# Patient Record
Sex: FEMALE | Race: WHITE | NOT HISPANIC OR LATINO | Employment: OTHER | URBAN - METROPOLITAN AREA
[De-identification: names, ages, dates, MRNs, and addresses within clinical notes are randomized per-mention and may not be internally consistent; named-entity substitution may affect disease eponyms.]

---

## 2017-01-13 ENCOUNTER — TRANSCRIBE ORDERS (OUTPATIENT)
Dept: LAB | Facility: CLINIC | Age: 62
End: 2017-01-13

## 2017-01-13 ENCOUNTER — APPOINTMENT (OUTPATIENT)
Dept: LAB | Facility: CLINIC | Age: 62
End: 2017-01-13
Payer: COMMERCIAL

## 2017-01-13 DIAGNOSIS — M06.9 RHEUMATOID ARTHRITIS, INVOLVING UNSPECIFIED SITE, UNSPECIFIED RHEUMATOID FACTOR PRESENCE: Primary | ICD-10-CM

## 2017-01-13 DIAGNOSIS — R73.09 OTHER ABNORMAL GLUCOSE: ICD-10-CM

## 2017-01-13 LAB
ALBUMIN SERPL BCP-MCNC: 3.6 G/DL (ref 3.5–5)
ALP SERPL-CCNC: 104 U/L (ref 46–116)
ALT SERPL W P-5'-P-CCNC: 76 U/L (ref 12–78)
ANION GAP SERPL CALCULATED.3IONS-SCNC: 7 MMOL/L (ref 4–13)
AST SERPL W P-5'-P-CCNC: 35 U/L (ref 5–45)
BILIRUB SERPL-MCNC: 0.54 MG/DL (ref 0.2–1)
BILIRUB UR QL STRIP: NEGATIVE
BUN SERPL-MCNC: 18 MG/DL (ref 5–25)
CALCIUM SERPL-MCNC: 9.2 MG/DL (ref 8.3–10.1)
CHLORIDE SERPL-SCNC: 102 MMOL/L (ref 100–108)
CLARITY UR: CLEAR
CO2 SERPL-SCNC: 27 MMOL/L (ref 21–32)
COLOR UR: YELLOW
CREAT SERPL-MCNC: 0.74 MG/DL (ref 0.6–1.3)
CRP SERPL QL: 10.6 MG/L
ERYTHROCYTE [DISTWIDTH] IN BLOOD BY AUTOMATED COUNT: 13.9 % (ref 11.6–15.1)
ERYTHROCYTE [SEDIMENTATION RATE] IN BLOOD: 49 MM/HOUR (ref 0–20)
GFR SERPL CREATININE-BSD FRML MDRD: >60 ML/MIN/1.73SQ M
GLUCOSE SERPL-MCNC: 207 MG/DL (ref 65–140)
GLUCOSE UR STRIP-MCNC: NEGATIVE MG/DL
HCT VFR BLD AUTO: 38.3 % (ref 34.8–46.1)
HGB BLD-MCNC: 12.8 G/DL (ref 11.5–15.4)
HGB UR QL STRIP.AUTO: NEGATIVE
KETONES UR STRIP-MCNC: NEGATIVE MG/DL
LEUKOCYTE ESTERASE UR QL STRIP: NEGATIVE
MCH RBC QN AUTO: 30.6 PG (ref 26.8–34.3)
MCHC RBC AUTO-ENTMCNC: 33.4 G/DL (ref 31.4–37.4)
MCV RBC AUTO: 92 FL (ref 82–98)
NITRITE UR QL STRIP: NEGATIVE
PH UR STRIP.AUTO: 6.5 [PH] (ref 4.5–8)
PLATELET # BLD AUTO: 281 THOUSANDS/UL (ref 149–390)
PMV BLD AUTO: 10.5 FL (ref 8.9–12.7)
POTASSIUM SERPL-SCNC: 4.1 MMOL/L (ref 3.5–5.3)
PROT SERPL-MCNC: 7.8 G/DL (ref 6.4–8.2)
PROT UR STRIP-MCNC: NEGATIVE MG/DL
RBC # BLD AUTO: 4.18 MILLION/UL (ref 3.81–5.12)
SODIUM SERPL-SCNC: 136 MMOL/L (ref 136–145)
SP GR UR STRIP.AUTO: 1.02 (ref 1–1.03)
UROBILINOGEN UR QL STRIP.AUTO: 0.2 E.U./DL
WBC # BLD AUTO: 7.29 THOUSAND/UL (ref 4.31–10.16)

## 2017-01-13 PROCEDURE — 85027 COMPLETE CBC AUTOMATED: CPT

## 2017-01-13 PROCEDURE — 80053 COMPREHEN METABOLIC PANEL: CPT

## 2017-01-13 PROCEDURE — 85652 RBC SED RATE AUTOMATED: CPT

## 2017-01-13 PROCEDURE — 86140 C-REACTIVE PROTEIN: CPT

## 2017-01-13 PROCEDURE — 81003 URINALYSIS AUTO W/O SCOPE: CPT

## 2017-01-13 PROCEDURE — 36415 COLL VENOUS BLD VENIPUNCTURE: CPT

## 2017-01-25 ENCOUNTER — ALLSCRIPTS OFFICE VISIT (OUTPATIENT)
Dept: OTHER | Facility: OTHER | Age: 62
End: 2017-01-25

## 2017-02-06 ENCOUNTER — ALLSCRIPTS OFFICE VISIT (OUTPATIENT)
Dept: OTHER | Facility: OTHER | Age: 62
End: 2017-02-06

## 2017-02-08 DIAGNOSIS — E11.9 TYPE 2 DIABETES MELLITUS WITHOUT COMPLICATIONS (HCC): ICD-10-CM

## 2017-02-16 ENCOUNTER — APPOINTMENT (OUTPATIENT)
Dept: LAB | Facility: CLINIC | Age: 62
End: 2017-02-16
Payer: COMMERCIAL

## 2017-02-16 DIAGNOSIS — E11.9 TYPE 2 DIABETES MELLITUS WITHOUT COMPLICATIONS (HCC): ICD-10-CM

## 2017-02-16 LAB
ALBUMIN SERPL BCP-MCNC: 3.8 G/DL (ref 3.5–5)
ALP SERPL-CCNC: 121 U/L (ref 46–116)
ALT SERPL W P-5'-P-CCNC: 77 U/L (ref 12–78)
ANION GAP SERPL CALCULATED.3IONS-SCNC: 9 MMOL/L (ref 4–13)
AST SERPL W P-5'-P-CCNC: 36 U/L (ref 5–45)
BILIRUB SERPL-MCNC: 0.69 MG/DL (ref 0.2–1)
BUN SERPL-MCNC: 23 MG/DL (ref 5–25)
CALCIUM SERPL-MCNC: 9.2 MG/DL (ref 8.3–10.1)
CHLORIDE SERPL-SCNC: 98 MMOL/L (ref 100–108)
CO2 SERPL-SCNC: 27 MMOL/L (ref 21–32)
CREAT SERPL-MCNC: 0.84 MG/DL (ref 0.6–1.3)
EST. AVERAGE GLUCOSE BLD GHB EST-MCNC: 194 MG/DL
GFR SERPL CREATININE-BSD FRML MDRD: >60 ML/MIN/1.73SQ M
GLUCOSE SERPL-MCNC: 297 MG/DL (ref 65–140)
HBA1C MFR BLD: 8.4 % (ref 4.2–6.3)
POTASSIUM SERPL-SCNC: 4.1 MMOL/L (ref 3.5–5.3)
PROT SERPL-MCNC: 8.2 G/DL (ref 6.4–8.2)
SODIUM SERPL-SCNC: 134 MMOL/L (ref 136–145)

## 2017-02-16 PROCEDURE — 80053 COMPREHEN METABOLIC PANEL: CPT

## 2017-02-16 PROCEDURE — 36415 COLL VENOUS BLD VENIPUNCTURE: CPT

## 2017-02-16 PROCEDURE — 83036 HEMOGLOBIN GLYCOSYLATED A1C: CPT

## 2017-02-28 ENCOUNTER — ALLSCRIPTS OFFICE VISIT (OUTPATIENT)
Dept: OTHER | Facility: OTHER | Age: 62
End: 2017-02-28

## 2017-03-05 ENCOUNTER — HOSPITAL ENCOUNTER (EMERGENCY)
Facility: HOSPITAL | Age: 62
Discharge: HOME | End: 2017-03-05
Attending: EMERGENCY MEDICINE
Payer: COMMERCIAL

## 2017-03-05 VITALS
DIASTOLIC BLOOD PRESSURE: 72 MMHG | OXYGEN SATURATION: 95 % | HEART RATE: 89 BPM | HEIGHT: 63 IN | SYSTOLIC BLOOD PRESSURE: 160 MMHG | RESPIRATION RATE: 18 BRPM | WEIGHT: 238.54 LBS | BODY MASS INDEX: 42.27 KG/M2

## 2017-03-05 DIAGNOSIS — R73.9 HYPERGLYCEMIA: Primary | ICD-10-CM

## 2017-03-05 LAB
ACETONE SERPL-MCNC: ABNORMAL MG/DL
ALBUMIN SERPL BCP-MCNC: 3.5 G/DL (ref 3.5–5)
ALP SERPL-CCNC: 131 U/L (ref 46–116)
ALT SERPL W P-5'-P-CCNC: 75 U/L (ref 12–78)
ANION GAP SERPL CALCULATED.3IONS-SCNC: 7 MMOL/L (ref 4–13)
AST SERPL W P-5'-P-CCNC: 37 U/L (ref 5–45)
BASOPHILS # BLD AUTO: 0.02 THOUSANDS/ΜL (ref 0–0.1)
BASOPHILS NFR BLD AUTO: 0 % (ref 0–1)
BILIRUB SERPL-MCNC: 0.3 MG/DL (ref 0.2–1)
BUN SERPL-MCNC: 20 MG/DL (ref 5–25)
CALCIUM SERPL-MCNC: 9.1 MG/DL (ref 8.3–10.1)
CHLORIDE SERPL-SCNC: 97 MMOL/L (ref 100–108)
CLARITY, POC: CLEAR
CO2 SERPL-SCNC: 28 MMOL/L (ref 21–32)
COLOR, POC: YELLOW
CREAT SERPL-MCNC: 0.76 MG/DL (ref 0.6–1.3)
EOSINOPHIL # BLD AUTO: 0.21 THOUSAND/ΜL (ref 0–0.61)
EOSINOPHIL NFR BLD AUTO: 3 % (ref 0–6)
ERYTHROCYTE [DISTWIDTH] IN BLOOD BY AUTOMATED COUNT: 12.7 % (ref 11.6–15.1)
EST. AVERAGE GLUCOSE BLD GHB EST-MCNC: 220 MG/DL
EXT BILIRUBIN, UA: NEGATIVE
EXT BLOOD URINE: NEGATIVE
EXT GLUCOSE, UA: NORMAL
EXT KETONES: NORMAL
EXT NITRITE, UA: NEGATIVE
EXT PH, UA: 6
EXT PROTEIN, UA: NEGATIVE
EXT SPECIFIC GRAVITY, UA: 1.01
EXT UROBILINOGEN: 0.2
GFR SERPL CREATININE-BSD FRML MDRD: >60 ML/MIN/1.73SQ M
GLUCOSE SERPL-MCNC: 295 MG/DL (ref 65–140)
GLUCOSE SERPL-MCNC: 342 MG/DL (ref 65–140)
HBA1C MFR BLD: 9.3 % (ref 4.2–6.3)
HCT VFR BLD AUTO: 39.3 % (ref 34.8–46.1)
HGB BLD-MCNC: 13.1 G/DL (ref 11.5–15.4)
LYMPHOCYTES # BLD AUTO: 1.8 THOUSANDS/ΜL (ref 0.6–4.47)
LYMPHOCYTES NFR BLD AUTO: 29 % (ref 14–44)
MCH RBC QN AUTO: 29.6 PG (ref 26.8–34.3)
MCHC RBC AUTO-ENTMCNC: 33.3 G/DL (ref 31.4–37.4)
MCV RBC AUTO: 89 FL (ref 82–98)
MONOCYTES # BLD AUTO: 0.91 THOUSAND/ΜL (ref 0.17–1.22)
MONOCYTES NFR BLD AUTO: 15 % (ref 4–12)
NEUTROPHILS # BLD AUTO: 3.27 THOUSANDS/ΜL (ref 1.85–7.62)
NEUTS SEG NFR BLD AUTO: 53 % (ref 43–75)
PLATELET # BLD AUTO: 261 THOUSANDS/UL (ref 149–390)
PMV BLD AUTO: 10 FL (ref 8.9–12.7)
POTASSIUM SERPL-SCNC: 4.2 MMOL/L (ref 3.5–5.3)
PROT SERPL-MCNC: 7.4 G/DL (ref 6.4–8.2)
RBC # BLD AUTO: 4.43 MILLION/UL (ref 3.81–5.12)
SODIUM SERPL-SCNC: 132 MMOL/L (ref 136–145)
T4 FREE SERPL-MCNC: 1.2 NG/DL (ref 0.76–1.46)
TSH SERPL DL<=0.05 MIU/L-ACNC: 3.8 UIU/ML (ref 0.36–3.74)
WBC # BLD AUTO: 6.21 THOUSAND/UL (ref 4.31–10.16)
WBC # BLD EST: NEGATIVE 10*3/UL

## 2017-03-05 PROCEDURE — 85025 COMPLETE CBC W/AUTO DIFF WBC: CPT | Performed by: PHYSICIAN ASSISTANT

## 2017-03-05 PROCEDURE — 84443 ASSAY THYROID STIM HORMONE: CPT | Performed by: PHYSICIAN ASSISTANT

## 2017-03-05 PROCEDURE — 82948 REAGENT STRIP/BLOOD GLUCOSE: CPT

## 2017-03-05 PROCEDURE — 82009 KETONE BODYS QUAL: CPT | Performed by: PHYSICIAN ASSISTANT

## 2017-03-05 PROCEDURE — 96360 HYDRATION IV INFUSION INIT: CPT

## 2017-03-05 PROCEDURE — 83036 HEMOGLOBIN GLYCOSYLATED A1C: CPT | Performed by: PHYSICIAN ASSISTANT

## 2017-03-05 PROCEDURE — 84480 ASSAY TRIIODOTHYRONINE (T3): CPT | Performed by: PHYSICIAN ASSISTANT

## 2017-03-05 PROCEDURE — 80053 COMPREHEN METABOLIC PANEL: CPT | Performed by: PHYSICIAN ASSISTANT

## 2017-03-05 PROCEDURE — 36415 COLL VENOUS BLD VENIPUNCTURE: CPT | Performed by: PHYSICIAN ASSISTANT

## 2017-03-05 PROCEDURE — 81002 URINALYSIS NONAUTO W/O SCOPE: CPT | Performed by: PHYSICIAN ASSISTANT

## 2017-03-05 PROCEDURE — 84439 ASSAY OF FREE THYROXINE: CPT | Performed by: PHYSICIAN ASSISTANT

## 2017-03-05 PROCEDURE — 99285 EMERGENCY DEPT VISIT HI MDM: CPT

## 2017-03-05 RX ORDER — CHOLECALCIFEROL (VITAMIN D3) 50 MCG
2000 TABLET ORAL DAILY
COMMUNITY

## 2017-03-05 RX ORDER — PHENOL 1.4 %
400 AEROSOL, SPRAY (ML) MUCOUS MEMBRANE DAILY
COMMUNITY

## 2017-03-05 RX ORDER — SACCHAROMYCES BOULARDII 250 MG
CAPSULE ORAL DAILY
COMMUNITY
End: 2018-06-05 | Stop reason: SDUPTHER

## 2017-03-05 RX ORDER — UREA 10 %
800 LOTION (ML) TOPICAL DAILY
COMMUNITY

## 2017-03-05 RX ORDER — GARLIC 180 MG
40 TABLET, DELAYED RELEASE (ENTERIC COATED) ORAL DAILY
COMMUNITY
End: 2021-02-12 | Stop reason: ALTCHOICE

## 2017-03-05 RX ORDER — VALSARTAN 160 MG/1
160 TABLET ORAL DAILY
COMMUNITY
End: 2018-02-08 | Stop reason: SDUPTHER

## 2017-03-05 RX ORDER — MULTIVITAMIN
1 TABLET ORAL DAILY
COMMUNITY

## 2017-03-05 RX ORDER — OMEPRAZOLE 20 MG/1
20 CAPSULE, DELAYED RELEASE ORAL DAILY
COMMUNITY

## 2017-03-05 RX ORDER — METFORMIN HYDROCHLORIDE 1000 MG/1
1000 TABLET, FILM COATED, EXTENDED RELEASE ORAL 2 TIMES DAILY WITH MEALS
COMMUNITY

## 2017-03-05 RX ORDER — OMEGA-3-ACID ETHYL ESTERS 1 G/1
1000 CAPSULE, LIQUID FILLED ORAL DAILY
COMMUNITY
End: 2022-07-18 | Stop reason: ALTCHOICE

## 2017-03-05 RX ADMIN — SODIUM CHLORIDE 1000 ML: 0.9 INJECTION, SOLUTION INTRAVENOUS at 20:31

## 2017-03-06 ENCOUNTER — ALLSCRIPTS OFFICE VISIT (OUTPATIENT)
Dept: OTHER | Facility: OTHER | Age: 62
End: 2017-03-06

## 2017-03-06 LAB — T3 SERPL-MCNC: 1.2 NG/ML (ref 0.6–1.8)

## 2017-03-09 ENCOUNTER — ALLSCRIPTS OFFICE VISIT (OUTPATIENT)
Dept: OTHER | Facility: OTHER | Age: 62
End: 2017-03-09

## 2017-03-12 LAB — GLUCOSE SERPL-MCNC: 282 MG/DL (ref 65–140)

## 2017-03-21 ENCOUNTER — HOSPITAL ENCOUNTER (OUTPATIENT)
Dept: RADIOLOGY | Age: 62
Discharge: HOME | End: 2017-03-21
Payer: COMMERCIAL

## 2017-03-21 ENCOUNTER — TRANSCRIBE ORDERS (OUTPATIENT)
Dept: ADMINISTRATIVE | Age: 62
End: 2017-03-21

## 2017-03-21 PROCEDURE — 74000 HB X-RAY EXAM OF ABDOMEN (SINGLE ANTEROPOSTERIOR VIEW): CPT

## 2017-04-01 ENCOUNTER — HOSPITAL ENCOUNTER (OUTPATIENT)
Dept: RADIOLOGY | Age: 62
Discharge: HOME | End: 2017-04-01
Payer: COMMERCIAL

## 2017-04-01 DIAGNOSIS — N20.0 CALCULUS OF KIDNEY: ICD-10-CM

## 2017-04-03 ENCOUNTER — ALLSCRIPTS OFFICE VISIT (OUTPATIENT)
Dept: OTHER | Facility: OTHER | Age: 62
End: 2017-04-03

## 2017-04-03 ENCOUNTER — LAB REQUISITION (OUTPATIENT)
Dept: LAB | Facility: HOSPITAL | Age: 62
End: 2017-04-03
Payer: COMMERCIAL

## 2017-04-03 DIAGNOSIS — R35.0 FREQUENCY OF MICTURITION: ICD-10-CM

## 2017-04-03 LAB
BILIRUB UR QL STRIP: NEGATIVE
CLARITY UR: NORMAL
COLOR UR: YELLOW
GLUCOSE (HISTORICAL): NEGATIVE
HGB UR QL STRIP.AUTO: NEGATIVE
KETONES UR STRIP-MCNC: NEGATIVE MG/DL
LEUKOCYTE ESTERASE UR QL STRIP: NEGATIVE
NITRITE UR QL STRIP: NEGATIVE
PH UR STRIP.AUTO: 7 [PH]
PROT UR STRIP-MCNC: 15 MG/DL
SP GR UR STRIP.AUTO: 1.01
UROBILINOGEN UR QL STRIP.AUTO: 0.2

## 2017-04-03 PROCEDURE — 87086 URINE CULTURE/COLONY COUNT: CPT | Performed by: INTERNAL MEDICINE

## 2017-04-04 LAB — BACTERIA UR CULT: NORMAL

## 2017-04-05 ENCOUNTER — GENERIC CONVERSION - ENCOUNTER (OUTPATIENT)
Dept: OTHER | Facility: OTHER | Age: 62
End: 2017-04-05

## 2017-04-10 ENCOUNTER — GENERIC CONVERSION - ENCOUNTER (OUTPATIENT)
Dept: OTHER | Facility: OTHER | Age: 62
End: 2017-04-10

## 2017-04-12 ENCOUNTER — GENERIC CONVERSION - ENCOUNTER (OUTPATIENT)
Dept: OTHER | Facility: OTHER | Age: 62
End: 2017-04-12

## 2017-04-13 ENCOUNTER — APPOINTMENT (OUTPATIENT)
Dept: LAB | Facility: MEDICAL CENTER | Age: 62
End: 2017-04-13
Payer: COMMERCIAL

## 2017-04-13 ENCOUNTER — TRANSCRIBE ORDERS (OUTPATIENT)
Dept: ADMINISTRATIVE | Facility: HOSPITAL | Age: 62
End: 2017-04-13

## 2017-04-13 DIAGNOSIS — E11.9 TYPE 2 DIABETES MELLITUS WITHOUT COMPLICATION, UNSPECIFIED LONG TERM INSULIN USE STATUS: ICD-10-CM

## 2017-04-13 DIAGNOSIS — R94.6 NONSPECIFIC ABNORMAL RESULTS OF THYROID FUNCTION STUDY: ICD-10-CM

## 2017-04-13 DIAGNOSIS — E11.9 TYPE 2 DIABETES MELLITUS WITHOUT COMPLICATION, UNSPECIFIED LONG TERM INSULIN USE STATUS: Primary | ICD-10-CM

## 2017-04-13 DIAGNOSIS — R79.89 OTHER ABNORMAL BLOOD CHEMISTRY: ICD-10-CM

## 2017-04-13 LAB
ALBUMIN SERPL BCP-MCNC: 3.5 G/DL (ref 3.5–5)
ALP SERPL-CCNC: 113 U/L (ref 46–116)
ALT SERPL W P-5'-P-CCNC: 62 U/L (ref 12–78)
AST SERPL W P-5'-P-CCNC: 30 U/L (ref 5–45)
BILIRUB DIRECT SERPL-MCNC: 0.13 MG/DL (ref 0–0.2)
BILIRUB SERPL-MCNC: 0.45 MG/DL (ref 0.2–1)
CHOLEST SERPL-MCNC: 245 MG/DL (ref 50–200)
GLUCOSE P FAST SERPL-MCNC: 186 MG/DL (ref 65–99)
HDLC SERPL-MCNC: 82 MG/DL (ref 40–60)
LDLC SERPL CALC-MCNC: 134 MG/DL (ref 0–100)
PROT SERPL-MCNC: 7.6 G/DL (ref 6.4–8.2)
TRIGL SERPL-MCNC: 143 MG/DL
TSH SERPL DL<=0.05 MIU/L-ACNC: 3.03 UIU/ML (ref 0.36–3.74)

## 2017-04-13 PROCEDURE — 80076 HEPATIC FUNCTION PANEL: CPT

## 2017-04-13 PROCEDURE — 84443 ASSAY THYROID STIM HORMONE: CPT

## 2017-04-13 PROCEDURE — 84681 ASSAY OF C-PEPTIDE: CPT

## 2017-04-13 PROCEDURE — 83519 RIA NONANTIBODY: CPT

## 2017-04-13 PROCEDURE — 80061 LIPID PANEL: CPT

## 2017-04-13 PROCEDURE — 82947 ASSAY GLUCOSE BLOOD QUANT: CPT

## 2017-04-13 PROCEDURE — 36415 COLL VENOUS BLD VENIPUNCTURE: CPT

## 2017-04-14 LAB — C PEPTIDE SERPL-MCNC: 2.3 NG/ML (ref 1.1–4.4)

## 2017-04-17 LAB — GAD65 AB SER-ACNC: <5 U/ML (ref 0–5)

## 2017-05-02 ENCOUNTER — ALLSCRIPTS OFFICE VISIT (OUTPATIENT)
Dept: OTHER | Facility: OTHER | Age: 62
End: 2017-05-02

## 2017-05-16 ENCOUNTER — GENERIC CONVERSION - ENCOUNTER (OUTPATIENT)
Dept: OTHER | Facility: OTHER | Age: 62
End: 2017-05-16

## 2017-05-24 ENCOUNTER — ALLSCRIPTS OFFICE VISIT (OUTPATIENT)
Dept: OTHER | Facility: OTHER | Age: 62
End: 2017-05-24

## 2017-06-05 ENCOUNTER — GENERIC CONVERSION - ENCOUNTER (OUTPATIENT)
Dept: OTHER | Facility: OTHER | Age: 62
End: 2017-06-05

## 2017-06-14 ENCOUNTER — ALLSCRIPTS OFFICE VISIT (OUTPATIENT)
Dept: OTHER | Facility: OTHER | Age: 62
End: 2017-06-14

## 2017-07-18 ENCOUNTER — ALLSCRIPTS OFFICE VISIT (OUTPATIENT)
Dept: OTHER | Facility: OTHER | Age: 62
End: 2017-07-18

## 2017-07-19 ENCOUNTER — GENERIC CONVERSION - ENCOUNTER (OUTPATIENT)
Dept: OTHER | Facility: OTHER | Age: 62
End: 2017-07-19

## 2017-07-26 ENCOUNTER — GENERIC CONVERSION - ENCOUNTER (OUTPATIENT)
Dept: OTHER | Facility: OTHER | Age: 62
End: 2017-07-26

## 2017-07-27 ENCOUNTER — GENERIC CONVERSION - ENCOUNTER (OUTPATIENT)
Dept: OTHER | Facility: OTHER | Age: 62
End: 2017-07-27

## 2017-07-27 ENCOUNTER — LAB REQUISITION (OUTPATIENT)
Dept: LAB | Facility: HOSPITAL | Age: 62
End: 2017-07-27
Payer: COMMERCIAL

## 2017-07-27 DIAGNOSIS — Z11.51 ENCOUNTER FOR SCREENING FOR HUMAN PAPILLOMAVIRUS (HPV): ICD-10-CM

## 2017-07-27 DIAGNOSIS — Z01.419 ENCOUNTER FOR GYNECOLOGICAL EXAMINATION WITHOUT ABNORMAL FINDING: ICD-10-CM

## 2017-07-27 PROCEDURE — G0145 SCR C/V CYTO,THINLAYER,RESCR: HCPCS | Performed by: OBSTETRICS & GYNECOLOGY

## 2017-07-27 PROCEDURE — 87624 HPV HI-RISK TYP POOLED RSLT: CPT | Performed by: OBSTETRICS & GYNECOLOGY

## 2017-07-31 ENCOUNTER — APPOINTMENT (OUTPATIENT)
Dept: LAB | Facility: MEDICAL CENTER | Age: 62
End: 2017-07-31
Payer: COMMERCIAL

## 2017-07-31 ENCOUNTER — TRANSCRIBE ORDERS (OUTPATIENT)
Dept: ADMINISTRATIVE | Facility: HOSPITAL | Age: 62
End: 2017-07-31

## 2017-07-31 DIAGNOSIS — M06.9 RHEUMATOID ARTHRITIS, INVOLVING UNSPECIFIED SITE, UNSPECIFIED RHEUMATOID FACTOR PRESENCE: Primary | ICD-10-CM

## 2017-07-31 DIAGNOSIS — E11.8 TYPE 2 DIABETES MELLITUS WITH COMPLICATION, UNSPECIFIED LONG TERM INSULIN USE STATUS: Primary | ICD-10-CM

## 2017-07-31 LAB
ALBUMIN SERPL BCP-MCNC: 3.4 G/DL (ref 3.5–5)
ALP SERPL-CCNC: 98 U/L (ref 46–116)
ALT SERPL W P-5'-P-CCNC: 46 U/L (ref 12–78)
ANION GAP SERPL CALCULATED.3IONS-SCNC: 6 MMOL/L (ref 4–13)
AST SERPL W P-5'-P-CCNC: 24 U/L (ref 5–45)
BACTERIA UR QL AUTO: ABNORMAL /HPF
BILIRUB DIRECT SERPL-MCNC: 0.15 MG/DL (ref 0–0.2)
BILIRUB SERPL-MCNC: 0.62 MG/DL (ref 0.2–1)
BILIRUB UR QL STRIP: NEGATIVE
BUN SERPL-MCNC: 24 MG/DL (ref 5–25)
CALCIUM SERPL-MCNC: 9.3 MG/DL (ref 8.3–10.1)
CHLORIDE SERPL-SCNC: 102 MMOL/L (ref 100–108)
CHOLEST SERPL-MCNC: 261 MG/DL (ref 50–200)
CLARITY UR: CLEAR
CO2 SERPL-SCNC: 28 MMOL/L (ref 21–32)
COLOR UR: YELLOW
CREAT SERPL-MCNC: 0.83 MG/DL (ref 0.6–1.3)
CREAT UR-MCNC: 103 MG/DL
CRP SERPL QL: 11 MG/L
ERYTHROCYTE [DISTWIDTH] IN BLOOD BY AUTOMATED COUNT: 13.2 % (ref 11.6–15.1)
ERYTHROCYTE [SEDIMENTATION RATE] IN BLOOD: 55 MM/HOUR (ref 0–20)
EST. AVERAGE GLUCOSE BLD GHB EST-MCNC: 171 MG/DL
GFR SERPL CREATININE-BSD FRML MDRD: 76 ML/MIN/1.73SQ M
GLUCOSE P FAST SERPL-MCNC: 159 MG/DL (ref 65–99)
GLUCOSE UR STRIP-MCNC: NEGATIVE MG/DL
HBA1C MFR BLD: 7.6 % (ref 4.2–6.3)
HCT VFR BLD AUTO: 37.8 % (ref 34.8–46.1)
HDLC SERPL-MCNC: 73 MG/DL (ref 40–60)
HGB BLD-MCNC: 12.7 G/DL (ref 11.5–15.4)
HGB UR QL STRIP.AUTO: NEGATIVE
HYALINE CASTS #/AREA URNS LPF: ABNORMAL /LPF
KETONES UR STRIP-MCNC: NEGATIVE MG/DL
LDLC SERPL CALC-MCNC: 163 MG/DL (ref 0–100)
LEUKOCYTE ESTERASE UR QL STRIP: ABNORMAL
MCH RBC QN AUTO: 29.7 PG (ref 26.8–34.3)
MCHC RBC AUTO-ENTMCNC: 33.6 G/DL (ref 31.4–37.4)
MCV RBC AUTO: 89 FL (ref 82–98)
MICROALBUMIN UR-MCNC: 13.8 MG/L (ref 0–20)
MICROALBUMIN/CREAT 24H UR: 13 MG/G CREATININE (ref 0–30)
NITRITE UR QL STRIP: NEGATIVE
NON-SQ EPI CELLS URNS QL MICRO: ABNORMAL /HPF
PH UR STRIP.AUTO: 6.5 [PH] (ref 4.5–8)
PLATELET # BLD AUTO: 279 THOUSANDS/UL (ref 149–390)
PMV BLD AUTO: 10.6 FL (ref 8.9–12.7)
POTASSIUM SERPL-SCNC: 4.2 MMOL/L (ref 3.5–5.3)
PROT SERPL-MCNC: 7.5 G/DL (ref 6.4–8.2)
PROT UR STRIP-MCNC: NEGATIVE MG/DL
RBC # BLD AUTO: 4.27 MILLION/UL (ref 3.81–5.12)
RBC #/AREA URNS AUTO: ABNORMAL /HPF
SODIUM SERPL-SCNC: 136 MMOL/L (ref 136–145)
SP GR UR STRIP.AUTO: 1.02 (ref 1–1.03)
TRIGL SERPL-MCNC: 125 MG/DL
TSH SERPL DL<=0.05 MIU/L-ACNC: 2.66 UIU/ML (ref 0.36–3.74)
UROBILINOGEN UR QL STRIP.AUTO: 0.2 E.U./DL
WBC # BLD AUTO: 7.88 THOUSAND/UL (ref 4.31–10.16)
WBC #/AREA URNS AUTO: ABNORMAL /HPF

## 2017-07-31 PROCEDURE — 82248 BILIRUBIN DIRECT: CPT | Performed by: INTERNAL MEDICINE

## 2017-07-31 PROCEDURE — 85027 COMPLETE CBC AUTOMATED: CPT | Performed by: INTERNAL MEDICINE

## 2017-07-31 PROCEDURE — 85652 RBC SED RATE AUTOMATED: CPT | Performed by: INTERNAL MEDICINE

## 2017-07-31 PROCEDURE — 80053 COMPREHEN METABOLIC PANEL: CPT | Performed by: INTERNAL MEDICINE

## 2017-07-31 PROCEDURE — 36415 COLL VENOUS BLD VENIPUNCTURE: CPT | Performed by: INTERNAL MEDICINE

## 2017-07-31 PROCEDURE — 82570 ASSAY OF URINE CREATININE: CPT | Performed by: INTERNAL MEDICINE

## 2017-07-31 PROCEDURE — 81001 URINALYSIS AUTO W/SCOPE: CPT | Performed by: INTERNAL MEDICINE

## 2017-07-31 PROCEDURE — 80061 LIPID PANEL: CPT | Performed by: INTERNAL MEDICINE

## 2017-07-31 PROCEDURE — 86140 C-REACTIVE PROTEIN: CPT | Performed by: INTERNAL MEDICINE

## 2017-07-31 PROCEDURE — 82043 UR ALBUMIN QUANTITATIVE: CPT | Performed by: INTERNAL MEDICINE

## 2017-07-31 PROCEDURE — 83036 HEMOGLOBIN GLYCOSYLATED A1C: CPT | Performed by: INTERNAL MEDICINE

## 2017-07-31 PROCEDURE — 84443 ASSAY THYROID STIM HORMONE: CPT | Performed by: INTERNAL MEDICINE

## 2017-08-01 ENCOUNTER — GENERIC CONVERSION - ENCOUNTER (OUTPATIENT)
Dept: OTHER | Facility: OTHER | Age: 62
End: 2017-08-01

## 2017-08-01 ENCOUNTER — HOSPITAL ENCOUNTER (OUTPATIENT)
Dept: RADIOLOGY | Age: 62
Discharge: HOME | End: 2017-08-01
Payer: COMMERCIAL

## 2017-08-01 DIAGNOSIS — Z12.31 VISIT FOR SCREENING MAMMOGRAM: ICD-10-CM

## 2017-08-01 PROCEDURE — G0202 SCR MAMMO BI INCL CAD: HCPCS

## 2017-08-02 LAB — HPV RRNA GENITAL QL NAA+PROBE: NORMAL

## 2017-08-03 LAB
LAB AP GYN PRIMARY INTERPRETATION: NORMAL
Lab: NORMAL

## 2017-08-09 ENCOUNTER — GENERIC CONVERSION - ENCOUNTER (OUTPATIENT)
Dept: OTHER | Facility: OTHER | Age: 62
End: 2017-08-09

## 2017-08-11 ENCOUNTER — GENERIC CONVERSION - ENCOUNTER (OUTPATIENT)
Dept: OTHER | Facility: OTHER | Age: 62
End: 2017-08-11

## 2017-09-14 ENCOUNTER — ALLSCRIPTS OFFICE VISIT (OUTPATIENT)
Dept: OTHER | Facility: OTHER | Age: 62
End: 2017-09-14

## 2017-09-19 ENCOUNTER — GENERIC CONVERSION - ENCOUNTER (OUTPATIENT)
Dept: OTHER | Facility: OTHER | Age: 62
End: 2017-09-19

## 2017-09-21 ENCOUNTER — TRANSCRIBE ORDERS (OUTPATIENT)
Dept: ADMINISTRATIVE | Facility: HOSPITAL | Age: 62
End: 2017-09-21

## 2017-09-21 DIAGNOSIS — R10.31 GROIN PAIN, RIGHT: ICD-10-CM

## 2017-09-21 DIAGNOSIS — R10.31 RIGHT LOWER QUADRANT ABDOMINAL PAIN: Primary | ICD-10-CM

## 2017-09-25 DIAGNOSIS — R10.31 RIGHT LOWER QUADRANT PAIN: ICD-10-CM

## 2017-09-27 ENCOUNTER — HOSPITAL ENCOUNTER (OUTPATIENT)
Dept: RADIOLOGY | Age: 62
Discharge: HOME | End: 2017-09-27
Payer: COMMERCIAL

## 2017-09-27 DIAGNOSIS — R10.31 RIGHT LOWER QUADRANT PAIN: ICD-10-CM

## 2017-09-27 PROCEDURE — 76705 ECHO EXAM OF ABDOMEN: CPT

## 2017-10-02 ENCOUNTER — GENERIC CONVERSION - ENCOUNTER (OUTPATIENT)
Dept: OTHER | Facility: OTHER | Age: 62
End: 2017-10-02

## 2017-10-30 ENCOUNTER — GENERIC CONVERSION - ENCOUNTER (OUTPATIENT)
Dept: OTHER | Facility: OTHER | Age: 62
End: 2017-10-30

## 2017-11-10 ENCOUNTER — APPOINTMENT (OUTPATIENT)
Dept: LAB | Facility: MEDICAL CENTER | Age: 62
End: 2017-11-10
Payer: COMMERCIAL

## 2017-11-10 ENCOUNTER — TRANSCRIBE ORDERS (OUTPATIENT)
Dept: ADMINISTRATIVE | Facility: HOSPITAL | Age: 62
End: 2017-11-10

## 2017-11-10 DIAGNOSIS — M06.9 RHEUMATOID ARTHRITIS, INVOLVING UNSPECIFIED SITE, UNSPECIFIED RHEUMATOID FACTOR PRESENCE: Primary | ICD-10-CM

## 2017-11-10 DIAGNOSIS — IMO0001 UNCONTROLLED TYPE 2 DIABETES MELLITUS WITHOUT COMPLICATION, WITH LONG-TERM CURRENT USE OF INSULIN: Primary | ICD-10-CM

## 2017-11-10 LAB
25(OH)D3 SERPL-MCNC: 42.5 NG/ML (ref 30–100)
ALBUMIN SERPL BCP-MCNC: 3.5 G/DL (ref 3.5–5)
ALP SERPL-CCNC: 103 U/L (ref 46–116)
ALT SERPL W P-5'-P-CCNC: 49 U/L (ref 12–78)
ANION GAP SERPL CALCULATED.3IONS-SCNC: 9 MMOL/L (ref 4–13)
AST SERPL W P-5'-P-CCNC: 25 U/L (ref 5–45)
BILIRUB SERPL-MCNC: 0.48 MG/DL (ref 0.2–1)
BUN SERPL-MCNC: 22 MG/DL (ref 5–25)
CALCIUM SERPL-MCNC: 9.8 MG/DL (ref 8.3–10.1)
CHLORIDE SERPL-SCNC: 100 MMOL/L (ref 100–108)
CK SERPL-CCNC: 55 U/L (ref 26–192)
CO2 SERPL-SCNC: 26 MMOL/L (ref 21–32)
CREAT SERPL-MCNC: 0.74 MG/DL (ref 0.6–1.3)
CRP SERPL QL: 11.1 MG/L
ERYTHROCYTE [DISTWIDTH] IN BLOOD BY AUTOMATED COUNT: 13.3 % (ref 11.6–15.1)
ERYTHROCYTE [SEDIMENTATION RATE] IN BLOOD: 66 MM/HOUR (ref 0–20)
EST. AVERAGE GLUCOSE BLD GHB EST-MCNC: 186 MG/DL
GFR SERPL CREATININE-BSD FRML MDRD: 87 ML/MIN/1.73SQ M
GLUCOSE P FAST SERPL-MCNC: 179 MG/DL (ref 65–99)
HBA1C MFR BLD: 8.1 % (ref 4.2–6.3)
HCT VFR BLD AUTO: 38 % (ref 34.8–46.1)
HGB BLD-MCNC: 12.8 G/DL (ref 11.5–15.4)
MCH RBC QN AUTO: 29.6 PG (ref 26.8–34.3)
MCHC RBC AUTO-ENTMCNC: 33.7 G/DL (ref 31.4–37.4)
MCV RBC AUTO: 88 FL (ref 82–98)
PLATELET # BLD AUTO: 290 THOUSANDS/UL (ref 149–390)
PMV BLD AUTO: 10.3 FL (ref 8.9–12.7)
POTASSIUM SERPL-SCNC: 4 MMOL/L (ref 3.5–5.3)
PROT SERPL-MCNC: 8.2 G/DL (ref 6.4–8.2)
RBC # BLD AUTO: 4.33 MILLION/UL (ref 3.81–5.12)
SODIUM SERPL-SCNC: 135 MMOL/L (ref 136–145)
WBC # BLD AUTO: 9.25 THOUSAND/UL (ref 4.31–10.16)

## 2017-11-10 PROCEDURE — 80053 COMPREHEN METABOLIC PANEL: CPT | Performed by: INTERNAL MEDICINE

## 2017-11-10 PROCEDURE — 82306 VITAMIN D 25 HYDROXY: CPT | Performed by: INTERNAL MEDICINE

## 2017-11-10 PROCEDURE — 82550 ASSAY OF CK (CPK): CPT | Performed by: INTERNAL MEDICINE

## 2017-11-10 PROCEDURE — 86140 C-REACTIVE PROTEIN: CPT | Performed by: INTERNAL MEDICINE

## 2017-11-10 PROCEDURE — 36415 COLL VENOUS BLD VENIPUNCTURE: CPT | Performed by: INTERNAL MEDICINE

## 2017-11-10 PROCEDURE — 86617 LYME DISEASE ANTIBODY: CPT | Performed by: INTERNAL MEDICINE

## 2017-11-10 PROCEDURE — 83036 HEMOGLOBIN GLYCOSYLATED A1C: CPT | Performed by: INTERNAL MEDICINE

## 2017-11-10 PROCEDURE — 85652 RBC SED RATE AUTOMATED: CPT | Performed by: INTERNAL MEDICINE

## 2017-11-10 PROCEDURE — 85027 COMPLETE CBC AUTOMATED: CPT | Performed by: INTERNAL MEDICINE

## 2017-11-12 LAB
B BURGDOR IGG PATRN SER IB-IMP: NEGATIVE
B BURGDOR IGM PATRN SER IB-IMP: NEGATIVE
B BURGDOR18KD IGG SER QL IB: NORMAL
B BURGDOR23KD IGG SER QL IB: NORMAL
B BURGDOR23KD IGM SER QL IB: NORMAL
B BURGDOR28KD IGG SER QL IB: NORMAL
B BURGDOR30KD IGG SER QL IB: NORMAL
B BURGDOR39KD IGG SER QL IB: NORMAL
B BURGDOR39KD IGM SER QL IB: NORMAL
B BURGDOR41KD IGG SER QL IB: NORMAL
B BURGDOR41KD IGM SER QL IB: NORMAL
B BURGDOR45KD IGG SER QL IB: NORMAL
B BURGDOR58KD IGG SER QL IB: NORMAL
B BURGDOR66KD IGG SER QL IB: NORMAL
B BURGDOR93KD IGG SER QL IB: NORMAL

## 2017-11-16 ENCOUNTER — GENERIC CONVERSION - ENCOUNTER (OUTPATIENT)
Dept: OTHER | Facility: OTHER | Age: 62
End: 2017-11-16

## 2017-12-21 ENCOUNTER — GENERIC CONVERSION - ENCOUNTER (OUTPATIENT)
Dept: OTHER | Facility: OTHER | Age: 62
End: 2017-12-21

## 2017-12-26 ENCOUNTER — GENERIC CONVERSION - ENCOUNTER (OUTPATIENT)
Dept: FAMILY MEDICINE CLINIC | Facility: CLINIC | Age: 62
End: 2017-12-26

## 2018-01-09 ENCOUNTER — GENERIC CONVERSION - ENCOUNTER (OUTPATIENT)
Dept: FAMILY MEDICINE CLINIC | Facility: CLINIC | Age: 63
End: 2018-01-09

## 2018-01-11 NOTE — PROGRESS NOTES
Plan    1  DSMT/MNT Time Record; Status:Complete;   Done: 63LLD1625 03:59PM   2  *1 - [facility] Co-Management  *  Status:   Active  Requested for: 73LBT7083  Care Summary provided  : Yes    Discussion/Summary    PATIENT EDUCATION RECORD   Indication for Services: hypertension, type 2 Diabetes Mellitus, hyperlipidemia and obesity  She is ready to learn  She has no barriers to learning  Healthy Eating:   Discussed importance of meal timing/consistency: Method: Instruction and Handout  Response: Verbalizes Understanding   Discussed nutrient types ( Cho/Fat/Protein): Method: Instruction and Handout  Response: Verbalizes Understanding   Discussed portion sizes: Method: Instruction, Handout and Demonstration  Response: Verbalizes Understanding   Discussed Eating Out: Method: Instruction  Response: Verbalizes Understanding   Discussed food label reading: Method: Instruction and Handout  Response: Verbalizes Understanding  Provided food diary and instructions on use: Method: Instruction, Handout and DemonstrationResponse: Verbalizes Understanding   Provided meal planning: Method: Instruction, Handout and Demonstration  Response: Verbalizes Understanding Her current weight is 239 4  Her keal needs are 1422  Her CHO's per meal are 107 g/day 30% carb 2,2,3  He/She was provided a meal plan for: fixed carbohydrates and weight loss  Discussed weight management/weight loss: Method: Instruction  Response: Verbalizes Understanding   Her current BMI 44  Discussed basic carbohydrate counting: Method: Instruction, Handout and Demonstration  Response: Verbalizes Understanding   Healthy Coping Class:   Identified lifestyle behaviors that need to change: Method: Instruction  Response: Verbalizes Understanding   Discussed motivation to change: Method: Instruction  Response: Verbalizes Understanding   Identified goals for behavior change: Method: Instruction  Response: Verbalizes Understanding   Discussed strategies for change: Method: Instruction  Response: Verbalizes Understanding   She participated in goal setting  Does not want class  Will be available for follow up as needed She was given the following educational materials: portion book, Personal Meal Plan 1422 calories, Planning Healthy Meals and Calorie and Carbohydrate Tracking Books/Websites/Phone Apps   Chief Complaint  Patient is here today for Medical Nutrition therapy for T2DM      History of Present Illness  Patient is a 57 y/o female with T2DM, HTN, HLP< Neuropathy, Morbid Obesity  A1c 9 3, BMI 42  Patient had lap band surgery in 2009 and has lost 75 lbs  Recently had to stop exercising due to health issues  Plans on returning to eliptical or recumbent bike  Referred to physical therapist for strength training exercises  Recent severe hypoglycemic episodes  Dr Robbie Doyle stopped Buddie Darnell and Prandin until BG >140  Patients random BG today was 254  Advised patient to contact Dr  to restart Buddie Darnell possibly at a lower dose and titrate up if necessary every few days  Also to discuss restarting Prandin when basal insulin is adjusted   Food history shows a medium carb intake  Patient admits to eating frequently throughout the day ("grazing")  She was educated on carb counting/meal planning and given a 1422 calorie low carb (30%) meal plan  She was encouraged to eat carbs only at meal when she takes the Prandin and to eat non carb snacks in between meals to avoid hypoglycemia  This is her initial assessment    Present at session: patient    She has no special learning needs  Her caloric needs are 1422  Recent weight change: +5    Patient  shops for food  Patient  cooks the food     Exercise routine:  None due to injury   She eats breakfast at  AM Egg sandwich, 1/2 banana OR Organic peanut butter dots cereal, milk   She snacks at AM 1/2 banana   She eats lunch at  [address] [place] [address] with [employer],   She snacks at PM Protein bar   She eats dinner at  PM Meat, salad or vegetable, 1/2 c  rice or potato   She snacks at PM Saltine crackers with peanut butter, Sf jello or pudding, hummus with vegetables     NUTRITION DIAGNOSES   Overweight Obesity   Overweight obesity related to: Excess energy intake and Physical inactivity  As evidenced by: BMI more than normative standard for age and sex (obesity-grade III 40+)  Medical Nutrition Therapy Intervention: Carbohydrate counting, Meal planning, Individualized meal plan, Strategies to monitor portion control, Label reading, Meal timing and Behavior modification strategies  Her comprehension was good   Her motivation was fair   Her compliance was fair   Goals:  1  Follow meal plan/count carbs  2  Increase exercise as able      Active Problems    1  Ankylosing spondylitis (720 0) (M45 9)   2  GERD without esophagitis (530 81) (K21 9)   3  Hypertension (401 9) (I10)   4  Nephrolithiasis (592 0) (N20 0)   5  Osteoarthritis (715 90) (M19 90)   6  Rheumatoid arthritis involving multiple sites, unspecified rheumatoid factor presence   (714 0) (M06 9)   7  Situational anxiety (300 09) (F41 8)   8  Subclinical hypothyroidism (244 8) (E03 9)   9  Thoracic myofascial strain, sequela (905 7) (S29 019S)   10  Type 2 diabetes mellitus (250 00) (E11 9)   11  Urinary frequency (788 41) (R35 0)    Past Medical History    1  History of Acute Cystitis (595 0)   2  History of Ankylosing spondylitis (720 0) (M45 9)   3  History of Diabetes Mellitus (250 00)   4  History of Diabetes mellitus screening (V77 1) (Z13 1)   5  History of abdominal pain (V13 89) (Z87 898)   6  History of acute sinusitis (V12 69) (Z87 09)   7  History of backache (V13 59) (Z87 39)   8  History of hematuria (V13 09) (Z87 448)   9  History of hypertension (V12 59) (Z86 79)   10  History of porphyria (V12 29) (Z86 39)   11  History of Urinary tract infection (599 0) (N39 0)   12   History of Visit for pre-operative examination (V72 84) (O96 802)    Surgical History    1  History of Appendectomy   2  History of Back Surgery   3  History of Cholecystectomy   4  History of Diagnostic Cystoscopy    Family History  Mother    1  Family history of Diabetes Mellitus (V18 0)   2  Family history of Gout (V18 19)   3  Family history of Heart Disease (V17 49)  Father    4  Family history of Diabetes Mellitus (V18 0)   5  Family history of Heart Disease (V17 49)  Brother    6  Family history of Nephrolithiasis    Social History    · Never A Smoker    Current Meds   1  Adult Aspirin EC Low Strength 81 MG Oral Tablet Delayed Release; Take 1 tablet daily   Recorded   2  ALPRAZolam 0 25 MG Oral Tablet; Therapy: 81NEE0785 to (Evaluate:78Eoq6117) Recorded   3  BD Pen Needle Short U/F 31G X 8 MM Miscellaneous; USE AS DIRECTED; Therapy: 80KFC0137 to (Evaluate:05Oct2017)  Requested for: 03IOE2672; Last   Rx:09Mar2017 Ordered   4  Calcium + D TABS Recorded   5  Ciprofloxacin HCl - 500 MG Oral Tablet; TAKE 1 TABLET EVERY 12 HOURS DAILY; Therapy: 14ATF2295 to (Evaluate:08Apr2017)  Requested for: 03Apr2017; Last   Rx:03Apr2017 Ordered   6  [employer] Fish Oil CAPS Recorded   7  [employer] Vitamin D3 1000 UNIT CAPS; Take as directed Recorded   8  Daily Value Multivitamin Oral Tablet Recorded   9  Folic Acid 311 MCG Oral Tablet; Take 1 tablet daily as directed Recorded   10  Levemir FlexTouch 100 UNIT/ML Subcutaneous Solution Pen-injector; INJECT 18 UNITS    IN THE AM AND 32 UNITS AT BEDTIME  Requested for: 18NYL3577;    Last Rx:27Mar2017 Ordered   11  MetFORMIN HCl ER (OSM) 1000 MG Oral Tablet Extended Release 24 Hour; TAKE 1    TABLET ONCE DAILY WITH THE BREAKFAST; Therapy: 11FOU9707 to (IYKNBJGX:60ZPO2074); Last Rx:25Jan2017 Ordered   12  Piroxicam 20 MG Oral Capsule; TAKE 1 CAPSULE Daily Recorded   13  PriLOSEC 20 MG CPDR Recorded   14  Terconazole 80 MG Vaginal Suppository; INSERT 1 SUPPOSITORY INTRAVAGINALLY AT    BEDTIME NIGHTLY;     Therapy: 71QXD4957 to (Evaluate:36Yzp8574)  Requested for: 03Apr2017; Last    Rx:03Apr2017 Ordered   15  Valsartan 160 MG Oral Tablet; take 1 tablet every day; Therapy: 80MJR9670 to (Evaluate:10Mar2018)  Requested for: 05KVY1201; Last    Rx:15Mar2017 Ordered   16  Xeljanz XR 11 MG Oral Tablet Extended Release 24 Hour; Take 1 tablet daily; Therapy: (Recorded:25Jan2017) to Recorded    Allergies    1  Penicillins   2  Sulfa Drugs   3  Barbiturates   4   Morphine Sulfate ER TB12    Vitals  Signs   Recorded: 51TBK6705 04:00PM   Weight: 239 lb 4 oz  BMI Calculated: 43 76  BSA Calculated: 2 06    Results/Data  DSMT/MNT Time Record 04WPB6241 03:59PM Honorio Bal     Test Name Result Flag Reference   Date of Service 6/14/17     Start - Stop Time 11:15-12:30     Total MInutes 75     Group Or Individual Instruction I-MNT       Signatures   Electronically signed by : Jm Mccabe RD; Jun 14 2017  4:23PM EST                       (Author)    Electronically signed by : LAURA Garcia ; Jun 20 2017  2:04PM EST

## 2018-01-11 NOTE — PROGRESS NOTES
Plan    1  DSMT/MNT Time Record; Status:Complete;   Done: 80VDC4994 10:20AM    Discussion/Summary    PATIENT EDUCATION RECORD   Indication for Services: hypertension, type 2 Diabetes Mellitus, Neuropathy, hyperlipidemia and obesity  She has no barriers to learning  Diabetes Disease Process:   She understands the pathophysiology of diabetes: Method: Instruction  Response: Verbalizes Understanding   Discussed patient's type of diabetes: Method: Instruction  Response: Verbalizes Understanding   Discussed diagnosis criteria: Method: Instruction  Response: Verbalizes Understanding   Discussed treatment goals: Method: Instruction  Response: Verbalizes Understanding   Discussed benefits of control: Method: Instruction  Response: Verbalizes Understanding   Discussed treatment options: Method: Instruction  Response: Verbalizes Understanding   Healthy Eating:   Discussed general nutrition topics: Method: Instruction and Handout  Response: Verbalizes Understanding   Discussed food label reading: Method: Instruction and Demonstration  Response: Verbalizes Understanding   Being Active:   Stated the benefits of exercise: Method: Instruction  Response: Verbalizes Understanding   Discussed proper exercise program: Method: Instruction  Response: Verbalizes Understanding   Her blood glucose targets are: Pre-meal target <110, Post-meal target <140 and HS target   Monitoring:   Discussed target blood glucose ranges: Method: Instruction and Handout  Response: Verbalizes Understanding  Discussed target hemoglobin A1c: Method: Instruction  Response: Verbalizes Understanding  Discussed testing times: Method: Instruction and Handout  Response: Verbalizes Understanding  Discussed meter : Method: Instruction and Demonstration  Response: Verbalizes Understanding  Discussed options for record keeping: Method: Instruction and Handout  Response: Verbalizes Understanding     Discussed reporting of readings to M D : Method: Instruction  Response: Verbalizes Understanding  She is currently using a Reli On meter  Taking Medication:   Stated name,dose, and timing of oral meds  Method: Instruction  Response: [facility]  Stated side effects/precautions with diabetes meds  Method: Instruction  Response: [facility]  Discussed medication safety  Method: Instruction  Response: [facility]  She is taking  biguanides and Meglitinide  Discussed site selection and rotation of injections  Method: Instruction  Response: [facility]  She is taking   She is taking insulin Tresiba 50 U hs  Discussed onset, peak, and duration of insulin  Method: Instruction  Response: [facility]  Discussed side effects and precautions of insulin  Method: Instruction  Response: [facility] 20lines  Discussed basal-bolus concept  Method: Instruction  Response: [facility]  She was given Fast 15 List   Problem Solving: She is on medications that cause hypoglycemia, She is able to state the symptoms, prevention, and treatment of hypoglycemia   Hypoglycemia: Stated definition and causes: Method: Instruction and Handout  Response: Verbalizes Understanding   Described signs and symptoms: Method: Instruction and Handout  Response: Verbalizes Understanding   Discussed prevention: Method: Instruction and Handout  Response: Verbalizes Instruction   Discussed treatment: Method: Instruction and Handout  Response: [employer] when to notify physician and when to call EMS: Method: Instruction and Handout  Response: Verbalizes Instruction   Hyperglycemia: Stated definition and causes: Method: Instruction and Handout  Response: Verbalizes Understanding   Described signs and symptoms: Method: Instruction and Handout  Response: Verbalizes Instruction   Discussed prevention of illness: Method: Instruction and Handout  Response: Verbalizes Understanding   Reducing Risk:   Discussed long term complications- prevention, assessment, and monitoring  Method: Instruction  Response: [facility]  Healthy Coping Class:   Identified lifestyle behaviors that need to change: Method: Instruction  Response: Verbalizes Understanding   Discussed motivation to change: Method: Instruction  Response: Verbalizes Understanding   Identified goals for behavior change: Method: Instruction  Response: Verbalizes Understanding   Discussed strategies for change: Method: Instruction  Response: Verbalizes Understanding   Education Plan/Path:  She needs an individual consultation  Recommended patient see: RD   She was given the following educational materials: Know Your Blood Glucose Numbers, The 15/15 Rule for Treating Low Blood Sugar, Blood Glucose Tracker, Diabetes Guidelines and Hyperglycemia/Hypoglycemia   Chief Complaint  Patient is here today for initial assessment for diabetes self management eduation for T2DM      History of Present Illness  Patient is a 59 y/o female with T2DM, neuropathy, HTN, HLP, Morbid Obesity  A1c 9 3, BMI 42  The patient has had lap band surgery and lost 75 lbs so far  She is exercising on treadmill 4 x week for 30-40 minutes  She is unable to do resistance training due to spinal condition  She is testing her BG fasting, before meals and bed  Readings are variable but mostly above target  Some readings in 200's-300's  Occasional 80-90 when she feels symptomatic for hypoglycemia  We discussed using paired BG testing in order to find patterns of hyperglycemia to facilitate treatment changes  The action of her various medications were reviewed as well as additional OAD's or bolus insulin that may be necessary to control PP BG  She reports eating a very low carb diet with some dietary indiscretions  Patient stated that she was well controlled until recently when her blood sugar started to increase significantly   We discussed the progression of diabetes and importance of control to reduce complications  She was educated on identifying total carbs on a food label and was given an estimate of 30 grams carb per meal until her MNT appointment  Active Problems    1  Ankylosing spondylitis (720 0) (M45 9)   2  GERD without esophagitis (530 81) (K21 9)   3  Hypertension (401 9) (I10)   4  Nephrolithiasis (592 0) (N20 0)   5  Osteoarthritis (715 90) (M19 90)   6  Rheumatoid arthritis involving multiple sites, unspecified rheumatoid factor presence   (714 0) (M06 9)   7  Situational anxiety (300 09) (F41 8)   8  Subclinical hypothyroidism (244 8) (E03 9)   9  Thoracic myofascial strain, sequela (905 7) (S29 019S)   10  Type 2 diabetes mellitus (250 00) (E11 9)   11  Urinary frequency (788 41) (R35 0)    Past Medical History    1  History of Acute Cystitis (595 0)   2  History of Ankylosing spondylitis (720 0) (M45 9)   3  History of Diabetes Mellitus (250 00)   4  History of Diabetes mellitus screening (V77 1) (Z13 1)   5  History of abdominal pain (V13 89) (Z87 898)   6  History of acute sinusitis (V12 69) (Z87 09)   7  History of backache (V13 59) (Z87 39)   8  History of hematuria (V13 09) (Z87 448)   9  History of hypertension (V12 59) (Z86 79)   10  History of porphyria (V12 29) (Z86 39)   11  History of Urinary tract infection (599 0) (N39 0)   12  History of Visit for pre-operative examination (V72 84) (U66 079)    Surgical History    1  History of Appendectomy   2  History of Back Surgery   3  History of Cholecystectomy   4  History of Diagnostic Cystoscopy    Family History  Mother    1  Family history of Diabetes Mellitus (V18 0)   2  Family history of Gout (V18 19)   3  Family history of Heart Disease (V17 49)  Father    4  Family history of Diabetes Mellitus (V18 0)   5  Family history of Heart Disease (V17 49)  Brother    6  Family history of Nephrolithiasis    Social History    · Never A Smoker    Current Meds   1  Adult Aspirin EC Low Strength 81 MG Oral Tablet Delayed Release;  Take 1 tablet daily   Recorded   2  ALPRAZolam 0 25 MG Oral Tablet; Therapy: 37SYW3529 to (Evaluate:41Pbb5029) Recorded   3  BD Pen Needle Short U/F 31G X 8 MM Miscellaneous; USE AS DIRECTED; Therapy: 56TYL6332 to (Evaluate:2017)  Requested for: 86PAF1666; Last   Rx:2017 Ordered   4  Calcium + D TABS Recorded   5  Ciprofloxacin HCl - 500 MG Oral Tablet; TAKE 1 TABLET EVERY 12 HOURS DAILY; Therapy: 54TSB4208 to (Evaluate:2017)  Requested for: 2017; Last   Rx:2017 Ordered   6  CVS Fish Oil CAPS Recorded   7  CVS Vitamin D3 1000 UNIT CAPS; Take as directed Recorded   8  Daily Value Multivitamin Oral Tablet Recorded   9  Folic Acid 874 MCG Oral Tablet; Take 1 tablet daily as directed Recorded   10  Levemir FlexTouch 100 UNIT/ML Subcutaneous Solution Pen-injector; INJECT 18 UNITS    IN THE AM AND 32 UNITS AT BEDTIME  Requested for: 96TUK3607;    Last Rx:2017 Ordered   11  MetFORMIN HCl ER (OSM) 1000 MG Oral Tablet Extended Release 24 Hour; TAKE 1    TABLET ONCE DAILY WITH THE BREAKFAST; Therapy: 66XCS8651 [facility]); Last Rx:2017 Ordered   12  Piroxicam 20 MG Oral Capsule; TAKE 1 CAPSULE Daily Recorded   13  PriLOSEC 20 MG CPDR Recorded   14  Terconazole 80 MG Vaginal Suppository; INSERT 1 SUPPOSITORY INTRAVAGINALLY AT    BEDTIME NIGHTLY; Therapy: 97HBR7045 to (Evaluate:57Xwf0781)  Requested for: 2017; Last    Rx:2017 Ordered   15  Valsartan 160 MG Oral Tablet; take 1 tablet every day; Therapy: 15QYM1320 to (Evaluate:2018)  Requested for: 23SSW5471; Last    Rx:2017 Ordered   16  Xeljanz XR 11 MG Oral Tablet Extended Release 24 Hour; Take 1 tablet daily; Therapy: (Recorded:2017) to Recorded    Allergies    1  Penicillins   2  Sulfa Drugs   3  Barbiturates   4   Morphine Sulfate ER TB12    Results/Data  DSMT/MNT Time Record 55ZMN4245 10:20AM Jojo Leone     Test Name Result Flag Reference   Date of Service 17     Start - Stop Time 8:30-9:30     Total MInutes 60     Group Or Individual Instruction I-DSMT       End of Encounter Meds    1  Valsartan 160 MG Oral Tablet; take 1 tablet every day; Therapy: 27DFQ7591 to (Evaluate:10Mar2018)  Requested for: 11TET9476; Last   Rx:15Mar2017 Ordered    2  BD Pen Needle Short U/F 31G X 8 MM Miscellaneous; USE AS DIRECTED; Therapy: 96GRT1666 to (Evaluate:05Oct2017)  Requested for: 90FDA3275; Last   Rx:09Mar2017 Ordered   3  Levemir FlexTouch 100 UNIT/ML Subcutaneous Solution Pen-injector; INJECT 18 UNITS   IN THE AM AND 32 UNITS AT BEDTIME  Requested for: 34NCL3252;   Last Rx:27Mar2017 Ordered   4  MetFORMIN HCl ER (OSM) 1000 MG Oral Tablet Extended Release 24 Hour; TAKE 1   TABLET ONCE DAILY WITH THE BREAKFAST; Therapy: 95CHG9629 to (JUYBABJL:61OQM3041); Last Rx:25Jan2017 Ordered    5  Ciprofloxacin HCl - 500 MG Oral Tablet; TAKE 1 TABLET EVERY 12 HOURS DAILY; Therapy: 48COM7612 to (Evaluate:08Apr2017)  Requested for: 03Apr2017; Last   Rx:03Apr2017 Ordered   6  Terconazole 80 MG Vaginal Suppository; INSERT 1 SUPPOSITORY INTRAVAGINALLY AT   BEDTIME NIGHTLY; Therapy: 42DDT4414 to (Evaluate:01Pqt2695)  Requested for: 03Apr2017; Last   Rx:03Apr2017 Ordered    7  Adult Aspirin EC Low Strength 81 MG Oral Tablet Delayed Release; Take 1 tablet daily   Recorded   8  ALPRAZolam 0 25 MG Oral Tablet; Therapy: 75EFB3395 to (Evaluate:35Vrk1347) Recorded   9  Calcium + D TABS Recorded   10  CVS Fish Oil CAPS Recorded   11  CVS Vitamin D3 1000 UNIT CAPS; Take as directed Recorded   12  Daily Value Multivitamin Oral Tablet Recorded   13  Folic Acid 721 MCG Oral Tablet; Take 1 tablet daily as directed Recorded   14  Piroxicam 20 MG Oral Capsule; TAKE 1 CAPSULE Daily Recorded   15  PriLOSEC 20 MG CPDR (Omeprazole) Recorded   16  Xeljanz XR 11 MG Oral Tablet Extended Release 24 Hour; Take 1 tablet daily;     Therapy: (Recorded:25Jan2017) to Recorded    Future Appointments    Date/Time Provider Specialty Site 06/14/2017 11:00 AM Micheline Rascon RD Diabetes Educator [address]     Signatures   Electronically signed by : Taya Guzman RD; May 24 2017 10:38AM EST                       (Author)    Electronically signed by : LAURA Aviles ; May 26 2017 12:43PM EST

## 2018-01-12 VITALS
HEIGHT: 62 IN | RESPIRATION RATE: 18 BRPM | HEART RATE: 84 BPM | WEIGHT: 233 LBS | BODY MASS INDEX: 42.88 KG/M2 | DIASTOLIC BLOOD PRESSURE: 78 MMHG | TEMPERATURE: 97.4 F | SYSTOLIC BLOOD PRESSURE: 122 MMHG

## 2018-01-12 VITALS — BODY MASS INDEX: 42.38 KG/M2 | WEIGHT: 239.25 LBS

## 2018-01-12 NOTE — RESULT NOTES
Verified Results  * CT ABDOMEN PELVIS W CONTRAST [id number] 11:08AM Marj Muir Order Number: [id number]     Test Name Result Flag Reference   CT ABDOMEN PELVIS W CONTRAST (Report)     CT ABDOMEN AND PELVIS WITH IV CONTRAST     INDICATION: 59-year-old woman with history of kidney stones now complaining of left flank pain  COMPARISON: CT from April 13, 2015  TECHNIQUE: CT examination of the abdomen and pelvis was performed after the administration of intravenous contrast  This examination, like all CT scans performed in the [facility], was performed utilizing techniques to minimize    radiation dose exposure, including the use of iterative reconstruction and automated exposure control  Axial, sagittal, and coronal reformatted images were submitted for interpretation  100 cc of intravenous Omnipaque 350 was administered for this    examination  Enteric contrast was not given  Delayed enhanced images were obtained  FINDINGS:     ABDOMEN     LOWER CHEST: No significant abnormalities identified in the lower chest      LIVER/BILIARY TREE: Unremarkable  GALLBLADDER: Postcholecystectomy  SPLEEN: Unremarkable  PANCREAS: Unremarkable  ADRENAL GLANDS: Stable 1 8 cm right adrenal adenoma  Normal left adrenal gland  KIDNEYS/URETERS: Single 2 mm calculus in the lower pole of each kidney  No other renal stone  No renal mass or collection  Perinephric spaces normal    Mild fullness of both pyelocalyceal systems with normal caliber ureters, consistent with mild lateral UPJ stenoses  No ureteral calculus  STOMACH AND BOWEL: Post gastric banding  Stomach otherwise normal in appearance  Small intestine normal  Several diverticula in the descending colon and sigmoid without evidence of diverticulitis  Colon otherwise normal in appearance  APPENDIX: No findings to suggest appendicitis  ABDOMINOPELVIC CAVITY: No lymphadenopathy or mass  No ascites   No extraluminal gas  VESSELS: Unremarkable for patient's age  PELVIS     REPRODUCTIVE ORGANS: Uterus normal in appearance  No adnexal masses  URINARY BLADDER: Unremarkable  ABDOMINAL WALL/INGUINAL REGIONS: Unremarkable  OSSEOUS STRUCTURES: Prior lumbar fusion  No recent fracture or destructive lesion  IMPRESSION:     1  Single 2 mm calculus in the lower pole of each kidney  2  No ureteral calculus  3  Probable mild bilateral UPJ stenoses  4  No evidence of acute abnormality in the abdomen or pelvis  Workstation performed: [id number]     Signed by:   Yennifer Liang MD   7/15/16     (1) CALCULI, RENAL 12Luy4515 08:18PM Theodore Gomez    Order Number: [id number]     Test Name Result Flag Reference   COLOR Austyn Volo     SIZE 5x4x3 mm     STONE WEIGHT 56 3 mg     COMPOSITION Comment     Percentage (Represents the % composition)   CA OXALATE,DIHYDRATE 25 %     CA OXALATE,MONOHYDR  70 %     CALCIUM PHOSPHATE 05 %     NIDUS No Nidus visualized     SURFACE CRYSTALS Comment:     Calcium oxalate dihydrate   PLEASE NOTE Comment     Calculi report with photograph will follow via computer, mail or   delivery  COMMENT-STONE3 Comment     Physician questions regarding Calculi Analysis contact [facility] at:  [phone]  PHOTO Comment     Photograph will follow under separate cover      Performed at:  [address]  [id number]  : Ninfa Wells MD, Phone:  [phone]     (1) URINE CULTURE [id number] 12:55PM Theodore Shubhamnina     Test Name Result Flag Reference   CLINICAL REPORT (Report)     Test:        Urine culture  Specimen Type:   Urine  Specimen Date:   7/11/2016 12:55 PM  Result Date:    7/12/2016 11:43 AM  Result Status:   Final result  Resulting Lab:   [place] [address]            Tel: [phone]      CULTURE ------------------                                   No Growth <1000 cfu/mL     (1) LIPID PANEL, FASTING [id number] 09:35AM Thaddeus Payne    Order Number: [id number]  TW Order Number: [id number] Order Number: [id number] Order Number: [id number] Order Number: [id number]     Test Name Result Flag Reference   CHOLESTEROL 220 mg/dL H    HDL,DIRECT 58 mg/dL  40-60   Specimen collection should occur prior to Metamizole administration due to the potential for falsely depressed results  LDL CHOLESTEROL CALCULATED 131 mg/dL H 0-100   Triglyceride:         Normal              <150 mg/dl       Borderline High    150-199 mg/dl       High               200-499 mg/dl       Very High          >499 mg/dl  Cholesterol:         Desirable        <200 mg/dl      Borderline High  200-239 mg/dl      High             >239 mg/dl  HDL Cholesterol:        High    >59 mg/dL      Low     <41 mg/dL  LDL CALCULATED:    This screening LDL is a calculated result  It does not have the accuracy of the Direct Measured LDL in the monitoring of patients with hyperlipidemia and/or statin therapy  Direct Measure LDL (PFO665) must be ordered separately in these patients  TRIGLYCERIDES 157 mg/dL H <=150   Specimen collection should occur prior to N-Acetylcysteine or Metamizole administration due to the potential for falsely depressed results       (1) GLUCOSE,  FASTING [id number] 09:35AM Thaddeus Payne    Order Number: [id number]  TW Order Number: [id number] Order Number: [id number] Order Number: [id number] Order Number: [id number]     Test Name Result Flag Reference   GLUCOSE FASTING 192 mg/dL H 65-99     (1) BUN 37Mpr8718 09:35AM Thdadeus Payne    Order Number: [id number]  TW Order Number: [id number] Order Number: [id number] Order Number: [id number] PABLO Number: [id number]     Test Name Result Flag Reference   BLOOD UREA NITROGEN 16 mg/dL  5-25     (1) CREATININE 51TVH9195 09:35AM Zari Cam    Order Number: [id number]  TW Order Number: [id number] Order Number: [id number] Order Number: [id number] Order Number: [id number]     Test Name Result Flag Reference   CREATININE 0 76 mg/dL  0 60-1 30   Standardized to IDMS reference method   eGFR Non-African American      >60 0 ml/min/1 73sq m   National Kidney Disease Education Program recommendations are as follows:  GFR calculation is accurate only with a steady state creatinine  Chronic Kidney disease less than 60 ml/min/1 73 sq  meters  Kidney failure less than 15 ml/min/1 73 sq  meters  Discussion/Summary   CALCIUM OXALATE STONE NOTED;  BLOOD SUGAR IS TOO HIGH- FOLLOW UP FOR ADJUSMENT PLEASE

## 2018-01-13 VITALS
SYSTOLIC BLOOD PRESSURE: 132 MMHG | HEART RATE: 100 BPM | TEMPERATURE: 97.8 F | WEIGHT: 234.6 LBS | DIASTOLIC BLOOD PRESSURE: 86 MMHG | BODY MASS INDEX: 41.56 KG/M2 | RESPIRATION RATE: 18 BRPM

## 2018-01-13 VITALS
BODY MASS INDEX: 43.24 KG/M2 | HEART RATE: 88 BPM | HEIGHT: 62 IN | TEMPERATURE: 97.2 F | WEIGHT: 235 LBS | RESPIRATION RATE: 16 BRPM | DIASTOLIC BLOOD PRESSURE: 82 MMHG | SYSTOLIC BLOOD PRESSURE: 142 MMHG

## 2018-01-13 VITALS
SYSTOLIC BLOOD PRESSURE: 122 MMHG | TEMPERATURE: 97.4 F | HEART RATE: 88 BPM | HEIGHT: 62 IN | DIASTOLIC BLOOD PRESSURE: 82 MMHG | WEIGHT: 236 LBS | BODY MASS INDEX: 43.43 KG/M2 | RESPIRATION RATE: 18 BRPM

## 2018-01-13 VITALS
WEIGHT: 236 LBS | SYSTOLIC BLOOD PRESSURE: 124 MMHG | RESPIRATION RATE: 16 BRPM | HEIGHT: 62 IN | BODY MASS INDEX: 43.43 KG/M2 | TEMPERATURE: 96.8 F | HEART RATE: 80 BPM | DIASTOLIC BLOOD PRESSURE: 82 MMHG

## 2018-01-13 VITALS
TEMPERATURE: 96.4 F | HEIGHT: 62 IN | BODY MASS INDEX: 42.65 KG/M2 | DIASTOLIC BLOOD PRESSURE: 80 MMHG | HEART RATE: 80 BPM | RESPIRATION RATE: 16 BRPM | WEIGHT: 231.8 LBS | SYSTOLIC BLOOD PRESSURE: 122 MMHG

## 2018-01-14 VITALS
HEIGHT: 62 IN | HEART RATE: 84 BPM | SYSTOLIC BLOOD PRESSURE: 140 MMHG | RESPIRATION RATE: 18 BRPM | WEIGHT: 237 LBS | BODY MASS INDEX: 43.61 KG/M2 | TEMPERATURE: 98.1 F | DIASTOLIC BLOOD PRESSURE: 80 MMHG

## 2018-01-14 NOTE — RESULT NOTES
Verified Results  (1) HEMOGLOBIN A1C 77Wpf0068 08:41AM Ashli Jeffers Order Number: [id number]     Test Name Result Flag Reference   HEMOGLOBIN A1C 7 5 % H 4 2-6 3   EST  AVG  GLUCOSE 169 mg/dl         Discussion/Summary   please follow up to adjust diabetic medication- HgA1c 7 5 - we can do better!

## 2018-01-15 VITALS
TEMPERATURE: 96.8 F | RESPIRATION RATE: 18 BRPM | HEIGHT: 62 IN | DIASTOLIC BLOOD PRESSURE: 74 MMHG | SYSTOLIC BLOOD PRESSURE: 118 MMHG | HEART RATE: 82 BPM | WEIGHT: 234 LBS | BODY MASS INDEX: 43.06 KG/M2

## 2018-01-15 NOTE — RESULT NOTES
Verified Results  [address] [place] [address] [id number] 08:32AM Bell Body Order Number: [id number]    - Patient Instructions: To schedule this appointment, please contact Central Scheduling at [phone]  Test Name Result Flag Reference   US ABDOMEN LIMITED (Report)     RIGHT LOWER QUADRANT ULTRASOUND     INDICATION: R10 31: Right lower quadrant pain  History taken directly from the electronic ordering system  COMPARISON: None  TECHNIQUE:  Real-time ultrasound of the right lower quadrant and right groin was performed with a linear transducer with both volumetric sweeps and still imaging techniques  FINDINGS: No discrete lesion visualized  Bladder appears unremarkable  Bilateral ureteral jets demonstrated  No hernia visualized  Right kidney does not demonstrate hydronephrosis  IMPRESSION:     No acute findings            Workstation performed: [id number]     Signed by:   Lyn Leavitt MD   9/30/17

## 2018-01-16 NOTE — RESULT NOTES
Verified Results  (1) URINE CULTURE 03Apr2017 06:47PM [place]     Test Name Result Flag Reference   CLINICAL REPORT (Report)     Test:        Urine culture  Specimen Type:   Urine  Specimen Date:   4/3/2017 6:47 PM  Result Date:    4/4/2017 5:06 PM  Result Status:   Final result  Resulting Lab:    [address] [place] [address]            Tel: [phone]      CULTURE                                       ------------------                                   30,000-39,000 cfu/ml Mixed Contaminants X4

## 2018-01-17 ENCOUNTER — ALLSCRIPTS OFFICE VISIT (OUTPATIENT)
Dept: OTHER | Facility: OTHER | Age: 63
End: 2018-01-17

## 2018-01-18 NOTE — MISCELLANEOUS
Provider Comments  Pt called in the AM to cancel appointment on 5/2/17          Signatures   Electronically signed by : Orlando Zhao, ; May  2 2017  2:36PM EST                       (Author)

## 2018-01-18 NOTE — PROGRESS NOTES
Assessment   1  Pre-op evaluation (V72 84) (Z01 818)   2  Type 2 diabetes mellitus (250 00) (E11 9)   3  GERD without esophagitis (530 81) (K21 9)   4  Hypertension (401 9) (I10)   5  Ankylosing spondylitis (720 0) (M45 9)   6  Rheumatoid arthritis involving multiple sites, unspecified rheumatoid factor presence     (714 0) (M06 9)    Discussion/Summary   Surgical Clearance: She is at a LOW risk from a cardiovascular standpoint at this time without any additional cardiac testing  Reevaluation needed, if she should present with symptoms prior to surgery/procedure  Surgical clearance faxed to Dr Aric Powers   PATIENT IS MEDICALLY STABLE FOR T9-L1 PEDICLE SCREW FIXATION AND FUSION; L1-2 INTERBODY FUSION, CAGE, BONE MORPHOGENIC PROTEIN, CHECK L2-SAC FUSION T10 LAMI   SINUS TACH STABLE; CREAT STABLE; ORDERS PER ENDOCRINE UP POST OP STABLE  Chief Complaint   PATIENT HERE FOR PRE OP EVALUATION FOR T9-L1 PEDICLE SCREW FIXATION AND FUSION; L1-2 INTER BODY FUSION, CAGE, CHECK L2-SAC FUSION T10 LAMI DR Aric Powers AT [place] 1/22/18      History of Present Illness   Pre-Op Visit (Brief): The patient is being seen for a preoperative visit-- and-- [email or web address]  Surgical Risk Assessment:      Prior Anesthesia: She had prior anesthesia,-- no prior adverse reaction to edidural anesthesia,-- no prior adverse reaction to spinal anesthesia-- and-- no prior adverse reaction to general anesthesia  Pertinent Past Medical History: DM2;  Exercise Capacity: unable to walk four blocks without symptoms,-- unable to walk two flights of stairs without symptoms-- and-- CANNOT AMBULATE DUE TO DISC DISEASE  Lifestyle Factors: denies alcohol use and denies tobacco use  Symptoms: no easy bleeding,-- no easy bruising,-- no frequent nosebleeds,-- no chest pain,-- no cough,-- no dyspnea,-- no edema,-- no palpitations-- and-- no wheezing  Pertinent Family History: no pertinent family history        Living Situation: home is secure and supportive and no post-op concerns with her living situation  HPI: PT HERE FOR PRE OP EVAL FOR SURGERY -1/22/18 DR ROCHA LUMBAR FUSION; THORACIC FUSION HAS HX OF PREVIOUS SURGERY; Review of Systems        Constitutional: PT IN OBVIOUS PAIN, but-- as noted in HPI,-- no fever,-- not feeling poorly,-- no chills-- and-- not feeling tired  Eyes: No complaints of eye pain, no red eyes, no eyesight problems, no discharge, no dry eyes, no itching of eyes,-- no eye pain,-- no eyesight problems-- and-- no purulent discharge from the eyes  ENT: as noted in HPI,-- no earache,-- no nosebleeds,-- no sore throat,-- no hearing loss,-- no nasal discharge-- and-- no hoarseness  Cardiovascular: chest pain-- and-- fast heart rate, but-- No complaints of slow heart rate, no fast heart rate, no chest pain, no palpitations, no leg claudication, no lower extremity edema,-- as noted in HPI,-- the heart rate was not slow,-- no intermittent leg claudication,-- no palpitations-- and-- no lower extremity edema  Respiratory: No complaints of shortness of breath, no wheezing, no cough, no SOB on exertion, no orthopnea, no PND,-- as noted in HPI,-- no shortness of breath,-- no cough,-- no wheezing-- and-- no shortness of breath during exertion  Gastrointestinal: No complaints of abdominal pain, no constipation, no nausea or vomiting, no diarrhea, no bloody stools,-- no abdominal pain,-- no nausea,-- no constipation-- and-- no diarrhea  Genitourinary: No complaints of dysuria, no incontinence, no pelvic pain, no dysmenorrhea, no vaginal discharge or bleeding-- and-- no pelvic pain  Musculoskeletal: THORACIC BACK PAIN; ANTALGIC GAIT;, but-- as noted in HPI,-- no joint swelling,-- no limb pain,-- no joint stiffness-- and-- no limb swelling--       The patient presents with complaints of gradual onset of moderate mid back arthralgias, described as aching, radiating to the back        Integumentary: NO RASH, but-- No complaints of skin rash or lesions, no itching, no skin wounds, no breast pain or lump,-- no rashes,-- no itching,-- no skin lesions-- and-- no skin wound  Neurological: No complaints of headache, no confusion, no convulsions, no numbness, no dizziness or fainting, no tingling, no limb weakness, no difficulty walking,-- no headache,-- no numbness,-- no tingling,-- no confusion-- and-- no dizziness  Psychiatric: no anxiety,-- no personality change,-- no sleep disturbances,-- no depression-- and-- no emotional problems  Endocrine: No complaints of proptosis, no hot flashes, no muscle weakness, no deepening of the voice, no feelings of weakness,-- no proptosis-- and-- no hot flashes  Hematologic/Lymphatic: No complaints of swollen glands, no swollen glands in the neck, does not bleed easily, does not bruise easily,-- no swollen glands,-- no tendency for easy bleeding,-- no tendency for easy bruising-- and-- no swollen glands in the neck  ROS reviewed  Active Problems   1  Abdominal pain, RLQ (right lower quadrant) (789 03) (R10 31)   2  Acute sinusitis, recurrence not specified, unspecified location (461 9) (J01 90)   3  Ankylosing spondylitis (720 0) (M45 9)   4  GERD without esophagitis (530 81) (K21 9)   5  Hypertension (401 9) (I10)   6  Need for influenza vaccination (V04 81) (Z23)   7  Nephrolithiasis (592 0) (N20 0)   8  Osteoarthritis (715 90) (M19 90)   9  Pre-op evaluation (V72 84) (Z01 818)   10  Rheumatoid arthritis involving multiple sites, unspecified rheumatoid factor presence      (714 0) (M06 9)   11  Right groin pain (789 09) (R10 31)   12  Situational anxiety (300 09) (F41 8)   13  Subclinical hypothyroidism (244 8) (E03 9)   14  Thoracic myofascial strain, sequela (905 7) (S29 019S)   15  Type 2 diabetes mellitus (250 00) (E11 9)   16   Urinary frequency (788 41) (R35 0)    Past Medical History    · History of Acute Cystitis (595 0)   · History of Ankylosing spondylitis (720 0) (M45 9)   · History of Diabetes Mellitus (250 00)   · History of Diabetes mellitus screening (V77 1) (Z13 1)   · History of abdominal pain (V13 89) (X13 476)   · History of acute sinusitis (V12 69) (Z87 09)   · History of backache (V13 59) (Z87 39)   · History of hematuria (V13 09) (Z87 448)   · History of hypertension (V12 59) (Z86 79)   · History of porphyria (V12 29) (Z86 39)   · History of Urinary tract infection (599 0) (N39 0)   · History of Visit for pre-operative examination (V72 84) (K64 946)     The active problems and past medical history were reviewed and updated today  Surgical History    · History of Appendectomy   · History of Back Surgery   · History of Cholecystectomy   · History of Diagnostic Cystoscopy     The surgical history was reviewed and updated today  Family History   Mother    · Family history of Diabetes Mellitus (V18 0)   · Family history of Gout (V18 19)   · Family history of Heart Disease (V17 49)  Father    · Family history of Diabetes Mellitus (V18 0)   · Family history of Heart Disease (V17 49)  Brother    · Family history of Nephrolithiasis     The family history was reviewed and updated today  Social History    · Never A Smoker  The social history was reviewed and updated today  The social history was reviewed and is unchanged  Current Meds    1  Adult Aspirin EC Low Strength 81 MG Oral Tablet Delayed Release; Take 1 tablet daily     Recorded   2  ALPRAZolam 0 25 MG Oral Tablet; Therapy: 08NXH6680 to (Evaluate:92Kva6570) Recorded   3  Amoxicillin 500 MG Oral Tablet; 4 tabs prior to dental procedure- pt not allergic to amoxil; Therapy: 06Vpy2821 to (Evaluate:30Oct2017)  Requested for: 27Oct2017; Last     Rx:27Oct2017 Ordered   4  BD Pen Needle Short U/F 31G X 8 MM Miscellaneous; USE AS DIRECTED; Therapy: 39NEF2315 to (Evaluate:05Oct2017)  Requested for: 08MJA7983; Last     Rx:09Mar2017 Ordered   5   Calcium + D TABS Recorded   6  CVS Fish Oil CAPS Recorded   7  CVS Vitamin D3 1000 UNIT CAPS; Take as directed Recorded   8  Daily Value Multivitamin Oral Tablet Recorded   9  Folic Acid 884 MCG Oral Tablet; Take 1 tablet daily as directed Recorded   10  Januvia 100 MG Oral Tablet; TAKE 1 TABLET DAILY; Therapy: (Recorded:66Vyv7516) to Recorded   11  MetFORMIN HCl ER (OSM) 1000 MG Oral Tablet Extended Release 24 Hour; Take 1      tablet twice daily; Therapy: 11VMW0948 to [phone])  Requested for: 31Oct2017; Last      Rx:31Oct2017 Ordered   12  Mometasone Furoate 0 1 % External Cream; APPLY SPARINGLY TO AFFECTED AREAS      TWICE DAILY  (AM AND PM); Therapy: 37UND2228 to (Jose Waggoner)  Requested for: 75ZXA8867; Last      Rx:72Ign1082 Ordered   13  Multivitamins TABS; Therapy: (Recorded:17Jan2018) to Recorded   14  Piroxicam 20 MG Oral Capsule; TAKE 1 CAPSULE Daily Recorded   15  PriLOSEC 20 MG CPDR Recorded   16  Probiotic CAPS; Therapy: (Recorded:17Jan2018) to Recorded   17  Mimi Crass FlexTouch 100 UNIT/ML Subcutaneous Solution Pen-injector; INJECT 38 UNIT      Bedtime; Therapy: (Recorded:14Sep2017) to Recorded   18  Valsartan 160 MG Oral Tablet; take 1 tablet every day; Therapy: 91VHP6795 to (Evaluate:10Mar2018)  Requested for: 92DCS1055; Last      Rx:15Mar2017 Ordered   19  Victoza 18 MG/3ML SOLN; INJECT 1 2 UNIT Daily; Therapy: (Recorded:17Jan2018) to Recorded   20  Xeljanz XR 11 MG Oral Tablet Extended Release 24 Hour; Take 1 tablet daily; Therapy: (Recorded:25Jan2017) to Recorded     The medication list was reviewed and updated today  Allergies   1  Penicillins   2  Sulfa Drugs   3  Barbiturates   4   Morphine Sulfate ER TB12    Vitals    Recorded: 07DIH1009 09:27AM   Temperature 97 5 F   Heart Rate 76   Respiration 18   Systolic 802   Diastolic 76   Height 5 ft 2 in   Weight 234 lb    BMI Calculated 42 8   BSA Calculated 2 04     Physical Exam        Constitutional General appearance: No acute distress, well appearing and well nourished  Head and Face      Head and face: Normal        Palpation of the face and sinuses: No sinus tenderness  Eyes      Conjunctiva and lids: No swelling, erythema or discharge  Pupils and irises: Equal, round, reactive to light  Ears, Nose, Mouth, and Throat      External inspection of ears and nose: Normal        Otoscopic examination: Tympanic membranes translucent with normal light reflex  Canals patent without erythema  Hearing: Normal        Nasal mucosa, septum, and turbinates: Normal without edema or erythema  -- SOME CROWDING POST OROPHARYNX  Lips, teeth, and gums: Normal, good dentition  Oropharynx: Abnormal   Oral mucosa was normal  The palate examination showed no abnormalities  The tongue was normal       Neck      Neck: Supple, symmetric, trachea midline, no masses  Thyroid: Normal, no thyromegaly  Pulmonary      Respiratory effort: No increased work of breathing or signs of respiratory distress  Respiratory rate: normal  Respiratory Findings: no dry cough,-- no barky cough-- and-- no audible wheezing  Percussion of chest: Normal        Palpation of chest: Normal        Auscultation of lungs: Clear to auscultation  Cardiovascular      Palpation of heart: Normal PMI, no thrills  The PMI was palpated at the 5th LICS in the midclavicular line  The apical impulse was normal  no precordial heave was noted  Auscultation of heart: Normal rate and rhythm, normal S1 and S2, no murmurs  The heart rate was normal  The rhythm was regular  Heart sounds: normal S1,-- normal S2,-- no gallop heard,-- no S3-- and-- no S4  Carotid pulses: 2+ bilaterally  Abdominal aorta: Normal        Pedal pulses: 2+ bilaterally  Peripheral vascular exam: Normal        Examination of extremities for edema and/or varicosities: Normal   no edema        Abdomen      Abdomen: Non-tender, no masses  The abdomen was obese  Bowel sounds were normal  The abdomen was soft and nontender  no masses palpated  The abdomen was normal to percussion  no CVA tenderness-- HEALED CICATRIX ABOVE UMBILICUS  Liver and spleen: No hepatomegaly or splenomegaly  Lymphatic      Palpation of lymph nodes in neck: No lymphadenopathy  Musculoskeletal      Gait and station: Normal        Digits and nails: Normal without clubbing or cyanosis  Joints, bones, and muscles: Normal        Range of motion: Abnormal  -- DECREASED CERVICAL ROM ; DECREASD T SPINE ROTATION; SPASM RIGHT TRAPEZIUS MUSCLE  Skin      Skin and subcutaneous tissue: Normal without rashes or lesions  Neurologic      Cranial nerves: Cranial nerves II-XII intact  Reflexes: 2+ and symmetric  Sensation: No sensory loss  Psychiatric      Orientation to person, place, and time: Normal        Mood and affect: Normal        End of Encounter Meds   1  Valsartan 160 MG Oral Tablet; take 1 tablet every day; Therapy: 66YVJ8452 to (Evaluate:10Mar2018)  Requested for: 88GGK3612; Last     Rx:15Mar2017 Ordered  2  Mometasone Furoate 0 1 % External Cream; APPLY SPARINGLY TO AFFECTED AREAS     TWICE DAILY  (AM AND PM); Therapy: 48ILJ8167 to (Geni Miramontes)  Requested for: 58RJX4124; Last     Rx:80Viv5541 Ordered  3  Multivitamins TABS; Therapy: (Recorded:17Jan2018) to Recorded   4  Probiotic CAPS; Therapy: (Recorded:17Jan2018) to Recorded   5  Victoza 18 MG/3ML SOLN; INJECT 1 2 UNIT Daily; Therapy: (Recorded:17Jan2018) to Recorded  6  Amoxicillin 500 MG Oral Tablet; 4 tabs prior to dental procedure- pt not allergic to amoxil; Therapy: 35Qrw3458 to (Evaluate:30Oct2017)  Requested for: 27Oct2017; Last     Rx:27Oct2017 Ordered  7  BD Pen Needle Short U/F 31G X 8 MM Miscellaneous; USE AS DIRECTED;      Therapy: 44NDJ3314 to [phone])  Requested for: 70BVP1934; Last     Rx:09Mar2017 Ordered   8  MetFORMIN HCl ER (OSM) 1000 MG Oral Tablet Extended Release 24 Hour; Take 1     tablet twice daily; Therapy: 74PVA0423 to ([phone])  Requested for: 31Oct2017; Last     Rx:31Oct2017 Ordered  9  Adult Aspirin EC Low Strength 81 MG Oral Tablet Delayed Release; Take 1 tablet daily     Recorded   10  ALPRAZolam 0 25 MG Oral Tablet; Therapy: 17AKQ1747 to (Evaluate:79Lwm1970) Recorded   11  Calcium + D TABS Recorded   12  CVS Fish Oil CAPS Recorded   13  CVS Vitamin D3 1000 UNIT CAPS; Take as directed Recorded   14  Daily Value Multivitamin Oral Tablet Recorded   15  Folic Acid 281 MCG Oral Tablet; Take 1 tablet daily as directed Recorded   16  Januvia 100 MG Oral Tablet; TAKE 1 TABLET DAILY; Therapy: (Recorded:14Sep2017) to Recorded   17  Piroxicam 20 MG Oral Capsule; TAKE 1 CAPSULE Daily Recorded   18  PriLOSEC 20 MG CPDR (Omeprazole) Recorded   19  Daisha Shravan FlexTouch 100 UNIT/ML Subcutaneous Solution Pen-injector; INJECT 38 UNIT      Bedtime; Therapy: (Recorded:14Sep2017) to Recorded   20  Xeljanz XR 11 MG Oral Tablet Extended Release 24 Hour; Take 1 tablet daily; Therapy: ([phone]) to Recorded    Signatures    Electronically signed by :  Jose Elias Mclaughlin DO; Jan 17 2018 10:28AM EST                       (Author)

## 2018-01-22 VITALS
TEMPERATURE: 97.5 F | WEIGHT: 234 LBS | HEART RATE: 76 BPM | BODY MASS INDEX: 43.06 KG/M2 | SYSTOLIC BLOOD PRESSURE: 128 MMHG | HEIGHT: 62 IN | RESPIRATION RATE: 18 BRPM | DIASTOLIC BLOOD PRESSURE: 76 MMHG

## 2018-01-23 NOTE — CONSULTS
Chief Complaint  PATIENT HERE FOR PRE OP EVALUATION FOR T9-L1 PEDICLE SCREW FIXATION AND FUSION; L1-2 INTER BODY FUSION, CAGE, CHECK L2-SAC FUSION T10 [facility] DR Karsten Srivastava AT [facility] 1/22/18      History of Present Illness  Pre-Op Visit (Brief): The patient is being seen for a preoperative visit and [employer]  Surgical Risk Assessment:   Prior Anesthesia: She had prior anesthesia, no prior adverse reaction to edidural anesthesia, no prior adverse reaction to spinal anesthesia and no prior adverse reaction to general anesthesia  Pertinent Past Medical History: DM2;  Exercise Capacity: unable to walk four blocks without symptoms, unable to walk two flights of stairs without symptoms and CANNOT AMBULATE DUE TO DISC DISEASE  Lifestyle Factors: denies alcohol use and denies tobacco use  Symptoms: no easy bleeding, no easy bruising, no frequent nosebleeds, no chest pain, no cough, no dyspnea, no edema, no palpitations and no wheezing  Pertinent Family History: no pertinent family history  Living Situation: home is secure and supportive and no post-op concerns with her living situation  HPI: PT HERE FOR PRE OP EVAL FOR SURGERY -1/22/18 DR ROCHA LUMBAR FUSION; THORACIC FUSION  SHE HAS HX OF PREVIOUS SURGERY; Review of Systems    Constitutional: PT IN OBVIOUS PAIN, but as noted in HPI, no fever, not feeling poorly, no chills and not feeling tired  Eyes: No complaints of eye pain, no red eyes, no eyesight problems, no discharge, no dry eyes, no itching of eyes, no eye pain, no eyesight problems and no purulent discharge from the eyes  ENT: as noted in HPI, no earache, no nosebleeds, no sore throat, no hearing loss, no nasal discharge and no hoarseness     Cardiovascular: chest pain and fast heart rate, but No complaints of slow heart rate, no fast heart rate, no chest pain, no palpitations, no leg claudication, no lower extremity edema, as noted in HPI, the heart rate was not slow, no intermittent leg claudication, no palpitations and no lower extremity edema  Respiratory: No complaints of shortness of breath, no wheezing, no cough, no SOB on exertion, no orthopnea, no PND, as noted in HPI, no shortness of breath, no cough, no wheezing and no shortness of breath during exertion  Gastrointestinal: No complaints of abdominal pain, no constipation, no nausea or vomiting, no diarrhea, no bloody stools, no abdominal pain, no nausea, no constipation and no diarrhea  Genitourinary: No complaints of dysuria, no incontinence, no pelvic pain, no dysmenorrhea, no vaginal discharge or bleeding and no pelvic pain  Musculoskeletal: THORACIC BACK PAIN; ANTALGIC GAIT;, but as noted in HPI, no joint swelling, no limb pain, no joint stiffness and no limb swelling    The patient presents with complaints of gradual onset of moderate mid back arthralgias, described as aching, radiating to the back  Integumentary: NO RASH, but No complaints of skin rash or lesions, no itching, no skin wounds, no breast pain or lump, no rashes, no itching, no skin lesions and no skin wound  Neurological: No complaints of headache, no confusion, no convulsions, no numbness, no dizziness or fainting, no tingling, no limb weakness, no difficulty walking, no headache, no numbness, no tingling, no confusion and no dizziness  Psychiatric: no anxiety, no personality change, no sleep disturbances, no depression and no emotional problems  Endocrine: No complaints of proptosis, no hot flashes, no muscle weakness, no deepening of the voice, no feelings of weakness, no proptosis and no hot flashes  Hematologic/Lymphatic: No complaints of swollen glands, no swollen glands in the neck, does not bleed easily, does not bruise easily, no swollen glands, no tendency for easy bleeding, no tendency for easy bruising and no swollen glands in the neck  ROS reviewed  Active Problems    1   Abdominal pain, RLQ (right lower quadrant) (789 03) (R10 31)   2  Acute sinusitis, recurrence not specified, unspecified location (461 9) (J01 90)   3  Ankylosing spondylitis (720 0) (M45 9)   4  GERD without esophagitis (530 81) (K21 9)   5  Hypertension (401 9) (I10)   6  Need for influenza vaccination (V04 81) (Z23)   7  Nephrolithiasis (592 0) (N20 0)   8  Osteoarthritis (715 90) (M19 90)   9  Pre-op evaluation (V72 84) (Z01 818)   10  Rheumatoid arthritis involving multiple sites, unspecified rheumatoid factor presence    (714 0) (M06 9)   11  Right groin pain (789 09) (R10 31)   12  Situational anxiety (300 09) (F41 8)   13  Subclinical hypothyroidism (244 8) (E03 9)   14  Thoracic myofascial strain, sequela (905 7) (S29 019S)   15  Type 2 diabetes mellitus (250 00) (E11 9)   16  Urinary frequency (788 41) (R35 0)    Past Medical History    · History of Acute Cystitis (595 0)   · History of Ankylosing spondylitis (720 0) (M45 9)   · History of Diabetes Mellitus (250 00)   · History of Diabetes mellitus screening (V77 1) (Z13 1)   · History of abdominal pain (V13 89) (Q18 113)   · History of acute sinusitis (V12 69) (Z87 09)   · History of backache (V13 59) (Z87 39)   · History of hematuria (V13 09) (Z87 448)   · History of hypertension (V12 59) (Z86 79)   · History of porphyria (V12 29) (Z86 39)   · History of Urinary tract infection (599 0) (N39 0)   · History of Visit for pre-operative examination (V72 84) (X78 410)    The active problems and past medical history were reviewed and updated today  Surgical History    · History of Appendectomy   · History of Back Surgery   · History of Cholecystectomy   · History of Diagnostic Cystoscopy    The surgical history was reviewed and updated today         Family History    · Family history of Diabetes Mellitus (V18 0)   · Family history of Gout (V18 19)   · Family history of Heart Disease (V17 49)    · Family history of Diabetes Mellitus (V18 0)   · Family history of Heart Disease (V17 49)    · Family history of Nephrolithiasis    The family history was reviewed and updated today  Social History    · Never A Smoker  The social history was reviewed and updated today  The social history was reviewed and is unchanged  Current Meds   1  Adult Aspirin EC Low Strength 81 MG Oral Tablet Delayed Release; Take 1 tablet daily   Recorded   2  ALPRAZolam 0 25 MG Oral Tablet; Therapy: 12TWX5902 to (Evaluate:23Gsr2152) Recorded   3  Amoxicillin 500 MG Oral Tablet; 4 tabs prior to dental procedure- pt not allergic to amoxil; Therapy: 27Efi6830 to (Evaluate:30Oct2017)  Requested for: 27Oct2017; Last   Rx:27Oct2017 Ordered   4  BD Pen Needle Short U/F 31G X 8 MM Miscellaneous; USE AS DIRECTED; Therapy: 46GEV1724 to (Evaluate:05Oct2017)  Requested for: 58YRJ0890; Last   Rx:09Mar2017 Ordered   5  Calcium + D TABS Recorded   6  [employer]S Fish Oil CAPS Recorded   7  CVS Vitamin D3 1000 UNIT CAPS; Take as directed Recorded   8  Daily Value Multivitamin Oral Tablet Recorded   9  Folic Acid 721 MCG Oral Tablet; Take 1 tablet daily as directed Recorded   10  Januvia 100 MG Oral Tablet; TAKE 1 TABLET DAILY; Therapy: (Recorded:14Sep2017) to Recorded   11  MetFORMIN HCl ER (OSM) 1000 MG Oral Tablet Extended Release 24 Hour; Take 1    tablet twice daily; Therapy: 22IDL3982 to (([phone])  Requested for: 31Oct2017; Last    Rx:31Oct2017 Ordered   12  Mometasone Furoate 0 1 % External Cream; APPLY SPARINGLY TO AFFECTED AREAS    TWICE DAILY  (AM AND PM); Therapy: 70LNH7102 to ()  Requested for: 78QSB9899; Last    Rx:59Euf7574 Ordered   13  Multivitamins TABS; Therapy: (Recorded:17Jan2018) to Recorded   14  Piroxicam 20 MG Oral Capsule; TAKE 1 CAPSULE Daily Recorded   15  PriLOSEC 20 MG CPDR Recorded   16  Probiotic CAPS; Therapy: (Recorded:17Jan2018) to Recorded   17  Nora Collie FlexTouch 100 UNIT/ML Subcutaneous Solution Pen-injector; INJECT 38 UNIT    Bedtime;     Therapy: (Recorded:14Sep2017) to Recorded   18  Valsartan 160 MG Oral Tablet; take 1 tablet every day; Therapy: 92RLE3340 to (Evaluate:10Mar2018)  Requested for: 27IIF4202; Last    Rx:15Mar2017 Ordered   19  Victoza 18 MG/3ML SOLN; INJECT 1 2 UNIT Daily; Therapy: (Recorded:17Jan2018) to Recorded   20  Xeljanz XR 11 MG Oral Tablet Extended Release 24 Hour; Take 1 tablet daily; Therapy: (Recorded:25Jan2017) to Recorded    The medication list was reviewed and updated today  Allergies    1  Penicillins   2  Sulfa Drugs   3  Barbiturates   4  Morphine Sulfate ER TB12    Vitals  Signs    Temperature: 97 5 F  Heart Rate: 76  Respiration: 18  Systolic: 567  Diastolic: 76  Height: 5 ft 2 in  Weight: 234 lb   BMI Calculated: 42 8  BSA Calculated: 2 04    Physical Exam    Constitutional   General appearance: No acute distress, well appearing and well nourished  Head and Face   Head and face: Normal     Palpation of the face and sinuses: No sinus tenderness  Eyes   Conjunctiva and lids: No swelling, erythema or discharge  Pupils and irises: Equal, round, reactive to light  Ears, Nose, Mouth, and Throat   External inspection of ears and nose: Normal     Otoscopic examination: Tympanic membranes translucent with normal light reflex  Canals patent without erythema  Hearing: Normal     Nasal mucosa, septum, and turbinates: Normal without edema or erythema  SOME CROWDING POST OROPHARYNX  Lips, teeth, and gums: Normal, good dentition  Oropharynx: Abnormal   Oral mucosa was normal  The palate examination showed no abnormalities  The tongue was normal    Neck   Neck: Supple, symmetric, trachea midline, no masses  Thyroid: Normal, no thyromegaly  Pulmonary   Respiratory effort: No increased work of breathing or signs of respiratory distress  Respiratory rate: normal  Respiratory Findings: no dry cough, no barky cough and no audible wheezing     Percussion of chest: Normal     Palpation of chest: Normal     Auscultation of lungs: Clear to auscultation  Cardiovascular   Palpation of heart: Normal PMI, no thrills  The PMI was palpated at the 5th LICS in the midclavicular line  The apical impulse was normal  no precordial heave was noted  Auscultation of heart: Normal rate and rhythm, normal S1 and S2, no murmurs  The heart rate was normal  The rhythm was regular  Heart sounds: normal S1, normal S2, no gallop heard, no S3 and no S4  Carotid pulses: 2+ bilaterally  Abdominal aorta: Normal     Pedal pulses: 2+ bilaterally  Peripheral vascular exam: Normal     Examination of extremities for edema and/or varicosities: Normal   no edema  Abdomen   Abdomen: Non-tender, no masses  The abdomen was obese  Bowel sounds were normal  The abdomen was soft and nontender  no masses palpated  The abdomen was normal to percussion  no CVA tenderness HEALED CICATRIX ABOVE UMBILICUS  Liver and spleen: No hepatomegaly or splenomegaly  Lymphatic   Palpation of lymph nodes in neck: No lymphadenopathy  Musculoskeletal   Gait and station: Normal     Digits and nails: Normal without clubbing or cyanosis  Joints, bones, and muscles: Normal     Range of motion: Abnormal   DECREASED CERVICAL ROM ; DECREASD T SPINE ROTATION; SPASM RIGHT TRAPEZIUS MUSCLE  Skin   Skin and subcutaneous tissue: Normal without rashes or lesions  Neurologic   Cranial nerves: Cranial nerves II-XII intact  Reflexes: 2+ and symmetric  Sensation: No sensory loss  Psychiatric   Orientation to person, place, and time: Normal     Mood and affect: Normal        Assessment    1  Pre-op evaluation (V72 84) (Z01 818)   2  Type 2 diabetes mellitus (250 00) (E11 9)   3  GERD without esophagitis (530 81) (K21 9)   4  Hypertension (401 9) (I10)   5  Ankylosing spondylitis (720 0) (M45 9)   6   Rheumatoid arthritis involving multiple sites, unspecified rheumatoid factor presence   (714 0) (M06 9)    Discussion/Summary  Surgical Clearance: She is at a LOW risk from a cardiovascular standpoint at this time without any additional cardiac testing  Reevaluation needed, if she should present with symptoms prior to surgery/procedure  Surgical clearance faxed to Dr Ace Lemons   PATIENT IS MEDICALLY STABLE FOR T9-L1 PEDICLE SCREW FIXATION AND FUSION; L1-2 INTERBODY FUSION, CAGE, BONE MORPHOGENIC PROTEIN, CHECK L2-SAC FUSION T10 [facility]    EKG- SINUS TACH  LABS STABLE; CREAT STABLE;  METFORMIN/INSULIN ORDERS PER ENDOCRINE  FOLLOW UP POST OP  BP STABLE  End of Encounter Meds    1  Valsartan 160 MG Oral Tablet; take 1 tablet every day; Therapy: 22GGL9585 to (Evaluate:10Mar2018)  Requested for: 72YFI8354; Last   Rx:15Mar2017 Ordered    2  Mometasone Furoate 0 1 % External Cream; APPLY SPARINGLY TO AFFECTED AREAS   TWICE DAILY  (AM AND PM); Therapy: 12HKS4054 to (Jer Perez)  Requested for: 21DCY1413; Last   Rx:27Sgx6020 Ordered    3  Multivitamins TABS; Therapy: (Recorded:17Jan2018) to Recorded   4  Probiotic CAPS; Therapy: (Recorded:17Jan2018) to Recorded   5  Victoza 18 MG/3ML SOLN; INJECT 1 2 UNIT Daily; Therapy: (Recorded:17Jan2018) to Recorded    6  Amoxicillin 500 MG Oral Tablet; 4 tabs prior to dental procedure- pt not allergic to amoxil; Therapy: 13Nhv9593 to (Evaluate:30Oct2017)  Requested for: 27Oct2017; Last   Rx:27Oct2017 Ordered    7  BD Pen Needle Short U/F 31G X 8 MM Miscellaneous; USE AS DIRECTED; Therapy: 79NTL9816 to (Evaluate:05Oct2017)  Requested for: 22HCK4839; Last   Rx:09Mar2017 Ordered   8  MetFORMIN HCl ER (OSM) 1000 MG Oral Tablet Extended Release 24 Hour; Take 1   tablet twice daily; Therapy: 88PRS8858 to ([phone] [id number])  Requested for: 31Oct2017; Last   Rx:31Oct2017 Ordered    9  Adult Aspirin EC Low Strength 81 MG Oral Tablet Delayed Release; Take 1 tablet daily   Recorded   10  ALPRAZolam 0 25 MG Oral Tablet; Therapy: 25WCM8353 to (Evaluate:29Dbt3210) Recorded   11  Calcium + D TABS Recorded   12   CVS Fish Oil CAPS Recorded 13  CVS Vitamin D3 1000 UNIT CAPS; Take as directed Recorded   14  Daily Value Multivitamin Oral Tablet Recorded   15  Folic Acid 532 MCG Oral Tablet; Take 1 tablet daily as directed Recorded   16  Januvia 100 MG Oral Tablet; TAKE 1 TABLET DAILY; Therapy: (Recorded:14Sep2017) to Recorded   17  Piroxicam 20 MG Oral Capsule; TAKE 1 CAPSULE Daily Recorded   18  PriLOSEC 20 MG CPDR (Omeprazole) Recorded   19  Buddie Darnell FlexTouch 100 UNIT/ML Subcutaneous Solution Pen-injector; INJECT 38 UNIT    Bedtime; Therapy: (Recorded:14Sep2017) to Recorded   20  Xeljanz XR 11 MG Oral Tablet Extended Release 24 Hour; Take 1 tablet daily; Therapy: ([phone]) to Recorded    Signatures   Electronically signed by :  Jose Elias Mclaughlin DO; Jan 17 2018 10:28AM EST                       (Author)

## 2018-02-06 ENCOUNTER — TRANSITIONAL CARE MANAGEMENT (OUTPATIENT)
Dept: FAMILY MEDICINE CLINIC | Facility: CLINIC | Age: 63
End: 2018-02-06

## 2018-02-06 DIAGNOSIS — I10 ESSENTIAL HYPERTENSION: Primary | ICD-10-CM

## 2018-02-08 ENCOUNTER — OFFICE VISIT (OUTPATIENT)
Dept: FAMILY MEDICINE CLINIC | Facility: CLINIC | Age: 63
End: 2018-02-08
Payer: COMMERCIAL

## 2018-02-08 VITALS
HEART RATE: 72 BPM | RESPIRATION RATE: 18 BRPM | DIASTOLIC BLOOD PRESSURE: 86 MMHG | TEMPERATURE: 97.4 F | SYSTOLIC BLOOD PRESSURE: 124 MMHG

## 2018-02-08 DIAGNOSIS — M96.1 LUMBAR POSTLAMINECTOMY SYNDROME: ICD-10-CM

## 2018-02-08 DIAGNOSIS — M48.062 SPINAL STENOSIS OF LUMBAR REGION WITH NEUROGENIC CLAUDICATION: ICD-10-CM

## 2018-02-08 DIAGNOSIS — E11.9 TYPE 2 DIABETES MELLITUS WITHOUT COMPLICATION, WITHOUT LONG-TERM CURRENT USE OF INSULIN (HCC): ICD-10-CM

## 2018-02-08 DIAGNOSIS — M45.7 ANKYLOSING SPONDYLITIS OF LUMBOSACRAL REGION (HCC): ICD-10-CM

## 2018-02-08 DIAGNOSIS — I10 ESSENTIAL HYPERTENSION: ICD-10-CM

## 2018-02-08 DIAGNOSIS — IMO0001 TRANSITION OF CARE PERFORMED WITH SHARING OF CLINICAL SUMMARY: Primary | ICD-10-CM

## 2018-02-08 PROCEDURE — 99496 TRANSJ CARE MGMT HIGH F2F 7D: CPT | Performed by: INTERNAL MEDICINE

## 2018-02-08 RX ORDER — LIRAGLUTIDE 6 MG/ML
INJECTION SUBCUTANEOUS
Refills: 0 | COMMUNITY
Start: 2018-02-01 | End: 2022-02-15

## 2018-02-08 RX ORDER — FERROUS SULFATE 325(65) MG
325 TABLET ORAL
COMMUNITY
Start: 2018-01-31 | End: 2018-04-09 | Stop reason: SDUPTHER

## 2018-02-08 RX ORDER — PEN NEEDLE, DIABETIC 31 GX5/16"
NEEDLE, DISPOSABLE MISCELLANEOUS
Refills: 0 | COMMUNITY
Start: 2018-02-01

## 2018-02-08 RX ORDER — INSULIN DEGLUDEC 200 U/ML
38 INJECTION, SOLUTION SUBCUTANEOUS
Refills: 0 | COMMUNITY
Start: 2018-02-07 | End: 2018-08-06 | Stop reason: ALTCHOICE

## 2018-02-08 RX ORDER — OXYCODONE HYDROCHLORIDE 5 MG/1
TABLET ORAL
Refills: 0 | COMMUNITY
Start: 2018-01-31 | End: 2018-06-05 | Stop reason: ALTCHOICE

## 2018-02-08 RX ORDER — ALPRAZOLAM 0.25 MG/1
TABLET ORAL AS NEEDED
COMMUNITY
Start: 2016-02-22

## 2018-02-08 RX ORDER — VALSARTAN 160 MG/1
160 TABLET ORAL DAILY
Qty: 90 TABLET | Refills: 3 | Status: SHIPPED | OUTPATIENT
Start: 2018-02-08 | End: 2018-07-24 | Stop reason: SINTOL

## 2018-02-08 RX ORDER — BLACK COHOSH ROOT 200 MG
250 CAPSULE ORAL
Refills: 0 | COMMUNITY
Start: 2018-01-30 | End: 2018-04-09 | Stop reason: SDUPTHER

## 2018-02-08 RX ORDER — METHOCARBAMOL 750 MG/1
TABLET, FILM COATED ORAL EVERY 6 HOURS PRN
COMMUNITY
Start: 2018-02-07 | End: 2020-04-22

## 2018-02-08 RX ORDER — AMOXICILLIN 500 MG/1
TABLET, FILM COATED ORAL
COMMUNITY
Start: 2017-08-23 | End: 2018-06-05 | Stop reason: ALTCHOICE

## 2018-02-08 NOTE — PROGRESS NOTES
Assessment/Plan: Nunu Dutton is a 61 y o  female with:   Problem List Items Addressed This Visit     None        Subjective:   Nunu Dutton is a 61 y o  female who presents today with a chief complaint of Transition of Care Management    Pt is two weeks s/p spinal surgery; She is walkign with a walker; Her sutures were removed; She was in rehab for a week; Review of Systems   Constitutional: Positive for activity change (walking better; using walker)  Negative for appetite change, chills, diaphoresis, fatigue, fever and unexpected weight change  HENT: Negative  Eyes: Negative  Respiratory: Negative  Cardiovascular: Negative  Gastrointestinal: Negative  Abdominal pain: right leg still weak  Anal bleeding: back pain from surgery; improving slowly  Endocrine: Negative  Musculoskeletal: Positive for arthralgias, back pain and gait problem  Skin: Negative  Allergic/Immunologic: Negative  Neurological: Negative for facial asymmetry, light-headedness and headaches  Hematological: Negative  Psychiatric/Behavioral: Negative  Objective: There were no vitals taken for this visit  Physical Exam   Constitutional: She is oriented to person, place, and time  She appears well-developed and well-nourished  HENT:   Head: Normocephalic and atraumatic  Right Ear: External ear normal    Left Ear: External ear normal    Nose: Nose normal    Mouth/Throat: Oropharynx is clear and moist    Eyes: Conjunctivae and EOM are normal  Pupils are equal, round, and reactive to light  Neck: Normal range of motion  Neck supple  No tracheal deviation present  No thyromegaly present  Cardiovascular: Normal rate, regular rhythm, normal heart sounds and intact distal pulses  Pulmonary/Chest: Effort normal and breath sounds normal  No respiratory distress  She has no wheezes  Abdominal: Soft  Bowel sounds are normal  She exhibits no distension  There is no tenderness     Musculoskeletal: Improved rom and sensation b/l lower ext   Neurological: She is alert and oriented to person, place, and time  No cranial nerve deficit  Coordination normal    Weakness right quadricep    Skin: Skin is warm and dry  Healing cicatrix mid thoracic to lumbar area- healing well; No exudate    Psychiatric: She has a normal mood and affect  Her behavior is normal  Judgment and thought content normal    Nursing note and vitals reviewed  No results found for this visit on 02/06/18      Histories Reviewed:  Patient's Medications   New Prescriptions    No medications on file   Previous Medications    ALPRAZOLAM (XANAX) 0 25 MG TABLET    as needed    AMOXICILLIN (AMOXIL) 500 MG TABLET    Take by mouth    ASPIRIN 81 MG TABLET    Take 81 mg by mouth daily    B-D UF III MINI PEN NEEDLES 31G X 5 MM MISC    use 2 daily as directed    BLACK COHOSH 40 MG CAPS    Take 40 mg by mouth daily    CALCIUM CARBONATE (OS-RILEY) 600 MG TABLET    Take 400 mg by mouth daily    CHOLECALCIFEROL (VITAMIN D) 2000 UNITS TABLET    Take 2,000 Units by mouth daily    FERROUS SULFATE 325 (65 FE) MG TABLET    Take 325 mg by mouth    FOLIC ACID (FOLVITE) 360 MCG TABLET    Take 800 mg by mouth daily    INSULIN DETEMIR (LEVEMIR) 100 UNITS/ML SUBCUTANEOUS INJECTION    Inject 10 Units under the skin daily at bedtime    INSULIN PEN NEEDLE (B-D ULTRAFINE III SHORT PEN) 31G X 8 MM MISC    by Does not apply route    METFORMIN (GLUMETZA) 1000 MG (MOD) 24 HR TABLET    Take 1,000 mg by mouth daily with breakfast    METHOCARBAMOL (ROBAXIN) 750 MG TABLET        MULTIPLE VITAMIN (MULTIVITAMIN) TABLET    Take 1 tablet by mouth daily    OMEGA-3-ACID ETHYL ESTERS (LOVAZA) 1 G CAPSULE    Take 1,000 mg by mouth daily    OMEPRAZOLE (PRILOSEC) 20 MG DELAYED RELEASE CAPSULE    Take 20 mg by mouth daily    OXYCODONE (ROXICODONE) 5 MG IMMEDIATE RELEASE TABLET    TAKE 1 TO 2 TABLETS BY MOUTH  2 TIMES A DAY AS NEEDED FOR SEVERE PAIN ( PAIN SCORE 7 -10)    PIROXICAM (FELDENE) 20 MG CAPSULE    Take 20 mg by mouth daily    PROBIOTIC PRODUCT (PROBIOTIC-10 PO)    Take by mouth    RA VITAMIN C 250 MG TABLET    Take 250 mg by mouth 2 (two) times a day before meals    SACCHAROMYCES BOULARDII (FLORASTOR) 250 MG CAPSULE    Take by mouth daily    TOFACITINIB CITRATE (XELJANZ XR PO)    Take 11 mg by mouth daily    TRESIBA FLEXTOUCH INJECTION  UNITS/ML        VALSARTAN (DIOVAN) 160 MG TABLET    Take 160 mg by mouth daily    VICTOZA 18 MG/3ML SOPN    inject 1 2 milligram subcutaneously daily IF TOLERATED   Modified Medications    No medications on file   Discontinued Medications    No medications on file     Allergies   Allergen Reactions    Barbiturates     Morphine     Penicillins Hives    Sulfa Antibiotics Hives     Past Medical History:   Diagnosis Date    [facility]     Hypertension      Social History     Social History    Marital status: /Civil Union     Spouse name: N/A    Number of children: N/A    Years of education: N/A     Occupational History    Not on file  Social History Main Topics    Smoking status: Never Smoker    Smokeless tobacco: Never Used    Alcohol use Yes      Comment: very rarely    Drug use: No    Sexual activity: Not on file     Other Topics Concern    Not on file     Social History Narrative    No narrative on file     Future Appointments  Date Time Provider Wilmar Hanley   8/7/2018 9:20 AM [facility]     There are no Patient Instructions on file for this visit  Follow up with surgeon;   Follow up with endocrine  Labs in 3-4 months  Continue with exercise at home  Follow up  Refill valsartan  Follow blood pressure  contine with iron tabs and vitamin c    lablast[poct:3

## 2018-02-09 NOTE — PROGRESS NOTES
Assessment/Plan:        Problem List Items Addressed This Visit     Ankylosing spondylitis ([facility] [address] )    Diabetes mellitus ([facility] [address] )    Relevant Medications    TRESIBA FLEXTOUCH injection pen 200 units/mL    VICTOZA 18 MG/3ML SOPN    Hypertension    Lumbar postlaminectomy syndrome    Lumbar stenosis    Transition of care performed with sharing of clinical summary - Primary           Subjective:     Patient ID: Huy Gibson is a 61 y o  female  Patient here for booker; She had lumbar spinal fusion and laminectomy; She did well in rehab and is walking with minimal assistance from a walker;  Surgical course and d/c  Course were reviewed in records and with patient and her ; She is progressing well and getting pt at home at present; she is wearing a brace; her bp has been stable at home; She has minimal incisional pain;  Her blood sugars have been monitoroed and she has no hypoglycemia; Review of Systems   HENT: Negative  Eyes: Negative  Respiratory: Negative  Gastrointestinal: Negative  Endocrine: Negative  Genitourinary: Negative  Musculoskeletal: Positive for arthralgias, back pain, gait problem (improved gait overall) and neck stiffness  Skin: Positive for wound (healing cicatrix lumbar area- no discharge; no redness; )  Allergic/Immunologic: Negative  Neurological: Positive for weakness (improved weakness b/l lower ext; )  Negative for tremors, seizures, syncope, speech difficulty, light-headedness and numbness  Hematological: Bruises/bleeds easily (currently with some bruising from meds in [facility])  Psychiatric/Behavioral: Negative  Objective:     Physical Exam   Constitutional: She is oriented to person, place, and time  She appears well-developed and well-nourished  HENT:   Head: Normocephalic and atraumatic     Right Ear: External ear normal    Left Ear: External ear normal    Nose: Nose normal    Mouth/Throat: Oropharynx is clear and moist    Eyes: Conjunctivae and EOM are normal  Pupils are equal, round, and reactive to light  Left eye exhibits no discharge  Neck: Normal range of motion  Neck supple  No JVD present  No tracheal deviation present  No thyromegaly present  Some limitation of c spine to right;   Some spasm right trapezius    Cardiovascular: Normal rate, regular rhythm and intact distal pulses  No murmur heard  Pulmonary/Chest: Effort normal and breath sounds normal  No respiratory distress  She has no wheezes  She has no rales  Abdominal: Soft  Bowel sounds are normal  She exhibits no distension  There is no tenderness  There is no rebound  Musculoskeletal: She exhibits no edema or tenderness  Lymphadenopathy:     She has no cervical adenopathy  Neurological: She is alert and oriented to person, place, and time  She has normal reflexes  No cranial nerve deficit  Coordination normal    Skin: Skin is warm and dry  She is not diaphoretic  Psychiatric: She has a normal mood and affect  Her behavior is normal  Judgment and thought content normal    Nursing note and vitals reviewed  Vitals:    02/08/18 1329   BP: 124/86   Pulse: 72   Resp: 18   Temp: (!) 97 4 °F (36 3 °C)   TempSrc: Tympanic       Transitional Care Management Review:  Samina Quintero is a 61 y o  female here for TCM follow up       During the TCM phone call patient stated:    Date and time hospital follow up call was made:  2/6/2018  1:20 PM  Patient was hopsitalized at:  [facility]  Date of admission:  1/22/18  Date of discharge:  1/24/18  Diagnosis:  Back Surgery  Were the patients medicaitons reviewed and updated:  No  Current symptoms:  Back pain - left side, Back pain - right side  Back pain, left side, severity:  Moderate  Back pain, left side, onset:  Other (comment)  Back pain, right side, severity:  Moderate  Back pain, right side, onset:  Other (comment)  Post hospital issues:  Reduced activity  Should patient be enrolled in antico monitoring?:  No  Scheduled for follow up?:  Yes  Did you obtain your prescribed medications:  Yes  Do you need help managing your perscriptions or medications:  No  Is transportation to your appointments needed:  Yes  Specify why:   will transport  I have advised the patient to call PCP with any new or worsening symptoms (please type in name along with any credentials):  Jhon Valdez MA  Living Arrangements:  Spouse or Significiant other  Support System:  Spouse  The type of support provided:  Emotional, Physical  Do you have social support:  Yes, as much as I need  Are you recieving outpatient services:  No  Are you recieving home care services:  Yes  Types of home care services:  Home PT, Nurse visit  Are you using any community resources:  No  Current waiver service:  No  Have you fallen in the last 12 months:  No  Interperter language line required?:  No  Counseling:  Patient             Leopold Calix, DO

## 2018-02-11 PROBLEM — I10 HYPERTENSION: Status: ACTIVE | Noted: 2018-02-11

## 2018-02-11 PROBLEM — E66.01 MORBID OBESITY WITH BMI OF 40.0-44.9, ADULT (HCC): Status: ACTIVE | Noted: 2018-01-22

## 2018-02-11 PROBLEM — M51.35 DDD (DEGENERATIVE DISC DISEASE), THORACOLUMBAR: Status: ACTIVE | Noted: 2017-12-28

## 2018-02-11 PROBLEM — IMO0001 TRANSITION OF CARE PERFORMED WITH SHARING OF CLINICAL SUMMARY: Status: ACTIVE | Noted: 2018-02-11

## 2018-02-11 PROBLEM — M06.9 RHEUMATOID ARTHRITIS INVOLVING MULTIPLE SITES (HCC): Status: ACTIVE | Noted: 2017-02-28

## 2018-02-11 PROBLEM — M45.9 ANKYLOSING SPONDYLITIS (HCC): Status: ACTIVE | Noted: 2017-02-28

## 2018-02-11 PROBLEM — K21.9 GASTROESOPHAGEAL REFLUX DISEASE WITHOUT ESOPHAGITIS: Status: ACTIVE | Noted: 2018-01-22

## 2018-02-11 PROBLEM — Z78.9 TRANSITION OF CARE PERFORMED WITH SHARING OF CLINICAL SUMMARY: Status: ACTIVE | Noted: 2018-02-11

## 2018-02-11 PROBLEM — M48.061 LUMBAR STENOSIS: Status: ACTIVE | Noted: 2018-01-22

## 2018-02-11 NOTE — PATIENT INSTRUCTIONS
Follow up with surgeon  Monitor wound  Follow blood pressure and blood glucose  Follow up with endocrine

## 2018-02-13 ENCOUNTER — TELEPHONE (OUTPATIENT)
Dept: FAMILY MEDICINE CLINIC | Facility: CLINIC | Age: 63
End: 2018-02-13

## 2018-02-14 DIAGNOSIS — E11.9 TYPE 2 DIABETES MELLITUS WITHOUT COMPLICATION, WITH LONG-TERM CURRENT USE OF INSULIN (HCC): Primary | ICD-10-CM

## 2018-02-14 DIAGNOSIS — Z79.4 TYPE 2 DIABETES MELLITUS WITHOUT COMPLICATION, WITH LONG-TERM CURRENT USE OF INSULIN (HCC): Primary | ICD-10-CM

## 2018-02-24 ENCOUNTER — TRANSCRIBE ORDERS (OUTPATIENT)
Dept: ADMINISTRATIVE | Facility: HOSPITAL | Age: 63
End: 2018-02-24

## 2018-02-24 ENCOUNTER — APPOINTMENT (OUTPATIENT)
Dept: LAB | Facility: MEDICAL CENTER | Age: 63
End: 2018-02-24
Payer: COMMERCIAL

## 2018-02-24 DIAGNOSIS — R94.6 NONSPECIFIC ABNORMAL RESULTS OF THYROID FUNCTION STUDY: Primary | ICD-10-CM

## 2018-02-24 DIAGNOSIS — E11.9 TYPE 2 DIABETES MELLITUS WITHOUT COMPLICATION, WITH LONG-TERM CURRENT USE OF INSULIN (HCC): ICD-10-CM

## 2018-02-24 DIAGNOSIS — Z79.4 TYPE 2 DIABETES MELLITUS WITHOUT COMPLICATION, WITH LONG-TERM CURRENT USE OF INSULIN (HCC): ICD-10-CM

## 2018-02-24 LAB
ALBUMIN SERPL BCP-MCNC: 3.6 G/DL (ref 3.5–5)
ALP SERPL-CCNC: 111 U/L (ref 46–116)
ALT SERPL W P-5'-P-CCNC: 45 U/L (ref 12–78)
AST SERPL W P-5'-P-CCNC: 29 U/L (ref 5–45)
BASOPHILS # BLD AUTO: 0.03 THOUSANDS/ΜL (ref 0–0.1)
BASOPHILS NFR BLD AUTO: 0 % (ref 0–1)
BILIRUB DIRECT SERPL-MCNC: 0.15 MG/DL (ref 0–0.2)
BILIRUB SERPL-MCNC: 0.46 MG/DL (ref 0.2–1)
CREAT SERPL-MCNC: 0.62 MG/DL (ref 0.6–1.3)
EOSINOPHIL # BLD AUTO: 0.19 THOUSAND/ΜL (ref 0–0.61)
EOSINOPHIL NFR BLD AUTO: 3 % (ref 0–6)
ERYTHROCYTE [DISTWIDTH] IN BLOOD BY AUTOMATED COUNT: 14.4 % (ref 11.6–15.1)
EST. AVERAGE GLUCOSE BLD GHB EST-MCNC: 148 MG/DL
GFR SERPL CREATININE-BSD FRML MDRD: 96 ML/MIN/1.73SQ M
HBA1C MFR BLD: 6.8 % (ref 4.2–6.3)
HCT VFR BLD AUTO: 35.4 % (ref 34.8–46.1)
HGB BLD-MCNC: 11 G/DL (ref 11.5–15.4)
IRON SERPL-MCNC: 44 UG/DL (ref 50–170)
LYMPHOCYTES # BLD AUTO: 1.17 THOUSANDS/ΜL (ref 0.6–4.47)
LYMPHOCYTES NFR BLD AUTO: 17 % (ref 14–44)
MCH RBC QN AUTO: 28 PG (ref 26.8–34.3)
MCHC RBC AUTO-ENTMCNC: 31.1 G/DL (ref 31.4–37.4)
MCV RBC AUTO: 90 FL (ref 82–98)
MONOCYTES # BLD AUTO: 0.72 THOUSAND/ΜL (ref 0.17–1.22)
MONOCYTES NFR BLD AUTO: 10 % (ref 4–12)
NEUTROPHILS # BLD AUTO: 4.95 THOUSANDS/ΜL (ref 1.85–7.62)
NEUTS SEG NFR BLD AUTO: 70 % (ref 43–75)
NRBC BLD AUTO-RTO: 0 /100 WBCS
PLATELET # BLD AUTO: 334 THOUSANDS/UL (ref 149–390)
PMV BLD AUTO: 9.8 FL (ref 8.9–12.7)
PROT SERPL-MCNC: 8.2 G/DL (ref 6.4–8.2)
RBC # BLD AUTO: 3.93 MILLION/UL (ref 3.81–5.12)
TSH SERPL DL<=0.05 MIU/L-ACNC: 3.37 UIU/ML (ref 0.36–3.74)
WBC # BLD AUTO: 7.08 THOUSAND/UL (ref 4.31–10.16)

## 2018-02-24 PROCEDURE — 36415 COLL VENOUS BLD VENIPUNCTURE: CPT | Performed by: INTERNAL MEDICINE

## 2018-02-24 PROCEDURE — 83036 HEMOGLOBIN GLYCOSYLATED A1C: CPT | Performed by: INTERNAL MEDICINE

## 2018-02-24 PROCEDURE — 84443 ASSAY THYROID STIM HORMONE: CPT | Performed by: INTERNAL MEDICINE

## 2018-02-24 PROCEDURE — 85025 COMPLETE CBC W/AUTO DIFF WBC: CPT

## 2018-02-24 PROCEDURE — 83540 ASSAY OF IRON: CPT

## 2018-02-24 PROCEDURE — 80076 HEPATIC FUNCTION PANEL: CPT | Performed by: INTERNAL MEDICINE

## 2018-02-24 PROCEDURE — 82565 ASSAY OF CREATININE: CPT | Performed by: INTERNAL MEDICINE

## 2018-02-28 ENCOUNTER — TELEPHONE (OUTPATIENT)
Dept: FAMILY MEDICINE CLINIC | Facility: CLINIC | Age: 63
End: 2018-02-28

## 2018-02-28 NOTE — TELEPHONE ENCOUNTER
PT called back I gave her message  She wants to know what she should do about her iron and her blood count

## 2018-02-28 NOTE — TELEPHONE ENCOUNTER
Call pt- labs are all very goo from a diabetes standpoing- hga1c is 6 8- fantastic! Her blood count is down( it is 11 -had been 12 8) ; Her iron is 44 - should be 50 or more   Overall labs are good but blood count being down can make her tired-  As for diabetes she is doing great tho!!!!

## 2018-03-01 ENCOUNTER — TELEPHONE (OUTPATIENT)
Dept: FAMILY MEDICINE CLINIC | Facility: CLINIC | Age: 63
End: 2018-03-01

## 2018-03-01 DIAGNOSIS — D50.0 IRON DEFICIENCY ANEMIA DUE TO CHRONIC BLOOD LOSS: Primary | ICD-10-CM

## 2018-03-01 NOTE — TELEPHONE ENCOUNTER
Call pt- we can repeat the blood count in 6-8 weeks - final result is in and it is very close to normal-  I will put in order for blood count and iron to make sure it is coming up

## 2018-04-03 ENCOUNTER — TELEPHONE (OUTPATIENT)
Dept: FAMILY MEDICINE CLINIC | Facility: CLINIC | Age: 63
End: 2018-04-03

## 2018-04-03 NOTE — TELEPHONE ENCOUNTER
Patient was admitted [address] 3/29/18 for mild attack and had a heart catheterization  D/C 3/31/18  Needs to set up TCM with Dr Quita Oppenheim

## 2018-04-04 ENCOUNTER — APPOINTMENT (OUTPATIENT)
Dept: LAB | Facility: MEDICAL CENTER | Age: 63
End: 2018-04-04
Payer: COMMERCIAL

## 2018-04-04 ENCOUNTER — TRANSCRIBE ORDERS (OUTPATIENT)
Dept: ADMINISTRATIVE | Facility: HOSPITAL | Age: 63
End: 2018-04-04

## 2018-04-04 DIAGNOSIS — E11.8 TYPE 2 DIABETES MELLITUS WITH COMPLICATION, WITHOUT LONG-TERM CURRENT USE OF INSULIN (HCC): ICD-10-CM

## 2018-04-04 DIAGNOSIS — D50.0 IRON DEFICIENCY ANEMIA DUE TO CHRONIC BLOOD LOSS: ICD-10-CM

## 2018-04-04 DIAGNOSIS — R07.9 CHEST PAIN, UNSPECIFIED TYPE: Primary | ICD-10-CM

## 2018-04-04 LAB
BASOPHILS # BLD AUTO: 0.03 THOUSANDS/ΜL (ref 0–0.1)
BASOPHILS NFR BLD AUTO: 0 % (ref 0–1)
CHOLEST SERPL-MCNC: 107 MG/DL (ref 50–200)
EOSINOPHIL # BLD AUTO: 0.14 THOUSAND/ΜL (ref 0–0.61)
EOSINOPHIL NFR BLD AUTO: 2 % (ref 0–6)
ERYTHROCYTE [DISTWIDTH] IN BLOOD BY AUTOMATED COUNT: 14 % (ref 11.6–15.1)
HCT VFR BLD AUTO: 36.2 % (ref 34.8–46.1)
HDLC SERPL-MCNC: 46 MG/DL (ref 40–60)
HGB BLD-MCNC: 11.9 G/DL (ref 11.5–15.4)
IRON SERPL-MCNC: 38 UG/DL (ref 50–170)
LDLC SERPL CALC-MCNC: 41 MG/DL (ref 0–100)
LYMPHOCYTES # BLD AUTO: 1.05 THOUSANDS/ΜL (ref 0.6–4.47)
LYMPHOCYTES NFR BLD AUTO: 14 % (ref 14–44)
MCH RBC QN AUTO: 28.1 PG (ref 26.8–34.3)
MCHC RBC AUTO-ENTMCNC: 32.9 G/DL (ref 31.4–37.4)
MCV RBC AUTO: 85 FL (ref 82–98)
MONOCYTES # BLD AUTO: 0.72 THOUSAND/ΜL (ref 0.17–1.22)
MONOCYTES NFR BLD AUTO: 9 % (ref 4–12)
NEUTROPHILS # BLD AUTO: 5.74 THOUSANDS/ΜL (ref 1.85–7.62)
NEUTS SEG NFR BLD AUTO: 75 % (ref 43–75)
NRBC BLD AUTO-RTO: 0 /100 WBCS
PLATELET # BLD AUTO: 318 THOUSANDS/UL (ref 149–390)
PMV BLD AUTO: 10.1 FL (ref 8.9–12.7)
RBC # BLD AUTO: 4.24 MILLION/UL (ref 3.81–5.12)
TRIGL SERPL-MCNC: 98 MG/DL
WBC # BLD AUTO: 7.7 THOUSAND/UL (ref 4.31–10.16)

## 2018-04-04 PROCEDURE — 80061 LIPID PANEL: CPT | Performed by: INTERNAL MEDICINE

## 2018-04-04 PROCEDURE — 36415 COLL VENOUS BLD VENIPUNCTURE: CPT

## 2018-04-04 PROCEDURE — 83540 ASSAY OF IRON: CPT

## 2018-04-04 PROCEDURE — 85025 COMPLETE CBC W/AUTO DIFF WBC: CPT

## 2018-04-09 ENCOUNTER — OFFICE VISIT (OUTPATIENT)
Dept: FAMILY MEDICINE CLINIC | Facility: CLINIC | Age: 63
End: 2018-04-09
Payer: COMMERCIAL

## 2018-04-09 VITALS
DIASTOLIC BLOOD PRESSURE: 72 MMHG | TEMPERATURE: 97.7 F | BODY MASS INDEX: 41.92 KG/M2 | HEART RATE: 64 BPM | HEIGHT: 62 IN | RESPIRATION RATE: 18 BRPM | WEIGHT: 227.8 LBS | SYSTOLIC BLOOD PRESSURE: 126 MMHG

## 2018-04-09 DIAGNOSIS — K21.9 GASTROESOPHAGEAL REFLUX DISEASE WITHOUT ESOPHAGITIS: ICD-10-CM

## 2018-04-09 DIAGNOSIS — IMO0001 TRANSITION OF CARE PERFORMED WITH SHARING OF CLINICAL SUMMARY: Primary | ICD-10-CM

## 2018-04-09 DIAGNOSIS — D50.9 IRON DEFICIENCY ANEMIA, UNSPECIFIED IRON DEFICIENCY ANEMIA TYPE: ICD-10-CM

## 2018-04-09 DIAGNOSIS — I10 ESSENTIAL HYPERTENSION: ICD-10-CM

## 2018-04-09 DIAGNOSIS — I21.21: ICD-10-CM

## 2018-04-09 DIAGNOSIS — E11.9 TYPE 2 DIABETES MELLITUS WITHOUT COMPLICATION, WITHOUT LONG-TERM CURRENT USE OF INSULIN (HCC): ICD-10-CM

## 2018-04-09 PROBLEM — I21.4 NSTEMI (NON-ST ELEVATED MYOCARDIAL INFARCTION) (HCC): Status: ACTIVE | Noted: 2018-04-09

## 2018-04-09 PROCEDURE — 99496 TRANSJ CARE MGMT HIGH F2F 7D: CPT | Performed by: INTERNAL MEDICINE

## 2018-04-09 RX ORDER — FERROUS SULFATE 325(65) MG
TABLET ORAL
Qty: 60 TABLET | Refills: 1 | Status: SHIPPED | OUTPATIENT
Start: 2018-04-09 | End: 2018-06-05 | Stop reason: ALTCHOICE

## 2018-04-09 RX ORDER — BLACK COHOSH ROOT 200 MG
CAPSULE ORAL
Qty: 60 TABLET | Refills: 1 | Status: SHIPPED | OUTPATIENT
Start: 2018-04-09 | End: 2018-06-05 | Stop reason: ALTCHOICE

## 2018-04-09 RX ORDER — METOPROLOL SUCCINATE 25 MG/1
25 TABLET, EXTENDED RELEASE ORAL 2 TIMES DAILY
COMMUNITY
End: 2018-08-06 | Stop reason: ALTCHOICE

## 2018-04-09 RX ORDER — ATORVASTATIN CALCIUM 80 MG/1
80 TABLET, FILM COATED ORAL
COMMUNITY

## 2018-04-09 NOTE — ASSESSMENT & PLAN NOTE
Pt on brillenta s/p stent left circ  Starting cardiac rehab- appt 4/20  appt with dr Claudell Polo upcoming  Check labs in 1 month

## 2018-04-09 NOTE — PROGRESS NOTES
ASSESSMENT and PLAN:  Rahel Worley is a 61 y o  female with:   Problem List Items Addressed This Visit     Diabetes mellitus ([address] )    Gastroesophageal reflux disease without esophagitis    Hypertension    Relevant Medications    metoprolol succinate (TOPROL-XL) 25 mg 24 hr tablet    Transition of care performed with sharing of clinical summary - Primary    Iron deficiency anemia    Relevant Medications    ferrous sulfate 325 (65 Fe) mg tablet    RA VITAMIN C 250 MG tablet    Other Relevant Orders    CBC and differential    Iron    Comprehensive metabolic panel    Acute myocardial infarction involving left circumflex coronary artery (HCC)     Pt on brillenta s/p stent left circ  Starting cardiac rehab- appt 4/20  appt with dr Talita Bee upcoming  Check labs in 1 month          Relevant Medications    ticagrelor (BRILINTA) 90 MG    metoprolol succinate (TOPROL-XL) 25 mg 24 hr tablet          SUBJECTIVE:  Rahel Worley is a 61 y o  female who presents today with a chief complaint of Transition of Care Management    Patient was in physical therapy- had trouble with pain in her neck; Went home- her back hurt - got up next day and it was worse; Had worse pain- into jaw and neck - they called 911 - went to  [facility]; ekg was ok; She had loose bm after leaving the hospital; has hx of iron def anemia      S/P cardiac cath and PCI/YOVANI to LCX OM2 90-95% lesion    Had Right radial cath     Coronary artery disease involving native coronary artery of native heart found  Left cxx    NSTEMI, initial episode of care [facility])              Review of Systems   Constitutional: Positive for activity change (not using right arm since cath)  HENT: Negative  Eyes: Negative  Respiratory: Negative  Cardiovascular: Negative  Gastrointestinal: Positive for diarrhea  Endocrine: Negative  Genitourinary: Negative  Musculoskeletal: Positive for arthralgias and back pain  Allergic/Immunologic: Negative      Neurological: Negative  Hematological: Negative  Psychiatric/Behavioral: Negative  I have reviewed the patient's PMH, Social History, Medication List and Allergies  OBJECTIVE:  /72 (BP Location: Left arm, Patient Position: Sitting, Cuff Size: Large)   Pulse 64   Temp 97 7 °F (36 5 °C) (Tympanic)   Resp 18   Ht 5' 2" (1 575 m)   Wt 103 kg (227 lb 12 8 oz)   BMI 41 67 kg/m²   Physical Exam   Constitutional: She is oriented to person, place, and time  She appears well-developed and well-nourished  HENT:   Head: Normocephalic and atraumatic  Right Ear: External ear normal    Left Ear: External ear normal    Nose: Nose normal    Mouth/Throat: Oropharynx is clear and moist    Eyes: Conjunctivae and EOM are normal  Pupils are equal, round, and reactive to light  Neck: Normal range of motion  Neck supple  No JVD present  No tracheal deviation present  No thyromegaly present  Cardiovascular: Normal rate, regular rhythm, normal heart sounds and intact distal pulses  Exam reveals no friction rub  No murmur heard  Pulmonary/Chest: Effort normal and breath sounds normal  No stridor  No respiratory distress  She has no wheezes  She has no rales  Abdominal: Soft  Bowel sounds are normal    Musculoskeletal: Normal range of motion  She exhibits tenderness (neck and back pain- unchanged)  Lymphadenopathy:     She has no cervical adenopathy  Neurological: She is alert and oriented to person, place, and time  She has normal reflexes  Skin: Skin is warm and dry  Psychiatric: She has a normal mood and affect  Her behavior is normal  Judgment and thought content normal    Nursing note and vitals reviewed

## 2018-05-15 ENCOUNTER — APPOINTMENT (OUTPATIENT)
Dept: LAB | Facility: MEDICAL CENTER | Age: 63
End: 2018-05-15
Payer: COMMERCIAL

## 2018-05-15 ENCOUNTER — TRANSCRIBE ORDERS (OUTPATIENT)
Dept: ADMINISTRATIVE | Facility: HOSPITAL | Age: 63
End: 2018-05-15

## 2018-05-15 DIAGNOSIS — IMO0001 UNCONTROLLED DIABETES MELLITUS TYPE 2 WITHOUT COMPLICATIONS, UNSPECIFIED WHETHER LONG TERM INSULIN USE: ICD-10-CM

## 2018-05-15 DIAGNOSIS — IMO0001 UNCONTROLLED DIABETES MELLITUS TYPE 2 WITHOUT COMPLICATIONS, UNSPECIFIED WHETHER LONG TERM INSULIN USE: Primary | ICD-10-CM

## 2018-05-15 DIAGNOSIS — M06.9 RHEUMATOID ARTHRITIS, INVOLVING UNSPECIFIED SITE, UNSPECIFIED RHEUMATOID FACTOR PRESENCE: Primary | ICD-10-CM

## 2018-05-15 DIAGNOSIS — M06.9 RHEUMATOID ARTHRITIS, INVOLVING UNSPECIFIED SITE, UNSPECIFIED RHEUMATOID FACTOR PRESENCE: ICD-10-CM

## 2018-05-15 DIAGNOSIS — D50.9 IRON DEFICIENCY ANEMIA, UNSPECIFIED IRON DEFICIENCY ANEMIA TYPE: ICD-10-CM

## 2018-05-15 LAB
ALBUMIN SERPL BCP-MCNC: 3.7 G/DL (ref 3.5–5)
ALP SERPL-CCNC: 109 U/L (ref 46–116)
ALT SERPL W P-5'-P-CCNC: 35 U/L (ref 12–78)
ANION GAP SERPL CALCULATED.3IONS-SCNC: 7 MMOL/L (ref 4–13)
AST SERPL W P-5'-P-CCNC: 23 U/L (ref 5–45)
BACTERIA UR QL AUTO: NORMAL /HPF
BASOPHILS # BLD AUTO: 0.02 THOUSANDS/ΜL (ref 0–0.1)
BASOPHILS NFR BLD AUTO: 0 % (ref 0–1)
BILIRUB DIRECT SERPL-MCNC: 0.15 MG/DL (ref 0–0.2)
BILIRUB SERPL-MCNC: 0.53 MG/DL (ref 0.2–1)
BILIRUB UR QL STRIP: NEGATIVE
BUN SERPL-MCNC: 15 MG/DL (ref 5–25)
CALCIUM SERPL-MCNC: 9.6 MG/DL (ref 8.3–10.1)
CHLORIDE SERPL-SCNC: 102 MMOL/L (ref 100–108)
CLARITY UR: CLEAR
CO2 SERPL-SCNC: 29 MMOL/L (ref 21–32)
COLOR UR: YELLOW
CREAT SERPL-MCNC: 0.75 MG/DL (ref 0.6–1.3)
CRP SERPL QL: 9.2 MG/L
EOSINOPHIL # BLD AUTO: 0.14 THOUSAND/ΜL (ref 0–0.61)
EOSINOPHIL NFR BLD AUTO: 2 % (ref 0–6)
ERYTHROCYTE [DISTWIDTH] IN BLOOD BY AUTOMATED COUNT: 14.1 % (ref 11.6–15.1)
ERYTHROCYTE [SEDIMENTATION RATE] IN BLOOD: 69 MM/HOUR (ref 0–20)
GFR SERPL CREATININE-BSD FRML MDRD: 85 ML/MIN/1.73SQ M
GLUCOSE P FAST SERPL-MCNC: 105 MG/DL (ref 65–99)
GLUCOSE UR STRIP-MCNC: NEGATIVE MG/DL
HCT VFR BLD AUTO: 37.9 % (ref 34.8–46.1)
HGB BLD-MCNC: 12 G/DL (ref 11.5–15.4)
HGB UR QL STRIP.AUTO: NEGATIVE
HYALINE CASTS #/AREA URNS LPF: NORMAL /LPF
IMM GRANULOCYTES # BLD AUTO: 0.02 THOUSAND/UL (ref 0–0.2)
IMM GRANULOCYTES NFR BLD AUTO: 0 % (ref 0–2)
IRON SERPL-MCNC: 54 UG/DL (ref 50–170)
KETONES UR STRIP-MCNC: NEGATIVE MG/DL
LEUKOCYTE ESTERASE UR QL STRIP: NEGATIVE
LYMPHOCYTES # BLD AUTO: 1.18 THOUSANDS/ΜL (ref 0.6–4.47)
LYMPHOCYTES NFR BLD AUTO: 15 % (ref 14–44)
MCH RBC QN AUTO: 27.6 PG (ref 26.8–34.3)
MCHC RBC AUTO-ENTMCNC: 31.7 G/DL (ref 31.4–37.4)
MCV RBC AUTO: 87 FL (ref 82–98)
MONOCYTES # BLD AUTO: 0.88 THOUSAND/ΜL (ref 0.17–1.22)
MONOCYTES NFR BLD AUTO: 11 % (ref 4–12)
NEUTROPHILS # BLD AUTO: 5.86 THOUSANDS/ΜL (ref 1.85–7.62)
NEUTS SEG NFR BLD AUTO: 72 % (ref 43–75)
NITRITE UR QL STRIP: NEGATIVE
NON-SQ EPI CELLS URNS QL MICRO: NORMAL /HPF
NRBC BLD AUTO-RTO: 0 /100 WBCS
PH UR STRIP.AUTO: 7.5 [PH] (ref 4.5–8)
PLATELET # BLD AUTO: 301 THOUSANDS/UL (ref 149–390)
PMV BLD AUTO: 9.8 FL (ref 8.9–12.7)
POTASSIUM SERPL-SCNC: 4.1 MMOL/L (ref 3.5–5.3)
PROT SERPL-MCNC: 8 G/DL (ref 6.4–8.2)
PROT UR STRIP-MCNC: ABNORMAL MG/DL
RBC # BLD AUTO: 4.35 MILLION/UL (ref 3.81–5.12)
RBC #/AREA URNS AUTO: NORMAL /HPF
SODIUM SERPL-SCNC: 138 MMOL/L (ref 136–145)
SP GR UR STRIP.AUTO: 1.01 (ref 1–1.03)
UROBILINOGEN UR QL STRIP.AUTO: 0.2 E.U./DL
WBC # BLD AUTO: 8.1 THOUSAND/UL (ref 4.31–10.16)
WBC #/AREA URNS AUTO: NORMAL /HPF

## 2018-05-15 PROCEDURE — 86140 C-REACTIVE PROTEIN: CPT

## 2018-05-15 PROCEDURE — 36415 COLL VENOUS BLD VENIPUNCTURE: CPT

## 2018-05-15 PROCEDURE — 85652 RBC SED RATE AUTOMATED: CPT

## 2018-05-15 PROCEDURE — 82248 BILIRUBIN DIRECT: CPT

## 2018-05-15 PROCEDURE — 85025 COMPLETE CBC W/AUTO DIFF WBC: CPT

## 2018-05-15 PROCEDURE — 80053 COMPREHEN METABOLIC PANEL: CPT

## 2018-05-15 PROCEDURE — 81001 URINALYSIS AUTO W/SCOPE: CPT | Performed by: PHYSICIAN ASSISTANT

## 2018-05-15 PROCEDURE — 83540 ASSAY OF IRON: CPT

## 2018-06-04 RX ORDER — CLOPIDOGREL BISULFATE 75 MG/1
75 TABLET ORAL
COMMUNITY
Start: 2018-04-17 | End: 2019-04-15

## 2018-06-05 ENCOUNTER — OFFICE VISIT (OUTPATIENT)
Dept: FAMILY MEDICINE CLINIC | Facility: CLINIC | Age: 63
End: 2018-06-05
Payer: COMMERCIAL

## 2018-06-05 VITALS
HEART RATE: 88 BPM | WEIGHT: 228 LBS | DIASTOLIC BLOOD PRESSURE: 78 MMHG | TEMPERATURE: 98.5 F | RESPIRATION RATE: 18 BRPM | BODY MASS INDEX: 41.7 KG/M2 | SYSTOLIC BLOOD PRESSURE: 134 MMHG

## 2018-06-05 DIAGNOSIS — R10.32 LLQ PAIN: Primary | ICD-10-CM

## 2018-06-05 DIAGNOSIS — R30.0 DIFFICULT OR PAINFUL URINATION: ICD-10-CM

## 2018-06-05 LAB
SL AMB  POCT GLUCOSE, UA: 1
SL AMB LEUKOCYTE ESTERASE,UA: NEGATIVE
SL AMB POCT BILIRUBIN,UA: NEGATIVE
SL AMB POCT BLOOD,UA: NEGATIVE
SL AMB POCT CLARITY,UA: CLEAR
SL AMB POCT COLOR,UA: YELLOW
SL AMB POCT KETONES,UA: ABNORMAL
SL AMB POCT NITRITE,UA: NEGATIVE
SL AMB POCT PH,UA: 6
SL AMB POCT SPECIFIC GRAVITY,UA: 1.01
SL AMB POCT URINE PROTEIN: NEGATIVE
SL AMB POCT UROBILINOGEN: 0.2

## 2018-06-05 PROCEDURE — 81002 URINALYSIS NONAUTO W/O SCOPE: CPT | Performed by: INTERNAL MEDICINE

## 2018-06-05 PROCEDURE — 99213 OFFICE O/P EST LOW 20 MIN: CPT | Performed by: INTERNAL MEDICINE

## 2018-06-05 RX ORDER — CIPROFLOXACIN 500 MG/1
500 TABLET, FILM COATED ORAL EVERY 12 HOURS SCHEDULED
Qty: 14 TABLET | Refills: 0 | Status: SHIPPED | OUTPATIENT
Start: 2018-06-05 | End: 2018-06-12

## 2018-06-05 RX ORDER — METRONIDAZOLE 500 MG/1
500 TABLET ORAL EVERY 8 HOURS SCHEDULED
Qty: 21 TABLET | Refills: 0 | Status: SHIPPED | OUTPATIENT
Start: 2018-06-05 | End: 2018-06-12

## 2018-06-05 NOTE — PROGRESS NOTES
ASSESSMENT and PLAN:  Yonas Nye is a 61 y o  female with:   Problem List Items Addressed This Visit     LLQ pain - Primary     Start oral abx  Call if no better  Relevant Medications    ciprofloxacin (CIPRO) 500 mg tablet    metroNIDAZOLE (FLAGYL) 500 mg tablet      Other Visit Diagnoses     Difficult or painful urination        Relevant Orders    POCT urine dip (Completed)          SUBJECTIVE:  Yonas Nye is a 61 y o  female who presents today with a chief complaint of Painful Urination    Patient has burning with urination for the last few days- has ;pelvic pain that radiates to left lower quadrant and into her back; It woke her up this am   No fever, no chills      Review of Systems   Constitutional: Negative  HENT: Negative  Eyes: Negative  Respiratory: Negative  Cardiovascular: Negative  Gastrointestinal: Negative  Endocrine: Negative  Genitourinary: Positive for dysuria, flank pain, frequency and pelvic pain  Negative for genital sores, hematuria, menstrual problem and urgency  Skin: Negative  Allergic/Immunologic: Negative  Neurological: Negative  Hematological: Negative  Psychiatric/Behavioral: Negative  I have reviewed the patient's PMH, Social History, Medication List and Allergies  OBJECTIVE:  /78 (BP Location: Left arm, Patient Position: Sitting, Cuff Size: Large)   Pulse 88   Temp 98 5 °F (36 9 °C) (Tympanic)   Resp 18   Wt 103 kg (228 lb)   BMI 41 70 kg/m²   Physical Exam   Constitutional: She is oriented to person, place, and time  She appears well-developed and well-nourished  HENT:   Head: Normocephalic and atraumatic  Right Ear: External ear normal    Mouth/Throat: Oropharynx is clear and moist    Eyes: Conjunctivae and EOM are normal  Pupils are equal, round, and reactive to light  Neck: Normal range of motion  Neck supple  No JVD present  No tracheal deviation present     Cardiovascular: Normal rate, regular rhythm, normal heart sounds and intact distal pulses  Pulmonary/Chest: Effort normal and breath sounds normal    Abdominal: Soft  She exhibits no shifting dullness, no distension and no mass  There is no hepatosplenomegaly  There is tenderness (pain llq, no rebound, no guarding  normal bs ) in the left lower quadrant  There is no rigidity, no rebound, no guarding, no CVA tenderness, no tenderness at McBurney's point and negative Buchanan's sign  No hernia  Hernia confirmed negative in the ventral area  Musculoskeletal: Normal range of motion  She exhibits tenderness (low back pain and stiffness)  Neurological: She is alert and oriented to person, place, and time  She has normal reflexes  No cranial nerve deficit  Coordination normal    Skin: Skin is warm and dry  Psychiatric: She has a normal mood and affect  Her behavior is normal  Thought content normal    Nursing note and vitals reviewed

## 2018-07-16 ENCOUNTER — TELEPHONE (OUTPATIENT)
Dept: FAMILY MEDICINE CLINIC | Facility: CLINIC | Age: 63
End: 2018-07-16

## 2018-07-16 NOTE — TELEPHONE ENCOUNTER
Patient needs to premedicate for a dental appointment on 7/25/18  Can you call in a antibiotic for her?      [address] [place] [address]

## 2018-07-17 ENCOUNTER — TELEPHONE (OUTPATIENT)
Dept: FAMILY MEDICINE CLINIC | Facility: CLINIC | Age: 63
End: 2018-07-17

## 2018-07-17 DIAGNOSIS — Z29.8 NEED FOR SBE (SUBACUTE BACTERIAL ENDOCARDITIS) PROPHYLAXIS: Primary | ICD-10-CM

## 2018-07-17 RX ORDER — CLINDAMYCIN HYDROCHLORIDE 300 MG/1
CAPSULE ORAL
Qty: 2 CAPSULE | Refills: 2 | Status: SHIPPED | OUTPATIENT
Start: 2018-07-17 | End: 2018-07-18

## 2018-07-17 NOTE — TELEPHONE ENCOUNTER
Pt is listed as being listed pcn allergic-  So she cant take pcn- so let  Me know if this is entered in error

## 2018-07-17 NOTE — TELEPHONE ENCOUNTER
Spoke with patient  She states she is allergic to penicillin (it gives her hives) BUT she has taken amoxil in the past   She was concerned that the medication you gave her wouldn't be enough coverage for her  I explained that penicillin and amoxil are the same and clindamycin is another antibiotic we give to patients allergic to penicillin  I explained to her it is safe and effective for her to take prior to her dental work  She expressed understanding and will take the medication she was prescribed

## 2018-07-17 NOTE — TELEPHONE ENCOUNTER
Pt is having dental work done and had requested an abx to take before she goes  She usually gets Amoxicillin 4 - 500mg tablets 1 hour before dental procedure  What she got this time is clindamycin   Pt is just making sure this will work the same way

## 2018-07-24 ENCOUNTER — TELEPHONE (OUTPATIENT)
Dept: FAMILY MEDICINE CLINIC | Facility: CLINIC | Age: 63
End: 2018-07-24

## 2018-07-24 DIAGNOSIS — I10 ESSENTIAL HYPERTENSION: Primary | ICD-10-CM

## 2018-07-24 RX ORDER — OLMESARTAN MEDOXOMIL 20 MG/1
20 TABLET ORAL DAILY
Qty: 30 TABLET | Refills: 3 | Status: SHIPPED | OUTPATIENT
Start: 2018-07-24 | End: 2018-08-06 | Stop reason: SDUPTHER

## 2018-07-24 NOTE — TELEPHONE ENCOUNTER
Patient called  Her Valsartan 160 mg once daily has been recalled  She would like a different med to be called to [facility]  30 day supply in case it doesn't work for her

## 2018-07-29 ENCOUNTER — OFFICE VISIT (OUTPATIENT)
Dept: URGENT CARE | Facility: MEDICAL CENTER | Age: 63
End: 2018-07-29
Payer: COMMERCIAL

## 2018-07-29 VITALS
SYSTOLIC BLOOD PRESSURE: 138 MMHG | HEART RATE: 84 BPM | OXYGEN SATURATION: 97 % | DIASTOLIC BLOOD PRESSURE: 85 MMHG | BODY MASS INDEX: 41.7 KG/M2 | WEIGHT: 228 LBS | TEMPERATURE: 98.4 F | RESPIRATION RATE: 16 BRPM

## 2018-07-29 DIAGNOSIS — J01.00 ACUTE NON-RECURRENT MAXILLARY SINUSITIS: Primary | ICD-10-CM

## 2018-07-29 PROCEDURE — 99213 OFFICE O/P EST LOW 20 MIN: CPT | Performed by: PHYSICIAN ASSISTANT

## 2018-07-29 RX ORDER — TOFACITINIB 11 MG/1
TABLET, FILM COATED, EXTENDED RELEASE ORAL
COMMUNITY
Start: 2018-07-11 | End: 2018-08-06 | Stop reason: ALTCHOICE

## 2018-07-29 RX ORDER — FLUTICASONE PROPIONATE 50 MCG
1 SPRAY, SUSPENSION (ML) NASAL DAILY
Qty: 1 BOTTLE | Refills: 1 | Status: SHIPPED | OUTPATIENT
Start: 2018-07-29 | End: 2018-09-13 | Stop reason: SDUPTHER

## 2018-07-29 RX ORDER — CLINDAMYCIN HYDROCHLORIDE 300 MG/1
CAPSULE ORAL
Refills: 0 | COMMUNITY
Start: 2018-07-27 | End: 2018-09-13 | Stop reason: ALTCHOICE

## 2018-07-29 RX ORDER — DOXYCYCLINE 100 MG/1
100 TABLET ORAL 2 TIMES DAILY
Qty: 14 TABLET | Refills: 0 | Status: SHIPPED | OUTPATIENT
Start: 2018-07-29 | End: 2018-08-06 | Stop reason: ALTCHOICE

## 2018-07-29 NOTE — PROGRESS NOTES
[facility]        NAME: Luisa Gray is a 61 y o  female  : 1955    MRN: [id number]  DATE: 2018  TIME: 10:42 AM    Assessment and Plan   Acute non-recurrent maxillary sinusitis [J01 00]  1  Acute non-recurrent maxillary sinusitis  fluticasone (FLONASE) 50 mcg/act nasal spray    doxycycline (ADOXA) 100 MG tablet         Patient Instructions     Take doxycycline twice daily for 7 days  Take with food  Use flonase daily  Continue saline rinses and tylenol  Follow up with PCP in 3-5 days  Proceed to  ER if symptoms worsen  Chief Complaint     Chief Complaint   Patient presents with    Earache     pt states left ear pain, left sided facial congestion, fatigue; onset one week  History of Present Illness       This is a 61year old female presenting for left sided facial pain x 1 week  She reports sinus congestion on the left side, left sided facial pressure and pain, and left sided ear pain and clogged ear sensation, PND with mild cough, and sore throat  She denies any symptoms on the right side, fever, nausea, vomiting, ear discharge  She has been using saline nasal rinses for her symptoms without relief  Review of Systems   Review of Systems   Constitutional: Negative for fever  HENT: Positive for congestion, ear pain, postnasal drip, sinus pain, sinus pressure and sore throat  Negative for facial swelling  Respiratory: Positive for cough  Negative for shortness of breath  Gastrointestinal: Negative for nausea and vomiting  Musculoskeletal: Negative for myalgias and neck pain  Skin: Negative for rash  Neurological: Positive for headaches  Negative for dizziness and light-headedness           Current Medications       Current Outpatient Prescriptions:     ALPRAZolam (XANAX) 0 25 mg tablet, as needed, Disp: , Rfl:     aspirin 81 MG tablet, Take 81 mg by mouth daily, Disp: , Rfl:     atorvastatin (LIPITOR) 80 mg tablet, Take 80 mg by mouth Medrol Dose Pack scheduling ONLY, Disp: , Rfl:     B-D UF III MINI PEN NEEDLES 31G X 5 MM MISC, use 2 daily as directed, Disp: , Rfl: 0    Black Cohosh 40 MG CAPS, Take 40 mg by mouth daily, Disp: , Rfl:     calcium carbonate (OS-RILEY) 600 MG tablet, Take 400 mg by mouth daily, Disp: , Rfl:     Cholecalciferol (VITAMIN D) 2000 units tablet, Take 2,000 Units by mouth daily, Disp: , Rfl:     clindamycin (CLEOCIN) 300 MG capsule, , Disp: , Rfl: 0    clopidogrel (PLAVIX) 75 mg tablet, Take 75 mg by mouth, Disp: , Rfl:     folic acid (FOLVITE) 149 MCG tablet, Take 800 mg by mouth daily, Disp: , Rfl:     insulin degludec (TRESIBA FLEXTOUCH) 200 units/mL CONCENTRATED U-200 injection pen, Inject 38 Units under the skin, Disp: , Rfl:     Insulin Pen Needle (B-D ULTRAFINE III SHORT PEN) 31G X 8 MM MISC, by Does not apply route, Disp: , Rfl:     metFORMIN (GLUMETZA) 1000 MG (MOD) 24 hr tablet, Take 1,000 mg by mouth 2 (two) times a day  , Disp: , Rfl:     methocarbamol (ROBAXIN) 750 mg tablet, every 6 (six) hours as needed  , Disp: , Rfl:     metoprolol succinate (TOPROL-XL) 25 mg 24 hr tablet, Take 25 mg by mouth 2 (two) times a day, Disp: , Rfl:     metoprolol tartrate (LOPRESSOR) 25 mg tablet, , Disp: , Rfl:     Multiple Vitamin (MULTIVITAMIN) tablet, Take 1 tablet by mouth daily, Disp: , Rfl:     olmesartan (BENICAR) 20 mg tablet, Take 1 tablet (20 mg total) by mouth daily, Disp: 30 tablet, Rfl: 3    omega-3-acid ethyl esters (LOVAZA) 1 g capsule, Take 1,000 mg by mouth daily, Disp: , Rfl:     omeprazole (PriLOSEC) 20 mg delayed release capsule, Take 20 mg by mouth daily, Disp: , Rfl:     Probiotic Product (PROBIOTIC-10 PO), Take by mouth, Disp: , Rfl:     Tofacitinib Citrate (XELJANZ XR PO), Take 11 mg by mouth daily, Disp: , Rfl:     TRESIBA FLEXTOUCH injection pen 200 units/mL, 38 Units daily at bedtime  , Disp: , Rfl: 0    VICTOZA 18 MG/3ML SOPN, inject 1 2 milligram subcutaneously daily IF TOLERATED, Disp: , Rfl: 0    XELJANZ XR 11 MG TB24, , Disp: , Rfl:     doxycycline (ADOXA) 100 MG tablet, Take 1 tablet (100 mg total) by mouth 2 (two) times a day for 7 days, Disp: 14 tablet, Rfl: 0    fluticasone (FLONASE) 50 mcg/act nasal spray, 1 spray into each nostril daily, Disp: 1 Bottle, Rfl: 1    piroxicam (FELDENE) 20 mg capsule, Take 20 mg by mouth daily, Disp: , Rfl:     Current Allergies     Allergies as of 07/29/2018 - Reviewed 07/29/2018   Allergen Reaction Noted    Barbiturates Other (See Comments) 03/05/2017    Morphine GI Intolerance 03/05/2017    Penicillins Hives 10/19/2012    Sulfasalazine Hives 10/19/2012    Sulfa antibiotics Hives and Rash 10/19/2012            The following portions of the patient's history were reviewed and updated as appropriate: allergies, current medications, past family history, past medical history, past social history, past surgical history and problem list      Past Medical History:   Diagnosis Date    [facility]ylosing spondyl[facility] ([facility] [address] )     Diabetes mellitus ([facility] [address] )     Hematuria     last assessed 9/7/13    Hypertension        Past Surgical History:   Procedure Laterality Date    APPENDECTOMY      BACK SURGERY      CERVICAL SPINE SURGERY      CHOLECYSTECTOMY      CYSTOSCOPY  05/06/2015    Diagnostic, last assessed 5/6/15    HYSTERECTOMY      LITHOTRIPSY         Family History   Problem Relation Age of Onset    Diabetes Mother     Gout Mother     Heart disease Mother     Diabetes Father     Heart disease Father     Nephrolithiasis Brother          Medications have been verified  Objective   /85   Pulse 84   Temp 98 4 °F (36 9 °C)   Resp 16   Wt 103 kg (228 lb)   SpO2 97%   Breastfeeding? No   BMI 41 70 kg/m²        Physical Exam     Physical Exam   Constitutional: She appears well-developed and well-nourished  No distress  HENT:   Head: Normocephalic and atraumatic     Right Ear: Tympanic membrane, external ear and ear canal normal    Left Ear: Tympanic membrane, external ear and ear canal normal    Nose: Mucosal edema (left side) and rhinorrhea present  Right sinus exhibits no maxillary sinus tenderness and no frontal sinus tenderness  Left sinus exhibits maxillary sinus tenderness  Left sinus exhibits no frontal sinus tenderness  Mouth/Throat: Uvula is midline and mucous membranes are normal  Posterior oropharyngeal erythema present  No oropharyngeal exudate or posterior oropharyngeal edema  Eyes: Conjunctivae are normal  Pupils are equal, round, and reactive to light  Neck: Normal range of motion  Neck supple  Cardiovascular: Normal rate, regular rhythm and normal heart sounds  Pulmonary/Chest: Effort normal and breath sounds normal  No respiratory distress  She has no decreased breath sounds  She has no wheezes  She has no rhonchi  She has no rales  Lymphadenopathy:     She has no cervical adenopathy  Neurological: She is alert  Skin: Skin is warm and dry  She is not diaphoretic  Nursing note and vitals reviewed

## 2018-07-29 NOTE — PATIENT INSTRUCTIONS
Take doxycycline twice daily for 7 days  Take with food  Use flonase daily  Continue saline rinses and tylenol  Follow up with your PCP for persistent symptoms  Go to the ER for any distress

## 2018-08-03 ENCOUNTER — TRANSCRIBE ORDERS (OUTPATIENT)
Dept: ADMINISTRATIVE | Facility: HOSPITAL | Age: 63
End: 2018-08-03

## 2018-08-03 ENCOUNTER — APPOINTMENT (OUTPATIENT)
Dept: LAB | Facility: MEDICAL CENTER | Age: 63
End: 2018-08-03
Payer: COMMERCIAL

## 2018-08-03 DIAGNOSIS — IMO0001 UNCONTROLLED DIABETES MELLITUS TYPE 2 WITHOUT COMPLICATIONS, UNSPECIFIED WHETHER LONG TERM INSULIN USE: Primary | ICD-10-CM

## 2018-08-03 DIAGNOSIS — IMO0001 UNCONTROLLED DIABETES MELLITUS TYPE 2 WITHOUT COMPLICATIONS, UNSPECIFIED WHETHER LONG TERM INSULIN USE: ICD-10-CM

## 2018-08-03 LAB
CREAT UR-MCNC: 26.6 MG/DL
EST. AVERAGE GLUCOSE BLD GHB EST-MCNC: 146 MG/DL
HBA1C MFR BLD: 6.7 % (ref 4.2–6.3)
MICROALBUMIN UR-MCNC: 7.4 MG/L (ref 0–20)
MICROALBUMIN/CREAT 24H UR: 28 MG/G CREATININE (ref 0–30)
TSH SERPL DL<=0.05 MIU/L-ACNC: 2.41 UIU/ML (ref 0.36–3.74)

## 2018-08-03 PROCEDURE — 83036 HEMOGLOBIN GLYCOSYLATED A1C: CPT | Performed by: INTERNAL MEDICINE

## 2018-08-03 PROCEDURE — 82043 UR ALBUMIN QUANTITATIVE: CPT | Performed by: INTERNAL MEDICINE

## 2018-08-03 PROCEDURE — 3061F NEG MICROALBUMINURIA REV: CPT | Performed by: INTERNAL MEDICINE

## 2018-08-03 PROCEDURE — 82570 ASSAY OF URINE CREATININE: CPT | Performed by: INTERNAL MEDICINE

## 2018-08-03 PROCEDURE — 84443 ASSAY THYROID STIM HORMONE: CPT

## 2018-08-03 PROCEDURE — 36415 COLL VENOUS BLD VENIPUNCTURE: CPT

## 2018-08-06 ENCOUNTER — OFFICE VISIT (OUTPATIENT)
Dept: FAMILY MEDICINE CLINIC | Facility: CLINIC | Age: 63
End: 2018-08-06
Payer: COMMERCIAL

## 2018-08-06 VITALS
WEIGHT: 228 LBS | TEMPERATURE: 98.1 F | SYSTOLIC BLOOD PRESSURE: 126 MMHG | RESPIRATION RATE: 116 BRPM | HEART RATE: 82 BPM | BODY MASS INDEX: 41.7 KG/M2 | DIASTOLIC BLOOD PRESSURE: 78 MMHG

## 2018-08-06 DIAGNOSIS — I10 ESSENTIAL HYPERTENSION: ICD-10-CM

## 2018-08-06 DIAGNOSIS — J01.01 ACUTE RECURRENT MAXILLARY SINUSITIS: Primary | ICD-10-CM

## 2018-08-06 PROCEDURE — 3078F DIAST BP <80 MM HG: CPT | Performed by: FAMILY MEDICINE

## 2018-08-06 PROCEDURE — 99214 OFFICE O/P EST MOD 30 MIN: CPT | Performed by: FAMILY MEDICINE

## 2018-08-06 PROCEDURE — 3074F SYST BP LT 130 MM HG: CPT | Performed by: FAMILY MEDICINE

## 2018-08-06 RX ORDER — AMOXICILLIN AND CLAVULANATE POTASSIUM 875; 125 MG/1; MG/1
1 TABLET, FILM COATED ORAL EVERY 12 HOURS SCHEDULED
Qty: 14 TABLET | Refills: 0 | Status: SHIPPED | OUTPATIENT
Start: 2018-08-06 | End: 2018-08-13

## 2018-08-06 NOTE — PROGRESS NOTES
FAMILY MEDICINE PROGRESS NOTE  Marielos Garcia 61 y o  female   DATE: August 6, 2018     ASSESSMENT and PLAN:  Marielos Garcia is a 61 y o  female with:     1  Acute recurrent maxillary sinusitis  >2 weeks of left sided frontal and maxillary sinusitis with mild improvement on Doxycycline that she was given at Urgent Care  Has been doing OTC care as well  Continue allergic rhinitis treatment with Flonase and intranasal saline for middle ear effusions, add Claritin  Treat with Augmentin, if persists, check sinus CT    -Drink at least 10 glasses of water per day  -Use intranasal saline  -Honey has been shown to help with coughs  -You can use Tylenol as needed for fevers  -Practice good hygiene and cover your mouth if you cough  -Call us back if you have new or worsening fevers and other symptoms  - amoxicillin-clavulanate (AUGMENTIN) 875-125 mg per tablet; Take 1 tablet by mouth every 12 (twelve) hours for 7 days  Dispense: 14 tablet; Refill: 0    2  Essential hypertension  Valsartan recalled, doing well on Olmesartan  Will call in for 1 year Rx for new antihypertensive  SUBJECTIVE:  Marielos Garcia is a 61 y o  female who presents today with a chief complaint of Dizziness; Nasal Congestion; and Sinus Problem  Pt has been having sinus issues for >2 weeks and worsening dizziness and feels like her whole left side is blocked, occ feels liquid moving around  Cough has improved, has postnasal drip and nasal congestion  Went to Urgent Care on 7/29 and given Doxcycline 100mg BID x 1 week, has also been doing Flonase and intranasal saline  Also just passed a kidney stone yesterday so was sick and nauseated yesterday  Dizziness   This is a new problem  The current episode started 1 to 4 weeks ago  The problem occurs constantly  The problem has been unchanged  Associated symptoms include congestion and a sore throat   Pertinent negatives include no abdominal pain, chest pain, chills, coughing, fatigue, fever, nausea or vomiting  She has tried acetaminophen and rest for the symptoms  The treatment provided mild relief  Sinus Problem   Associated symptoms include congestion and a sore throat  Pertinent negatives include no chills, coughing, ear pain, shortness of breath, sinus pressure or sneezing  Review of Systems   Constitutional: Negative for activity change, chills, fatigue and fever  HENT: Positive for congestion and sore throat  Negative for ear pain, rhinorrhea, sinus pain, sinus pressure and sneezing  Eyes: Negative for discharge  Respiratory: Negative for cough and shortness of breath  Cardiovascular: Negative for chest pain and palpitations  Gastrointestinal: Negative for abdominal pain, diarrhea, nausea and vomiting  Neurological: Positive for dizziness  I have reviewed the patient's PMH, Social History, Medication List and Allergies as appropriate  OBJECTIVE:  /78   Pulse 82   Temp 98 1 °F (36 7 °C)   Resp (!) 116   Wt 103 kg (228 lb)   BMI 41 70 kg/m²    Physical Exam   Constitutional: She appears well-developed and well-nourished  No distress  HENT:   Head: Normocephalic and atraumatic  Right Ear: External ear normal  Tympanic membrane is not injected, not perforated and not erythematous  No middle ear effusion  Left Ear: External ear normal  Tympanic membrane is not injected, not perforated and not erythematous  A middle ear effusion is present  Nose: Right sinus exhibits no maxillary sinus tenderness and no frontal sinus tenderness  Left sinus exhibits maxillary sinus tenderness and frontal sinus tenderness  Mouth/Throat: Uvula is midline, oropharynx is clear and moist and mucous membranes are normal  No oropharyngeal exudate, posterior oropharyngeal edema, posterior oropharyngeal erythema or tonsillar abscesses  Eyes: Conjunctivae are normal  Right eye exhibits no discharge  Left eye exhibits no discharge  Neck: Normal range of motion  Neck supple  Cardiovascular: Normal rate and normal heart sounds  Pulmonary/Chest: Effort normal and breath sounds normal  No respiratory distress  She has no wheezes  She has no rales  Lymphadenopathy:     She has no cervical adenopathy  Skin: She is not diaphoretic  Vitals reviewed  Gregor Murray MD    Note: Portions of the record may have been created with voice recognition software  Occasional wrong word or "sound a like" substitutions may have occurred due to the inherent limitations of voice recognition software  Read the chart carefully and recognize, using context, where substitutions have occurred

## 2018-08-06 NOTE — TELEPHONE ENCOUNTER
Patient requesting refill on the Olmesartan 20mg, 90 day supply  To [facility]  Last DOS- 08/06/2018

## 2018-08-07 PROCEDURE — 4010F ACE/ARB THERAPY RXD/TAKEN: CPT | Performed by: INTERNAL MEDICINE

## 2018-08-07 RX ORDER — OLMESARTAN MEDOXOMIL 20 MG/1
20 TABLET ORAL DAILY
Qty: 90 TABLET | Refills: 3 | Status: SHIPPED | OUTPATIENT
Start: 2018-08-07 | End: 2019-03-25 | Stop reason: ALTCHOICE

## 2018-08-08 ENCOUNTER — TELEPHONE (OUTPATIENT)
Dept: FAMILY MEDICINE CLINIC | Facility: CLINIC | Age: 63
End: 2018-08-08

## 2018-08-08 DIAGNOSIS — J06.9 ACUTE URI: Primary | ICD-10-CM

## 2018-08-08 RX ORDER — MOXIFLOXACIN HYDROCHLORIDE 400 MG/1
400 TABLET ORAL DAILY
Qty: 7 TABLET | Refills: 0 | Status: SHIPPED | OUTPATIENT
Start: 2018-08-08 | End: 2018-08-15

## 2018-08-08 NOTE — TELEPHONE ENCOUNTER
Pt called she stated the prescription for amoxicillin-clav is giving her diarrhea  She is asking if there is another alternative for her to take  Please advise  Patient would like a call back

## 2018-08-24 LAB
LEFT EYE DIABETIC RETINOPATHY: NORMAL
RIGHT EYE DIABETIC RETINOPATHY: NORMAL

## 2018-08-27 ENCOUNTER — OFFICE VISIT (OUTPATIENT)
Dept: FAMILY MEDICINE CLINIC | Facility: CLINIC | Age: 63
End: 2018-08-27
Payer: COMMERCIAL

## 2018-08-27 VITALS
WEIGHT: 228.2 LBS | HEIGHT: 62 IN | SYSTOLIC BLOOD PRESSURE: 112 MMHG | BODY MASS INDEX: 41.99 KG/M2 | HEART RATE: 96 BPM | DIASTOLIC BLOOD PRESSURE: 76 MMHG | RESPIRATION RATE: 16 BRPM | TEMPERATURE: 99 F

## 2018-08-27 DIAGNOSIS — M50.20 CERVICAL HERNIATED DISC: Primary | ICD-10-CM

## 2018-08-27 DIAGNOSIS — E11.9 TYPE 2 DIABETES MELLITUS WITHOUT COMPLICATION, WITHOUT LONG-TERM CURRENT USE OF INSULIN (HCC): ICD-10-CM

## 2018-08-27 DIAGNOSIS — Z01.818 PRE-OP EXAMINATION: ICD-10-CM

## 2018-08-27 PROCEDURE — 99243 OFF/OP CNSLTJ NEW/EST LOW 30: CPT | Performed by: INTERNAL MEDICINE

## 2018-08-27 NOTE — ASSESSMENT & PLAN NOTE
Lab Results   Component Value Date    HGBA1C 6 7 (H) 08/03/2018       No results for input(s): POCGLU in the last 72 hours      Blood Sugar Average: Last 72 hrs:

## 2018-08-27 NOTE — PROGRESS NOTES
ASSESSMENT and PLAN:  Luisa Gray is a 61 y o  female with:   Problem List Items Addressed This Visit     Cervical herniated disc - Primary     pateint is medically stable for anesthesia  Diabetes mellitus ([address] )     Lab Results   Component Value Date    HGBA1C 6 7 (H) 08/03/2018       No results for input(s): POCGLU in the last 72 hours  Blood Sugar Average: Last 72 hrs:           Pre-op examination     Patient is medically stable for anesthesia   Follow up after mri               SUBJECTIVE:  Luisa Gray is a 61 y o  female who presents today with a chief complaint of Procedure ([place])    Patient here for pre op eval- she needs anesthesia for mri of c spine after worsening of pain  Her bs is well controlled and se sees endocrine  Her blood pressure is stable  While camping a few weeks ago, she struck the back of her head on her camper- since then she has more pain, pain with lifting her arms  Review of Systems   Constitutional: Negative  HENT: Negative  Eyes: Negative  Respiratory: Negative  Cardiovascular: Negative  Gastrointestinal: Negative  Endocrine: Negative  Genitourinary: Negative  Musculoskeletal: Positive for arthralgias (neck pain, pain with lifting arms  ), neck pain and neck stiffness (neck pain on right side  )  Skin: Negative  Allergic/Immunologic: Negative  Neurological: Negative  Hematological: Negative  Psychiatric/Behavioral: Negative  I have reviewed the patient's PMH, Social History, Medication List and Allergies  OBJECTIVE:  /76 (BP Location: Left arm, Patient Position: Sitting, Cuff Size: Large)   Pulse 96   Temp 99 °F (37 2 °C) (Tympanic)   Resp 16   Ht 5' 2" (1 575 m)   Wt 104 kg (228 lb 3 2 oz)   BMI 41 74 kg/m²   Physical Exam   Constitutional: She is oriented to person, place, and time  She appears well-developed and well-nourished  HENT:   Head: Normocephalic and atraumatic     Right Ear: External ear normal    Left Ear: External ear normal    Nose: Nose normal    Mouth/Throat: Oropharyngeal exudate (post nasal drip  noted on left ) present  Eyes: Conjunctivae and EOM are normal  Pupils are equal, round, and reactive to light  Left eye exhibits no discharge  Neck: Neck supple  No JVD present  No tracheal deviation present  No thyromegaly present  Limited rom of c spine   Cardiovascular: Normal rate, regular rhythm, normal heart sounds and intact distal pulses  No murmur heard  Pulmonary/Chest: Effort normal and breath sounds normal  No respiratory distress  She has no wheezes  She has no rales  Abdominal: Soft  Bowel sounds are normal  She exhibits no distension  There is no tenderness  Musculoskeletal: She exhibits tenderness (pain right side of neck; pain with active rotation to right;  )  Neurological: She is alert and oriented to person, place, and time  Skin: Skin is warm and dry  She is not diaphoretic  Psychiatric: She has a normal mood and affect  Her behavior is normal  Judgment and thought content normal    Nursing note and vitals reviewed

## 2018-09-13 ENCOUNTER — OFFICE VISIT (OUTPATIENT)
Dept: FAMILY MEDICINE CLINIC | Facility: CLINIC | Age: 63
End: 2018-09-13
Payer: COMMERCIAL

## 2018-09-13 VITALS
TEMPERATURE: 97.7 F | DIASTOLIC BLOOD PRESSURE: 74 MMHG | HEART RATE: 88 BPM | RESPIRATION RATE: 16 BRPM | SYSTOLIC BLOOD PRESSURE: 122 MMHG

## 2018-09-13 DIAGNOSIS — J01.00 ACUTE NON-RECURRENT MAXILLARY SINUSITIS: ICD-10-CM

## 2018-09-13 DIAGNOSIS — R42 DIZZINESS: ICD-10-CM

## 2018-09-13 DIAGNOSIS — J01.00 SUBACUTE MAXILLARY SINUSITIS: Primary | ICD-10-CM

## 2018-09-13 DIAGNOSIS — R42 VERTIGO: ICD-10-CM

## 2018-09-13 DIAGNOSIS — R09.81 SINUS CONGESTION: ICD-10-CM

## 2018-09-13 PROCEDURE — 99213 OFFICE O/P EST LOW 20 MIN: CPT | Performed by: INTERNAL MEDICINE

## 2018-09-13 PROCEDURE — 1036F TOBACCO NON-USER: CPT | Performed by: INTERNAL MEDICINE

## 2018-09-13 RX ORDER — AZITHROMYCIN 250 MG/1
TABLET, FILM COATED ORAL
Qty: 6 TABLET | Refills: 0 | Status: SHIPPED | OUTPATIENT
Start: 2018-09-13 | End: 2018-09-17

## 2018-09-13 RX ORDER — FLUTICASONE PROPIONATE 50 MCG
1 SPRAY, SUSPENSION (ML) NASAL DAILY
Qty: 1 BOTTLE | Refills: 4 | Status: SHIPPED | OUTPATIENT
Start: 2018-09-13 | End: 2019-03-25 | Stop reason: ALTCHOICE

## 2018-09-13 RX ORDER — PREDNISONE 10 MG/1
TABLET ORAL
Qty: 15 TABLET | Refills: 0 | Status: SHIPPED | OUTPATIENT
Start: 2018-09-13 | End: 2019-02-07

## 2018-09-13 NOTE — ASSESSMENT & PLAN NOTE
Add pred  Add nasal steroid- continue  Add abx  Add pt  Call if no better in 2 weeks are refer to ent for scope

## 2018-09-13 NOTE — ASSESSMENT & PLAN NOTE
Add abx  Add pred  contiue nasal steroid  If no better refer to ent for scope   follw blood sugars on prednisone

## 2018-09-13 NOTE — PROGRESS NOTES
ASSESSMENT and PLAN:  Esther Chin is a 61 y o  female with:   Problem List Items Addressed This Visit     Dizziness     Add pred  Add nasal steroid- continue  Add abx  Add pt  Call if no better in 2 weeks are refer to ent for scope  Sinus congestion     Add abx  Add pred  contiue nasal steroid  If no better refer to ent for scope   follw blood sugars on prednisone             Other Visit Diagnoses     Subacute maxillary sinusitis    -  Primary    Relevant Medications    predniSONE 10 mg tablet    azithromycin (ZITHROMAX) 250 mg tablet    Acute non-recurrent maxillary sinusitis        Relevant Medications    fluticasone (FLONASE) 50 mcg/act nasal spray    Vertigo        Relevant Orders    Ambulatory referral to Physical Therapy          SUBJECTIVE:  Esther Chin is a 61 y o  female who presents today with a chief complaint of Follow-up (MRI results) and Sinus Problem    Patient here for follow up  She  Continues to have dizziness and clogged ears and feeling off balance  Even since she had sinus infection her ear feels blocked on left, left side feels heavy; if she lays down she gets dizzy when she gets up- she has to rest for a minute  She ahs left facial pressure ; Review of Systems   Constitutional: Positive for fatigue  HENT: Positive for congestion, postnasal drip, rhinorrhea, sinus pain and sinus pressure  Negative for sore throat, tinnitus, trouble swallowing and voice change  Eyes: Negative  Respiratory: Negative  Cardiovascular: Negative  Gastrointestinal: Negative  Endocrine: Negative  Genitourinary: Negative  Musculoskeletal: Negative  Skin: Negative  Allergic/Immunologic: Negative  Neurological: Positive for dizziness and light-headedness  Negative for tremors, syncope, facial asymmetry, weakness, numbness and headaches  Hematological: Negative  Psychiatric/Behavioral: Negative          I have reviewed the patient's PMH, Social History, Medication List and Allergies  OBJECTIVE:  /74   Pulse 88   Temp 97 7 °F (36 5 °C)   Resp 16   Physical Exam   Constitutional: She is oriented to person, place, and time  She appears well-developed and well-nourished  HENT:   Head: Normocephalic and atraumatic  Right Ear: Hearing, tympanic membrane and external ear normal    Left Ear: Hearing and external ear normal  Tympanic membrane mobility is abnormal    Nose: Mucosal edema and rhinorrhea present  Mouth/Throat: Oropharyngeal exudate (p9ost nasal drip noted;  pressure left maxillary sinus; some pain left tmj;  lmited rom of c spine) present  Effusion left tm ; right is clear    Eyes: Conjunctivae and EOM are normal  Pupils are equal, round, and reactive to light  Right eye exhibits no discharge  Neck: Normal range of motion  Neck supple  No JVD present  No tracheal deviation present  No thyromegaly present  Cardiovascular: Normal rate, regular rhythm, normal heart sounds and intact distal pulses  No murmur heard  Pulmonary/Chest: Effort normal and breath sounds normal  No respiratory distress  She has no wheezes  Musculoskeletal: Normal range of motion  She exhibits no edema or tenderness  Lymphadenopathy:     She has no cervical adenopathy  Neurological: She is alert and oriented to person, place, and time  She has normal reflexes  No cranial nerve deficit  Coordination normal    Nursing note and vitals reviewed

## 2018-10-15 ENCOUNTER — IMMUNIZATION (OUTPATIENT)
Dept: FAMILY MEDICINE CLINIC | Facility: CLINIC | Age: 63
End: 2018-10-15
Payer: COMMERCIAL

## 2018-10-15 DIAGNOSIS — Z23 NEED FOR INFLUENZA VACCINATION: Primary | ICD-10-CM

## 2018-10-15 PROCEDURE — 90686 IIV4 VACC NO PRSV 0.5 ML IM: CPT

## 2018-10-15 PROCEDURE — 90471 IMMUNIZATION ADMIN: CPT

## 2018-10-16 ENCOUNTER — APPOINTMENT (OUTPATIENT)
Dept: LAB | Facility: MEDICAL CENTER | Age: 63
End: 2018-10-16
Payer: COMMERCIAL

## 2018-10-16 ENCOUNTER — TRANSCRIBE ORDERS (OUTPATIENT)
Dept: ADMINISTRATIVE | Facility: HOSPITAL | Age: 63
End: 2018-10-16

## 2018-10-16 DIAGNOSIS — Z79.899 HISTORY OF LONG-TERM TREATMENT WITH HIGH-RISK MEDICATION: ICD-10-CM

## 2018-10-16 DIAGNOSIS — R58 HEMORRHAGE OF BLOOD VESSEL: Primary | ICD-10-CM

## 2018-10-16 LAB
BASOPHILS # BLD AUTO: 0.03 THOUSANDS/ΜL (ref 0–0.1)
BASOPHILS NFR BLD AUTO: 0 % (ref 0–1)
EOSINOPHIL # BLD AUTO: 0.13 THOUSAND/ΜL (ref 0–0.61)
EOSINOPHIL NFR BLD AUTO: 2 % (ref 0–6)
ERYTHROCYTE [DISTWIDTH] IN BLOOD BY AUTOMATED COUNT: 13.2 % (ref 11.6–15.1)
HCT VFR BLD AUTO: 37.7 % (ref 34.8–46.1)
HGB BLD-MCNC: 12.2 G/DL (ref 11.5–15.4)
IMM GRANULOCYTES # BLD AUTO: 0.02 THOUSAND/UL (ref 0–0.2)
IMM GRANULOCYTES NFR BLD AUTO: 0 % (ref 0–2)
INR PPP: 0.89 (ref 0.86–1.17)
LYMPHOCYTES # BLD AUTO: 1 THOUSANDS/ΜL (ref 0.6–4.47)
LYMPHOCYTES NFR BLD AUTO: 13 % (ref 14–44)
MCH RBC QN AUTO: 29.6 PG (ref 26.8–34.3)
MCHC RBC AUTO-ENTMCNC: 32.4 G/DL (ref 31.4–37.4)
MCV RBC AUTO: 92 FL (ref 82–98)
MONOCYTES # BLD AUTO: 0.64 THOUSAND/ΜL (ref 0.17–1.22)
MONOCYTES NFR BLD AUTO: 8 % (ref 4–12)
NEUTROPHILS # BLD AUTO: 5.85 THOUSANDS/ΜL (ref 1.85–7.62)
NEUTS SEG NFR BLD AUTO: 77 % (ref 43–75)
NRBC BLD AUTO-RTO: 0 /100 WBCS
PLATELET # BLD AUTO: 299 THOUSANDS/UL (ref 149–390)
PMV BLD AUTO: 10.3 FL (ref 8.9–12.7)
PROTHROMBIN TIME: 12.1 SECONDS (ref 11.8–14.2)
RBC # BLD AUTO: 4.12 MILLION/UL (ref 3.81–5.12)
WBC # BLD AUTO: 7.67 THOUSAND/UL (ref 4.31–10.16)

## 2018-10-16 PROCEDURE — 85025 COMPLETE CBC W/AUTO DIFF WBC: CPT | Performed by: INTERNAL MEDICINE

## 2018-10-16 PROCEDURE — 85610 PROTHROMBIN TIME: CPT | Performed by: INTERNAL MEDICINE

## 2018-10-16 PROCEDURE — 36415 COLL VENOUS BLD VENIPUNCTURE: CPT | Performed by: INTERNAL MEDICINE

## 2018-11-08 ENCOUNTER — APPOINTMENT (OUTPATIENT)
Dept: LAB | Facility: MEDICAL CENTER | Age: 63
End: 2018-11-08
Payer: COMMERCIAL

## 2018-11-08 DIAGNOSIS — M06.9 RHEUMATOID ARTHRITIS, INVOLVING UNSPECIFIED SITE, UNSPECIFIED RHEUMATOID FACTOR PRESENCE: ICD-10-CM

## 2018-11-08 LAB
ALBUMIN SERPL BCP-MCNC: 3.7 G/DL (ref 3.5–5)
ALP SERPL-CCNC: 103 U/L (ref 46–116)
ALT SERPL W P-5'-P-CCNC: 34 U/L (ref 12–78)
ANION GAP SERPL CALCULATED.3IONS-SCNC: 4 MMOL/L (ref 4–13)
AST SERPL W P-5'-P-CCNC: 16 U/L (ref 5–45)
BASOPHILS # BLD AUTO: 0.03 THOUSANDS/ΜL (ref 0–0.1)
BASOPHILS NFR BLD AUTO: 0 % (ref 0–1)
BILIRUB SERPL-MCNC: 0.41 MG/DL (ref 0.2–1)
BUN SERPL-MCNC: 21 MG/DL (ref 5–25)
CALCIUM SERPL-MCNC: 9.6 MG/DL (ref 8.3–10.1)
CHLORIDE SERPL-SCNC: 100 MMOL/L (ref 100–108)
CO2 SERPL-SCNC: 28 MMOL/L (ref 21–32)
CREAT SERPL-MCNC: 0.73 MG/DL (ref 0.6–1.3)
CRP SERPL QL: 5.4 MG/L
EOSINOPHIL # BLD AUTO: 0.15 THOUSAND/ΜL (ref 0–0.61)
EOSINOPHIL NFR BLD AUTO: 2 % (ref 0–6)
ERYTHROCYTE [DISTWIDTH] IN BLOOD BY AUTOMATED COUNT: 13.2 % (ref 11.6–15.1)
ERYTHROCYTE [SEDIMENTATION RATE] IN BLOOD: 62 MM/HOUR (ref 0–20)
GFR SERPL CREATININE-BSD FRML MDRD: 88 ML/MIN/1.73SQ M
GLUCOSE P FAST SERPL-MCNC: 122 MG/DL (ref 65–99)
HCT VFR BLD AUTO: 37.7 % (ref 34.8–46.1)
HGB BLD-MCNC: 12 G/DL (ref 11.5–15.4)
IMM GRANULOCYTES # BLD AUTO: 0.03 THOUSAND/UL (ref 0–0.2)
IMM GRANULOCYTES NFR BLD AUTO: 0 % (ref 0–2)
LYMPHOCYTES # BLD AUTO: 1.86 THOUSANDS/ΜL (ref 0.6–4.47)
LYMPHOCYTES NFR BLD AUTO: 21 % (ref 14–44)
MCH RBC QN AUTO: 28.8 PG (ref 26.8–34.3)
MCHC RBC AUTO-ENTMCNC: 31.8 G/DL (ref 31.4–37.4)
MCV RBC AUTO: 91 FL (ref 82–98)
MONOCYTES # BLD AUTO: 0.86 THOUSAND/ΜL (ref 0.17–1.22)
MONOCYTES NFR BLD AUTO: 10 % (ref 4–12)
NEUTROPHILS # BLD AUTO: 5.77 THOUSANDS/ΜL (ref 1.85–7.62)
NEUTS SEG NFR BLD AUTO: 67 % (ref 43–75)
NRBC BLD AUTO-RTO: 0 /100 WBCS
PLATELET # BLD AUTO: 321 THOUSANDS/UL (ref 149–390)
PMV BLD AUTO: 10.2 FL (ref 8.9–12.7)
POTASSIUM SERPL-SCNC: 3.8 MMOL/L (ref 3.5–5.3)
PROT SERPL-MCNC: 8 G/DL (ref 6.4–8.2)
RBC # BLD AUTO: 4.16 MILLION/UL (ref 3.81–5.12)
SODIUM SERPL-SCNC: 132 MMOL/L (ref 136–145)
WBC # BLD AUTO: 8.7 THOUSAND/UL (ref 4.31–10.16)

## 2018-11-08 PROCEDURE — 80053 COMPREHEN METABOLIC PANEL: CPT

## 2018-11-08 PROCEDURE — 86140 C-REACTIVE PROTEIN: CPT

## 2018-11-08 PROCEDURE — 85652 RBC SED RATE AUTOMATED: CPT

## 2018-11-08 PROCEDURE — 36415 COLL VENOUS BLD VENIPUNCTURE: CPT

## 2018-11-08 PROCEDURE — 85025 COMPLETE CBC W/AUTO DIFF WBC: CPT

## 2018-12-03 ENCOUNTER — EVALUATION (OUTPATIENT)
Dept: PHYSICAL THERAPY | Age: 63
End: 2018-12-03
Payer: COMMERCIAL

## 2018-12-03 ENCOUNTER — APPOINTMENT (OUTPATIENT)
Dept: PHYSICAL THERAPY | Age: 63
End: 2018-12-03
Payer: COMMERCIAL

## 2018-12-03 DIAGNOSIS — M54.5 CHRONIC BILATERAL LOW BACK PAIN, WITH SCIATICA PRESENCE UNSPECIFIED: Primary | ICD-10-CM

## 2018-12-03 DIAGNOSIS — G89.29 CHRONIC BILATERAL LOW BACK PAIN, WITH SCIATICA PRESENCE UNSPECIFIED: Primary | ICD-10-CM

## 2018-12-03 PROCEDURE — 97163 PT EVAL HIGH COMPLEX 45 MIN: CPT | Performed by: PHYSICAL THERAPIST

## 2018-12-03 PROCEDURE — G8990 OTHER PT/OT CURRENT STATUS: HCPCS | Performed by: PHYSICAL THERAPIST

## 2018-12-03 PROCEDURE — G8991 OTHER PT/OT GOAL STATUS: HCPCS | Performed by: PHYSICAL THERAPIST

## 2018-12-03 PROCEDURE — 97110 THERAPEUTIC EXERCISES: CPT | Performed by: PHYSICAL THERAPIST

## 2018-12-03 NOTE — PROGRESS NOTES
PT Evaluation     Today's date: 12/3/2018  Patient name: Cheryle Groom  : 1955  MRN: [id number]  Referring provider: Mary Ellen Wilson MD  Dx:   Encounter Diagnosis     ICD-10-CM    1  Chronic bilateral low back pain, with sciatica presence unspecified M54 5     G89 29                   Assessment  Assessment details: 61year old female patient reports to PT for aquatherapy with chronic low back pain and history of 11 cervical and lumbar surgeries combined with most recent being in January that was from her thoracic region to her tailbone, per patient report  Patient presents with generalized decreased strength on her L LE compared to her R, which correlates with patient's inability to complete functional tasks  Patient educated that if weakness progresses, that she should go see her MD   Patient will benefit from skilled PT services to address current impairments and functional limitations to help patient return to her PLOF  Impairments: abnormal or restricted ROM, abnormal movement, activity intolerance, impaired physical strength and pain with function    Symptom irritability: moderateUnderstanding of Dx/Px/POC: good   Prognosis: fair  Prognosis details: Chronicity, past surgical history     Goals  STG  1  Patient will be independent with completion of HEP throughout therapy  2  Patient will have at worst 8/10 pain so patient can sleep with less discomfort in 2 weeks  LTG  1  Patient will stand for at least 10 minutes without increase in symptoms so patient can wait in line at the store in 6 weeks  2  Patient will increase strength by at least 1/2 grade in L LE so patient can navigate steps with less difficulty in 6 weeks      Plan  Patient would benefit from: skilled physical therapy  Planned therapy interventions: abdominal trunk stabilization, aquatic therapy, joint mobilization, manual therapy, neuromuscular re-education, patient education, strengthening, stretching, therapeutic activities, therapeutic exercise, home exercise program, functional ROM exercises, graded exercise and graded activity  Frequency: 2x week  Duration in weeks: 4  Treatment plan discussed with: patient        Subjective Evaluation    History of Present Illness  Mechanism of injury: Patient reports for aquatherapy due to chronic low back pain  Patient has had a total of 11 surgeries combined on her cervical and lumbar region, with the most recent one being in January when patient had her thoracic spine fused to her tailbone, per patient report  Patient also had myocardial infarction in March  Patient notes she has had some weakness in her L LE since a couple months after the surgery, but it isn't progressively weakening  Patient denies bowel/bladder issues, numbness/tingling  Patient has trouble sleeping, standing, and lifting due to her symptoms  Patient was told to not lift more than 3 lbs    Pain  Current pain ratin  At best pain ratin  At worst pain rating: 10  Relieving factors: medications, ice and heat  Aggravating factors: standing and stair climbing    Treatments  Previous treatment: physical therapy  Current treatment: physical therapy  Patient Goals  Patient goals for therapy: decreased pain, return to sport/leisure activities and increased strength          Objective     Neurological Testing     Sensation     Lumbar   Left   Intact: light touch    Right   Intact: light touch    Reflexes   Left   Patellar (L4): trace (1+)  Achilles (S1): trace (1+)  Clonus sign: negative    Right   Patellar (L4): trace (1+)  Achilles (S1): trace (1+)  Clonus sign: negative    Strength/Myotome Testing     Left Hip   Planes of Motion   Flexion: 3-    Right Hip   Planes of Motion   Flexion: 4    Left Knee   Flexion: 3+  Extension: 3+    Right Knee   Flexion: 4  Extension: 4    Left Ankle/Foot   Dorsiflexion: 3+    Right Ankle/Foot   Dorsiflexion: 4      Flowsheet Rows      Most Recent Value   PT/OT G-Codes   Current Score  47 Projected Score  55   FOTO information reviewed  Yes   Assessment Type  Evaluation   G code set  Other PT/OT Primary   Other PT Primary Current Status ()  CK   Other PT Primary Goal Status ()  CK          Precautions: total of 11 cervical/lumbar surgeries, DM, Myocardial infarction in March, 3 lb lifting restriction (per patient report)    Daily Treatment Diary     Manual                                                                                   Exercise Diary              Seated R QL stretch             Step ups             Supine ab marching             DKTC                                                    aquatherapy             Warm up/cool down             Seated R QL stretch             Seated piriformis stretch B             Seated hamstring stretch             LAQ             Side steps             Step ups or mini squats             Standing marches             Hip 3 way             B UE 3 way                                            Modalities

## 2018-12-03 NOTE — LETTER
2018    Jennifer Larsen MD  [address]  [address] [facility]  [address] [place] [address]    Patient: Huy Gibson   YOB: 1955   Date of Visit: 12/3/2018     Encounter Diagnosis     ICD-10-CM    1  Chronic bilateral low back pain, with sciatica presence unspecified M54 5     G89 29        Dear Dr Sky Shane:    Please review the attached Plan of Care from [facility] recent visit  Please verify that you agree therapy should continue by signing the attached document and sending it back to our office  If you have any questions or concerns, please don't hesitate to call  Sincerely,    Colen Bence, PT      Referring Provider:      I certify that I have read the below Plan of Care and certify the need for these services furnished under this plan of treatment while under my care  Jennifer Larsen MD  [address]  [address] [facility]  [place] [address] [address]: [phone]          PT Evaluation     Today's date: 12/3/2018  Patient name: Huy Gibson  : 1955  MRN: [id number]  Referring provider: Lincoln Cabrera MD  Dx:   Encounter Diagnosis     ICD-10-CM    1  Chronic bilateral low back pain, with sciatica presence unspecified M54 5     G89 29                   Assessment  Assessment details: 61year old female patient reports to PT for aquatherapy with chronic low back pain and history of 11 cervical and lumbar surgeries combined with most recent being in January that was from her thoracic region to her tailbone, per patient report  Patient presents with generalized decreased strength on her L LE compared to her R, which correlates with patient's inability to complete functional tasks  Patient educated that if weakness progresses, that she should go see her MD   Patient will benefit from skilled PT services to address current impairments and functional limitations to help patient return to her PLOF     Impairments: abnormal or restricted ROM, abnormal movement, activity intolerance, impaired physical strength and pain with function    Symptom irritability: moderateUnderstanding of Dx/Px/POC: good   Prognosis: fair  Prognosis details: Chronicity, past surgical history     Goals  STG  1  Patient will be independent with completion of HEP throughout therapy  2  Patient will have at worst 8/10 pain so patient can sleep with less discomfort in 2 weeks  LTG  1  Patient will stand for at least 10 minutes without increase in symptoms so patient can wait in line at the store in 6 weeks  2  Patient will increase strength by at least 1/2 grade in L LE so patient can navigate steps with less difficulty in 6 weeks  Plan  Patient would benefit from: skilled physical therapy  Planned therapy interventions: abdominal trunk stabilization, aquatic therapy, joint mobilization, manual therapy, neuromuscular re-education, patient education, strengthening, stretching, therapeutic activities, therapeutic exercise, home exercise program, functional ROM exercises, graded exercise and graded activity  Frequency: 2x week  Duration in weeks: 4  Treatment plan discussed with: patient        Subjective Evaluation    History of Present Illness  Mechanism of injury: Patient reports for aquatherapy due to chronic low back pain  Patient has had a total of 11 surgeries combined on her cervical and lumbar region, with the most recent one being in January when patient had her thoracic spine fused to her tailbone, per patient report  Patient also had myocardial infarction in March  Patient notes she has had some weakness in her L LE since a couple months after the surgery, but it isn't progressively weakening  Patient denies bowel/bladder issues, numbness/tingling  Patient has trouble sleeping, standing, and lifting due to her symptoms  Patient was told to not lift more than 3 lbs    Pain  Current pain ratin  At best pain ratin  At worst pain rating: 10  Relieving factors: medications, ice and heat  Aggravating factors: standing and stair climbing    Treatments  Previous treatment: physical therapy  Current treatment: physical therapy  Patient Goals  Patient goals for therapy: decreased pain, return to sport/leisure activities and increased strength          Objective     Neurological Testing     Sensation     Lumbar   Left   Intact: light touch    Right   Intact: light touch    Reflexes   Left   Patellar (L4): trace (1+)  Achilles (S1): trace (1+)  Clonus sign: negative    Right   Patellar (L4): trace (1+)  Achilles (S1): trace (1+)  Clonus sign: negative    Strength/Myotome Testing     Left Hip   Planes of Motion   Flexion: 3-    Right Hip   Planes of Motion   Flexion: 4    Left Knee   Flexion: 3+  Extension: 3+    Right Knee   Flexion: 4  Extension: 4    Left Ankle/Foot   Dorsiflexion: 3+    Right Ankle/Foot   Dorsiflexion: 4      Flowsheet Rows      Most Recent Value   PT/OT G-Codes   Current Score  47   Projected Score  55   FOTO information reviewed  Yes   Assessment Type  Evaluation   G code set  Other PT/OT Primary   Other PT Primary Current Status ()  CK   Other PT Primary Goal Status ()  CK          Precautions: total of 11 cervical/lumbar surgeries, DM, Myocardial infarction in March, 3 lb lifting restriction (per patient report)    Daily Treatment Diary     Manual                                                                                   Exercise Diary              Seated R QL stretch             Step ups             Supine ab marching             DKTC                                                    aquatherapy             Warm up/cool down             Seated R QL stretch             Seated piriformis stretch B             Seated hamstring stretch             LAQ             Side steps             Step ups or mini squats             Standing marches             Hip 3 way             B UE 3 way                                            Modalities

## 2018-12-05 ENCOUNTER — OFFICE VISIT (OUTPATIENT)
Dept: PHYSICAL THERAPY | Age: 63
End: 2018-12-05
Payer: COMMERCIAL

## 2018-12-05 DIAGNOSIS — G89.29 CHRONIC BILATERAL LOW BACK PAIN, WITH SCIATICA PRESENCE UNSPECIFIED: Primary | ICD-10-CM

## 2018-12-05 DIAGNOSIS — M54.5 CHRONIC BILATERAL LOW BACK PAIN, WITH SCIATICA PRESENCE UNSPECIFIED: Primary | ICD-10-CM

## 2018-12-05 PROCEDURE — 97113 AQUATIC THERAPY/EXERCISES: CPT | Performed by: SPECIALIST/TECHNOLOGIST

## 2018-12-05 NOTE — PROGRESS NOTES
Daily Note     Today's date: 2018  Patient name: Blas Proctor  : 1955  MRN: [id number]  Referring provider: Judie Zacarias MD  Dx:   Encounter Diagnosis     ICD-10-CM    1  Chronic bilateral low back pain, with sciatica presence unspecified M54 5     G89 29                   Subjective: Pt reports back pain with steps entering pool  Pt also reports overall L sided weakness that has remianed unchanged since initial evaluation  Objective: See treatment diary below    Precautions: total of 11 cervical/lumbar surgeries, DM, Myocardial infarction in March, 3 lb lifting restriction (per patient report)    Daily Treatment Diary     Manual                                                                                   Exercise Diary              Seated R QL stretch             Step ups             Supine ab marching             DKTC                                                    aquatherapy             Warm up/cool down 10x shallow end            Seated R QL stretch 3x :20            Seated piriformis stretch B 3x :20            Seated hamstring stretch 3x :20            LAQ 20x            Side steps 5x             Step ups or mini squats 20x mini squat            Standing marches 20x            Hip 3 way 20x            B UE 3 way  20x                                          Modalities                                                       Assessment: Started aquatic exercise plan this visit  Pt able to tolerate all exercises well with no increase in pain and demonstrates increased mobility and exercise tolerance in pool  Pt notes L sided LE weakness with standing hip abduction  Tolerated treatment well  Pt reports mild muscular fatigue post-treatment  Patient demonstrated fatigue post treatment, exhibited good technique with therapeutic exercises and would benefit from continued PT in aquatic environment  Plan: Continue per plan of care  Progress treatment as tolerated

## 2018-12-10 ENCOUNTER — APPOINTMENT (OUTPATIENT)
Dept: PHYSICAL THERAPY | Age: 63
End: 2018-12-10
Payer: COMMERCIAL

## 2018-12-11 ENCOUNTER — APPOINTMENT (OUTPATIENT)
Dept: LAB | Facility: MEDICAL CENTER | Age: 63
End: 2018-12-11
Payer: COMMERCIAL

## 2018-12-11 ENCOUNTER — TRANSCRIBE ORDERS (OUTPATIENT)
Dept: ADMINISTRATIVE | Facility: HOSPITAL | Age: 63
End: 2018-12-11

## 2018-12-11 DIAGNOSIS — E11.9 TYPE 2 DIABETES MELLITUS WITHOUT COMPLICATION, UNSPECIFIED WHETHER LONG TERM INSULIN USE (HCC): Primary | ICD-10-CM

## 2018-12-11 LAB
EST. AVERAGE GLUCOSE BLD GHB EST-MCNC: 177 MG/DL
HBA1C MFR BLD: 7.8 % (ref 4.2–6.3)

## 2018-12-11 PROCEDURE — 83036 HEMOGLOBIN GLYCOSYLATED A1C: CPT | Performed by: INTERNAL MEDICINE

## 2018-12-11 PROCEDURE — 36415 COLL VENOUS BLD VENIPUNCTURE: CPT | Performed by: INTERNAL MEDICINE

## 2018-12-12 ENCOUNTER — APPOINTMENT (OUTPATIENT)
Dept: PHYSICAL THERAPY | Age: 63
End: 2018-12-12
Payer: COMMERCIAL

## 2018-12-19 ENCOUNTER — APPOINTMENT (OUTPATIENT)
Dept: PHYSICAL THERAPY | Age: 63
End: 2018-12-19
Payer: COMMERCIAL

## 2018-12-21 ENCOUNTER — APPOINTMENT (OUTPATIENT)
Dept: PHYSICAL THERAPY | Age: 63
End: 2018-12-21
Payer: COMMERCIAL

## 2018-12-26 ENCOUNTER — APPOINTMENT (OUTPATIENT)
Dept: PHYSICAL THERAPY | Age: 63
End: 2018-12-26
Payer: COMMERCIAL

## 2018-12-28 ENCOUNTER — APPOINTMENT (OUTPATIENT)
Dept: PHYSICAL THERAPY | Age: 63
End: 2018-12-28
Payer: COMMERCIAL

## 2019-02-07 ENCOUNTER — OFFICE VISIT (OUTPATIENT)
Dept: FAMILY MEDICINE CLINIC | Facility: CLINIC | Age: 64
End: 2019-02-07
Payer: COMMERCIAL

## 2019-02-07 VITALS
TEMPERATURE: 97.6 F | BODY MASS INDEX: 43.43 KG/M2 | WEIGHT: 236 LBS | DIASTOLIC BLOOD PRESSURE: 76 MMHG | HEART RATE: 100 BPM | HEIGHT: 62 IN | RESPIRATION RATE: 17 BRPM | SYSTOLIC BLOOD PRESSURE: 122 MMHG

## 2019-02-07 DIAGNOSIS — R39.9 UTI SYMPTOMS: Primary | ICD-10-CM

## 2019-02-07 PROBLEM — Z01.818 PRE-OP EXAMINATION: Status: RESOLVED | Noted: 2018-08-27 | Resolved: 2019-02-07

## 2019-02-07 PROBLEM — IMO0001 TRANSITION OF CARE PERFORMED WITH SHARING OF CLINICAL SUMMARY: Status: RESOLVED | Noted: 2018-02-11 | Resolved: 2019-02-07

## 2019-02-07 PROBLEM — R10.32 LLQ PAIN: Status: RESOLVED | Noted: 2018-06-05 | Resolved: 2019-02-07

## 2019-02-07 PROBLEM — Z78.9 TRANSITION OF CARE PERFORMED WITH SHARING OF CLINICAL SUMMARY: Status: RESOLVED | Noted: 2018-02-11 | Resolved: 2019-02-07

## 2019-02-07 PROBLEM — R09.81 SINUS CONGESTION: Status: RESOLVED | Noted: 2018-09-13 | Resolved: 2019-02-07

## 2019-02-07 PROBLEM — R42 DIZZINESS: Status: RESOLVED | Noted: 2018-09-13 | Resolved: 2019-02-07

## 2019-02-07 LAB
SL AMB  POCT GLUCOSE, UA: NEGATIVE
SL AMB LEUKOCYTE ESTERASE,UA: NEGATIVE
SL AMB POCT BILIRUBIN,UA: NEGATIVE
SL AMB POCT BLOOD,UA: NEGATIVE
SL AMB POCT CLARITY,UA: ABNORMAL
SL AMB POCT COLOR,UA: YELLOW
SL AMB POCT KETONES,UA: NEGATIVE
SL AMB POCT NITRITE,UA: NEGATIVE
SL AMB POCT PH,UA: 5
SL AMB POCT SPECIFIC GRAVITY,UA: 1.03
SL AMB POCT URINE PROTEIN: ABNORMAL
SL AMB POCT UROBILINOGEN: 0.2

## 2019-02-07 PROCEDURE — 99213 OFFICE O/P EST LOW 20 MIN: CPT | Performed by: FAMILY MEDICINE

## 2019-02-07 PROCEDURE — 3008F BODY MASS INDEX DOCD: CPT | Performed by: FAMILY MEDICINE

## 2019-02-07 PROCEDURE — 81002 URINALYSIS NONAUTO W/O SCOPE: CPT | Performed by: FAMILY MEDICINE

## 2019-02-07 PROCEDURE — 87086 URINE CULTURE/COLONY COUNT: CPT | Performed by: FAMILY MEDICINE

## 2019-02-07 NOTE — PROGRESS NOTES
FAMILY MEDICINE PROGRESS NOTE  Roderick Valdivia 59 y o  female   DATE: February 7, 2019     ASSESSMENT and PLAN:  Roderick Valdivia is a 59 y o  female with:     1  UTI symptoms  1 week of right flank pain radiating into groin/vagina  Pt unable to lay on exam table for speculum exam or thorough abdominal exam due to chronic low back pain  Urine dip in the office today was negative for blood, leuks, nitrites  Will send for Urine Cx to rule out UTI  If negative, consider empiric treatment for vaginitis versus pelvic US given abdominal bloating versus MSK type pain  She does have a h/o nephrolithiasis, but given lack of hematuria and classic symptoms, lower likelihood  Patient agreeable with the plan and expressed understanding  I discucssed signs and symptoms for which to RTC, go to ER or seek urgent medical care  SUBJECTIVE:  Roderick Valdivia is a 59 y o  female who presents today with a chief complaint of Back Pain (Since last sunday); Groin Pain; and Possible UTI  Pt started 1 week ago with right flank pain that radiates into her groin/vagina  The pain comes and goes, can go days without the pain, doesn't feel like her kidney infection  She feels like a burning in her vagina, worse at night time  Sometimes will feel pressure in her belly after she eats  She has had 11 back surgeries, but doesn't feel like her back pain  Denies dysuria, hematuria (history of nephrolithiasis <1 year ago)  Denies fevers, chills, nausea/vomiting, constipation/diarrhea  She did cranberry juice for a few days without relief  Review of Systems   Constitutional: Negative for chills, fatigue and fever  Endocrine: Negative for polyuria  Genitourinary: Positive for frequency, pelvic pain, vaginal discharge and vaginal pain  Negative for decreased urine volume, difficulty urinating, dysuria, hematuria, urgency and vaginal bleeding  Musculoskeletal: Positive for arthralgias and back pain       I have reviewed the patient's PMH, Social History, Medication List and Allergies as appropriate  OBJECTIVE:  /76 (BP Location: Left arm, Patient Position: Sitting, Cuff Size: Large)   Pulse 100   Temp 97 6 °F (36 4 °C)   Resp 17   Ht 5' 2" (1 575 m)   Wt 107 kg (236 lb)   Breastfeeding? No   BMI 43 16 kg/m²    Physical Exam   Constitutional: She appears well-developed and well-nourished  No distress  Cardiovascular: Normal rate, regular rhythm and normal heart sounds  Pulmonary/Chest: Effort normal and breath sounds normal  No respiratory distress  She has no wheezes  She has no rales  Abdominal: Soft  Bowel sounds are normal  She exhibits no distension  There is no tenderness  There is no rigidity, no rebound, no guarding and no CVA tenderness  No suprapubic tenderness   Skin: She is not diaphoretic  Vitals reviewed  Josy Farmer MD    Note: Portions of the record may have been created with voice recognition software  Occasional wrong word or "sound a like" substitutions may have occurred due to the inherent limitations of voice recognition software  Read the chart carefully and recognize, using context, where substitutions have occurred

## 2019-02-08 ENCOUNTER — TELEPHONE (OUTPATIENT)
Dept: FAMILY MEDICINE CLINIC | Facility: CLINIC | Age: 64
End: 2019-02-08

## 2019-02-08 DIAGNOSIS — N76.0 ACUTE VAGINITIS: Primary | ICD-10-CM

## 2019-02-08 LAB — BACTERIA UR CULT: NORMAL

## 2019-02-09 NOTE — TELEPHONE ENCOUNTER
Please call Becky Anton and let her know that her labs showed she does not have a UTI, if she is still having vaginal symptoms, I e  Discharge, I'll send in medications for vaginitis  Thank you!     Office Visit on 02/07/2019   Component Date Value Ref Range Status    LEUKOCYTE ESTERASE,UA 02/07/2019 negative   Final    NITRITE,UA 02/07/2019 negative   Final    SL AMB POCT UROBILINOGEN 02/07/2019 0 2   Final    POCT URINE PROTEIN 02/07/2019 15+-   Final     PH,UA 02/07/2019 5 0   Final    BLOOD,UA 02/07/2019 negative   Final    SPECIFIC GRAVITY,UA 02/07/2019 1 030   Final    KETONES,UA 02/07/2019 negative   Final    BILIRUBIN,UA 02/07/2019 negative   Final    GLUCOSE, UA 02/07/2019 negative   Final     COLOR,UA 02/07/2019 yellow   Final    CLARITY,UA 02/07/2019 cloudy   Final    Urine Culture 02/07/2019 10,000-19,000 cfu/ml    Final    Mixed Contaminants X3

## 2019-02-11 RX ORDER — FLUCONAZOLE 150 MG/1
150 TABLET ORAL ONCE
Qty: 1 TABLET | Refills: 0 | Status: SHIPPED | OUTPATIENT
Start: 2019-02-11 | End: 2019-02-11

## 2019-02-11 NOTE — TELEPHONE ENCOUNTER
I am treating her for a vaginal infection, if her symptoms don't improve, she should give me a call back

## 2019-02-11 NOTE — TELEPHONE ENCOUNTER
Called pt and advised, she is still having burning and pain   Can you send in RX to [address] [place] [address] please

## 2019-02-14 ENCOUNTER — TELEPHONE (OUTPATIENT)
Dept: FAMILY MEDICINE CLINIC | Facility: CLINIC | Age: 64
End: 2019-02-14

## 2019-02-14 DIAGNOSIS — R10.9 ABDOMINAL PAIN, UNSPECIFIED ABDOMINAL LOCATION: Primary | ICD-10-CM

## 2019-02-14 NOTE — TELEPHONE ENCOUNTER
So we have a couple of options  1  She can come in for an appt for a speculum exam, she didn't want to do it before because she couldn't lay on the table long enough  The other option would be to see a Gyn and they maybe able to examine her better  2  I can treat her empirically for all other vaginal infections, but that really isnt the best way to do it  3  CT of the abd/pelvis to rule out out a non-gyn cause of her pain      If there is any concern for Gonorrhea/Chlamydia, we can check a urine test without doing the pelvic exam, but I'm not sure if that is really the cause

## 2019-02-14 NOTE — TELEPHONE ENCOUNTER
Patient called  She saw you on 02/07/19  Originally she was treated for UTI, but culture was negative  She then was treated for possible vaginal infection   Patient states she is still having abdominal pain and vaginal burning, please advise

## 2019-02-19 ENCOUNTER — TRANSCRIBE ORDERS (OUTPATIENT)
Dept: ADMINISTRATIVE | Facility: HOSPITAL | Age: 64
End: 2019-02-19

## 2019-02-19 ENCOUNTER — APPOINTMENT (OUTPATIENT)
Dept: LAB | Facility: MEDICAL CENTER | Age: 64
End: 2019-02-19
Payer: COMMERCIAL

## 2019-02-19 DIAGNOSIS — M06.9 RHEUMATOID ARTHRITIS, INVOLVING UNSPECIFIED SITE, UNSPECIFIED RHEUMATOID FACTOR PRESENCE: Primary | ICD-10-CM

## 2019-02-19 DIAGNOSIS — M06.9 RHEUMATOID ARTHRITIS, INVOLVING UNSPECIFIED SITE, UNSPECIFIED RHEUMATOID FACTOR PRESENCE: ICD-10-CM

## 2019-02-19 LAB
ALBUMIN SERPL BCP-MCNC: 3.9 G/DL (ref 3.5–5)
ALP SERPL-CCNC: 103 U/L (ref 46–116)
ALT SERPL W P-5'-P-CCNC: 47 U/L (ref 12–78)
ANION GAP SERPL CALCULATED.3IONS-SCNC: 10 MMOL/L (ref 4–13)
AST SERPL W P-5'-P-CCNC: 29 U/L (ref 5–45)
BACTERIA UR QL AUTO: ABNORMAL /HPF
BASOPHILS # BLD AUTO: 0.03 THOUSANDS/ΜL (ref 0–0.1)
BASOPHILS NFR BLD AUTO: 0 % (ref 0–1)
BILIRUB SERPL-MCNC: 0.55 MG/DL (ref 0.2–1)
BILIRUB UR QL STRIP: NEGATIVE
BUN SERPL-MCNC: 19 MG/DL (ref 5–25)
CALCIUM SERPL-MCNC: 9.4 MG/DL (ref 8.3–10.1)
CHLORIDE SERPL-SCNC: 102 MMOL/L (ref 100–108)
CLARITY UR: CLEAR
CO2 SERPL-SCNC: 25 MMOL/L (ref 21–32)
COLOR UR: YELLOW
CREAT SERPL-MCNC: 0.78 MG/DL (ref 0.6–1.3)
CRP SERPL QL: 3.7 MG/L
EOSINOPHIL # BLD AUTO: 0.29 THOUSAND/ΜL (ref 0–0.61)
EOSINOPHIL NFR BLD AUTO: 4 % (ref 0–6)
ERYTHROCYTE [DISTWIDTH] IN BLOOD BY AUTOMATED COUNT: 13.2 % (ref 11.6–15.1)
ERYTHROCYTE [SEDIMENTATION RATE] IN BLOOD: 54 MM/HOUR (ref 0–20)
GFR SERPL CREATININE-BSD FRML MDRD: 81 ML/MIN/1.73SQ M
GLUCOSE P FAST SERPL-MCNC: 131 MG/DL (ref 65–99)
GLUCOSE UR STRIP-MCNC: NEGATIVE MG/DL
HCT VFR BLD AUTO: 39.7 % (ref 34.8–46.1)
HGB BLD-MCNC: 12.4 G/DL (ref 11.5–15.4)
HGB UR QL STRIP.AUTO: ABNORMAL
HYALINE CASTS #/AREA URNS LPF: ABNORMAL /LPF
IMM GRANULOCYTES # BLD AUTO: 0.03 THOUSAND/UL (ref 0–0.2)
IMM GRANULOCYTES NFR BLD AUTO: 0 % (ref 0–2)
KETONES UR STRIP-MCNC: NEGATIVE MG/DL
LEUKOCYTE ESTERASE UR QL STRIP: NEGATIVE
LYMPHOCYTES # BLD AUTO: 1.22 THOUSANDS/ΜL (ref 0.6–4.47)
LYMPHOCYTES NFR BLD AUTO: 15 % (ref 14–44)
MCH RBC QN AUTO: 29 PG (ref 26.8–34.3)
MCHC RBC AUTO-ENTMCNC: 31.2 G/DL (ref 31.4–37.4)
MCV RBC AUTO: 93 FL (ref 82–98)
MONOCYTES # BLD AUTO: 0.73 THOUSAND/ΜL (ref 0.17–1.22)
MONOCYTES NFR BLD AUTO: 9 % (ref 4–12)
NEUTROPHILS # BLD AUTO: 5.65 THOUSANDS/ΜL (ref 1.85–7.62)
NEUTS SEG NFR BLD AUTO: 72 % (ref 43–75)
NITRITE UR QL STRIP: NEGATIVE
NON-SQ EPI CELLS URNS QL MICRO: ABNORMAL /HPF
NRBC BLD AUTO-RTO: 0 /100 WBCS
PH UR STRIP.AUTO: 7 [PH] (ref 4.5–8)
PLATELET # BLD AUTO: 320 THOUSANDS/UL (ref 149–390)
PMV BLD AUTO: 9.9 FL (ref 8.9–12.7)
POTASSIUM SERPL-SCNC: 4.1 MMOL/L (ref 3.5–5.3)
PROT SERPL-MCNC: 7.9 G/DL (ref 6.4–8.2)
PROT UR STRIP-MCNC: NEGATIVE MG/DL
RBC # BLD AUTO: 4.28 MILLION/UL (ref 3.81–5.12)
RBC #/AREA URNS AUTO: ABNORMAL /HPF
SODIUM SERPL-SCNC: 137 MMOL/L (ref 136–145)
SP GR UR STRIP.AUTO: 1.02 (ref 1–1.03)
UROBILINOGEN UR QL STRIP.AUTO: 0.2 E.U./DL
WBC # BLD AUTO: 7.95 THOUSAND/UL (ref 4.31–10.16)
WBC #/AREA URNS AUTO: ABNORMAL /HPF

## 2019-02-19 PROCEDURE — 85025 COMPLETE CBC W/AUTO DIFF WBC: CPT

## 2019-02-19 PROCEDURE — 80053 COMPREHEN METABOLIC PANEL: CPT

## 2019-02-19 PROCEDURE — 36415 COLL VENOUS BLD VENIPUNCTURE: CPT

## 2019-02-19 PROCEDURE — 81001 URINALYSIS AUTO W/SCOPE: CPT | Performed by: INTERNAL MEDICINE

## 2019-02-19 PROCEDURE — 86140 C-REACTIVE PROTEIN: CPT

## 2019-02-19 PROCEDURE — 85652 RBC SED RATE AUTOMATED: CPT

## 2019-02-22 ENCOUNTER — HOSPITAL ENCOUNTER (OUTPATIENT)
Dept: RADIOLOGY | Age: 64
Discharge: HOME | End: 2019-02-22
Payer: COMMERCIAL

## 2019-02-22 DIAGNOSIS — R10.9 ABDOMINAL PAIN, UNSPECIFIED ABDOMINAL LOCATION: ICD-10-CM

## 2019-02-22 PROCEDURE — 74176 CT ABD & PELVIS W/O CONTRAST: CPT

## 2019-02-26 ENCOUNTER — TELEPHONE (OUTPATIENT)
Dept: FAMILY MEDICINE CLINIC | Facility: CLINIC | Age: 64
End: 2019-02-26

## 2019-02-26 DIAGNOSIS — R93.5 ABNORMAL CT OF THE ABDOMEN: Primary | ICD-10-CM

## 2019-02-26 NOTE — TELEPHONE ENCOUNTER
She definitely needs a GI referral, I ordered in, I'd like her to get that done soon    If her pain doesn't improve, I would also have her see Gyn, does she have one she is seeing already, if not, I'll put in a referral

## 2019-02-26 NOTE — TELEPHONE ENCOUNTER
Patient returned call and I gave message  She stated she hasn't had a colonoscopy in over 10 years  She also stated she has had a partial hysterectomy  Would patient need a referral for GI? Since is been a while  Please advise  Patient would like a return call

## 2019-02-26 NOTE — TELEPHONE ENCOUNTER
Please call Samina Quintero and let her know that I reviewed her CT abd and it showed a few things:  1  In the right side of her abdomen she does have some pooling of fluid which could be related to stool but could be due to something underlying, when was her last colonoscopy? I don't see that she's had one per Epic  She also may have some inflammation of the bowel wall  I would like her to see GI, does she have one already? 2  Also there was some fullness in her pelvis, did she have a hysterectomy in the past? She may need Gyn f/u as well  Thank you! Procedure: Ct Abdomen Pelvis Wo Contrast    Result Date: 2/26/2019  Narrative: CT ABDOMEN AND PELVIS WITHOUT IV CONTRAST INDICATION:   R10 9: Unspecified abdominal pain  COMPARISON:  July 15, 2016 TECHNIQUE:  CT examination of the abdomen and pelvis was performed without intravenous contrast   Axial, sagittal, and coronal 2D reformatted images were created from the source data and submitted for interpretation  Radiation dose length product (DLP) for this visit:  1281 mGy-cm   This examination, like all CT scans performed in the [facility], was performed utilizing techniques to minimize radiation dose exposure, including the use of iterative reconstruction and automated exposure control  Enteric contrast was administered  FINDINGS: Study is limited secondary to artifact from patient's prior spinal surgical hardware  ABDOMEN LOWER CHEST:  Atelectatic changes are noted at the lung bases  LIVER/BILIARY TREE:  Hepatic steatosis  GALLBLADDER:  Gallbladder is surgically absent  SPLEEN:  Unremarkable  PANCREAS:  Unremarkable  ADRENAL GLANDS:  Stable 1 8 cm right adrenal gland adenoma  Normal left adrenal gland  KIDNEYS/URETERS:  No evidence of hydronephrosis  2 mm calcification in the left renal pelvis is noted  STOMACH AND BOWEL:  Status post gastric banding  There is contrast pooling peripherally within the ascending colon (series 2 image 46)  Thickening versus underdistention of the rectal wall is noted  Scattered colonic diverticula without evidence of acute diverticulitis  APPENDIX:  There are expected postoperative changes of appendectomy  ABDOMINOPELVIC CAVITY:  No ascites or free intraperitoneal air  No lymphadenopathy  VESSELS:  Unremarkable for patient's age  PELVIS REPRODUCTIVE ORGANS:  Patient is status post hysterectomy  Fullness within the vaginal cuff is noted URINARY BLADDER:  Unremarkable  ABDOMINAL WALL/INGUINAL REGIONS:  Unremarkable  OSSEOUS STRUCTURES:  Status post thoracolumbar spine surgery with hardware and diffuse osteopenia noted  Impression: Contrast pooling peripherally within the ascending colon which may be due to fecal material however underlying lesion cannot be entirely excluded  Recommend correlation with colonoscopy Thickening versus underdistention of the rectal wall  Clinical correlation for colitis is recommended  Follow-up with gastroenterology is recommended  Fullness within the vaginal cuff  Recommend correlation with physical examination  The study was marked in EPIC for significant notification   Workstation performed: [id number]

## 2019-02-27 NOTE — TELEPHONE ENCOUNTER
Patient called back, she wants to discuss her CT in person   She scheduled an appt w/ you this Friday to discuss results

## 2019-03-01 ENCOUNTER — OFFICE VISIT (OUTPATIENT)
Dept: FAMILY MEDICINE CLINIC | Facility: CLINIC | Age: 64
End: 2019-03-01
Payer: COMMERCIAL

## 2019-03-01 VITALS — DIASTOLIC BLOOD PRESSURE: 68 MMHG | HEART RATE: 70 BPM | TEMPERATURE: 97.7 F | SYSTOLIC BLOOD PRESSURE: 122 MMHG

## 2019-03-01 DIAGNOSIS — R93.5 ABNORMAL CT OF THE ABDOMEN: Primary | ICD-10-CM

## 2019-03-01 PROBLEM — E78.5 HYPERLIPIDEMIA: Status: ACTIVE | Noted: 2018-07-02

## 2019-03-01 PROBLEM — M48.061 LUMBAR STENOSIS: Status: RESOLVED | Noted: 2018-01-22 | Resolved: 2019-03-01

## 2019-03-01 PROBLEM — I25.10 CORONARY ARTERY DISEASE INVOLVING NATIVE CORONARY ARTERY OF NATIVE HEART: Status: ACTIVE | Noted: 2018-03-29

## 2019-03-01 PROBLEM — I21.4 NSTEMI (NON-ST ELEVATED MYOCARDIAL INFARCTION) (HCC): Status: RESOLVED | Noted: 2018-04-09 | Resolved: 2019-03-01

## 2019-03-01 PROBLEM — M35.00 SJOGREN'S DISEASE (HCC): Status: ACTIVE | Noted: 2019-03-01

## 2019-03-01 PROCEDURE — 1036F TOBACCO NON-USER: CPT | Performed by: FAMILY MEDICINE

## 2019-03-01 PROCEDURE — 99214 OFFICE O/P EST MOD 30 MIN: CPT | Performed by: FAMILY MEDICINE

## 2019-03-01 NOTE — PROGRESS NOTES
FAMILY MEDICINE PROGRESS NOTE  Frandy Jefferson 59 y o  female   DATE: March 1, 2019     ASSESSMENT and PLAN:  Frandy Jefferson is a 59 y o  female with:     1  Abnormal CT of the abdomen  Reviewed results of recent CT abd in depth with patient and her   Discussed the findings in the ascending colon could represent stool or could represent a colonic lesion given her last colonoscopy was 1/2009  Also discussed thickening of the rectum  She was advised to f/u with Dr Janis Biggs (GI) who did her previous colonoscopy ASAP for further management  Also discussed vaginal cuff fullness noted on CT  Pt did not have a hysterectomy, but did have b/l salpingoophorectomy in 2005, will have her f/u with her Gyn (Dr Jeff Corey) for further evaluation if indicated  Patient agreeable with the plan and expressed understanding  I discucssed signs and symptoms for which to RTC, go to ER or seek urgent medical care  SUBJECTIVE:  Frandy Jefferson is a 59 y o  female who presents today with a chief complaint of Results (Review of CT scan results)  Pt is here to discuss her CT scan results  She was initially seen for right sided groin pain/vaginal pain, that pain is present though improving  Lajean Cassette She was treated for a presumed UTI  She cannot lay on the table for a speculum exam so was treated empirically for vaginitis with Diflucan  Her last colonoscopy was 10 years ago  Also, s/p b/l salpingo-oophorectomy in 2005 for ovarian cysts, but didn't take her Uterus  Review of Systems   Constitutional: Negative for chills and fever  Gastrointestinal: Positive for abdominal pain  Negative for constipation, diarrhea and nausea  Genitourinary: Positive for vaginal pain  Negative for difficulty urinating and vaginal discharge  I have reviewed the patient's PMH, Social History, Medication List and Allergies as appropriate        OBJECTIVE:  /68 (BP Location: Left arm, Patient Position: Sitting, Cuff Size: Large) Pulse 70   Temp 97 7 °F (36 5 °C)   Breastfeeding? No      I have spent 30 minutes with Patient and  today in which greater than 50% of this time was spent in counseling/coordination of care regarding CT results, Risks and benefits of tx options, Intructions for management, Patient and family education, Importance of treatment compliance and Impressions  Rual Fierro MD    Note: Portions of the record may have been created with voice recognition software  Occasional wrong word or "sound a like" substitutions may have occurred due to the inherent limitations of voice recognition software  Read the chart carefully and recognize, using context, where substitutions have occurred

## 2019-03-04 ENCOUNTER — TELEPHONE (OUTPATIENT)
Dept: OBGYN CLINIC | Facility: CLINIC | Age: 64
End: 2019-03-04

## 2019-03-04 NOTE — TELEPHONE ENCOUNTER
Pt called  She had a CT scan on 2/22/19 and her doctor told her to call her gyn because of what was found  Please review and advise

## 2019-03-25 ENCOUNTER — OFFICE VISIT (OUTPATIENT)
Dept: FAMILY MEDICINE CLINIC | Facility: CLINIC | Age: 64
End: 2019-03-25
Payer: COMMERCIAL

## 2019-03-25 VITALS
TEMPERATURE: 98.1 F | BODY MASS INDEX: 43.43 KG/M2 | DIASTOLIC BLOOD PRESSURE: 64 MMHG | HEIGHT: 62 IN | HEART RATE: 108 BPM | OXYGEN SATURATION: 95 % | SYSTOLIC BLOOD PRESSURE: 128 MMHG | WEIGHT: 236 LBS | RESPIRATION RATE: 16 BRPM

## 2019-03-25 DIAGNOSIS — R31.9 HEMATURIA, UNSPECIFIED TYPE: Primary | ICD-10-CM

## 2019-03-25 LAB
SL AMB  POCT GLUCOSE, UA: NEGATIVE
SL AMB LEUKOCYTE ESTERASE,UA: NEGATIVE
SL AMB POCT BILIRUBIN,UA: ABNORMAL
SL AMB POCT BLOOD,UA: ABNORMAL
SL AMB POCT CLARITY,UA: ABNORMAL
SL AMB POCT COLOR,UA: ABNORMAL
SL AMB POCT KETONES,UA: NEGATIVE
SL AMB POCT NITRITE,UA: NEGATIVE
SL AMB POCT PH,UA: 5
SL AMB POCT SPECIFIC GRAVITY,UA: 1.03
SL AMB POCT URINE PROTEIN: ABNORMAL
SL AMB POCT UROBILINOGEN: ABNORMAL

## 2019-03-25 PROCEDURE — 81002 URINALYSIS NONAUTO W/O SCOPE: CPT | Performed by: FAMILY MEDICINE

## 2019-03-25 PROCEDURE — 99214 OFFICE O/P EST MOD 30 MIN: CPT | Performed by: FAMILY MEDICINE

## 2019-03-25 NOTE — PATIENT INSTRUCTIONS
Hematuria   WHAT YOU NEED TO KNOW:   Hematuria is blood in your urine  Your urine may be bright red to dark brown  DISCHARGE INSTRUCTIONS:   Return to the emergency department if:   · You have blood in your urine after a new injury, such as a fall  · You are urinating very small amounts or not at all  · You feel like you cannot empty your bladder  · You have severe back or side pain that does not go away with treatment  Contact your healthcare provider if:   · You have a fever that gets worse or does not go away with treatment  · You cannot keep liquids or medicines down  · Your urine gets darker, even after you drink extra liquids  · You have questions or concerns about your condition, treatment, or care  Drink liquids as directed: You may need to drink extra liquids to help flush the blood from your body through your urine  Water is the best liquid to drink  Ask how much liquid to drink each day and which liquids are best for you  Follow up with your healthcare provider as directed:  Write down your questions so you remember to ask them during your visits  © 2017 [facility] Information is for End User's use only and may not be sold, redistributed or otherwise used for commercial purposes  All illustrations and images included in CareNotes® are the copyrighted property of A D A M , Inc  or [employer]ien [employer]  The above information is an  only  It is not intended as medical advice for individual conditions or treatments  Talk to your doctor, nurse or pharmacist before following any medical regimen to see if it is safe and effective for you

## 2019-03-25 NOTE — PROGRESS NOTES
FAMILY MEDICINE PROGRESS NOTE  Obinna Montgomery 59 y o  female   DATE: March 25, 2019     ASSESSMENT and PLAN:  Obinna Montgomery is a 59 y o  female with:     1  Hematuria, unspecified type  2 days of gross hematuria with recent worsening  Urine dip positive for blood, negative for leuks and nitrites  Pt given urine sample to give sample at lab since not enough quantity to send for micro  Does have a history of nephrolithiasis and on 2/22 showed nonobstructing left 2mm kidney stone, which is likely the cause of her symptoms  Her b/l flank pain is MSK>b/l nephrolithiasis  Encouraged fluids, PRN pain meds, CT renal protocol if symptoms dont improve  - POCT urine dip  - CT renal protocol; Future  - UA w Reflex to Microscopic w Reflex to Culture -Lab Collect    Patient agreeable with the plan and expressed understanding  I discucssed signs and symptoms for which to RTC, go to ER or seek urgent medical care  SUBJECTIVE:  Obinna Montgomery is a 59 y o  female who presents today with a chief complaint of Blood in Urine  Started 2 days ago with pink in her urine with a "metallic smell " Denies dysuria, dribbling, hesitancy, fevers, chills  Today she said the urine was darker  Does have bilateral flank pain  Pt has been doing a lot of lifting and bending her  since his recent surgery  She has been drinking a lot of fluids  She doesn't think it's kidney stones which she has had in the past  2/22/19- 2 mm calcification in the left renal pelvis is noted  She is scheduled for a colonoscopy    Blood in Urine   This is a new problem  The current episode started in the past 7 days  The problem has been gradually worsening since onset  She describes the hematuria as gross hematuria  The hematuria occurs during the initial portion of her urinary stream  She reports no clotting in her urine stream  She is experiencing no pain  She describes her urine color as light pink   Irritative symptoms do not include frequency, nocturia or urgency  Obstructive symptoms do not include dribbling, incomplete emptying, an intermittent stream, a slower stream, straining or a weak stream  Associated symptoms include flank pain  Pertinent negatives include no abdominal pain, dysuria, fever, hesitancy, inability to urinate, nausea or vomiting  Her past medical history is significant for kidney stones  There is no history of  trauma or recent infection  Review of Systems   Constitutional: Negative for fever  Gastrointestinal: Negative for abdominal pain, nausea and vomiting  Genitourinary: Positive for flank pain and hematuria  Negative for dysuria, frequency, hesitancy, incomplete emptying, nocturia and urgency  I have reviewed the patient's PMH, Social History, Medication List and Allergies as appropriate  OBJECTIVE:  /64   Pulse (!) 108   Temp 98 1 °F (36 7 °C)   Resp 16   Ht 5' 2" (1 575 m)   Wt 107 kg (236 lb)   SpO2 95%   BMI 43 16 kg/m²    Physical Exam   Constitutional: She appears well-developed and well-nourished  No distress  Cardiovascular: Normal rate, regular rhythm and normal heart sounds  Pulmonary/Chest: Effort normal and breath sounds normal  No respiratory distress  She has no wheezes  She has no rales  Abdominal: Soft  Bowel sounds are normal  She exhibits no distension  There is no tenderness  There is CVA tenderness  There is no rigidity, no rebound and no guarding  No suprapubic tenderness   Skin: She is not diaphoretic  Vitals reviewed  Manasa Jacques MD    Note: Portions of the record have been created with voice recognition software  Occasional wrong word or "sound a like" substitutions may have occurred due to the inherent limitations of voice recognition software  Read the chart carefully and recognize, using context, where substitutions have occurred

## 2019-03-27 ENCOUNTER — TELEPHONE (OUTPATIENT)
Dept: FAMILY MEDICINE CLINIC | Facility: CLINIC | Age: 64
End: 2019-03-27

## 2019-03-27 NOTE — TELEPHONE ENCOUNTER
Patient called stating she still had blood in her urine  She stated she will be dropping off a sample to the lab tomorrow morning   Just an [place]

## 2019-03-28 ENCOUNTER — TELEPHONE (OUTPATIENT)
Dept: FAMILY MEDICINE CLINIC | Facility: CLINIC | Age: 64
End: 2019-03-28

## 2019-03-28 ENCOUNTER — TRANSCRIBE ORDERS (OUTPATIENT)
Dept: ADMINISTRATIVE | Age: 64
End: 2019-03-28

## 2019-03-28 ENCOUNTER — APPOINTMENT (OUTPATIENT)
Dept: LAB | Age: 64
End: 2019-03-28
Payer: COMMERCIAL

## 2019-03-28 LAB
BACTERIA UR QL AUTO: ABNORMAL /HPF
BILIRUB UR QL STRIP: NEGATIVE
CLARITY UR: ABNORMAL
COLOR UR: YELLOW
GLUCOSE UR STRIP-MCNC: NEGATIVE MG/DL
HGB UR QL STRIP.AUTO: ABNORMAL
HYALINE CASTS #/AREA URNS LPF: ABNORMAL /LPF
KETONES UR STRIP-MCNC: NEGATIVE MG/DL
LEUKOCYTE ESTERASE UR QL STRIP: NEGATIVE
NITRITE UR QL STRIP: NEGATIVE
NON-SQ EPI CELLS URNS QL MICRO: ABNORMAL /HPF
PH UR STRIP.AUTO: 6.5 [PH]
PROT UR STRIP-MCNC: ABNORMAL MG/DL
RBC #/AREA URNS AUTO: ABNORMAL /HPF
SP GR UR STRIP.AUTO: 1.02 (ref 1–1.03)
UROBILINOGEN UR QL STRIP.AUTO: 0.2 E.U./DL
WBC #/AREA URNS AUTO: ABNORMAL /HPF

## 2019-03-28 PROCEDURE — 81001 URINALYSIS AUTO W/SCOPE: CPT | Performed by: FAMILY MEDICINE

## 2019-03-28 NOTE — TELEPHONE ENCOUNTER
Please call Michoacano Wisdom and let her know that her urine test did show that she has a large amount of blood, if she is still having the back pain, I would recommend that she get the CT done to check for kidney stones, if not I would recommend Urology evaluation  Thank you!     Office Visit on 03/25/2019   Component Date Value Ref Range Status    LEUKOCYTE ESTERASE,UA 03/25/2019 negative   Final    NITRITE,UA 03/25/2019 negative   Final    SL AMB POCT UROBILINOGEN 03/25/2019 0 2(3 5)   Final    POCT URINE PROTEIN 03/25/2019 100(1 0)++   Final     PH,UA 03/25/2019 5 0   Final    BLOOD,UA 03/25/2019 +++   Final    SPECIFIC GRAVITY,UA 03/25/2019 1 030   Final    KETONES,UA 03/25/2019 negative   Final    BILIRUBIN,UA 03/25/2019 1(17)+   Final    GLUCOSE, UA 03/25/2019 negative   Final     COLOR,UA 03/25/2019 DENNIS   Final    CLARITY,UA 03/25/2019 CLOUDY   Final    Color, UA 03/28/2019 Yellow   Final    Clarity, UA 03/28/2019 Cloudy   Final    Specific [place], UA 03/28/2019 1 023  1 003 - 1 030 Final    pH, UA 03/28/2019 6 5  4 5, 5 0, 5 5, 6 0, 6 5, 7 0, 7 5, 8 0 Final    Leukocytes, UA 03/28/2019 Negative  Negative Final    Nitrite, UA 03/28/2019 Negative  Negative Final    Protein, UA 03/28/2019 30 (1+)* Negative mg/dl Final    Glucose, UA 03/28/2019 Negative  Negative mg/dl Final    Ketones, UA 03/28/2019 Negative  Negative mg/dl Final    Urobilinogen, UA 03/28/2019 0 2  0 2, 1 0 E U /dl E U /dl Final    Bilirubin, UA 03/28/2019 Negative  Negative Final    Blood, UA 03/28/2019 Large* Negative Final    RBC, UA 03/28/2019 Innumerable* None Seen, 0-5 /hpf Final    WBC, UA 03/28/2019 2-4* None Seen, 0-5, 5-55, 5-65 /hpf Final    Epithelial Cells 03/28/2019 None Seen  None Seen, Occasional /hpf Final    Bacteria, UA 03/28/2019 None Seen  None Seen, Occasional /hpf Final    Hyaline Casts, UA 03/28/2019 None Seen  None Seen /lpf Final

## 2019-03-28 NOTE — TELEPHONE ENCOUNTER
Pt calling to inform she has scheduled a renal protocol ct scan for 04/04/19 in [place] and needs a prior [facility]

## 2019-03-28 NOTE — TELEPHONE ENCOUNTER
SPOKE WITH PATIENT, GAVE MESSAGE  SHE IS NOT HAVING BACK PAIN  SHE SAID THE URINE IS WHAT IS CONCERNING HER  WHEN SHE WIPES THERE IS BLOOD ON THE PAPER  SHE STATES SHE HAD KIDNEY STONES IN THE PAST AND "THIS IS NOT KIDNEY STONES"  SHE SAW DR Gayatri Carvajal AT UROLOGY IN THE PAST, I INSTRUCTED HER TO CALL HIM AND MAKE APPOINTMENT, HER CT SCAN IS NOT SCHEDULED UNTIL NEXT WEEK

## 2019-03-29 ENCOUNTER — TELEPHONE (OUTPATIENT)
Dept: UROLOGY | Facility: CLINIC | Age: 64
End: 2019-03-29

## 2019-03-29 NOTE — TELEPHONE ENCOUNTER
Patient of [facility], Dr Ana Maria Marie  Has not been seen since 2016  Being worked up for hematuria by PCP  Scheduled for CT scan on 4/4/19  Would like to follow up in office after scan  Scheduled patient to see AP on 4/11/19

## 2019-04-04 ENCOUNTER — HOSPITAL ENCOUNTER (OUTPATIENT)
Dept: RADIOLOGY | Facility: MEDICAL CENTER | Age: 64
Discharge: HOME | End: 2019-04-04
Payer: COMMERCIAL

## 2019-04-04 DIAGNOSIS — R31.9 HEMATURIA, UNSPECIFIED TYPE: ICD-10-CM

## 2019-04-04 PROCEDURE — 74178 CT ABD&PLV WO CNTR FLWD CNTR: CPT

## 2019-04-04 RX ADMIN — IOHEXOL 100 ML: 350 INJECTION, SOLUTION INTRAVENOUS at 14:27

## 2019-04-08 ENCOUNTER — TELEPHONE (OUTPATIENT)
Dept: FAMILY MEDICINE CLINIC | Facility: CLINIC | Age: 64
End: 2019-04-08

## 2019-04-09 ENCOUNTER — TELEPHONE (OUTPATIENT)
Dept: FAMILY MEDICINE CLINIC | Facility: CLINIC | Age: 64
End: 2019-04-09

## 2019-04-09 PROBLEM — E27.8 ADRENAL MASS, RIGHT (HCC): Status: ACTIVE | Noted: 2019-04-09

## 2019-04-11 ENCOUNTER — OFFICE VISIT (OUTPATIENT)
Dept: UROLOGY | Facility: AMBULATORY SURGERY CENTER | Age: 64
End: 2019-04-11
Payer: COMMERCIAL

## 2019-04-11 VITALS
HEART RATE: 88 BPM | DIASTOLIC BLOOD PRESSURE: 72 MMHG | HEIGHT: 62 IN | SYSTOLIC BLOOD PRESSURE: 122 MMHG | WEIGHT: 237 LBS | BODY MASS INDEX: 43.61 KG/M2

## 2019-04-11 DIAGNOSIS — N20.1 URETERAL CALCULUS: Primary | ICD-10-CM

## 2019-04-11 DIAGNOSIS — E27.8 RIGHT ADRENAL MASS (HCC): ICD-10-CM

## 2019-04-11 DIAGNOSIS — R31.0 GROSS HEMATURIA: ICD-10-CM

## 2019-04-11 PROCEDURE — 99214 OFFICE O/P EST MOD 30 MIN: CPT | Performed by: NURSE PRACTITIONER

## 2019-04-11 RX ORDER — TAMSULOSIN HYDROCHLORIDE 0.4 MG/1
0.4 CAPSULE ORAL
Qty: 30 CAPSULE | Refills: 0 | Status: ON HOLD | OUTPATIENT
Start: 2019-04-11 | End: 2019-04-18 | Stop reason: SDUPTHER

## 2019-04-11 RX ORDER — OLMESARTAN MEDOXOMIL 20 MG/1
20 TABLET ORAL DAILY
COMMUNITY
End: 2019-06-17 | Stop reason: SDUPTHER

## 2019-04-11 RX ORDER — CEFAZOLIN SODIUM 1 G/50ML
1000 SOLUTION INTRAVENOUS ONCE
Status: CANCELLED | OUTPATIENT
Start: 2019-04-11 | End: 2019-04-11

## 2019-04-12 ENCOUNTER — TELEPHONE (OUTPATIENT)
Dept: UROLOGY | Facility: AMBULATORY SURGERY CENTER | Age: 64
End: 2019-04-12

## 2019-04-15 ENCOUNTER — CLINICAL SUPPORT (OUTPATIENT)
Dept: URGENT CARE | Facility: MEDICAL CENTER | Age: 64
End: 2019-04-15
Payer: COMMERCIAL

## 2019-04-15 DIAGNOSIS — N20.1 URETERAL CALCULUS: ICD-10-CM

## 2019-04-15 PROBLEM — R31.0 GROSS HEMATURIA: Status: ACTIVE | Noted: 2019-04-15

## 2019-04-15 LAB
ATRIAL RATE: 93 BPM
P AXIS: 49 DEGREES
PR INTERVAL: 156 MS
QRS AXIS: -3 DEGREES
QRSD INTERVAL: 82 MS
QT INTERVAL: 352 MS
QTC INTERVAL: 437 MS
T WAVE AXIS: 27 DEGREES
VENTRICULAR RATE: 93 BPM

## 2019-04-15 PROCEDURE — 93005 ELECTROCARDIOGRAM TRACING: CPT

## 2019-04-15 PROCEDURE — 93010 ELECTROCARDIOGRAM REPORT: CPT | Performed by: INTERNAL MEDICINE

## 2019-04-17 ENCOUNTER — ANESTHESIA EVENT (OUTPATIENT)
Dept: PERIOP | Facility: HOSPITAL | Age: 64
End: 2019-04-17
Payer: COMMERCIAL

## 2019-04-17 PROCEDURE — NC001 PR NO CHARGE: Performed by: UROLOGY

## 2019-04-18 ENCOUNTER — HOSPITAL ENCOUNTER (OUTPATIENT)
Dept: RADIOLOGY | Facility: HOSPITAL | Age: 64
Setting detail: OUTPATIENT SURGERY
Discharge: HOME | End: 2019-04-18
Payer: COMMERCIAL

## 2019-04-18 ENCOUNTER — HOSPITAL ENCOUNTER (OUTPATIENT)
Facility: HOSPITAL | Age: 64
Setting detail: OUTPATIENT SURGERY
Discharge: HOME | End: 2019-04-18
Attending: UROLOGY | Admitting: UROLOGY
Payer: COMMERCIAL

## 2019-04-18 ENCOUNTER — ANESTHESIA (OUTPATIENT)
Dept: PERIOP | Facility: HOSPITAL | Age: 64
End: 2019-04-18
Payer: COMMERCIAL

## 2019-04-18 VITALS
OXYGEN SATURATION: 97 % | HEIGHT: 62 IN | DIASTOLIC BLOOD PRESSURE: 77 MMHG | BODY MASS INDEX: 43.61 KG/M2 | TEMPERATURE: 97.9 F | WEIGHT: 237 LBS | RESPIRATION RATE: 16 BRPM | HEART RATE: 83 BPM | SYSTOLIC BLOOD PRESSURE: 154 MMHG

## 2019-04-18 DIAGNOSIS — R31.0 GROSS HEMATURIA: ICD-10-CM

## 2019-04-18 DIAGNOSIS — N20.1 URETERAL CALCULUS: ICD-10-CM

## 2019-04-18 LAB
GLUCOSE SERPL-MCNC: 137 MG/DL (ref 65–140)
GLUCOSE SERPL-MCNC: 90 MG/DL (ref 65–140)

## 2019-04-18 PROCEDURE — 52356 CYSTO/URETERO W/LITHOTRIPSY: CPT | Performed by: UROLOGY

## 2019-04-18 PROCEDURE — NC001 PR NO CHARGE: Performed by: UROLOGY

## 2019-04-18 PROCEDURE — C2617 STENT, NON-COR, TEM W/O DEL: HCPCS | Performed by: UROLOGY

## 2019-04-18 PROCEDURE — 82360 CALCULUS ASSAY QUANT: CPT | Performed by: UROLOGY

## 2019-04-18 PROCEDURE — C1769 GUIDE WIRE: HCPCS | Performed by: UROLOGY

## 2019-04-18 PROCEDURE — 74420 UROGRAPHY RTRGR +-KUB: CPT

## 2019-04-18 PROCEDURE — 82948 REAGENT STRIP/BLOOD GLUCOSE: CPT

## 2019-04-18 RX ORDER — KETOROLAC TROMETHAMINE 30 MG/ML
15 INJECTION, SOLUTION INTRAMUSCULAR; INTRAVENOUS EVERY 6 HOURS SCHEDULED
Status: DISCONTINUED | OUTPATIENT
Start: 2019-04-18 | End: 2019-04-18 | Stop reason: HOSPADM

## 2019-04-18 RX ORDER — SODIUM CHLORIDE 9 MG/ML
125 INJECTION, SOLUTION INTRAVENOUS CONTINUOUS
Status: DISCONTINUED | OUTPATIENT
Start: 2019-04-18 | End: 2019-04-18 | Stop reason: HOSPADM

## 2019-04-18 RX ORDER — ACETAMINOPHEN 325 MG/1
650 TABLET ORAL EVERY 6 HOURS PRN
Status: DISCONTINUED | OUTPATIENT
Start: 2019-04-18 | End: 2019-04-18 | Stop reason: HOSPADM

## 2019-04-18 RX ORDER — HYDROMORPHONE HCL/PF 1 MG/ML
0.5 SYRINGE (ML) INJECTION
Status: DISCONTINUED | OUTPATIENT
Start: 2019-04-18 | End: 2019-04-18 | Stop reason: HOSPADM

## 2019-04-18 RX ORDER — FENTANYL CITRATE/PF 50 MCG/ML
50 SYRINGE (ML) INJECTION
Status: DISCONTINUED | OUTPATIENT
Start: 2019-04-18 | End: 2019-04-18 | Stop reason: HOSPADM

## 2019-04-18 RX ORDER — MAGNESIUM HYDROXIDE 1200 MG/15ML
LIQUID ORAL AS NEEDED
Status: DISCONTINUED | OUTPATIENT
Start: 2019-04-18 | End: 2019-04-18 | Stop reason: HOSPADM

## 2019-04-18 RX ORDER — FENTANYL CITRATE 50 UG/ML
INJECTION, SOLUTION INTRAMUSCULAR; INTRAVENOUS AS NEEDED
Status: DISCONTINUED | OUTPATIENT
Start: 2019-04-18 | End: 2019-04-18 | Stop reason: SURG

## 2019-04-18 RX ORDER — NEOSTIGMINE METHYLSULFATE 1 MG/ML
INJECTION INTRAVENOUS AS NEEDED
Status: DISCONTINUED | OUTPATIENT
Start: 2019-04-18 | End: 2019-04-18 | Stop reason: SURG

## 2019-04-18 RX ORDER — ONDANSETRON 2 MG/ML
INJECTION INTRAMUSCULAR; INTRAVENOUS AS NEEDED
Status: DISCONTINUED | OUTPATIENT
Start: 2019-04-18 | End: 2019-04-18 | Stop reason: SURG

## 2019-04-18 RX ORDER — CEFAZOLIN SODIUM 2 G/50ML
SOLUTION INTRAVENOUS AS NEEDED
Status: DISCONTINUED | OUTPATIENT
Start: 2019-04-18 | End: 2019-04-18

## 2019-04-18 RX ORDER — TAMSULOSIN HYDROCHLORIDE 0.4 MG/1
0.4 CAPSULE ORAL
Status: DISCONTINUED | OUTPATIENT
Start: 2019-04-18 | End: 2019-04-18 | Stop reason: HOSPADM

## 2019-04-18 RX ORDER — OXYCODONE HYDROCHLORIDE 5 MG/1
TABLET ORAL
Qty: 10 TABLET | Refills: 0 | Status: SHIPPED | OUTPATIENT
Start: 2019-04-18 | End: 2019-07-16 | Stop reason: ALTCHOICE

## 2019-04-18 RX ORDER — GLYCOPYRROLATE 0.2 MG/ML
INJECTION INTRAMUSCULAR; INTRAVENOUS AS NEEDED
Status: DISCONTINUED | OUTPATIENT
Start: 2019-04-18 | End: 2019-04-18 | Stop reason: SURG

## 2019-04-18 RX ORDER — OXYCODONE HYDROCHLORIDE 5 MG/1
5 TABLET ORAL EVERY 4 HOURS PRN
Status: DISCONTINUED | OUTPATIENT
Start: 2019-04-18 | End: 2019-04-18 | Stop reason: HOSPADM

## 2019-04-18 RX ORDER — KETOROLAC TROMETHAMINE 30 MG/ML
INJECTION, SOLUTION INTRAMUSCULAR; INTRAVENOUS AS NEEDED
Status: DISCONTINUED | OUTPATIENT
Start: 2019-04-18 | End: 2019-04-18 | Stop reason: SURG

## 2019-04-18 RX ORDER — CEFAZOLIN SODIUM 2 G/50ML
2000 SOLUTION INTRAVENOUS ONCE
Status: COMPLETED | OUTPATIENT
Start: 2019-04-18 | End: 2019-04-18

## 2019-04-18 RX ORDER — CEPHALEXIN 500 MG/1
500 CAPSULE ORAL EVERY 12 HOURS SCHEDULED
Qty: 6 CAPSULE | Refills: 0 | Status: SHIPPED | OUTPATIENT
Start: 2019-04-18 | End: 2019-04-21

## 2019-04-18 RX ORDER — MEPERIDINE HYDROCHLORIDE 50 MG/ML
12.5 INJECTION INTRAMUSCULAR; INTRAVENOUS; SUBCUTANEOUS ONCE AS NEEDED
Status: DISCONTINUED | OUTPATIENT
Start: 2019-04-18 | End: 2019-04-18 | Stop reason: HOSPADM

## 2019-04-18 RX ORDER — MIDAZOLAM HYDROCHLORIDE 1 MG/ML
INJECTION INTRAMUSCULAR; INTRAVENOUS AS NEEDED
Status: DISCONTINUED | OUTPATIENT
Start: 2019-04-18 | End: 2019-04-18 | Stop reason: SURG

## 2019-04-18 RX ORDER — ONDANSETRON 2 MG/ML
4 INJECTION INTRAMUSCULAR; INTRAVENOUS EVERY 6 HOURS PRN
Status: DISCONTINUED | OUTPATIENT
Start: 2019-04-18 | End: 2019-04-18 | Stop reason: HOSPADM

## 2019-04-18 RX ORDER — ONDANSETRON 2 MG/ML
4 INJECTION INTRAMUSCULAR; INTRAVENOUS ONCE AS NEEDED
Status: COMPLETED | OUTPATIENT
Start: 2019-04-18 | End: 2019-04-18

## 2019-04-18 RX ORDER — PROPOFOL 10 MG/ML
INJECTION, EMULSION INTRAVENOUS AS NEEDED
Status: DISCONTINUED | OUTPATIENT
Start: 2019-04-18 | End: 2019-04-18 | Stop reason: SURG

## 2019-04-18 RX ORDER — TAMSULOSIN HYDROCHLORIDE 0.4 MG/1
0.4 CAPSULE ORAL
Qty: 30 CAPSULE | Refills: 0 | Status: SHIPPED | OUTPATIENT
Start: 2019-04-18 | End: 2019-07-16 | Stop reason: ALTCHOICE

## 2019-04-18 RX ORDER — ROCURONIUM BROMIDE 10 MG/ML
INJECTION, SOLUTION INTRAVENOUS AS NEEDED
Status: DISCONTINUED | OUTPATIENT
Start: 2019-04-18 | End: 2019-04-18 | Stop reason: SURG

## 2019-04-18 RX ORDER — CEFAZOLIN SODIUM 1 G/50ML
1000 SOLUTION INTRAVENOUS ONCE
Status: DISCONTINUED | OUTPATIENT
Start: 2019-04-18 | End: 2019-04-18 | Stop reason: HOSPADM

## 2019-04-18 RX ADMIN — ONDANSETRON 4 MG: 2 INJECTION INTRAMUSCULAR; INTRAVENOUS at 13:36

## 2019-04-18 RX ADMIN — OXYCODONE HYDROCHLORIDE 5 MG: 5 TABLET ORAL at 17:31

## 2019-04-18 RX ADMIN — PROPOFOL 200 MG: 10 INJECTION, EMULSION INTRAVENOUS at 13:07

## 2019-04-18 RX ADMIN — SODIUM CHLORIDE 125 ML/HR: 0.9 INJECTION, SOLUTION INTRAVENOUS at 10:50

## 2019-04-18 RX ADMIN — FENTANYL CITRATE 100 MCG: 50 INJECTION, SOLUTION INTRAMUSCULAR; INTRAVENOUS at 13:35

## 2019-04-18 RX ADMIN — ONDANSETRON 4 MG: 2 INJECTION INTRAMUSCULAR; INTRAVENOUS at 14:56

## 2019-04-18 RX ADMIN — GLYCOPYRROLATE 0.4 MG: 0.2 INJECTION INTRAMUSCULAR; INTRAVENOUS at 14:09

## 2019-04-18 RX ADMIN — CEFAZOLIN SODIUM 2000 MG: 2 SOLUTION INTRAVENOUS at 13:06

## 2019-04-18 RX ADMIN — IOHEXOL 1 ML: 240 INJECTION, SOLUTION INTRATHECAL; INTRAVASCULAR; INTRAVENOUS; ORAL at 14:19

## 2019-04-18 RX ADMIN — KETOROLAC TROMETHAMINE 30 MG: 30 INJECTION, SOLUTION INTRAMUSCULAR at 14:04

## 2019-04-18 RX ADMIN — FENTANYL CITRATE 100 MCG: 50 INJECTION, SOLUTION INTRAMUSCULAR; INTRAVENOUS at 13:07

## 2019-04-18 RX ADMIN — TAMSULOSIN HYDROCHLORIDE 0.4 MG: 0.4 CAPSULE ORAL at 17:32

## 2019-04-18 RX ADMIN — NEOSTIGMINE METHYLSULFATE 2.5 MG: 1 INJECTION, SOLUTION INTRAVENOUS at 14:09

## 2019-04-18 RX ADMIN — MIDAZOLAM 2 MG: 1 INJECTION INTRAMUSCULAR; INTRAVENOUS at 12:56

## 2019-04-18 RX ADMIN — ROCURONIUM BROMIDE 30 MG: 10 INJECTION, SOLUTION INTRAVENOUS at 13:07

## 2019-04-18 RX ADMIN — FENTANYL CITRATE 50 MCG: 50 INJECTION, SOLUTION INTRAMUSCULAR; INTRAVENOUS at 15:01

## 2019-04-24 ENCOUNTER — PROCEDURE VISIT (OUTPATIENT)
Dept: UROLOGY | Facility: MEDICAL CENTER | Age: 64
End: 2019-04-24
Payer: COMMERCIAL

## 2019-04-24 VITALS — TEMPERATURE: 99.1 F | HEART RATE: 88 BPM | HEIGHT: 62 IN | WEIGHT: 237 LBS | BODY MASS INDEX: 43.61 KG/M2

## 2019-04-24 DIAGNOSIS — N20.0 NEPHROLITHIASIS: Primary | ICD-10-CM

## 2019-04-24 PROCEDURE — 99211 OFF/OP EST MAY X REQ PHY/QHP: CPT | Performed by: UROLOGY

## 2019-04-25 LAB
CA PHOS MFR STONE: 5 %
CALCIUM OXALATE DIHYDRATE MFR STONE IR: 10 %
COLOR STONE: NORMAL
COM MFR STONE: 85 %
COMMENT-STONE3: NORMAL
COMPOSITION: NORMAL
LABORATORY COMMENT REPORT: NORMAL
NIDUS STONE QL: NORMAL
PHOTO: NORMAL
SIZE STONE: NORMAL MM
STONE ANALYSIS-IMP: NORMAL
STONE ANALYSIS-IMP: NORMAL
WT STONE: 15 MG

## 2019-05-15 ENCOUNTER — OFFICE VISIT (OUTPATIENT)
Dept: UROLOGY | Facility: AMBULATORY SURGERY CENTER | Age: 64
End: 2019-05-15
Payer: COMMERCIAL

## 2019-05-15 VITALS
WEIGHT: 236 LBS | HEART RATE: 89 BPM | DIASTOLIC BLOOD PRESSURE: 80 MMHG | SYSTOLIC BLOOD PRESSURE: 132 MMHG | BODY MASS INDEX: 43.43 KG/M2 | HEIGHT: 62 IN

## 2019-05-15 DIAGNOSIS — E27.8 ADRENAL MASS (HCC): Primary | ICD-10-CM

## 2019-05-15 DIAGNOSIS — N20.0 NEPHROLITHIASIS: ICD-10-CM

## 2019-05-15 PROCEDURE — 99213 OFFICE O/P EST LOW 20 MIN: CPT | Performed by: NURSE PRACTITIONER

## 2019-05-20 ENCOUNTER — TELEPHONE (OUTPATIENT)
Dept: UROLOGY | Facility: AMBULATORY SURGERY CENTER | Age: 64
End: 2019-05-20

## 2019-05-20 DIAGNOSIS — E27.8 ADRENAL MASS (HCC): Primary | ICD-10-CM

## 2019-05-29 ENCOUNTER — APPOINTMENT (OUTPATIENT)
Dept: LAB | Facility: MEDICAL CENTER | Age: 64
End: 2019-05-29
Payer: COMMERCIAL

## 2019-05-29 ENCOUNTER — TRANSCRIBE ORDERS (OUTPATIENT)
Dept: ADMINISTRATIVE | Facility: HOSPITAL | Age: 64
End: 2019-05-29

## 2019-05-29 DIAGNOSIS — R94.6 ABNORMAL FINDING ON THYROID FUNCTION TEST: Primary | ICD-10-CM

## 2019-05-29 DIAGNOSIS — M06.9 RHEUMATOID ARTHRITIS, INVOLVING UNSPECIFIED SITE, UNSPECIFIED RHEUMATOID FACTOR PRESENCE: Primary | ICD-10-CM

## 2019-05-29 DIAGNOSIS — E78.5 HYPERLIPIDEMIA, UNSPECIFIED HYPERLIPIDEMIA TYPE: Primary | ICD-10-CM

## 2019-05-29 DIAGNOSIS — E11.65 UNCONTROLLED TYPE 2 DIABETES MELLITUS WITH HYPERGLYCEMIA (HCC): ICD-10-CM

## 2019-05-29 LAB
ALBUMIN SERPL BCP-MCNC: 3.8 G/DL (ref 3.5–5)
ALP SERPL-CCNC: 100 U/L (ref 46–116)
ALT SERPL W P-5'-P-CCNC: 45 U/L (ref 12–78)
ANION GAP SERPL CALCULATED.3IONS-SCNC: 10 MMOL/L (ref 4–13)
AST SERPL W P-5'-P-CCNC: 25 U/L (ref 5–45)
BASOPHILS # BLD AUTO: 0.03 THOUSANDS/ΜL (ref 0–0.1)
BASOPHILS NFR BLD AUTO: 0 % (ref 0–1)
BILIRUB DIRECT SERPL-MCNC: 0.16 MG/DL (ref 0–0.2)
BILIRUB SERPL-MCNC: 0.58 MG/DL (ref 0.2–1)
BILIRUB UR QL STRIP: NEGATIVE
BUN SERPL-MCNC: 21 MG/DL (ref 5–25)
CALCIUM SERPL-MCNC: 9.3 MG/DL (ref 8.3–10.1)
CHLORIDE SERPL-SCNC: 102 MMOL/L (ref 100–108)
CHOLEST SERPL-MCNC: 129 MG/DL (ref 50–200)
CLARITY UR: CLEAR
CO2 SERPL-SCNC: 27 MMOL/L (ref 21–32)
COLOR UR: YELLOW
CREAT SERPL-MCNC: 0.7 MG/DL (ref 0.6–1.3)
CRP SERPL QL: 3.4 MG/L
EOSINOPHIL # BLD AUTO: 0.32 THOUSAND/ΜL (ref 0–0.61)
EOSINOPHIL NFR BLD AUTO: 4 % (ref 0–6)
ERYTHROCYTE [DISTWIDTH] IN BLOOD BY AUTOMATED COUNT: 13.3 % (ref 11.6–15.1)
ERYTHROCYTE [SEDIMENTATION RATE] IN BLOOD: 66 MM/HOUR (ref 0–20)
EST. AVERAGE GLUCOSE BLD GHB EST-MCNC: 163 MG/DL
GFR SERPL CREATININE-BSD FRML MDRD: 92 ML/MIN/1.73SQ M
GLUCOSE P FAST SERPL-MCNC: 143 MG/DL (ref 65–99)
GLUCOSE UR STRIP-MCNC: NEGATIVE MG/DL
HBA1C MFR BLD: 7.3 % (ref 4.2–6.3)
HCT VFR BLD AUTO: 37.9 % (ref 34.8–46.1)
HDLC SERPL-MCNC: 62 MG/DL (ref 40–60)
HGB BLD-MCNC: 12.1 G/DL (ref 11.5–15.4)
HGB UR QL STRIP.AUTO: NEGATIVE
IMM GRANULOCYTES # BLD AUTO: 0.03 THOUSAND/UL (ref 0–0.2)
IMM GRANULOCYTES NFR BLD AUTO: 0 % (ref 0–2)
KETONES UR STRIP-MCNC: NEGATIVE MG/DL
LDLC SERPL CALC-MCNC: 47 MG/DL (ref 0–100)
LEUKOCYTE ESTERASE UR QL STRIP: NEGATIVE
LYMPHOCYTES # BLD AUTO: 1.15 THOUSANDS/ΜL (ref 0.6–4.47)
LYMPHOCYTES NFR BLD AUTO: 15 % (ref 14–44)
MCH RBC QN AUTO: 29 PG (ref 26.8–34.3)
MCHC RBC AUTO-ENTMCNC: 31.9 G/DL (ref 31.4–37.4)
MCV RBC AUTO: 91 FL (ref 82–98)
MONOCYTES # BLD AUTO: 0.74 THOUSAND/ΜL (ref 0.17–1.22)
MONOCYTES NFR BLD AUTO: 10 % (ref 4–12)
NEUTROPHILS # BLD AUTO: 5.51 THOUSANDS/ΜL (ref 1.85–7.62)
NEUTS SEG NFR BLD AUTO: 71 % (ref 43–75)
NITRITE UR QL STRIP: NEGATIVE
NONHDLC SERPL-MCNC: 67 MG/DL
NRBC BLD AUTO-RTO: 0 /100 WBCS
PH UR STRIP.AUTO: 7.5 [PH]
PLATELET # BLD AUTO: 307 THOUSANDS/UL (ref 149–390)
PMV BLD AUTO: 10.5 FL (ref 8.9–12.7)
POTASSIUM SERPL-SCNC: 4.2 MMOL/L (ref 3.5–5.3)
PROT SERPL-MCNC: 7.8 G/DL (ref 6.4–8.2)
PROT UR STRIP-MCNC: NEGATIVE MG/DL
RBC # BLD AUTO: 4.17 MILLION/UL (ref 3.81–5.12)
SODIUM SERPL-SCNC: 139 MMOL/L (ref 136–145)
SP GR UR STRIP.AUTO: 1.02 (ref 1–1.03)
TRIGL SERPL-MCNC: 100 MG/DL
TSH SERPL DL<=0.05 MIU/L-ACNC: 2.81 UIU/ML (ref 0.36–3.74)
UROBILINOGEN UR QL STRIP.AUTO: 0.2 E.U./DL
WBC # BLD AUTO: 7.78 THOUSAND/UL (ref 4.31–10.16)

## 2019-05-29 PROCEDURE — 80053 COMPREHEN METABOLIC PANEL: CPT | Performed by: INTERNAL MEDICINE

## 2019-05-29 PROCEDURE — 81003 URINALYSIS AUTO W/O SCOPE: CPT | Performed by: INTERNAL MEDICINE

## 2019-05-29 PROCEDURE — 80061 LIPID PANEL: CPT | Performed by: INTERNAL MEDICINE

## 2019-05-29 PROCEDURE — 85652 RBC SED RATE AUTOMATED: CPT | Performed by: INTERNAL MEDICINE

## 2019-05-29 PROCEDURE — 36415 COLL VENOUS BLD VENIPUNCTURE: CPT | Performed by: INTERNAL MEDICINE

## 2019-05-29 PROCEDURE — 85025 COMPLETE CBC W/AUTO DIFF WBC: CPT | Performed by: INTERNAL MEDICINE

## 2019-05-29 PROCEDURE — 82248 BILIRUBIN DIRECT: CPT | Performed by: INTERNAL MEDICINE

## 2019-05-29 PROCEDURE — 86140 C-REACTIVE PROTEIN: CPT | Performed by: INTERNAL MEDICINE

## 2019-05-29 PROCEDURE — 83036 HEMOGLOBIN GLYCOSYLATED A1C: CPT | Performed by: INTERNAL MEDICINE

## 2019-05-29 PROCEDURE — 84443 ASSAY THYROID STIM HORMONE: CPT | Performed by: INTERNAL MEDICINE

## 2019-06-07 ENCOUNTER — APPOINTMENT (OUTPATIENT)
Dept: LAB | Facility: MEDICAL CENTER | Age: 64
End: 2019-06-07
Payer: COMMERCIAL

## 2019-06-07 DIAGNOSIS — E27.8 ADRENAL MASS (HCC): ICD-10-CM

## 2019-06-07 PROCEDURE — 84244 ASSAY OF RENIN: CPT

## 2019-06-07 PROCEDURE — 82088 ASSAY OF ALDOSTERONE: CPT

## 2019-06-07 PROCEDURE — 36415 COLL VENOUS BLD VENIPUNCTURE: CPT

## 2019-06-10 ENCOUNTER — APPOINTMENT (OUTPATIENT)
Dept: LAB | Facility: MEDICAL CENTER | Age: 64
End: 2019-06-10
Payer: COMMERCIAL

## 2019-06-10 DIAGNOSIS — E27.8 ADRENAL MASS (HCC): ICD-10-CM

## 2019-06-10 PROCEDURE — 83835 ASSAY OF METANEPHRINES: CPT

## 2019-06-10 PROCEDURE — 82530 CORTISOL FREE: CPT

## 2019-06-10 PROCEDURE — 82384 ASSAY THREE CATECHOLAMINES: CPT

## 2019-06-11 LAB — ALDOST SERPL-MCNC: 3.1 NG/DL (ref 0–30)

## 2019-06-12 LAB — RENIN PLAS-CCNC: 4.18 NG/ML/HR (ref 0.17–5.38)

## 2019-06-14 LAB
CORTIS F 24H UR-MRATE: 17 UG/24 HR (ref 6–42)
CORTIS F UR-MCNC: 11 UG/L
DOPAMINE 24H UR-MRATE: 189 UG/24 HR (ref 0–510)
DOPAMINE UR-MCNC: 122 UG/L
EPINEPH 24H UR-MRATE: 2 UG/24 HR (ref 0–20)
EPINEPH UR-MCNC: 1 UG/L
METANEPH 24H UR-MRATE: 87 UG/24 HR (ref 45–290)
METANEPHS 24H UR-MCNC: 56 UG/L
NOREPINEPH 24H UR-MRATE: 68 UG/24 HR (ref 0–135)
NOREPINEPH UR-MCNC: 44 UG/L
NORMETANEPHRINE 24H UR-MCNC: 261 UG/L
NORMETANEPHRINE 24H UR-MRATE: 405 UG/24 HR (ref 82–500)

## 2019-06-17 DIAGNOSIS — I10 HYPERTENSION: Primary | ICD-10-CM

## 2019-06-17 RX ORDER — OLMESARTAN MEDOXOMIL 20 MG/1
20 TABLET ORAL DAILY
Qty: 90 TABLET | Refills: 0 | Status: SHIPPED | OUTPATIENT
Start: 2019-06-17 | End: 2019-07-16

## 2019-06-26 ENCOUNTER — OFFICE VISIT (OUTPATIENT)
Dept: UROLOGY | Facility: AMBULATORY SURGERY CENTER | Age: 64
End: 2019-06-26
Payer: COMMERCIAL

## 2019-06-26 VITALS
BODY MASS INDEX: 43.79 KG/M2 | HEART RATE: 85 BPM | WEIGHT: 238 LBS | HEIGHT: 62 IN | SYSTOLIC BLOOD PRESSURE: 138 MMHG | DIASTOLIC BLOOD PRESSURE: 80 MMHG

## 2019-06-26 DIAGNOSIS — E27.8 ADRENAL MASS (HCC): ICD-10-CM

## 2019-06-26 DIAGNOSIS — N20.0 NEPHROLITHIASIS: Primary | ICD-10-CM

## 2019-06-26 PROCEDURE — 99214 OFFICE O/P EST MOD 30 MIN: CPT | Performed by: UROLOGY

## 2019-06-26 RX ORDER — PREDNISONE 1 MG/1
TABLET ORAL
Refills: 0 | COMMUNITY
Start: 2019-05-30 | End: 2019-09-19 | Stop reason: ALTCHOICE

## 2019-07-16 ENCOUNTER — OFFICE VISIT (OUTPATIENT)
Dept: FAMILY MEDICINE CLINIC | Facility: CLINIC | Age: 64
End: 2019-07-16
Payer: COMMERCIAL

## 2019-07-16 VITALS
HEART RATE: 97 BPM | OXYGEN SATURATION: 94 % | SYSTOLIC BLOOD PRESSURE: 140 MMHG | WEIGHT: 241 LBS | RESPIRATION RATE: 16 BRPM | DIASTOLIC BLOOD PRESSURE: 64 MMHG | BODY MASS INDEX: 44.08 KG/M2 | TEMPERATURE: 98.8 F

## 2019-07-16 DIAGNOSIS — Z79.4 TYPE 2 DIABETES MELLITUS WITH HYPERGLYCEMIA, WITH LONG-TERM CURRENT USE OF INSULIN (HCC): ICD-10-CM

## 2019-07-16 DIAGNOSIS — I25.10 CORONARY ARTERY DISEASE INVOLVING NATIVE CORONARY ARTERY OF NATIVE HEART WITHOUT ANGINA PECTORIS: ICD-10-CM

## 2019-07-16 DIAGNOSIS — Z23 NEED FOR TDAP VACCINATION: ICD-10-CM

## 2019-07-16 DIAGNOSIS — E66.01 CLASS 3 SEVERE OBESITY DUE TO EXCESS CALORIES WITH SERIOUS COMORBIDITY AND BODY MASS INDEX (BMI) OF 40.0 TO 44.9 IN ADULT (HCC): ICD-10-CM

## 2019-07-16 DIAGNOSIS — K21.9 GASTROESOPHAGEAL REFLUX DISEASE WITHOUT ESOPHAGITIS: ICD-10-CM

## 2019-07-16 DIAGNOSIS — Z23 NEED FOR PNEUMOCOCCAL VACCINATION: ICD-10-CM

## 2019-07-16 DIAGNOSIS — Z11.59 NEED FOR HEPATITIS C SCREENING TEST: Primary | ICD-10-CM

## 2019-07-16 DIAGNOSIS — I10 ESSENTIAL HYPERTENSION: ICD-10-CM

## 2019-07-16 DIAGNOSIS — E03.8 SUBCLINICAL HYPOTHYROIDISM: ICD-10-CM

## 2019-07-16 DIAGNOSIS — E11.65 TYPE 2 DIABETES MELLITUS WITH HYPERGLYCEMIA, WITH LONG-TERM CURRENT USE OF INSULIN (HCC): ICD-10-CM

## 2019-07-16 PROBLEM — E66.813 CLASS 3 SEVERE OBESITY DUE TO EXCESS CALORIES WITH SERIOUS COMORBIDITY AND BODY MASS INDEX (BMI) OF 40.0 TO 44.9 IN ADULT (HCC): Status: ACTIVE | Noted: 2019-07-16

## 2019-07-16 PROBLEM — N20.1 URETERAL CALCULUS: Status: RESOLVED | Noted: 2019-04-15 | Resolved: 2019-07-16

## 2019-07-16 PROBLEM — R31.0 GROSS HEMATURIA: Status: RESOLVED | Noted: 2019-04-15 | Resolved: 2019-07-16

## 2019-07-16 PROCEDURE — 90471 IMMUNIZATION ADMIN: CPT

## 2019-07-16 PROCEDURE — 1036F TOBACCO NON-USER: CPT | Performed by: FAMILY MEDICINE

## 2019-07-16 PROCEDURE — 90715 TDAP VACCINE 7 YRS/> IM: CPT

## 2019-07-16 PROCEDURE — 90732 PPSV23 VACC 2 YRS+ SUBQ/IM: CPT

## 2019-07-16 PROCEDURE — 4010F ACE/ARB THERAPY RXD/TAKEN: CPT | Performed by: FAMILY MEDICINE

## 2019-07-16 PROCEDURE — 90472 IMMUNIZATION ADMIN EACH ADD: CPT

## 2019-07-16 PROCEDURE — 99214 OFFICE O/P EST MOD 30 MIN: CPT | Performed by: FAMILY MEDICINE

## 2019-07-16 RX ORDER — LISINOPRIL 20 MG/1
20 TABLET ORAL DAILY
Qty: 90 TABLET | Refills: 3 | Status: SHIPPED | OUTPATIENT
Start: 2019-07-16 | End: 2020-05-08

## 2019-07-16 RX ORDER — NITROGLYCERIN 0.4 MG/1
0.4 TABLET SUBLINGUAL
Qty: 90 TABLET | Refills: 1 | Status: SHIPPED | OUTPATIENT
Start: 2019-07-16

## 2019-07-16 NOTE — ASSESSMENT & PLAN NOTE
Cont OTC Omeprazole 20mg given she was recently started on prednisone by her Rheumatologist, but may need to try H2 blocker in the future

## 2019-07-16 NOTE — ASSESSMENT & PLAN NOTE
CAD s/p LAD stent in March 2018, f/w Dr Lazarus Millin [facility])  S/p Plavix x 1 year, since been discontinued  Continue ASA 81mg, Lopressor 25mg BID, Atorvastatin 80mg     Given acute back pain similar to previous episode, EKG checked today that was normal, recommended ER eval given her history, she refused   Agreeable to Rx for Nitro PRN and will call her Cardiologist   Her pain was reproducible so may be related to MSK given recent camping trip, but still encouraged f/u and reviewed ER precautions at length

## 2019-07-16 NOTE — PROGRESS NOTES
FAMILY MEDICINE PROGRESS NOTE  Leona Bowen 59 y o  female   DATE: July 16, 2019     ASSESSMENT and PLAN:  Leona Bowen is a 59 y o  female with:     Type 2 diabetes mellitus with hyperglycemia, with long-term current use of insulin ([facility] [address] )  Lab Results   Component Value Date    HGBA1C 7 3 (H) 05/29/2019     F/w Endocrinology (Dr Bryan Núñez)  Currently on Metformin, insulin, Victoza    Gastroesophageal reflux disease without esophagitis  Cont OTC Omeprazole 20mg given she was recently started on prednisone by her Rheumatologist, but may need to try H2 blocker in the future    Essential hypertension  BP Readings from Last 3 Encounters:   07/16/19 140/64   06/26/19 138/80   05/15/19 132/80     Lab Results   Component Value Date    CREATININE 0 70 05/29/2019    EGFR 92 05/29/2019     Not quite at goal on current regimen: Metoprolol tartrate 25mg BID and Olmesartan 20mg  Given h/o CAD, switch from Olmesartan to Lisinopril 20mg and monitor BP at home, if persistently elevated, can increase dose      Subclinical hypothyroidism  Lab Results   Component Value Date    [id number] 2 810 05/29/2019     Resolved    Coronary artery disease involving native coronary artery of native heart without angina pectoris  CAD s/p LAD stent in March 2018, f/w Dr Tim Rodriguez ([address] [facility])  S/p Plavix x 1 year, since been discontinued  Continue ASA 81mg, Lopressor 25mg BID, Atorvastatin 80mg     Given acute back pain similar to previous episode, EKG checked today that was normal, recommended ER eval given her history, she refused   Agreeable to Rx for Nitro PRN and will call her Cardiologist   Her pain was reproducible so may be related to MSK given recent camping trip, but still encouraged f/u and reviewed ER precautions at length    Class 3 severe obesity due to excess calories with serious comorbidity and body mass index (BMI) of 40 0 to 44 9 in adult [facility])  Wt Readings from Last 3 Encounters:   07/16/19 109 kg (241 lb)   06/26/19 108 kg (238 lb)   05/15/19 107 kg (236 lb)     BMI Counseling: Body mass index is 44 08 kg/m²  Discussed the patient's BMI with her  The BMI is above average  BMI counseling and education was provided to the patient  Nutrition recommendations include reducing intake of cholesterol  PPSV23 and TDaP updated today  Recommended mammo, but refused due to inability to do so because of her back surgery, will f/u with her Gyn (Dr Crystal Sandoval)     I discucssed signs and symptoms for which to RTC, go to ER or seek urgent medical care  SUBJECTIVE:  Chapo Banks is a 59 y o  female who presents today with a chief complaint of Medication Refill  Pt here for follow up and to establish with me as a PCP  Acutely is having pain in the middle of her back, she says the pain is intermittent, it comes and goes  She says it is similar to when she had her MI last year  She says she had it while she was sitting outside yesterday, but didn't want to go to the ER  She says it may have been a muscle spasm because she just returned from camping for 4 days, but wanted to wait until this appt  Sees multiple specialists who manage most of her medications and she recently had labs done  Dr Ila Alexander, recently started on prednisone 5mg and Methotrexate  Dr Cammy Baker- Cardiology, H/o CAD s/p stent to LAD in March 2018  Atorvastatin, Metoprolol tartrate, ASA 81mg  No longer on Plavix was stopped after 1 year  Dr Robert Roberts- Endocrinologist, has an appt on Friday, uncontrolled, on metformin, victoza, insulin  States her BG have been stable since starting the prednisone  Dr Kenna Nelson- Podiatry for DM2    HTN- only medication prescribed by this office is Olmesartan 20mg daily    Review of Systems   Respiratory: Positive for chest tightness and shortness of breath  Musculoskeletal: Positive for arthralgias and back pain  I have reviewed the patient's Past Medical History      OBJECTIVE:  /64   Pulse 97   Temp 98 8 °F (37 1 °C)   Resp 16   Wt 109 kg (241 lb)   SpO2 94%   BMI 44 08 kg/m²    Physical Exam   Constitutional: She appears well-developed and well-nourished  No distress  obese   Cardiovascular: Normal rate, regular rhythm and normal heart sounds  Pulmonary/Chest: Effort normal and breath sounds normal  No respiratory distress  She has no wheezes  She has no rales  Musculoskeletal:        Arms:  Skin: She is not diaphoretic  Vitals reviewed  EKG- NSR @ 82bpm, no acute ischemic changes    Marlene Cooper MD    Note: Portions of the record have been created with voice recognition software  Occasional wrong word or "sound a like" substitutions may have occurred due to the inherent limitations of voice recognition software  Read the chart carefully and recognize, using context, where substitutions have occurred

## 2019-07-16 NOTE — ASSESSMENT & PLAN NOTE
BP Readings from Last 3 Encounters:   07/16/19 140/64   06/26/19 138/80   05/15/19 132/80     Lab Results   Component Value Date    CREATININE 0 70 05/29/2019    EGFR 92 05/29/2019     Not quite at goal on current regimen: Metoprolol tartrate 25mg BID and Olmesartan 20mg  Given h/o CAD, switch from Olmesartan to Lisinopril 20mg and monitor BP at home, if persistently elevated, can increase dose

## 2019-07-16 NOTE — ASSESSMENT & PLAN NOTE
Wt Readings from Last 3 Encounters:   07/16/19 109 kg (241 lb)   06/26/19 108 kg (238 lb)   05/15/19 107 kg (236 lb)     BMI Counseling: Body mass index is 44 08 kg/m²  Discussed the patient's BMI with her  The BMI is above average  BMI counseling and education was provided to the patient  Nutrition recommendations include reducing intake of cholesterol

## 2019-07-16 NOTE — ASSESSMENT & PLAN NOTE
Lab Results   Component Value Date    HGBA1C 7 3 (H) 05/29/2019     F/w Endocrinology (Dr Lesli Bauman)  Currently on Metformin, insulin, Victoza

## 2019-07-17 ENCOUNTER — APPOINTMENT (OUTPATIENT)
Dept: LAB | Facility: MEDICAL CENTER | Age: 64
End: 2019-07-17
Payer: COMMERCIAL

## 2019-07-17 ENCOUNTER — TRANSCRIBE ORDERS (OUTPATIENT)
Dept: ADMINISTRATIVE | Facility: HOSPITAL | Age: 64
End: 2019-07-17

## 2019-07-17 DIAGNOSIS — M06.9 RHEUMATOID ARTHRITIS, INVOLVING UNSPECIFIED SITE, UNSPECIFIED RHEUMATOID FACTOR PRESENCE: Primary | ICD-10-CM

## 2019-07-17 DIAGNOSIS — Z11.59 NEED FOR HEPATITIS C SCREENING TEST: ICD-10-CM

## 2019-07-17 LAB
ALBUMIN SERPL BCP-MCNC: 3.8 G/DL (ref 3.5–5)
ALP SERPL-CCNC: 94 U/L (ref 46–116)
ALT SERPL W P-5'-P-CCNC: 46 U/L (ref 12–78)
ANION GAP SERPL CALCULATED.3IONS-SCNC: 8 MMOL/L (ref 4–13)
AST SERPL W P-5'-P-CCNC: 17 U/L (ref 5–45)
BASOPHILS # BLD AUTO: 0.03 THOUSANDS/ΜL (ref 0–0.1)
BASOPHILS NFR BLD AUTO: 0 % (ref 0–1)
BILIRUB SERPL-MCNC: 0.55 MG/DL (ref 0.2–1)
BUN SERPL-MCNC: 16 MG/DL (ref 5–25)
CALCIUM SERPL-MCNC: 9.3 MG/DL (ref 8.3–10.1)
CHLORIDE SERPL-SCNC: 104 MMOL/L (ref 100–108)
CO2 SERPL-SCNC: 28 MMOL/L (ref 21–32)
CREAT SERPL-MCNC: 0.74 MG/DL (ref 0.6–1.3)
CRP SERPL QL: 3.6 MG/L
EOSINOPHIL # BLD AUTO: 0.15 THOUSAND/ΜL (ref 0–0.61)
EOSINOPHIL NFR BLD AUTO: 2 % (ref 0–6)
ERYTHROCYTE [DISTWIDTH] IN BLOOD BY AUTOMATED COUNT: 14.5 % (ref 11.6–15.1)
ERYTHROCYTE [SEDIMENTATION RATE] IN BLOOD: 45 MM/HOUR (ref 0–20)
GFR SERPL CREATININE-BSD FRML MDRD: 86 ML/MIN/1.73SQ M
GLUCOSE P FAST SERPL-MCNC: 117 MG/DL (ref 65–99)
HCT VFR BLD AUTO: 38.5 % (ref 34.8–46.1)
HCV AB SER QL: NORMAL
HGB BLD-MCNC: 12.1 G/DL (ref 11.5–15.4)
IMM GRANULOCYTES # BLD AUTO: 0.07 THOUSAND/UL (ref 0–0.2)
IMM GRANULOCYTES NFR BLD AUTO: 1 % (ref 0–2)
LYMPHOCYTES # BLD AUTO: 1.1 THOUSANDS/ΜL (ref 0.6–4.47)
LYMPHOCYTES NFR BLD AUTO: 13 % (ref 14–44)
MCH RBC QN AUTO: 28.9 PG (ref 26.8–34.3)
MCHC RBC AUTO-ENTMCNC: 31.4 G/DL (ref 31.4–37.4)
MCV RBC AUTO: 92 FL (ref 82–98)
MONOCYTES # BLD AUTO: 0.91 THOUSAND/ΜL (ref 0.17–1.22)
MONOCYTES NFR BLD AUTO: 11 % (ref 4–12)
NEUTROPHILS # BLD AUTO: 6.44 THOUSANDS/ΜL (ref 1.85–7.62)
NEUTS SEG NFR BLD AUTO: 73 % (ref 43–75)
NRBC BLD AUTO-RTO: 0 /100 WBCS
PLATELET # BLD AUTO: 296 THOUSANDS/UL (ref 149–390)
PMV BLD AUTO: 10.1 FL (ref 8.9–12.7)
POTASSIUM SERPL-SCNC: 3.9 MMOL/L (ref 3.5–5.3)
PROT SERPL-MCNC: 7.6 G/DL (ref 6.4–8.2)
RBC # BLD AUTO: 4.19 MILLION/UL (ref 3.81–5.12)
SODIUM SERPL-SCNC: 140 MMOL/L (ref 136–145)
WBC # BLD AUTO: 8.7 THOUSAND/UL (ref 4.31–10.16)

## 2019-07-17 PROCEDURE — 85652 RBC SED RATE AUTOMATED: CPT | Performed by: INTERNAL MEDICINE

## 2019-07-17 PROCEDURE — 36415 COLL VENOUS BLD VENIPUNCTURE: CPT | Performed by: INTERNAL MEDICINE

## 2019-07-17 PROCEDURE — 85025 COMPLETE CBC W/AUTO DIFF WBC: CPT | Performed by: INTERNAL MEDICINE

## 2019-07-17 PROCEDURE — 80053 COMPREHEN METABOLIC PANEL: CPT | Performed by: INTERNAL MEDICINE

## 2019-07-17 PROCEDURE — 86140 C-REACTIVE PROTEIN: CPT | Performed by: INTERNAL MEDICINE

## 2019-07-17 PROCEDURE — 86803 HEPATITIS C AB TEST: CPT

## 2019-09-09 ENCOUNTER — OFFICE VISIT (OUTPATIENT)
Dept: FAMILY MEDICINE CLINIC | Facility: CLINIC | Age: 64
End: 2019-09-09
Payer: COMMERCIAL

## 2019-09-09 VITALS
TEMPERATURE: 99.3 F | BODY MASS INDEX: 44.35 KG/M2 | SYSTOLIC BLOOD PRESSURE: 120 MMHG | WEIGHT: 241 LBS | HEIGHT: 62 IN | DIASTOLIC BLOOD PRESSURE: 78 MMHG

## 2019-09-09 DIAGNOSIS — R05.9 COUGH: Primary | ICD-10-CM

## 2019-09-09 PROCEDURE — 3008F BODY MASS INDEX DOCD: CPT | Performed by: NURSE PRACTITIONER

## 2019-09-09 PROCEDURE — 99213 OFFICE O/P EST LOW 20 MIN: CPT | Performed by: NURSE PRACTITIONER

## 2019-09-09 RX ORDER — BENZONATATE 100 MG/1
100 CAPSULE ORAL 3 TIMES DAILY PRN
Qty: 30 CAPSULE | Refills: 0 | Status: SHIPPED | OUTPATIENT
Start: 2019-09-09 | End: 2019-09-26 | Stop reason: SDUPTHER

## 2019-09-09 RX ORDER — TOFACITINIB 11 MG/1
TABLET, FILM COATED, EXTENDED RELEASE ORAL
COMMUNITY
Start: 2019-08-27 | End: 2020-05-08

## 2019-09-09 RX ORDER — ALBUTEROL SULFATE 90 UG/1
2 AEROSOL, METERED RESPIRATORY (INHALATION) EVERY 6 HOURS PRN
Qty: 1 INHALER | Refills: 5 | Status: SHIPPED | OUTPATIENT
Start: 2019-09-09 | End: 2020-01-27 | Stop reason: ALTCHOICE

## 2019-09-09 NOTE — PROGRESS NOTES
Assessment/Plan:     Diagnoses and all orders for this visit:    Cough  -     benzonatate (TESSALON PERLES) 100 mg capsule; Take 1 capsule (100 mg total) by mouth 3 (three) times a day as needed for cough  -     albuterol (PROVENTIL HFA,VENTOLIN HFA) 90 mcg/act inhaler; Inhale 2 puffs every 6 (six) hours as needed for wheezing or shortness of breath    Other orders  -     XELJANZ XR 11 MG TB24        Discussed with patient plan to treat with benzonatate 100 mg as needed up to three times a day  Discussed with patient plan to use inhaler to decrease bronchospasms  Patient instructed to call if no improvement in 72 hours or symptoms worsen    Subjective:      Patient ID: Ginger Cheadle is a 59 y o  female  59 y  o female presenting with cough and chest tightness for the past two weeks  She is currently going though physical therapy and by the end of each session she cannot stop coughing and her chest feels tight  The cough keeps her up at night and is worse at night and during exercise  She denies fever, chills or generalized body aches being associated with her other symptoms  She as been taking over the counter allergy and cold medication without much relief of symptoms            Family History   Problem Relation Age of Onset    Diabetes Mother     Gout Mother     Heart disease Mother     Diabetes Father     Heart disease Father     Nephrolithiasis Brother      Social History     Socioeconomic History    Marital status: /Civil Union     Spouse name: Not on file    Number of children: Not on file    Years of education: Not on file    Highest education level: Not on file   Occupational History    Not on file   Social Needs    Financial resource strain: Not on file    Food insecurity:     Worry: Not on file     Inability: Not on file    Transportation needs:     Medical: Not on file     Non-medical: Not on file   Tobacco Use    Smoking status: Never Smoker    Smokeless tobacco: Never Used Substance and Sexual Activity    Alcohol use: Yes     Frequency: Monthly or less     Comment: very rarely    Drug use: No    Sexual activity: Not on file   Lifestyle    Physical activity:     Days per week: Not on file     Minutes per session: Not on file    Stress: Not on file   Relationships    Social connections:     Talks on phone: Not on file     Gets together: Not on file     Attends Jain service: Not on file     Active member of club or organization: Not on file     Attends meetings of clubs or organizations: Not on file     Relationship status: Not on file    Intimate partner violence:     Fear of current or ex partner: Not on file     Emotionally abused: Not on file     Physically abused: Not on file     Forced sexual activity: Not on file   Other Topics Concern    Not on file   Social History Narrative    Not on file     Past Medical History:   Diagnosis Date    [facility]yl[facility] spondylitis ([facility] [address]     Arthritis     Diabetes mellitus ([facility] [address] )     GERD (gastroesophageal reflux disease)     Heart attack ([facility] [address] )     Hematuria     last assessed 9/7/13    Hyperlipidemia     Hypertension     Nephrolithiasis 9/7/2013    Subclinical hypothyroidism 9/2/2015     Past Surgical History:   Procedure Laterality Date    APPENDECTOMY      BACK SURGERY      mulitple surgery, fusions "top to bottom"    BREAST SURGERY Left     biopsy    CERVICAL SPINE SURGERY      CHOLECYSTECTOMY      CORONARY ANGIOPLASTY WITH STENT PLACEMENT      CYSTOSCOPY  05/06/2015    Diagnostic, last assessed 5/6/15    HYSTERECTOMY      LITHOTRIPSY      [place] CYSTO/URETERO W/LITHOTRIPSY &INDWELL STENT INSRT Left 4/18/2019    Procedure: CYSTO, URETEROSCOPY W/HOLMIUM LASER, RETROGRADE PYELOGRAM, STENT INSERTION;  Surgeon: Pastor Oliva MD;  Location: [facility] OR;  Service: Urology     Allergies   Allergen Reactions    Barbiturates Other (See Comments)     stomach pain    Morphine GI Intolerance     Other reaction(s): GI Intolerance    Sulfa Antibiotics Hives and Rash    Sulfasalazine Hives and Rash       Current Outpatient Medications:     ALPRAZolam (XANAX) 0 25 mg tablet, as needed, Disp: , Rfl:     aspirin 81 MG tablet, Take 81 mg by mouth daily, Disp: , Rfl:     atorvastatin (LIPITOR) 80 mg tablet, Take 80 mg by mouth Medrol Dose Pack scheduling ONLY, Disp: , Rfl:     B-D UF III MINI PEN NEEDLES 31G X 5 MM MISC, use 2 daily as directed, Disp: , Rfl: 0    Black Cohosh 40 MG CAPS, Take 40 mg by mouth daily, Disp: , Rfl:     calcium carbonate (OS-RILEY) 600 MG tablet, Take 400 mg by mouth daily, Disp: , Rfl:     Cholecalciferol (VITAMIN D) 2000 units tablet, Take 2,000 Units by mouth daily, Disp: , Rfl:     folic acid (FOLVITE) 854 MCG tablet, Take 800 mg by mouth daily, Disp: , Rfl:     insulin degludec (TRESIBA FLEXTOUCH) 200 units/mL CONCENTRATED U-200 injection pen, Inject 38 Units under the skin daily at bedtime , Disp: , Rfl:     Insulin Pen Needle (B-D ULTRAFINE III SHORT PEN) 31G X 8 MM MISC, by Does not apply route, Disp: , Rfl:     lisinopril (ZESTRIL) 20 mg tablet, Take 1 tablet (20 mg total) by mouth daily, Disp: 90 tablet, Rfl: 3    metFORMIN (GLUMETZA) 1000 MG (MOD) 24 hr tablet, Take 1,000 mg by mouth 2 (two) times a day with meals , Disp: , Rfl:     methocarbamol (ROBAXIN) 750 mg tablet, every 6 (six) hours as needed  , Disp: , Rfl:     methotrexate 2 5 mg tablet, , Disp: , Rfl:     metoprolol tartrate (LOPRESSOR) 25 mg tablet, 25 mg every 12 (twelve) hours  , Disp: , Rfl:     Multiple Vitamin (MULTIVITAMIN) tablet, Take 1 tablet by mouth daily, Disp: , Rfl:     nitroglycerin (NITROSTAT) 0 4 mg SL tablet, Place 1 tablet (0 4 mg total) under the tongue every 5 (five) minutes as needed for chest pain, Disp: 90 tablet, Rfl: 1    omega-3-acid ethyl esters (LOVAZA) 1 g capsule, Take 1,000 mg by mouth daily, Disp: , Rfl:     omeprazole (PriLOSEC) 20 mg delayed release capsule, Take 20 mg by mouth daily, Disp: , Rfl:     Probiotic Product (PROBIOTIC-10 PO), Take by mouth daily , Disp: , Rfl:     Tofacitinib Citrate (XELJANZ XR PO), Take 11 mg by mouth daily, Disp: , Rfl:     VICTOZA 18 MG/3ML SOPN, inject 1 2 milligram subcutaneously daily IF TOLERATED- Takes at 1400, Disp: , Rfl: 0    predniSONE 5 mg tablet, take 3 tablets by mouth daily for 2 weeks then take 2 tablets rosmery     (REFER TO PRESCRIPTION NOTES)  , Disp: , Rfl: 0    XELJANZ XR 11 MG TB24, , Disp: , Rfl:       Review of Systems   Constitutional: Negative  HENT: Negative  Respiratory: Positive for cough and chest tightness  Cardiovascular: Negative  Musculoskeletal: Negative  Neurological: Negative  Psychiatric/Behavioral: Negative  Objective:    /78 (BP Location: Left arm, Patient Position: Sitting, Cuff Size: Standard)   Temp 99 3 °F (37 4 °C)   Ht 5' 2" (1 575 m)   Wt 109 kg (241 lb)   BMI 44 08 kg/m² (Reviewed)     Physical Exam   Constitutional: She is oriented to person, place, and time  Vital signs are normal  She appears well-developed and well-nourished  HENT:   Head: Normocephalic and atraumatic  Right Ear: Tympanic membrane, external ear and ear canal normal    Left Ear: Tympanic membrane, external ear and ear canal normal    Nose: Mucosal edema and rhinorrhea present  Mouth/Throat: Uvula is midline, oropharynx is clear and moist and mucous membranes are normal    Eyes: Pupils are equal, round, and reactive to light  Conjunctivae, EOM and lids are normal    Neck: Trachea normal  Neck supple  Cardiovascular: Normal rate, regular rhythm, normal heart sounds and intact distal pulses  Pulmonary/Chest: Effort normal and breath sounds normal    Neurological: She is alert and oriented to person, place, and time  Skin: Skin is warm and dry  Capillary refill takes less than 2 seconds  Psychiatric: She has a normal mood and affect   Her behavior is normal

## 2019-09-11 ENCOUNTER — HOSPITAL ENCOUNTER (OUTPATIENT)
Dept: RADIOLOGY | Facility: HOSPITAL | Age: 64
Discharge: HOME | End: 2019-09-11
Attending: UROLOGY

## 2019-09-19 ENCOUNTER — OFFICE VISIT (OUTPATIENT)
Dept: FAMILY MEDICINE CLINIC | Facility: CLINIC | Age: 64
End: 2019-09-19
Payer: COMMERCIAL

## 2019-09-19 VITALS
SYSTOLIC BLOOD PRESSURE: 136 MMHG | DIASTOLIC BLOOD PRESSURE: 82 MMHG | HEART RATE: 99 BPM | RESPIRATION RATE: 16 BRPM | OXYGEN SATURATION: 95 % | TEMPERATURE: 98.3 F

## 2019-09-19 DIAGNOSIS — E11.65 TYPE 2 DIABETES MELLITUS WITH HYPERGLYCEMIA, WITH LONG-TERM CURRENT USE OF INSULIN (HCC): ICD-10-CM

## 2019-09-19 DIAGNOSIS — J40 BRONCHITIS: Primary | ICD-10-CM

## 2019-09-19 DIAGNOSIS — Z79.4 TYPE 2 DIABETES MELLITUS WITH HYPERGLYCEMIA, WITH LONG-TERM CURRENT USE OF INSULIN (HCC): ICD-10-CM

## 2019-09-19 DIAGNOSIS — Z12.11 COLON CANCER SCREENING: ICD-10-CM

## 2019-09-19 PROCEDURE — 82043 UR ALBUMIN QUANTITATIVE: CPT | Performed by: FAMILY MEDICINE

## 2019-09-19 PROCEDURE — 99214 OFFICE O/P EST MOD 30 MIN: CPT | Performed by: FAMILY MEDICINE

## 2019-09-19 PROCEDURE — 82570 ASSAY OF URINE CREATININE: CPT | Performed by: FAMILY MEDICINE

## 2019-09-19 RX ORDER — DOXYCYCLINE 100 MG/1
100 CAPSULE ORAL 2 TIMES DAILY
Qty: 14 CAPSULE | Refills: 0 | Status: SHIPPED | OUTPATIENT
Start: 2019-09-19 | End: 2019-09-26

## 2019-09-19 RX ORDER — METHYLPREDNISOLONE 4 MG/1
TABLET ORAL
Qty: 21 TABLET | Refills: 0 | Status: SHIPPED | OUTPATIENT
Start: 2019-09-19 | End: 2019-09-25

## 2019-09-19 NOTE — PROGRESS NOTES
FAMILY MEDICINE PROGRESS NOTE  Marielos Garcia 59 y o  female   DATE: September 19, 2019     ASSESSMENT and PLAN:  aMrielos Garcia is a 59 y o  female with:     1  Bronchitis  3-4 weeks of LRTI symptoms, no evidence of remaining wheezing and no systemic signs  No recent travel or s/sx PE  Will start Abx and steroids for presumed bronchitis, if symptoms persist, check CXR  She declined codeine cough syrup  Ok to discontinue inhaler, continue Tessalon PRN  - doxycycline monohydrate (MONODOX) 100 mg capsule; Take 1 capsule (100 mg total) by mouth 2 (two) times a day for 7 days  Dispense: 14 capsule; Refill: 0  - methylPREDNISolone 4 MG tablet therapy pack; Use as directed on package  Dispense: 21 tablet; Refill: 0    2  Type 2 diabetes mellitus with hyperglycemia, with long-term current use of insulin (HCC)  F/w Endo, but overdue for microalbuminuria screen, collected today  - Microalbumin / creatinine urine ratio    3  Colon cancer screening  - Cologuard; Future      Patient agreeable with the plan and expressed understanding  I discucssed signs and symptoms for which to RTC, go to ER or seek urgent medical care  SUBJECTIVE:  Marielos Garcia is a 59 y o  female who presents today with a chief complaint of Follow-up  Pt here with ongoing cough for the past 3-4 weeks  She was seen 1-2 weeks ago and prescribed an albuterol inhaler and tessalon perles, she states they help her symptoms temporarily, but then it comes back worse  Cough   This is a new problem  The current episode started 1 to 4 weeks ago  The problem has been gradually worsening  The problem occurs every few minutes  The cough is non-productive  Associated symptoms include a fever (100F) and shortness of breath  Pertinent negatives include no chest pain (heaviness), hemoptysis, nasal congestion, postnasal drip, rhinorrhea or sore throat  The symptoms are aggravated by lying down and other (inhalation, talking)   She has tried prescription cough suppressant and a beta-agonist inhaler for the symptoms  Review of Systems   Constitutional: Positive for fever (100F)  HENT: Negative for postnasal drip, rhinorrhea and sore throat  Respiratory: Positive for cough and shortness of breath  Negative for hemoptysis  Cardiovascular: Negative for chest pain (heaviness)  I have reviewed the patient's Past Medical History  OBJECTIVE:  /82   Pulse 99   Temp 98 3 °F (36 8 °C)   Resp 16   SpO2 95%    Physical Exam   Constitutional: She appears well-developed and well-nourished  No distress  obese   HENT:   Head: Normocephalic and atraumatic  Right Ear: External ear normal    Left Ear: External ear normal    Nose: Right sinus exhibits no maxillary sinus tenderness and no frontal sinus tenderness  Left sinus exhibits no maxillary sinus tenderness and no frontal sinus tenderness  Mouth/Throat: Uvula is midline, oropharynx is clear and moist and mucous membranes are normal  No oropharyngeal exudate, posterior oropharyngeal edema, posterior oropharyngeal erythema or tonsillar abscesses  Eyes: Conjunctivae are normal  Right eye exhibits no discharge  Left eye exhibits no discharge  Neck: Normal range of motion  Neck supple  Cardiovascular: Normal rate and normal heart sounds  Pulmonary/Chest: Effort normal  No respiratory distress  She has no wheezes  She has no rales  She exhibits tenderness (chest wall tenderness to palpation)  Lymphadenopathy:     She has no cervical adenopathy  Skin: She is not diaphoretic  Vitals reviewed  Therman Sanes Merlinda Ports, MD    Note: Portions of the record have been created with voice recognition software  Occasional wrong word or "sound a like" substitutions may have occurred due to the inherent limitations of voice recognition software  Read the chart carefully and recognize, using context, where substitutions have occurred

## 2019-09-20 LAB
CREAT UR-MCNC: 46.9 MG/DL
MICROALBUMIN UR-MCNC: 28.5 MG/L (ref 0–20)
MICROALBUMIN/CREAT 24H UR: 61 MG/G CREATININE (ref 0–30)

## 2019-09-26 DIAGNOSIS — R05.9 COUGH: ICD-10-CM

## 2019-09-26 RX ORDER — BENZONATATE 100 MG/1
100 CAPSULE ORAL 3 TIMES DAILY PRN
Qty: 30 CAPSULE | Refills: 0 | Status: SHIPPED | OUTPATIENT
Start: 2019-09-26 | End: 2019-12-11

## 2019-09-30 LAB
LEFT EYE DIABETIC RETINOPATHY: NORMAL
RIGHT EYE DIABETIC RETINOPATHY: NORMAL
SEVERITY (EYE EXAM): NORMAL

## 2019-10-07 ENCOUNTER — IMMUNIZATIONS (OUTPATIENT)
Dept: FAMILY MEDICINE CLINIC | Facility: CLINIC | Age: 64
End: 2019-10-07
Payer: COMMERCIAL

## 2019-10-07 DIAGNOSIS — Z23 NEED FOR VACCINATION: Primary | ICD-10-CM

## 2019-10-07 PROCEDURE — 90471 IMMUNIZATION ADMIN: CPT

## 2019-10-07 PROCEDURE — 90682 RIV4 VACC RECOMBINANT DNA IM: CPT

## 2019-10-12 ENCOUNTER — OFFICE VISIT (OUTPATIENT)
Dept: URGENT CARE | Facility: MEDICAL CENTER | Age: 64
End: 2019-10-12
Payer: COMMERCIAL

## 2019-10-12 VITALS
HEART RATE: 100 BPM | HEIGHT: 62 IN | BODY MASS INDEX: 44.35 KG/M2 | RESPIRATION RATE: 18 BRPM | SYSTOLIC BLOOD PRESSURE: 126 MMHG | DIASTOLIC BLOOD PRESSURE: 76 MMHG | OXYGEN SATURATION: 95 % | WEIGHT: 241 LBS | TEMPERATURE: 98.1 F

## 2019-10-12 DIAGNOSIS — R30.0 DYSURIA: Primary | ICD-10-CM

## 2019-10-12 DIAGNOSIS — B37.3 VAGINAL CANDIDIASIS: ICD-10-CM

## 2019-10-12 LAB
SL AMB  POCT GLUCOSE, UA: NORMAL
SL AMB LEUKOCYTE ESTERASE,UA: NORMAL
SL AMB POCT BILIRUBIN,UA: NORMAL
SL AMB POCT BLOOD,UA: NORMAL
SL AMB POCT CLARITY,UA: CLEAR
SL AMB POCT COLOR,UA: YELLOW
SL AMB POCT KETONES,UA: NORMAL
SL AMB POCT NITRITE,UA: NORMAL
SL AMB POCT PH,UA: 5
SL AMB POCT SPECIFIC GRAVITY,UA: 1
SL AMB POCT URINE PROTEIN: NORMAL
SL AMB POCT UROBILINOGEN: 0.2

## 2019-10-12 PROCEDURE — G0382 LEV 3 HOSP TYPE B ED VISIT: HCPCS | Performed by: PHYSICIAN ASSISTANT

## 2019-10-12 PROCEDURE — S9083 URGENT CARE CENTER GLOBAL: HCPCS | Performed by: PHYSICIAN ASSISTANT

## 2019-10-12 PROCEDURE — 81002 URINALYSIS NONAUTO W/O SCOPE: CPT | Performed by: PHYSICIAN ASSISTANT

## 2019-10-12 RX ORDER — FLUCONAZOLE 150 MG/1
150 TABLET ORAL ONCE
Qty: 1 TABLET | Refills: 0 | Status: SHIPPED | OUTPATIENT
Start: 2019-10-12 | End: 2019-10-12

## 2019-10-12 NOTE — PROGRESS NOTES
[facility]        NAME: Raysa Dumont is a 59 y o  female  : 1955    MRN: [id number]  DATE: 2019  TIME: 10:10 AM    Assessment and Plan   Dysuria [R30 0]  1  Dysuria  POCT urine dip   2  Vaginal candidiasis  fluconazole (DIFLUCAN) 150 mg tablet         Patient Instructions     1  Increase fluids  2  Take Diflucan 150mg  As single dose  3  Follow up with PCP in 3-5 days if symptoms persist       Chief Complaint     Chief Complaint   Patient presents with    Possible UTI     Pt  with pressure in her lower abdomen, frequency, and urgency for the past 2 days  History of Present Illness       Marjorie Logan is a 80-year-old male presents with a 2 day history of pain and burning with urination  Patient reports having a yeast infection 4 days prior, she used Monistat over-the-counter but has had continued burning, itchiness, and redness  Patient reports discomfort with urination over the past 2 days, she denies any abdominal pain, back pain or fever since the onset of her most recent symptoms  Review of Systems   Review of Systems   Constitutional: Negative  Gastrointestinal: Negative  Genitourinary: Positive for dysuria and frequency           Current Medications       Current Outpatient Medications:     ALPRAZolam (XANAX) 0 25 mg tablet, as needed, Disp: , Rfl:     aspirin 81 MG tablet, Take 81 mg by mouth daily, Disp: , Rfl:     atorvastatin (LIPITOR) 80 mg tablet, Take 80 mg by mouth Medrol Dose Pack scheduling ONLY, Disp: , Rfl:     B-D UF III MINI PEN NEEDLES 31G X 5 MM MISC, use 2 daily as directed, Disp: , Rfl: 0    Black Cohosh 40 MG CAPS, Take 40 mg by mouth daily, Disp: , Rfl:     calcium carbonate (OS-RILEY) 600 MG tablet, Take 400 mg by mouth daily, Disp: , Rfl:     Cholecalciferol (VITAMIN D) 2000 units tablet, Take 2,000 Units by mouth daily, Disp: , Rfl:     folic acid (FOLVITE) 504 MCG tablet, Take 800 mg by mouth daily, Disp: , Rfl:     insulin degludec (TRESIBA FLEXTOUCH) 200 units/mL CONCENTRATED U-200 injection pen, Inject 38 Units under the skin daily at bedtime , Disp: , Rfl:     Insulin Pen Needle (B-D ULTRAFINE III SHORT PEN) 31G X 8 MM MISC, by Does not apply route, Disp: , Rfl:     lisinopril (ZESTRIL) 20 mg tablet, Take 1 tablet (20 mg total) by mouth daily, Disp: 90 tablet, Rfl: 3    metFORMIN (GLUMETZA) 1000 MG (MOD) 24 hr tablet, Take 1,000 mg by mouth 2 (two) times a day with meals , Disp: , Rfl:     methocarbamol (ROBAXIN) 750 mg tablet, every 6 (six) hours as needed  , Disp: , Rfl:     methotrexate 2 5 mg tablet, , Disp: , Rfl:     metoprolol tartrate (LOPRESSOR) 25 mg tablet, 25 mg every 12 (twelve) hours  , Disp: , Rfl:     Multiple Vitamin (MULTIVITAMIN) tablet, Take 1 tablet by mouth daily, Disp: , Rfl:     nitroglycerin (NITROSTAT) 0 4 mg SL tablet, Place 1 tablet (0 4 mg total) under the tongue every 5 (five) minutes as needed for chest pain, Disp: 90 tablet, Rfl: 1    omega-3-acid ethyl esters (LOVAZA) 1 g capsule, Take 1,000 mg by mouth daily, Disp: , Rfl:     omeprazole (PriLOSEC) 20 mg delayed release capsule, Take 20 mg by mouth daily, Disp: , Rfl:     Probiotic Product (PROBIOTIC-10 PO), Take by mouth daily , Disp: , Rfl:     Tofacitinib Citrate (XELJANZ XR PO), Take 11 mg by mouth daily, Disp: , Rfl:     VICTOZA 18 MG/3ML SOPN, inject 1 2 milligram subcutaneously daily IF TOLERATED- Takes at 1400, Disp: , Rfl: 0    albuterol (PROVENTIL HFA,VENTOLIN HFA) 90 mcg/act inhaler, Inhale 2 puffs every 6 (six) hours as needed for wheezing or shortness of breath (Patient not taking: Reported on 10/12/2019), Disp: 1 Inhaler, Rfl: 5    benzonatate (TESSALON PERLES) 100 mg capsule, Take 1 capsule (100 mg total) by mouth 3 (three) times a day as needed for cough (Patient not taking: Reported on 10/12/2019), Disp: 30 capsule, Rfl: 0    fluconazole (DIFLUCAN) 150 mg tablet, Take 1 tablet (150 mg total) by mouth once for 1 dose, Disp: 1 tablet, Rfl: 0    XELJANZ XR 11 MG TB24, , Disp: , Rfl:     Current Allergies     Allergies as of 10/12/2019 - Reviewed 10/12/2019   Allergen Reaction Noted    Barbiturates Other (See Comments) 03/05/2017    Morphine GI Intolerance 03/05/2017    Sulfa antibiotics Hives and Rash 10/19/2012    Sulfasalazine Hives and Rash 10/19/2012            The following portions of the patient's history were reviewed and updated as appropriate: allergies, current medications, past family history, past medical history, past social history, past surgical history and problem list      Past Medical History:   Diagnosis Date    [facility]yl[facility]yl[facility] [address]     Arthritis     Diabetes mellitus ([facility] [address]     GERD (gastroesophageal reflux disease)     Heart attack ([facility] [address]     Hematuria     last assessed 9/7/13    Hyperlipidemia     Hypertension     Nephrolithiasis 9/7/2013    Subclinical hypothyroidism 9/2/2015       Past Surgical History:   Procedure Laterality Date    APPENDECTOMY      BACK SURGERY      mulitple surgery, fusions "top to bottom"    BREAST SURGERY Left     biopsy    CERVICAL SPINE SURGERY      CHOLECYSTECTOMY      CORONARY ANGIOPLASTY WITH STENT PLACEMENT      CYSTOSCOPY  05/06/2015    Diagnostic, last assessed 5/6/15    HYSTERECTOMY      LITHOTRIPSY      [place] CYSTO/URETERO W/LITHOTRIPSY &INDWELL STENT INSRT Left 4/18/2019    Procedure: CYSTO, URETEROSCOPY W/HOLMIUM LASER, RETROGRADE PYELOGRAM, STENT INSERTION;  Surgeon: John Whitley MD;  Location: [facility] OR;  Service: Urology       Family History   Problem Relation Age of Onset    Diabetes Mother     Gout Mother     Heart disease Mother     Diabetes Father     Heart disease Father     Nephrolithiasis Brother          Medications have been verified          Objective   /76   Pulse 100   Temp 98 1 °F (36 7 °C) (Oral)   Resp 18   Ht 5' 2" (1 575 m)   Wt 109 kg (241 lb)   SpO2 95%   BMI 44 08 kg/m²        Physical Exam Physical Exam   Constitutional: She appears well-developed and well-nourished  No distress  Cardiovascular: Normal rate, regular rhythm and normal heart sounds  No murmur heard  Pulmonary/Chest: Effort normal and breath sounds normal    Abdominal: Soft  Normal appearance and bowel sounds are normal  There is no tenderness  There is no rigidity, no rebound, no guarding and no CVA tenderness

## 2019-10-12 NOTE — PATIENT INSTRUCTIONS
1  Increase fluids  2  Take Diflucan 150mg  As single dose  3   Follow up with PCP in 3-5 days if symptoms persist

## 2019-10-16 ENCOUNTER — TRANSCRIBE ORDERS (OUTPATIENT)
Dept: ADMINISTRATIVE | Facility: HOSPITAL | Age: 64
End: 2019-10-16

## 2019-10-16 ENCOUNTER — APPOINTMENT (OUTPATIENT)
Dept: LAB | Facility: MEDICAL CENTER | Age: 64
End: 2019-10-16
Payer: COMMERCIAL

## 2019-10-16 DIAGNOSIS — E11.9 TYPE 2 DIABETES MELLITUS WITHOUT COMPLICATION, WITH LONG-TERM CURRENT USE OF INSULIN (HCC): Primary | ICD-10-CM

## 2019-10-16 DIAGNOSIS — Z79.4 TYPE 2 DIABETES MELLITUS WITHOUT COMPLICATION, WITH LONG-TERM CURRENT USE OF INSULIN (HCC): Primary | ICD-10-CM

## 2019-10-16 LAB
CREAT UR-MCNC: 23 MG/DL
EST. AVERAGE GLUCOSE BLD GHB EST-MCNC: 177 MG/DL
HBA1C MFR BLD: 7.8 % (ref 4.2–6.3)
MICROALBUMIN UR-MCNC: 13.2 MG/L (ref 0–20)
MICROALBUMIN/CREAT 24H UR: 57 MG/G CREATININE (ref 0–30)

## 2019-10-16 PROCEDURE — 82043 UR ALBUMIN QUANTITATIVE: CPT | Performed by: INTERNAL MEDICINE

## 2019-10-16 PROCEDURE — 82570 ASSAY OF URINE CREATININE: CPT | Performed by: INTERNAL MEDICINE

## 2019-10-16 PROCEDURE — 36415 COLL VENOUS BLD VENIPUNCTURE: CPT | Performed by: INTERNAL MEDICINE

## 2019-10-16 PROCEDURE — 83036 HEMOGLOBIN GLYCOSYLATED A1C: CPT | Performed by: INTERNAL MEDICINE

## 2019-10-25 ENCOUNTER — OFFICE VISIT (OUTPATIENT)
Dept: FAMILY MEDICINE CLINIC | Facility: CLINIC | Age: 64
End: 2019-10-25
Payer: COMMERCIAL

## 2019-10-25 VITALS
TEMPERATURE: 98.4 F | SYSTOLIC BLOOD PRESSURE: 120 MMHG | BODY MASS INDEX: 44.35 KG/M2 | OXYGEN SATURATION: 97 % | RESPIRATION RATE: 16 BRPM | HEART RATE: 82 BPM | WEIGHT: 242.5 LBS | DIASTOLIC BLOOD PRESSURE: 74 MMHG

## 2019-10-25 DIAGNOSIS — E11.65 TYPE 2 DIABETES MELLITUS WITH HYPERGLYCEMIA, WITH LONG-TERM CURRENT USE OF INSULIN (HCC): ICD-10-CM

## 2019-10-25 DIAGNOSIS — B37.3 VAGINAL CANDIDIASIS: ICD-10-CM

## 2019-10-25 DIAGNOSIS — R39.9 UTI SYMPTOMS: Primary | ICD-10-CM

## 2019-10-25 DIAGNOSIS — Z79.4 TYPE 2 DIABETES MELLITUS WITH HYPERGLYCEMIA, WITH LONG-TERM CURRENT USE OF INSULIN (HCC): ICD-10-CM

## 2019-10-25 LAB
SL AMB  POCT GLUCOSE, UA: ABNORMAL
SL AMB LEUKOCYTE ESTERASE,UA: ABNORMAL
SL AMB POCT BILIRUBIN,UA: ABNORMAL
SL AMB POCT BLOOD,UA: ABNORMAL
SL AMB POCT CLARITY,UA: CLEAR
SL AMB POCT COLOR,UA: YELLOW
SL AMB POCT KETONES,UA: ABNORMAL
SL AMB POCT NITRITE,UA: ABNORMAL
SL AMB POCT PH,UA: 6
SL AMB POCT SPECIFIC GRAVITY,UA: 1.01
SL AMB POCT URINE PROTEIN: ABNORMAL
SL AMB POCT UROBILINOGEN: ABNORMAL

## 2019-10-25 PROCEDURE — 81002 URINALYSIS NONAUTO W/O SCOPE: CPT | Performed by: FAMILY MEDICINE

## 2019-10-25 PROCEDURE — 87086 URINE CULTURE/COLONY COUNT: CPT | Performed by: FAMILY MEDICINE

## 2019-10-25 PROCEDURE — 99214 OFFICE O/P EST MOD 30 MIN: CPT | Performed by: FAMILY MEDICINE

## 2019-10-25 RX ORDER — FLUCONAZOLE 150 MG/1
150 TABLET ORAL ONCE
Qty: 1 TABLET | Refills: 0 | Status: SHIPPED | OUTPATIENT
Start: 2019-10-25 | End: 2019-10-25

## 2019-10-25 NOTE — PROGRESS NOTES
FAMILY MEDICINE PROGRESS NOTE  Frederick Duque 59 y o  female   DATE: October 25, 2019     ASSESSMENT and PLAN:  Frederick Duque is a 59 y o  female with:     Problem List Items Addressed This Visit        Endocrine    Type 2 diabetes mellitus with hyperglycemia, with long-term current use of insulin ([facility] [address] )      Other Visit Diagnoses     UTI symptoms    -  Primary    Relevant Orders    POCT urine dip (Completed)    Vaginal candidiasis        Relevant Medications    fluconazole (DIFLUCAN) 150 mg tablet        Urine dip negative, less likely UTI, but will send for urine culture to confirm  Symptoms consistent with yeast infection given vaginal discharge and improvement with Diflucan, so given another Rx for diflucan, may need re-treatment in 1 week, pt to call with update  Advised urinary frequency may be due to uncontrolled T2DM, advised to f/u with Endo and recommended carb counting  SUBJECTIVE:  Frederick Duque is a 59 y o  female who presents today with a chief complaint of Follow-up  Patient is here for an urgent Care follow-up  She was seen on 10/12/2019 and was given Diflucan for vaginal candidiasis  She had normal urine dip at that time  She felt improved with the diflucan, but now has some recurring urgency, frequency  Also had blood work done on 10/16/2019 for diabetes for her Endocrinologist and her A1c is worsened from 7 3 to 7 8%  Urinary Tract Infection    This is a new problem  The current episode started 1 to 4 weeks ago  The problem occurs intermittently  The problem has been waxing and waning  The pain is mild  There has been no fever  She is not sexually active  There is no history of pyelonephritis  Associated symptoms include a discharge, frequency and urgency  Pertinent negatives include no chills, flank pain, hematuria, hesitancy, nausea, sweats or vomiting  She has tried increased fluids for the symptoms  Her past medical history is significant for kidney stones   There is no history of recurrent UTIs  Review of Systems   Constitutional: Negative for chills and fever  Gastrointestinal: Negative for nausea and vomiting  Genitourinary: Positive for frequency and urgency  Negative for difficulty urinating, flank pain, hematuria and hesitancy  I have reviewed the patient's Past Medical History  OBJECTIVE:  /74   Pulse 82   Temp 98 4 °F (36 9 °C)   Resp 16   Wt 110 kg (242 lb 8 oz)   SpO2 97%   BMI 44 35 kg/m²    Physical Exam   Constitutional: She appears well-developed and well-nourished  No distress  Cardiovascular: Normal rate, regular rhythm and normal heart sounds  Pulmonary/Chest: Effort normal and breath sounds normal  No respiratory distress  She has no wheezes  She has no rales  Abdominal: Soft  Bowel sounds are normal  She exhibits no distension  There is no tenderness  There is no rigidity, no rebound, no guarding and no CVA tenderness  No suprapubic tenderness   Skin: She is not diaphoretic  Vitals reviewed  Labs:  Office Visit on 10/25/2019   Component Date Value Ref Range Status    LEUKOCYTE ESTERASE,UA 10/25/2019 neg   Final    NITRITE,UA 10/25/2019 neg   Final    SL AMB POCT UROBILINOGEN 10/25/2019 0 2(3 5)   Final    POCT URINE PROTEIN 10/25/2019 15(0 15)+-   Final     PH,UA 10/25/2019 6 0   Final    BLOOD,UA 10/25/2019 neg   Final    SPECIFIC GRAVITY,UA 10/25/2019 1 010   Final    KETONES,UA 10/25/2019 neg   Final    BILIRUBIN,UA 10/25/2019 neg   Final    GLUCOSE, UA 10/25/2019 neg   Final     COLOR,UA 10/25/2019 yellow   Final    CLARITY,UA 10/25/2019 clear   Final         Marlene Hawkins MD    Note: Portions of the record have been created with voice recognition software  Occasional wrong word or "sound a like" substitutions may have occurred due to the inherent limitations of voice recognition software  Read the chart carefully and recognize, using context, where substitutions have occurred

## 2019-10-26 LAB — BACTERIA UR CULT: NORMAL

## 2019-10-28 ENCOUNTER — TELEPHONE (OUTPATIENT)
Dept: FAMILY MEDICINE CLINIC | Facility: CLINIC | Age: 64
End: 2019-10-28

## 2019-10-28 NOTE — TELEPHONE ENCOUNTER
Left message for patient to call office----- Message from [facility]  Yaya Zavala MD sent at 10/27/2019  3:07 PM EDT -----  Please call Mildred Villafuerte with her results  urine test negative, doesn't have a UTI, the diflucan I prescribed should help her symptoms

## 2019-12-03 ENCOUNTER — TRANSCRIBE ORDERS (OUTPATIENT)
Dept: ADMINISTRATIVE | Facility: HOSPITAL | Age: 64
End: 2019-12-03

## 2019-12-03 ENCOUNTER — APPOINTMENT (OUTPATIENT)
Dept: LAB | Facility: MEDICAL CENTER | Age: 64
End: 2019-12-03
Payer: COMMERCIAL

## 2019-12-03 DIAGNOSIS — M35.3 POLYMYALGIA RHEUMATICA (HCC): Primary | ICD-10-CM

## 2019-12-03 LAB
ALBUMIN SERPL BCP-MCNC: 4 G/DL (ref 3.5–5)
ALP SERPL-CCNC: 98 U/L (ref 46–116)
ALT SERPL W P-5'-P-CCNC: 46 U/L (ref 12–78)
ANION GAP SERPL CALCULATED.3IONS-SCNC: 6 MMOL/L (ref 4–13)
AST SERPL W P-5'-P-CCNC: 27 U/L (ref 5–45)
BASOPHILS # BLD AUTO: 0.03 THOUSANDS/ΜL (ref 0–0.1)
BASOPHILS NFR BLD AUTO: 0 % (ref 0–1)
BILIRUB SERPL-MCNC: 0.47 MG/DL (ref 0.2–1)
BUN SERPL-MCNC: 14 MG/DL (ref 5–25)
CALCIUM SERPL-MCNC: 10.1 MG/DL (ref 8.3–10.1)
CHLORIDE SERPL-SCNC: 104 MMOL/L (ref 100–108)
CO2 SERPL-SCNC: 29 MMOL/L (ref 21–32)
CREAT SERPL-MCNC: 0.78 MG/DL (ref 0.6–1.3)
CRP SERPL QL: <3 MG/L
EOSINOPHIL # BLD AUTO: 0.18 THOUSAND/ΜL (ref 0–0.61)
EOSINOPHIL NFR BLD AUTO: 2 % (ref 0–6)
ERYTHROCYTE [DISTWIDTH] IN BLOOD BY AUTOMATED COUNT: 14.2 % (ref 11.6–15.1)
ERYTHROCYTE [SEDIMENTATION RATE] IN BLOOD: 62 MM/HOUR (ref 0–20)
GFR SERPL CREATININE-BSD FRML MDRD: 81 ML/MIN/1.73SQ M
GLUCOSE P FAST SERPL-MCNC: 152 MG/DL (ref 65–99)
HCT VFR BLD AUTO: 37.9 % (ref 34.8–46.1)
HGB BLD-MCNC: 11.8 G/DL (ref 11.5–15.4)
IMM GRANULOCYTES # BLD AUTO: 0.02 THOUSAND/UL (ref 0–0.2)
IMM GRANULOCYTES NFR BLD AUTO: 0 % (ref 0–2)
LYMPHOCYTES # BLD AUTO: 0.89 THOUSANDS/ΜL (ref 0.6–4.47)
LYMPHOCYTES NFR BLD AUTO: 11 % (ref 14–44)
MCH RBC QN AUTO: 29.5 PG (ref 26.8–34.3)
MCHC RBC AUTO-ENTMCNC: 31.1 G/DL (ref 31.4–37.4)
MCV RBC AUTO: 95 FL (ref 82–98)
MONOCYTES # BLD AUTO: 0.69 THOUSAND/ΜL (ref 0.17–1.22)
MONOCYTES NFR BLD AUTO: 9 % (ref 4–12)
NEUTROPHILS # BLD AUTO: 6.18 THOUSANDS/ΜL (ref 1.85–7.62)
NEUTS SEG NFR BLD AUTO: 78 % (ref 43–75)
NRBC BLD AUTO-RTO: 0 /100 WBCS
PLATELET # BLD AUTO: 357 THOUSANDS/UL (ref 149–390)
PMV BLD AUTO: 10.2 FL (ref 8.9–12.7)
POTASSIUM SERPL-SCNC: 4.2 MMOL/L (ref 3.5–5.3)
PROT SERPL-MCNC: 7.7 G/DL (ref 6.4–8.2)
RBC # BLD AUTO: 4 MILLION/UL (ref 3.81–5.12)
SODIUM SERPL-SCNC: 139 MMOL/L (ref 136–145)
WBC # BLD AUTO: 7.99 THOUSAND/UL (ref 4.31–10.16)

## 2019-12-03 PROCEDURE — 86140 C-REACTIVE PROTEIN: CPT | Performed by: INTERNAL MEDICINE

## 2019-12-03 PROCEDURE — 85652 RBC SED RATE AUTOMATED: CPT | Performed by: INTERNAL MEDICINE

## 2019-12-03 PROCEDURE — 80053 COMPREHEN METABOLIC PANEL: CPT | Performed by: INTERNAL MEDICINE

## 2019-12-03 PROCEDURE — 36415 COLL VENOUS BLD VENIPUNCTURE: CPT | Performed by: INTERNAL MEDICINE

## 2019-12-03 PROCEDURE — 85025 COMPLETE CBC W/AUTO DIFF WBC: CPT | Performed by: INTERNAL MEDICINE

## 2019-12-11 ENCOUNTER — OFFICE VISIT (OUTPATIENT)
Dept: FAMILY MEDICINE CLINIC | Facility: CLINIC | Age: 64
End: 2019-12-11
Payer: COMMERCIAL

## 2019-12-11 VITALS
TEMPERATURE: 98.6 F | RESPIRATION RATE: 16 BRPM | DIASTOLIC BLOOD PRESSURE: 70 MMHG | OXYGEN SATURATION: 96 % | BODY MASS INDEX: 43.53 KG/M2 | WEIGHT: 238 LBS | SYSTOLIC BLOOD PRESSURE: 106 MMHG | HEART RATE: 88 BPM

## 2019-12-11 DIAGNOSIS — J40 BRONCHITIS: Primary | ICD-10-CM

## 2019-12-11 PROCEDURE — 99214 OFFICE O/P EST MOD 30 MIN: CPT | Performed by: FAMILY MEDICINE

## 2019-12-11 RX ORDER — GUAIFENESIN AND CODEINE PHOSPHATE 100; 10 MG/5ML; MG/5ML
5 SOLUTION ORAL 3 TIMES DAILY PRN
Qty: 118 ML | Refills: 0 | Status: SHIPPED | OUTPATIENT
Start: 2019-12-11 | End: 2019-12-18

## 2019-12-11 NOTE — PROGRESS NOTES
FAMILY MEDICINE PROGRESS NOTE  Emily Nelson 59 y o  female   DATE: December 11, 2019     ASSESSMENT and PLAN:  Emily Nelson is a 59 y o  female with:     1  Bronchitis  Completed course of Zpak, has lingering cough and wheezing  Add daily Asmanex x 5 days, use albuterol only qHS PRN restart Flonase and add short course of Codeine/Guaifenesin  Do not combine with benzos/alcohol  If persists, may need steroids, but given that she is IDDM2, would avoid at this time  - mometasone (ASMANEX TWISTHALER) 220 MCG/INH inhaler; Inhale 1 puff daily Rinse mouth after use  Dispense: 1 Inhaler; Refill: 0  - guaifenesin-codeine (GUAIFENESIN AC) 100-10 MG/5ML liquid; Take 5 mL by mouth 3 (three) times a day as needed for cough for up to 7 days Do not drive, drink alcohol or combine with Alprazolam  Dispense: 118 mL; Refill: 0    2  HTN   Recently Metoprolol was increased 25 BID-->50mg BID and Lisinopril was decreased 20mg-->10mg by Cardiology  BP and Pulse acceptable today, advised Albuterol may increase her pulse, so monitor that on her records  Re-eval at appt in 10 days for pre-op clearance  Patient agreeable with the plan and expressed understanding  I discucssed signs and symptoms for which to RTC, go to ER or seek urgent medical care  Patient agreeable with the plan and expressed understanding  I discucssed signs and symptoms for which to RTC, go to ER or seek urgent medical care  SUBJECTIVE:  Emily Nelson is a 59 y o  female who presents today with a chief complaint of Cough; Headache; and Nasal Congestion  Pt is here for f/u of urgent care visit of acute bronchitis  She was given a ZPak which did help her symptoms, she took her last dose yesterday  But she still has headaches, cough, PND and can't sleep at night because of the cough  She did not have any improvement with the Tessalon perles  Review of Systems   Constitutional: Negative for fever     HENT: Positive for congestion, postnasal drip and rhinorrhea  Respiratory: Positive for cough and wheezing  Negative for shortness of breath  I have reviewed the patient's Past Medical History  OBJECTIVE:  /70   Pulse 88   Temp 98 6 °F (37 °C)   Resp 16   Wt 108 kg (238 lb)   SpO2 96%   BMI 43 53 kg/m²    Physical Exam   Constitutional: She appears well-developed and well-nourished  No distress  obese   HENT:   Head: Normocephalic and atraumatic  Right Ear: External ear normal    Left Ear: External ear normal    Nose: Right sinus exhibits no maxillary sinus tenderness and no frontal sinus tenderness  Left sinus exhibits no maxillary sinus tenderness and no frontal sinus tenderness  Mouth/Throat: Uvula is midline, oropharynx is clear and moist and mucous membranes are normal  No oropharyngeal exudate, posterior oropharyngeal edema, posterior oropharyngeal erythema or tonsillar abscesses  Eyes: Conjunctivae are normal  Right eye exhibits no discharge  Left eye exhibits no discharge  Neck: Normal range of motion  Neck supple  Cardiovascular: Normal rate and normal heart sounds  Pulmonary/Chest: Effort normal  No respiratory distress  She has wheezes in the right middle field, the right lower field and the left lower field  She has no rales  Lymphadenopathy:     She has no cervical adenopathy  Skin: She is not diaphoretic  Vitals reviewed  Jozef Bravo MD    Note: Portions of the record have been created with voice recognition software  Occasional wrong word or "sound a like" substitutions may have occurred due to the inherent limitations of voice recognition software  Read the chart carefully and recognize, using context, where substitutions have occurred

## 2019-12-20 ENCOUNTER — OFFICE VISIT (OUTPATIENT)
Dept: FAMILY MEDICINE CLINIC | Facility: CLINIC | Age: 64
End: 2019-12-20
Payer: COMMERCIAL

## 2019-12-20 VITALS
DIASTOLIC BLOOD PRESSURE: 62 MMHG | RESPIRATION RATE: 17 BRPM | BODY MASS INDEX: 42.26 KG/M2 | WEIGHT: 238.5 LBS | SYSTOLIC BLOOD PRESSURE: 118 MMHG | HEIGHT: 63 IN | HEART RATE: 77 BPM | TEMPERATURE: 98.1 F | OXYGEN SATURATION: 98 %

## 2019-12-20 DIAGNOSIS — Z01.818 PRE-OP EXAM: ICD-10-CM

## 2019-12-20 DIAGNOSIS — H25.9 AGE-RELATED CATARACT OF BOTH EYES, UNSPECIFIED AGE-RELATED CATARACT TYPE: Primary | ICD-10-CM

## 2019-12-20 PROCEDURE — 99242 OFF/OP CONSLTJ NEW/EST SF 20: CPT | Performed by: PHYSICIAN ASSISTANT

## 2019-12-20 NOTE — PROGRESS NOTES
PRE-OPERATIVE EVALUATION  [facility]    NAME: Mildred Villafuerte  AGE: 59 y o  SEX: female  : 1955     DATE: 2019     Pre-Operative Evaluation      Chief Complaint: Pre-operative Evaluation     Surgery: cataract removal  Anticipated Date of Surgery:  left,  right  Referring Provider: No ref  provider found       History of Present Illness:     Mildred Villafuerte is a 59 y o  female who presents to the office today for a preoperative consultation at the request of surgeon, Dr Yousif Montano  Planned anesthesia is general  Patient has a bleeding risk of: no recent abnormal bleeding and no remote history of abnormal bleeding  Patient does not have objections to receiving blood products if needed  Current anti-platelet/anti-coagulation medications that the patient is prescribed includes: Aspirin  Assessment of Chronic Conditions:   - Diabetes Mellitus: well controlled      Assessment of Cardiac Risk:  · Denies unstable or severe angina or MI in the last 6 weeks or history of stent placement in the last year   · Denies decompensated heart failure (e g  New onset heart failure, NYHA functional class IV heart failure, or worsening existing heart failure)  · Denies significant arrhythmias such as high grade AV block, symptomatic ventricular arrhythmia, newly recognized ventricular tachycardia, supraventricular tachycardia with resting heart rate >100, or symptomatic bradycardia  · Denies severe heart valve disease including aortic stenosis or symptomatic mitral stenosis     Exercise Capacity:  · Able to walk 4 blocks without symptoms?: Yes  · Able to walk 2 flights without symptoms?: Yes, some back pain    Prior Anesthesia Reactions: No     Personal history of venous thromboembolic disease? No           Review of Systems:     Review of Systems   Constitutional: Negative for fatigue, fever and unexpected weight change  HENT: Positive for voice change   Negative for postnasal drip, rhinorrhea and sore throat  Respiratory: Positive for cough  Negative for shortness of breath and wheezing  Cardiovascular: Negative for chest pain, palpitations and leg swelling  Gastrointestinal: Negative for constipation, diarrhea and nausea  Musculoskeletal: Negative for arthralgias and myalgias  Current Problem List:     Patient Active Problem List   Diagnosis    [facility]ylosing spondylitis ([facility] )    Cervical herniated disc    DDD (degenerative disc disease), thoracolumbar    Type 2 diabetes mellitus with hyperglycemia, with long-term current use of insulin ([facility])    Degenerative disc disease, cervical    Gastroesophageal reflux disease without esophagitis    Lumbar foraminal stenosis    Essential hypertension    Lumbar postlaminectomy syndrome    Rheumatoid arthritis involving multiple sites ([place] [address] )    Iron deficiency anemia    Acute myocardial infarction involving left circumflex coronary artery ([place] [address] )    Nephrolithiasis    Sjogren's disease ([place] [address] )    Hyperlipidemia    Coronary artery disease involving native coronary artery of native heart without angina pectoris    Adrenal mass ([place] [address] )    Class 3 severe obesity due to excess calories with serious comorbidity and body mass index (BMI) of 40 0 to 44 9 in [facility])       Allergies:      Allergies   Allergen Reactions    Barbiturates Other (See Comments)     stomach pain    Morphine GI Intolerance     Other reaction(s): GI Intolerance    Sulfa Antibiotics Hives and Rash    Sulfasalazine Hives and Rash       Current Medications:       Current Outpatient Medications:     albuterol (PROVENTIL HFA,VENTOLIN HFA) 90 mcg/act inhaler, Inhale 2 puffs every 6 (six) hours as needed for wheezing or shortness of breath, Disp: 1 Inhaler, Rfl: 5    ALPRAZolam (XANAX) 0 25 mg tablet, as needed, Disp: , Rfl:     aspirin 81 MG tablet, Take 81 mg by mouth daily, Disp: , Rfl:     atorvastatin (LIPITOR) 80 mg tablet, Take 80 mg by mouth Medrol Dose Pack scheduling ONLY, Disp: , Rfl:     B-D UF III MINI PEN NEEDLES 31G X 5 MM MISC, use 2 daily as directed, Disp: , Rfl: 0    Black Cohosh 40 MG CAPS, Take 40 mg by mouth daily, Disp: , Rfl:     calcium carbonate (OS-RILEY) 600 MG tablet, Take 400 mg by mouth daily, Disp: , Rfl:     Cholecalciferol (VITAMIN D) 2000 units tablet, Take 2,000 Units by mouth daily, Disp: , Rfl:     folic acid (FOLVITE) 247 MCG tablet, Take 800 mg by mouth daily, Disp: , Rfl:     insulin degludec (TRESIBA FLEXTOUCH) 200 units/mL CONCENTRATED U-200 injection pen, Inject 38 Units under the skin daily at bedtime , Disp: , Rfl:     Insulin Pen Needle (B-D ULTRAFINE III SHORT PEN) 31G X 8 MM MISC, by Does not apply route, Disp: , Rfl:     lisinopril (ZESTRIL) 20 mg tablet, Take 1 tablet (20 mg total) by mouth daily (Patient taking differently: Take 10 mg by mouth daily ), Disp: 90 tablet, Rfl: 3    metFORMIN (GLUMETZA) 1000 MG (MOD) 24 hr tablet, Take 1,000 mg by mouth 2 (two) times a day with meals , Disp: , Rfl:     methocarbamol (ROBAXIN) 750 mg tablet, every 6 (six) hours as needed  , Disp: , Rfl:     methotrexate 2 5 mg tablet, , Disp: , Rfl:     metoprolol tartrate (LOPRESSOR) 25 mg tablet, 50 mg every 12 (twelve) hours , Disp: , Rfl:     mometasone (ASMANEX TWISTHALER) 220 MCG/INH inhaler, Inhale 1 puff daily Rinse mouth after use , Disp: 1 Inhaler, Rfl: 0    Multiple Vitamin (MULTIVITAMIN) tablet, Take 1 tablet by mouth daily, Disp: , Rfl:     nitroglycerin (NITROSTAT) 0 4 mg SL tablet, Place 1 tablet (0 4 mg total) under the tongue every 5 (five) minutes as needed for chest pain, Disp: 90 tablet, Rfl: 1    omega-3-acid ethyl esters (LOVAZA) 1 g capsule, Take 1,000 mg by mouth daily, Disp: , Rfl:     omeprazole (PriLOSEC) 20 mg delayed release capsule, Take 20 mg by mouth daily, Disp: , Rfl:     Probiotic Product (PROBIOTIC-10 PO), Take by mouth daily , Disp: , Rfl:     Tofacitinib Citrate (XELJANZ XR PO), Take 11 mg by mouth daily, Disp: , Rfl:     VICTOZA 18 MG/3ML SOPN, inject 1 2 milligram subcutaneously daily IF TOLERATED- Takes at 1400, Disp: , Rfl: 0    XELJANZ XR 11 MG TB24, , Disp: , Rfl:     Past Medical History:       Past Medical History:   Diagnosis Date    [facility]ylosing spondylitis ([facility] [address] )     Arthritis     Diabetes mellitus ([facility] [address] )     GERD (gastroesophageal reflux disease)     Heart attack ([facility] )     Hematuria     last assessed 9/7/13    Hyperlipidemia     Hypertension     Nephrolithiasis 9/7/2013    Subclinical hypothyroidism 9/2/2015        Past Surgical History:   Procedure Laterality Date    APPENDECTOMY      BACK SURGERY      mulitple surgery, fusions "top to bottom"    BREAST SURGERY Left     biopsy    CERVICAL SPINE SURGERY      CHOLECYSTECTOMY      CORONARY ANGIOPLASTY WITH STENT PLACEMENT      CYSTOSCOPY  05/06/2015    Diagnostic, last assessed 5/6/15    HYSTERECTOMY      LITHOTRIPSY      [place] CYSTO/URETERO W/LITHOTRIPSY &INDWELL STENT INSRT Left 4/18/2019    Procedure: CYSTO, URETEROSCOPY W/HOLMIUM LASER, RETROGRADE PYELOGRAM, STENT INSERTION;  Surgeon: Ilene Orta MD;  Location: [facility];  Service: Urology        Family History   Problem Relation Age of Onset    Diabetes Mother     Gout Mother     Heart disease Mother     Diabetes Father     Heart disease Father     Nephrolithiasis Brother         Social History     Socioeconomic History    Marital status: /Civil Union     Spouse name: Not on file    Number of children: Not on file    Years of education: Not on file    Highest education level: Not on file   Occupational History    Not on file   Social Needs    Financial resource strain: Not on file    Food insecurity:     Worry: Not on file     Inability: Not on file    Transportation needs:     Medical: Not on file     Non-medical: Not on file   Tobacco Use    Smoking status: Never Smoker    Smokeless tobacco: Never Used   Substance and Sexual Activity    Alcohol use: Not Currently     Frequency: Never     Binge frequency: Never     Comment: very rarely    Drug use: No    Sexual activity: Not on file   Lifestyle    Physical activity:     Days per week: Not on file     Minutes per session: Not on file    Stress: Not on file   Relationships    Social connections:     Talks on phone: Not on file     Gets together: Not on file     Attends Holiness service: Not on file     Active member of club or organization: Not on file     Attends meetings of clubs or organizations: Not on file     Relationship status: Not on file    Intimate partner violence:     Fear of current or ex partner: Not on file     Emotionally abused: Not on file     Physically abused: Not on file     Forced sexual activity: Not on file   Other Topics Concern    Not on file   Social History Narrative    Not on file        Physical Exam:     Physical Exam   Constitutional: She is oriented to person, place, and time  She appears well-developed and well-nourished  No distress  HENT:   Head: Normocephalic and atraumatic  Eyes: Pupils are equal, round, and reactive to light  Neck: Normal range of motion  Neck supple  Cardiovascular: Normal rate, regular rhythm, normal heart sounds and intact distal pulses  Exam reveals no gallop and no friction rub  No murmur heard  Pulmonary/Chest: Effort normal and breath sounds normal  No respiratory distress  She has no wheezes  She has no rales  Neurological: She is alert and oriented to person, place, and time  Skin: Skin is warm and dry  She is not diaphoretic  Psychiatric: She has a normal mood and affect  Her behavior is normal  Thought content normal    Vitals reviewed  Data:     Pre-operative work-up    Laboratory Results: I have personally reviewed the pertinent laboratory results/reports           Assessment & Recommendations:     1  Age-related cataract of both eyes, unspecified age-related cataract type     2   Pre-op exam         Pre-Op Evaluation Assessment  59 y o  female with planned surgery: bilateral cataract removal     Known risk factors for perioperative complications: None  Cardiac Risk Estimation: per the Revised Cardiac Risk Index (Circ  100:1043, 1999), the patient's risk factors for cardiac complications include none, putting her in: RCI RISK CLASS II (1 risk factor, risk of major cardiac compl  appr  1 3%)  Current medications which may produce withdrawal symptoms if withheld perioperatively: xanax  Pre-Op Evaluation Plan  1  Further preoperative workup as follows:   - None; no further preoperative work-up is required    2  Medication Management/Recommendations:   - Patient has been instructed to avoid herbs or non-directed vitamins the week prior to surgery to ensure no drug interactions with perioperative surgical and anesthetic medications  - Patient should continue antihypertensive medications up through and including the day of surgery  - Insulin Management: Tresiba 42 units at night  - Patient has been instructed to avoid aspirin containing medications or non-steroidal anti-inflammatory drugs for the week preceding surgery  3  Prophylaxis for cardiac events with perioperative beta-blockers: not indicated  4  Patient requires further consultation with: None  Patient has seen and been cleared by Cardiology on 12/6/2019    Clearance  Patient is CLEARED for surgery without any additional cardiac testing       Paulino John PA-C  [facility]  [address] [place] [id number]  Phone#  [phone]  Fax#  [phone]

## 2020-01-14 ENCOUNTER — APPOINTMENT (OUTPATIENT)
Dept: LAB | Facility: MEDICAL CENTER | Age: 65
End: 2020-01-14
Payer: COMMERCIAL

## 2020-01-14 ENCOUNTER — TRANSCRIBE ORDERS (OUTPATIENT)
Dept: ADMINISTRATIVE | Facility: HOSPITAL | Age: 65
End: 2020-01-14

## 2020-01-14 DIAGNOSIS — I25.10 ATHEROSCLEROSIS OF NATIVE CORONARY ARTERY OF NATIVE HEART WITHOUT ANGINA PECTORIS: Primary | ICD-10-CM

## 2020-01-14 DIAGNOSIS — IMO0002 UNCONTROLLED TYPE 2 DIABETES WITH NEUROPATHY: Primary | ICD-10-CM

## 2020-01-14 DIAGNOSIS — I25.10 ATHEROSCLEROSIS OF NATIVE CORONARY ARTERY OF NATIVE HEART WITHOUT ANGINA PECTORIS: ICD-10-CM

## 2020-01-14 DIAGNOSIS — IMO0002 UNCONTROLLED TYPE 2 DIABETES WITH NEUROPATHY: ICD-10-CM

## 2020-01-14 LAB
CHOLEST SERPL-MCNC: 133 MG/DL (ref 50–200)
CREAT UR-MCNC: 138 MG/DL
EST. AVERAGE GLUCOSE BLD GHB EST-MCNC: 174 MG/DL
HBA1C MFR BLD: 7.7 % (ref 4.2–6.3)
HDLC SERPL-MCNC: 70 MG/DL
LDLC SERPL CALC-MCNC: 44 MG/DL (ref 0–100)
MICROALBUMIN UR-MCNC: 41.9 MG/L (ref 0–20)
MICROALBUMIN/CREAT 24H UR: 30 MG/G CREATININE (ref 0–30)
NONHDLC SERPL-MCNC: 63 MG/DL
TRIGL SERPL-MCNC: 96 MG/DL

## 2020-01-14 PROCEDURE — 82570 ASSAY OF URINE CREATININE: CPT | Performed by: INTERNAL MEDICINE

## 2020-01-14 PROCEDURE — 3060F POS MICROALBUMINURIA REV: CPT | Performed by: FAMILY MEDICINE

## 2020-01-14 PROCEDURE — 36415 COLL VENOUS BLD VENIPUNCTURE: CPT

## 2020-01-14 PROCEDURE — 82043 UR ALBUMIN QUANTITATIVE: CPT | Performed by: INTERNAL MEDICINE

## 2020-01-14 PROCEDURE — 83036 HEMOGLOBIN GLYCOSYLATED A1C: CPT

## 2020-01-14 PROCEDURE — 80061 LIPID PANEL: CPT

## 2020-01-27 ENCOUNTER — TRANSCRIBE ORDERS (OUTPATIENT)
Dept: ADMINISTRATIVE | Facility: HOSPITAL | Age: 65
End: 2020-01-27

## 2020-01-27 ENCOUNTER — OFFICE VISIT (OUTPATIENT)
Dept: FAMILY MEDICINE CLINIC | Facility: CLINIC | Age: 65
End: 2020-01-27
Payer: COMMERCIAL

## 2020-01-27 VITALS
RESPIRATION RATE: 16 BRPM | DIASTOLIC BLOOD PRESSURE: 64 MMHG | HEART RATE: 88 BPM | SYSTOLIC BLOOD PRESSURE: 120 MMHG | TEMPERATURE: 97.1 F | OXYGEN SATURATION: 97 % | WEIGHT: 239 LBS | BODY MASS INDEX: 43.02 KG/M2

## 2020-01-27 DIAGNOSIS — N64.4 BREAST PAIN, RIGHT: ICD-10-CM

## 2020-01-27 DIAGNOSIS — N64.4 BREAST PAIN, RIGHT: Primary | ICD-10-CM

## 2020-01-27 DIAGNOSIS — Z12.39 SCREENING FOR BREAST CANCER: Primary | ICD-10-CM

## 2020-01-27 DIAGNOSIS — M79.18 MUSCULOSKELETAL PAIN: ICD-10-CM

## 2020-01-27 PROCEDURE — 99214 OFFICE O/P EST MOD 30 MIN: CPT | Performed by: FAMILY MEDICINE

## 2020-01-27 RX ORDER — LOTEPREDNOL ETABONATE 10 MG/ML
SUSPENSION TOPICAL
COMMUNITY
Start: 2020-01-14 | End: 2020-05-08

## 2020-01-27 NOTE — PROGRESS NOTES
FAMILY MEDICINE PROGRESS NOTE  Misti Bennett 72 y o  female   DATE: January 27, 2020     ASSESSMENT and PLAN:  Misti Bennett is a 72 y o  female with:     Problem List Items Addressed This Visit     None      Visit Diagnoses     Screening for breast cancer    -  Primary    Relevant Orders    Mammo screening bilateral w 3d & cad    Breast pain, right        Relevant Orders    Mammo diagnostic right w cad    Musculoskeletal pain          Discussed with patient and  that I do think the pain is more MSK related to recent overuse  But given large, pendulous breasts, the exam is difficult, so would recommend diagnostic mammo to rule out lesions, also is overdue for screening mammo  Advised Tylenol 1gm TID, can use Robaxin PRN, avoid NSAIDs given CAD  Patient agreeable with the plan and expressed understanding  I discucssed signs and symptoms for which to RTC, go to ER or seek urgent medical care  SUBJECTIVE:  Misti Bennett is a 72 y o  female who presents today with a chief complaint of Breast Pain (right lump)  Pt here with , who is assisting with history    For the past 1-2 weeks has had right breast/arm pain  She states she was laying in bed one night and happened to notice it  She denies any rashes, drainage, redness, but the pain has been persistent  She takes Tylenol 2 tabs qHS and that does help with the pain temporarily  Per , he thinks it is because she "overdid it," she had been doing heavy manual work a few weeks ago moving things in their renovation of their bathroom prior to cataract surgery  Her last mammo was in 2017, she hasnt been able to go back to have another mammo due to her back pain issues and unable to move for the procedure  20 years ago had 2 normal "pea sized" masses removed from her left breast        Review of Systems   Musculoskeletal: Positive for arthralgias  Skin: Positive for rash       I have reviewed the patient's Past Medical History  OBJECTIVE:  /64   Pulse 88   Temp (!) 97 1 °F (36 2 °C)   Resp 16   Wt 108 kg (239 lb)   SpO2 97%   BMI 43 02 kg/m²    Physical Exam   Constitutional: She appears well-developed and well-nourished  obese   Pulmonary/Chest: Chest wall is not dull to percussion  She exhibits no mass, no tenderness, no bony tenderness, no edema, no deformity, no swelling and no retraction  Right breast exhibits tenderness  Right breast exhibits no inverted nipple, no mass, no nipple discharge and no skin change  Left breast exhibits tenderness  Left breast exhibits no inverted nipple, no mass, no nipple discharge and no skin change  There is breast tenderness  No breast discharge  Kennith Piety Corinne Hoar, MD    Note: Portions of the record have been created with voice recognition software  Occasional wrong word or "sound a like" substitutions may have occurred due to the inherent limitations of voice recognition software  Read the chart carefully and recognize, using context, where substitutions have occurred

## 2020-01-29 ENCOUNTER — TELEPHONE (OUTPATIENT)
Dept: FAMILY MEDICINE CLINIC | Facility: CLINIC | Age: 65
End: 2020-01-29

## 2020-01-29 DIAGNOSIS — N64.4 BREAST PAIN: Primary | ICD-10-CM

## 2020-01-29 NOTE — TELEPHONE ENCOUNTER
Patient calling in to report that the Tylenol is not working  Not doing anything for the pain      [facility]

## 2020-01-30 DIAGNOSIS — M06.9 RHEUMATOID ARTHRITIS INVOLVING MULTIPLE SITES, UNSPECIFIED RHEUMATOID FACTOR PRESENCE: ICD-10-CM

## 2020-01-30 DIAGNOSIS — M17.0 PRIMARY OSTEOARTHRITIS OF BOTH KNEES: Primary | ICD-10-CM

## 2020-01-30 DIAGNOSIS — N64.4 BREAST PAIN: ICD-10-CM

## 2020-01-30 NOTE — TELEPHONE ENCOUNTER
Patient called stating pharmacy told her prescription for the gel needed Pre approval  For insurance purposes  Patient would like a return call

## 2020-02-03 ENCOUNTER — TELEPHONE (OUTPATIENT)
Dept: FAMILY MEDICINE CLINIC | Facility: CLINIC | Age: 65
End: 2020-02-03

## 2020-02-03 NOTE — TELEPHONE ENCOUNTER
Did a prior auth request for diclofenac sodium 1% gel, it was approved, I called the pharmacy they put it thru and it went thru they said they will call the patient to informed him

## 2020-02-11 ENCOUNTER — TELEPHONE (OUTPATIENT)
Dept: FAMILY MEDICINE CLINIC | Facility: CLINIC | Age: 65
End: 2020-02-11

## 2020-02-11 ENCOUNTER — HOSPITAL ENCOUNTER (OUTPATIENT)
Dept: MAMMOGRAPHY | Facility: CLINIC | Age: 65
Discharge: HOME | End: 2020-02-11
Payer: COMMERCIAL

## 2020-02-11 ENCOUNTER — HOSPITAL ENCOUNTER (OUTPATIENT)
Dept: ULTRASOUND IMAGING | Facility: CLINIC | Age: 65
Discharge: HOME | End: 2020-02-11
Payer: COMMERCIAL

## 2020-02-11 VITALS — WEIGHT: 235 LBS | HEIGHT: 63 IN | BODY MASS INDEX: 41.64 KG/M2

## 2020-02-11 DIAGNOSIS — N64.4 BREAST PAIN, RIGHT: ICD-10-CM

## 2020-02-11 PROCEDURE — 77066 DX MAMMO INCL CAD BI: CPT

## 2020-02-11 PROCEDURE — 76642 ULTRASOUND BREAST LIMITED: CPT

## 2020-02-11 PROCEDURE — G0279 TOMOSYNTHESIS, MAMMO: HCPCS

## 2020-02-11 NOTE — TELEPHONE ENCOUNTER
----- Message from Carlita Chisholm MD sent at 2/11/2020  5:03 PM EST -----  Please call Le [facility] with her results  Mammogram was normal, no lesions

## 2020-03-05 ENCOUNTER — TELEPHONE (OUTPATIENT)
Dept: FAMILY MEDICINE CLINIC | Facility: CLINIC | Age: 65
End: 2020-03-05

## 2020-03-05 DIAGNOSIS — B37.9 YEAST INFECTION: Primary | ICD-10-CM

## 2020-03-05 RX ORDER — FLUCONAZOLE 150 MG/1
150 TABLET ORAL ONCE
COMMUNITY
End: 2020-03-05 | Stop reason: SDUPTHER

## 2020-03-05 RX ORDER — FLUCONAZOLE 150 MG/1
150 TABLET ORAL ONCE
Qty: 1 TABLET | Refills: 0 | Status: SHIPPED | OUTPATIENT
Start: 2020-03-05 | End: 2020-03-05

## 2020-03-05 NOTE — TELEPHONE ENCOUNTER
Patient requesting Diflucan sent to [facility]  She has been fighting a yeast infection for the last 4 days  Over the counter products are not helping  Please advise  I prepped script for you

## 2020-04-17 ENCOUNTER — TELEMEDICINE (OUTPATIENT)
Dept: FAMILY MEDICINE CLINIC | Facility: CLINIC | Age: 65
End: 2020-04-17
Payer: COMMERCIAL

## 2020-04-17 VITALS — SYSTOLIC BLOOD PRESSURE: 100 MMHG | HEART RATE: 80 BPM | TEMPERATURE: 98.2 F | DIASTOLIC BLOOD PRESSURE: 78 MMHG

## 2020-04-17 DIAGNOSIS — R07.81 RIB PAIN ON RIGHT SIDE: Primary | ICD-10-CM

## 2020-04-17 PROCEDURE — 99213 OFFICE O/P EST LOW 20 MIN: CPT | Performed by: FAMILY MEDICINE

## 2020-04-20 ENCOUNTER — TELEPHONE (OUTPATIENT)
Dept: FAMILY MEDICINE CLINIC | Facility: CLINIC | Age: 65
End: 2020-04-20

## 2020-04-22 ENCOUNTER — TELEPHONE (OUTPATIENT)
Dept: FAMILY MEDICINE CLINIC | Facility: CLINIC | Age: 65
End: 2020-04-22

## 2020-04-22 DIAGNOSIS — M62.838 MUSCLE SPASM: Primary | ICD-10-CM

## 2020-04-22 RX ORDER — CYCLOBENZAPRINE HCL 5 MG
TABLET ORAL
Qty: 20 TABLET | Refills: 0 | Status: SHIPPED | OUTPATIENT
Start: 2020-04-22 | End: 2020-04-28 | Stop reason: SDUPTHER

## 2020-04-28 DIAGNOSIS — M62.838 MUSCLE SPASM: ICD-10-CM

## 2020-04-28 RX ORDER — CYCLOBENZAPRINE HCL 5 MG
TABLET ORAL
Qty: 20 TABLET | Refills: 0 | Status: SHIPPED | OUTPATIENT
Start: 2020-04-28 | End: 2020-05-08

## 2020-05-01 ENCOUNTER — TELEPHONE (OUTPATIENT)
Dept: UROLOGY | Facility: AMBULATORY SURGERY CENTER | Age: 65
End: 2020-05-01

## 2020-05-08 ENCOUNTER — TELEMEDICINE (OUTPATIENT)
Dept: FAMILY MEDICINE CLINIC | Facility: CLINIC | Age: 65
End: 2020-05-08
Payer: COMMERCIAL

## 2020-05-08 VITALS
WEIGHT: 235 LBS | HEART RATE: 103 BPM | SYSTOLIC BLOOD PRESSURE: 120 MMHG | DIASTOLIC BLOOD PRESSURE: 78 MMHG | TEMPERATURE: 98.1 F | HEIGHT: 63 IN | BODY MASS INDEX: 41.64 KG/M2

## 2020-05-08 DIAGNOSIS — D35.01 ADENOMA OF RIGHT ADRENAL GLAND: ICD-10-CM

## 2020-05-08 DIAGNOSIS — E11.65 TYPE 2 DIABETES MELLITUS WITH HYPERGLYCEMIA, WITH LONG-TERM CURRENT USE OF INSULIN (HCC): Primary | ICD-10-CM

## 2020-05-08 DIAGNOSIS — I25.10 CORONARY ARTERY DISEASE INVOLVING NATIVE CORONARY ARTERY OF NATIVE HEART WITHOUT ANGINA PECTORIS: ICD-10-CM

## 2020-05-08 DIAGNOSIS — D50.9 IRON DEFICIENCY ANEMIA, UNSPECIFIED IRON DEFICIENCY ANEMIA TYPE: ICD-10-CM

## 2020-05-08 DIAGNOSIS — Z79.4 TYPE 2 DIABETES MELLITUS WITH HYPERGLYCEMIA, WITH LONG-TERM CURRENT USE OF INSULIN (HCC): Primary | ICD-10-CM

## 2020-05-08 DIAGNOSIS — K21.9 GASTROESOPHAGEAL REFLUX DISEASE WITHOUT ESOPHAGITIS: ICD-10-CM

## 2020-05-08 DIAGNOSIS — M06.9 RHEUMATOID ARTHRITIS INVOLVING MULTIPLE SITES, UNSPECIFIED RHEUMATOID FACTOR PRESENCE: ICD-10-CM

## 2020-05-08 DIAGNOSIS — I10 ESSENTIAL HYPERTENSION: ICD-10-CM

## 2020-05-08 PROBLEM — I21.21 ACUTE MYOCARDIAL INFARCTION INVOLVING LEFT CIRCUMFLEX CORONARY ARTERY (HCC): Status: RESOLVED | Noted: 2018-04-09 | Resolved: 2020-05-08

## 2020-05-08 PROCEDURE — 4040F PNEUMOC VAC/ADMIN/RCVD: CPT | Performed by: FAMILY MEDICINE

## 2020-05-08 PROCEDURE — 4010F ACE/ARB THERAPY RXD/TAKEN: CPT | Performed by: FAMILY MEDICINE

## 2020-05-08 PROCEDURE — 99214 OFFICE O/P EST MOD 30 MIN: CPT | Performed by: FAMILY MEDICINE

## 2020-05-08 RX ORDER — METOPROLOL TARTRATE 50 MG/1
50 TABLET, FILM COATED ORAL EVERY 12 HOURS
COMMUNITY
Start: 2020-03-24

## 2020-05-08 RX ORDER — LISINOPRIL 10 MG/1
10 TABLET ORAL DAILY
Qty: 90 TABLET | Refills: 1 | Status: SHIPPED | OUTPATIENT
Start: 2020-05-08 | End: 2021-01-19 | Stop reason: SDUPTHER

## 2020-05-11 ENCOUNTER — TELEPHONE (OUTPATIENT)
Dept: FAMILY MEDICINE CLINIC | Facility: CLINIC | Age: 65
End: 2020-05-11

## 2020-05-12 ENCOUNTER — TRANSCRIBE ORDERS (OUTPATIENT)
Dept: ADMINISTRATIVE | Facility: HOSPITAL | Age: 65
End: 2020-05-12

## 2020-05-12 ENCOUNTER — APPOINTMENT (OUTPATIENT)
Dept: LAB | Facility: MEDICAL CENTER | Age: 65
End: 2020-05-12
Payer: COMMERCIAL

## 2020-05-12 DIAGNOSIS — M06.9 RHEUMATOID ARTHRITIS, INVOLVING UNSPECIFIED SITE, UNSPECIFIED RHEUMATOID FACTOR PRESENCE: ICD-10-CM

## 2020-05-12 DIAGNOSIS — R94.6 NONSPECIFIC ABNORMAL RESULTS OF THYROID FUNCTION STUDY: Primary | ICD-10-CM

## 2020-05-12 DIAGNOSIS — R94.6 NONSPECIFIC ABNORMAL RESULTS OF THYROID FUNCTION STUDY: ICD-10-CM

## 2020-05-12 DIAGNOSIS — E11.65 UNCONTROLLED TYPE 2 DIABETES MELLITUS WITH HYPERGLYCEMIA (HCC): ICD-10-CM

## 2020-05-12 DIAGNOSIS — M06.9 RHEUMATOID ARTHRITIS, INVOLVING UNSPECIFIED SITE, UNSPECIFIED RHEUMATOID FACTOR PRESENCE: Primary | ICD-10-CM

## 2020-05-12 LAB
ALBUMIN SERPL BCP-MCNC: 3.7 G/DL (ref 3.5–5)
ALP SERPL-CCNC: 106 U/L (ref 46–116)
ALT SERPL W P-5'-P-CCNC: 41 U/L (ref 12–78)
ANION GAP SERPL CALCULATED.3IONS-SCNC: 7 MMOL/L (ref 4–13)
AST SERPL W P-5'-P-CCNC: 25 U/L (ref 5–45)
BASOPHILS # BLD AUTO: 0.02 THOUSANDS/ΜL (ref 0–0.1)
BASOPHILS NFR BLD AUTO: 0 % (ref 0–1)
BILIRUB DIRECT SERPL-MCNC: 0.16 MG/DL (ref 0–0.2)
BILIRUB SERPL-MCNC: 0.46 MG/DL (ref 0.2–1)
BILIRUB UR QL STRIP: NEGATIVE
BUN SERPL-MCNC: 16 MG/DL (ref 5–25)
CALCIUM SERPL-MCNC: 9.2 MG/DL (ref 8.3–10.1)
CHLORIDE SERPL-SCNC: 101 MMOL/L (ref 100–108)
CLARITY UR: CLEAR
CO2 SERPL-SCNC: 28 MMOL/L (ref 21–32)
COLOR UR: YELLOW
CREAT SERPL-MCNC: 0.71 MG/DL (ref 0.6–1.3)
CRP SERPL QL: 3.8 MG/L
EOSINOPHIL # BLD AUTO: 0.22 THOUSAND/ΜL (ref 0–0.61)
EOSINOPHIL NFR BLD AUTO: 3 % (ref 0–6)
ERYTHROCYTE [DISTWIDTH] IN BLOOD BY AUTOMATED COUNT: 14.3 % (ref 11.6–15.1)
ERYTHROCYTE [SEDIMENTATION RATE] IN BLOOD: 60 MM/HOUR (ref 0–20)
EST. AVERAGE GLUCOSE BLD GHB EST-MCNC: 189 MG/DL
GFR SERPL CREATININE-BSD FRML MDRD: 90 ML/MIN/1.73SQ M
GLUCOSE P FAST SERPL-MCNC: 166 MG/DL (ref 65–99)
GLUCOSE UR STRIP-MCNC: NEGATIVE MG/DL
HBA1C MFR BLD: 8.2 %
HCT VFR BLD AUTO: 37.9 % (ref 34.8–46.1)
HGB BLD-MCNC: 11.9 G/DL (ref 11.5–15.4)
HGB UR QL STRIP.AUTO: NEGATIVE
IMM GRANULOCYTES # BLD AUTO: 0.02 THOUSAND/UL (ref 0–0.2)
IMM GRANULOCYTES NFR BLD AUTO: 0 % (ref 0–2)
KETONES UR STRIP-MCNC: NEGATIVE MG/DL
LEUKOCYTE ESTERASE UR QL STRIP: NEGATIVE
LYMPHOCYTES # BLD AUTO: 0.99 THOUSANDS/ΜL (ref 0.6–4.47)
LYMPHOCYTES NFR BLD AUTO: 13 % (ref 14–44)
MCH RBC QN AUTO: 29.5 PG (ref 26.8–34.3)
MCHC RBC AUTO-ENTMCNC: 31.4 G/DL (ref 31.4–37.4)
MCV RBC AUTO: 94 FL (ref 82–98)
MONOCYTES # BLD AUTO: 0.66 THOUSAND/ΜL (ref 0.17–1.22)
MONOCYTES NFR BLD AUTO: 9 % (ref 4–12)
NEUTROPHILS # BLD AUTO: 5.5 THOUSANDS/ΜL (ref 1.85–7.62)
NEUTS SEG NFR BLD AUTO: 75 % (ref 43–75)
NITRITE UR QL STRIP: NEGATIVE
NRBC BLD AUTO-RTO: 0 /100 WBCS
PH UR STRIP.AUTO: 7.5 [PH]
PLATELET # BLD AUTO: 316 THOUSANDS/UL (ref 149–390)
PMV BLD AUTO: 10.2 FL (ref 8.9–12.7)
POTASSIUM SERPL-SCNC: 4.1 MMOL/L (ref 3.5–5.3)
PROT SERPL-MCNC: 7.5 G/DL (ref 6.4–8.2)
PROT UR STRIP-MCNC: NEGATIVE MG/DL
RBC # BLD AUTO: 4.04 MILLION/UL (ref 3.81–5.12)
SODIUM SERPL-SCNC: 136 MMOL/L (ref 136–145)
SP GR UR STRIP.AUTO: 1.01 (ref 1–1.03)
TSH SERPL DL<=0.05 MIU/L-ACNC: 2.51 UIU/ML (ref 0.36–3.74)
UROBILINOGEN UR QL STRIP.AUTO: 0.2 E.U./DL
WBC # BLD AUTO: 7.41 THOUSAND/UL (ref 4.31–10.16)

## 2020-05-12 PROCEDURE — 85025 COMPLETE CBC W/AUTO DIFF WBC: CPT

## 2020-05-12 PROCEDURE — 82248 BILIRUBIN DIRECT: CPT

## 2020-05-12 PROCEDURE — 86140 C-REACTIVE PROTEIN: CPT

## 2020-05-12 PROCEDURE — 83036 HEMOGLOBIN GLYCOSYLATED A1C: CPT

## 2020-05-12 PROCEDURE — 81003 URINALYSIS AUTO W/O SCOPE: CPT | Performed by: INTERNAL MEDICINE

## 2020-05-12 PROCEDURE — 80053 COMPREHEN METABOLIC PANEL: CPT

## 2020-05-12 PROCEDURE — 84443 ASSAY THYROID STIM HORMONE: CPT

## 2020-05-12 PROCEDURE — 36415 COLL VENOUS BLD VENIPUNCTURE: CPT

## 2020-05-12 PROCEDURE — 85652 RBC SED RATE AUTOMATED: CPT

## 2020-05-22 LAB
LEFT EYE DIABETIC RETINOPATHY: NORMAL
RIGHT EYE DIABETIC RETINOPATHY: NORMAL

## 2020-07-20 ENCOUNTER — TRANSITIONAL CARE MANAGEMENT (OUTPATIENT)
Dept: FAMILY MEDICINE CLINIC | Facility: CLINIC | Age: 65
End: 2020-07-20

## 2020-07-28 ENCOUNTER — OFFICE VISIT (OUTPATIENT)
Dept: FAMILY MEDICINE CLINIC | Facility: CLINIC | Age: 65
End: 2020-07-28
Payer: COMMERCIAL

## 2020-07-28 VITALS
TEMPERATURE: 97.8 F | RESPIRATION RATE: 18 BRPM | SYSTOLIC BLOOD PRESSURE: 122 MMHG | HEART RATE: 84 BPM | HEIGHT: 63 IN | DIASTOLIC BLOOD PRESSURE: 64 MMHG | WEIGHT: 236 LBS | BODY MASS INDEX: 41.82 KG/M2 | OXYGEN SATURATION: 96 %

## 2020-07-28 DIAGNOSIS — Z79.4 TYPE 2 DIABETES MELLITUS WITH HYPERGLYCEMIA, WITH LONG-TERM CURRENT USE OF INSULIN (HCC): ICD-10-CM

## 2020-07-28 DIAGNOSIS — I10 ESSENTIAL HYPERTENSION: ICD-10-CM

## 2020-07-28 DIAGNOSIS — M48.061 LUMBAR FORAMINAL STENOSIS: ICD-10-CM

## 2020-07-28 DIAGNOSIS — Z23 NEED FOR VACCINATION: ICD-10-CM

## 2020-07-28 DIAGNOSIS — E11.65 TYPE 2 DIABETES MELLITUS WITH HYPERGLYCEMIA, WITH LONG-TERM CURRENT USE OF INSULIN (HCC): ICD-10-CM

## 2020-07-28 DIAGNOSIS — I25.10 CORONARY ARTERY DISEASE INVOLVING NATIVE CORONARY ARTERY OF NATIVE HEART WITHOUT ANGINA PECTORIS: Primary | ICD-10-CM

## 2020-07-28 DIAGNOSIS — Z98.84 H/O LAPAROSCOPIC ADJUSTABLE GASTRIC BANDING: ICD-10-CM

## 2020-07-28 PROCEDURE — 3008F BODY MASS INDEX DOCD: CPT | Performed by: FAMILY MEDICINE

## 2020-07-28 PROCEDURE — 90670 PCV13 VACCINE IM: CPT

## 2020-07-28 PROCEDURE — 99495 TRANSJ CARE MGMT MOD F2F 14D: CPT | Performed by: FAMILY MEDICINE

## 2020-07-28 PROCEDURE — 90471 IMMUNIZATION ADMIN: CPT

## 2020-07-28 NOTE — PROGRESS NOTES
Transition of Care Management Visit  Evette Bojorquez 72 y o  female   MRN: [id number]    Assessment/Plan: Evette Bojorquez is a 72 y o  female with:     Problem List Items Addressed This Visit        Endocrine    Type 2 diabetes mellitus with hyperglycemia, with long-term current use of insulin [facility])    Relevant Orders    Ambulatory referral to Endocrinology       Cardiovascular and Mediastinum    Essential hypertension    Coronary artery disease involving native coronary artery of native heart without angina pectoris - Primary       Musculoskeletal and Integument    Lumbar foraminal stenosis      Other Visit Diagnoses     H/O laparoscopic adjustable gastric banding        Need for vaccination            Recommend f/u with Cardiology as scheduled and Endocrinology (referral provided for new endocrinologist)  Advised to reestablish with bariatric surgery, not believe the gastric band because her symptoms, but does need routine follow-up  No changes to medications at time    PCV 13 given today    Subjective:  Evette Bojorquez is a 72 y o  female here for Transition Care Management Visit  Hospital Summary:  Patient admitted from July 17, 2020 to July 18, 2020 with chest pain  Patient has a history of coronary artery disease status post drug-eluting stent to  2 in 2018, type 2 diabetes, dyslipidemia who had tearing chest pain into her back with normal blood pressure wish which felt similar to her previous attacks when she had her stent placed  Patient had normal EKG, serial troponins were negative, A1c was elevated 8 3, Lexiscan done during hospitalization was normal   Patient states that she went to the ER because it felt similar to previous MI and took nitro PRN  Patient states she has not had recurrent chest pain, no SOB, palpitations  Patient thinks it was a result of her back pain that is chronic  Patient to a new endocrinologist since her A1c remains uncontrolled despite eating healthier recently    Patient has a history gastric band placement in 2009, has not followed up with bariatric surgery in years  Need for Follow-up:   With cardiology in 3-4 weeks    Changed/New Medications: No changes, no new meds    I personally reviewed the following images:  Chest x-ray-no acute pulmonary disease, CT chest-no aortic dissection    Patient Active Problem List    Diagnosis Date Noted    Class 3 severe obesity due to excess calories with serious comorbidity and body mass index (BMI) of 40 0 to 44 9 in adult [facility]) 07/16/2019    Adenoma of right adrenal gland 04/09/2019    Sjogren's disease ([facility] [address] ) 03/01/2019    Hyperlipidemia 07/02/2018    Coronary artery disease involving native coronary artery of native heart without angina pectoris 03/29/2018    Essential hypertension 02/11/2018    Gastroesophageal reflux disease without esophagitis 01/22/2018    DDD (degenerative disc disease), thoracolumbar 12/28/2017    [facility] 02/28/2017    Rheumatoid arthritis involving multiple sites ([place] [address] ) 02/28/2017    Lumbar foraminal stenosis 11/07/2016    Lumbar postlaminectomy syndrome 11/07/2016    Cervical herniated disc 02/26/2016    Degenerative disc disease, cervical 02/19/2016    Type 2 diabetes mellitus with hyperglycemia, with long-term current use of insulin ([facility]) 09/13/2012       Current Outpatient Medications:     ALPRAZolam (XANAX) 0 25 mg tablet, as needed, Disp: , Rfl:     aspirin 81 MG tablet, Take 81 mg by mouth daily, Disp: , Rfl:     atorvastatin (LIPITOR) 80 mg tablet, Take 80 mg by mouth Medrol Dose Pack scheduling ONLY, Disp: , Rfl:     B-D UF III MINI PEN NEEDLES 31G X 5 MM MISC, use 2 daily as directed, Disp: , Rfl: 0    Black Cohosh 40 MG CAPS, Take 40 mg by mouth daily, Disp: , Rfl:     calcium carbonate (OS-RILEY) 600 MG tablet, Take 400 mg by mouth daily, Disp: , Rfl:     Cholecalciferol (VITAMIN D) 2000 units tablet, Take 2,000 Units by mouth daily, Disp: , Rfl:     diclofenac sodium (VOLTAREN) 1 %, Apply 2 g topically 4 (four) times a day, Disp: 1 Tube, Rfl: 1    folic acid (FOLVITE) 855 MCG tablet, Take 800 mg by mouth daily, Disp: , Rfl:     insulin degludec (TRESIBA FLEXTOUCH) 200 units/mL CONCENTRATED U-200 injection pen, Inject 50 Units under the skin daily at bedtime , Disp: , Rfl:     Insulin Pen Needle (B-D ULTRAFINE III SHORT PEN) 31G X 8 MM MISC, by Does not apply route, Disp: , Rfl:     lisinopril (ZESTRIL) 10 mg tablet, Take 1 tablet (10 mg total) by mouth daily, Disp: 90 tablet, Rfl: 1    metFORMIN (GLUMETZA) 1000 MG (MOD) 24 hr tablet, Take 1,000 mg by mouth 2 (two) times a day with meals , Disp: , Rfl:     methotrexate 2 5 mg tablet, , Disp: , Rfl:     metoprolol tartrate (LOPRESSOR) 50 mg tablet, 50 mg every 12 (twelve) hours, Disp: , Rfl:     Multiple Vitamin (MULTIVITAMIN) tablet, Take 1 tablet by mouth daily, Disp: , Rfl:     nitroglycerin (NITROSTAT) 0 4 mg SL tablet, Place 1 tablet (0 4 mg total) under the tongue every 5 (five) minutes as needed for chest pain, Disp: 90 tablet, Rfl: 1    omega-3-acid ethyl esters (LOVAZA) 1 g capsule, Take 1,000 mg by mouth daily, Disp: , Rfl:     omeprazole (PriLOSEC) 20 mg delayed release capsule, Take 20 mg by mouth daily, Disp: , Rfl:     Probiotic Product (PROBIOTIC-10 PO), Take by mouth daily , Disp: , Rfl:     Tofacitinib Citrate (XELJANZ XR PO), Take 11 mg by mouth daily, Disp: , Rfl:     VICTOZA 18 MG/3ML SOPN, inject 1 2 milligram subcutaneously daily IF TOLERATED- Takes at 1400, Disp: , Rfl: 0    Objective:  /64 (BP Location: Left arm, Patient Position: Sitting, Cuff Size: Large)   Pulse 84   Temp 97 8 °F (36 6 °C)   Resp 18   Ht 5' 2 5" (1 588 m)   Wt 107 kg (236 lb)   SpO2 96%   Breastfeeding No   BMI 42 48 kg/m²     Transitional Care Management Review:  Evette Bojorquez is a 72 y o  female here for TCM follow up       During the TCM phone call patient stated:  TCM Call (since 6/27/2020) Date and time call was made  7/20/2020  4:25 PM    Hospital care reviewed  Records reviewed    Patient was hospitialized at  [facility]    Date of Admission  07/17/20    Date of discharge  07/18/20    Diagnosis  precordial pain    Disposition  Home    Were the patients medications reviewed and updated  Yes    Current Symptoms  None      TCM Call (since 6/27/2020)     Post hospital issues  None    Should patient be enrolled in anticoag monitoring? No    Scheduled for follow up? Yes    Did you obtain your prescribed medications  Yes    Do you need help managing your prescriptions or medications  No    Is transportation to your appointment needed  No    I have advised the patient to call PCP with any new or worsening symptoms  Jessica Thomas MA     Living Arrangements  Spouse or Significiant other    Support System  Spouse    The type of support provided  Emotional; Physical    Do you have social support  Yes, as much as I need    Are you recieving any outpatient services  No    Are you recieving home care services  No    Are you using any community resources  No    Current waiver services  No    Have you fallen in the last 12 months  No    Interperter language line needed  No    Counseling  Patient        BMI Counseling: Body mass index is 42 48 kg/m²  The BMI is above normal  Nutrition recommendations include reducing intake of cholesterol  Anabel Alcantara MD    Note: Portions of the record may have been created with voice recognition software  Occasional wrong word or "sound a like" substitutions may have occurred due to the inherent limitations of voice recognition software  Read the chart carefully and recognize, using context, where substitutions have occurred

## 2020-08-12 ENCOUNTER — TELEMEDICINE (OUTPATIENT)
Dept: FAMILY MEDICINE CLINIC | Facility: CLINIC | Age: 65
End: 2020-08-12
Payer: COMMERCIAL

## 2020-08-12 ENCOUNTER — APPOINTMENT (OUTPATIENT)
Dept: LAB | Facility: CLINIC | Age: 65
End: 2020-08-12
Payer: COMMERCIAL

## 2020-08-12 DIAGNOSIS — N39.0 ACUTE UTI: ICD-10-CM

## 2020-08-12 DIAGNOSIS — N39.0 ACUTE UTI: Primary | ICD-10-CM

## 2020-08-12 PROCEDURE — 4040F PNEUMOC VAC/ADMIN/RCVD: CPT | Performed by: FAMILY MEDICINE

## 2020-08-12 PROCEDURE — 3074F SYST BP LT 130 MM HG: CPT | Performed by: FAMILY MEDICINE

## 2020-08-12 PROCEDURE — 3052F HG A1C>EQUAL 8.0%<EQUAL 9.0%: CPT | Performed by: FAMILY MEDICINE

## 2020-08-12 PROCEDURE — 99214 OFFICE O/P EST MOD 30 MIN: CPT | Performed by: FAMILY MEDICINE

## 2020-08-12 PROCEDURE — 2022F DILAT RTA XM EVC RTNOPTHY: CPT | Performed by: FAMILY MEDICINE

## 2020-08-12 PROCEDURE — 1036F TOBACCO NON-USER: CPT | Performed by: FAMILY MEDICINE

## 2020-08-12 PROCEDURE — 3078F DIAST BP <80 MM HG: CPT | Performed by: FAMILY MEDICINE

## 2020-08-12 PROCEDURE — 87086 URINE CULTURE/COLONY COUNT: CPT

## 2020-08-12 RX ORDER — NITROFURANTOIN 25; 75 MG/1; MG/1
100 CAPSULE ORAL 2 TIMES DAILY
Qty: 10 CAPSULE | Refills: 0 | Status: SHIPPED | OUTPATIENT
Start: 2020-08-12 | End: 2020-08-17

## 2020-08-12 NOTE — PROGRESS NOTES
Virtual Regular Visit      Assessment/Plan:    Problem List Items Addressed This Visit     None      Visit Diagnoses     Acute UTI    -  Primary    Relevant Medications    nitrofurantoin (MACROBID) 100 mg capsule    Other Relevant Orders    Urine culture        Symptoms consistent with acute cystitis without hematuria, will start patient empirically on Macrobid, no recent urine culture results available for sensitivities  Will have patient submit urine specimen for urine culture since she is going away a on vacation for a few days  Will follow-up pending results  Advised to continue supportive care including increased hydration and reviewed ER precautions  Reason for visit is   Chief Complaint   Patient presents with   Jessica Quintana Virtual Regular Visit        Encounter provider Shade Dillon MD    Provider located at [place] [address]  [phone]      Recent Visits  No visits were found meeting these conditions  Showing recent visits within past 7 days and meeting all other requirements     Today's Visits  Date Type Provider Dept   08/12/20 Telemedicine Marlene Dillon MD 2 [facility] today's visits and meeting all other requirements     Future Appointments  No visits were found meeting these conditions  Showing future appointments within next 150 days and meeting all other requirements        The patient was identified by name and date of birth  Jhony Horner was informed that this is a telemedicine visit and that the visit is being conducted through [address] [place] [address] and patient was informed that this is not a secure, HIPAA-complaint platform  She agrees to proceed     My office door was closed  No one else was in the room  She acknowledged consent and understanding of privacy and security of the video platform  The patient has agreed to participate and understands they can discontinue the visit at any time      Patient is aware this is a billable service  Subjective  Luisa George is a 72 y o  female with uncontrolled type 2 diabetes A1c, currently on Victoza and insulin  Has a history of recurrent UTI symptoms, no longer seeing urology  States that for the past 2 days she has had progressive urinary urgency and dysuria, it feels like she is peeing razor blades, feels similar to previous UTI episodes         Urinary Tract Infection    The problem occurs intermittently  The problem has been waxing and waning  The quality of the pain is described as burning  The pain is moderate  There has been no fever  There is no history of pyelonephritis  Associated symptoms include frequency and urgency  Pertinent negatives include no chills, flank pain, hematuria, hesitancy, nausea or vomiting  Her past medical history is significant for kidney stones and recurrent UTIs        Past Medical History:   Diagnosis Date    [facility] [address] )     Arthritis     Diabetes mellitus ([facility] [address] )     GERD (gastroesophageal reflux disease)     Heart attack ([facility] [address] )     Hematuria     last assessed 9/7/13    Hyperlipidemia     Hypertension     Nephrolithiasis 9/7/2013    Subclinical hypothyroidism 9/2/2015       Past Surgical History:   Procedure Laterality Date    APPENDECTOMY      BACK SURGERY      mulitple surgery, fusions "top to bottom"    BREAST SURGERY Left     biopsy    CERVICAL SPINE SURGERY      CHOLECYSTECTOMY      CORONARY ANGIOPLASTY WITH STENT PLACEMENT      CYSTOSCOPY  05/06/2015    Diagnostic, last assessed 5/6/15    HYSTERECTOMY      LITHOTRIPSY      TN CYSTO/URETERO W/LITHOTRIPSY &INDWELL STENT INSRT Left 4/18/2019    Procedure: CYSTO, URETEROSCOPY W/HOLMIUM LASER, RETROGRADE PYELOGRAM, STENT INSERTION;  Surgeon: Efrain Cheema MD;  Location: [facility];  Service: Urology       Current Outpatient Medications   Medication Sig Dispense Refill    ALPRAZolam (XANAX) 0 25 mg tablet as needed      aspirin 81 MG tablet Take 81 mg by mouth daily      atorvastatin (LIPITOR) 80 mg tablet Take 80 mg by mouth Medrol Dose Pack scheduling ONLY      B-D UF III MINI PEN NEEDLES 31G X 5 MM MISC use 2 daily as directed  0    Black Cohosh 40 MG CAPS Take 40 mg by mouth daily      calcium carbonate (OS-RILEY) 600 MG tablet Take 400 mg by mouth daily      Cholecalciferol (VITAMIN D) 2000 units tablet Take 2,000 Units by mouth daily      diclofenac sodium (VOLTAREN) 1 % Apply 2 g topically 4 (four) times a day 1 Tube 1    folic acid (FOLVITE) 669 MCG tablet Take 800 mg by mouth daily      insulin degludec (TRESIBA FLEXTOUCH) 200 units/mL CONCENTRATED U-200 injection pen Inject 50 Units under the skin daily at bedtime       Insulin Pen Needle (B-D ULTRAFINE III SHORT PEN) 31G X 8 MM MISC by Does not apply route      lisinopril (ZESTRIL) 10 mg tablet Take 1 tablet (10 mg total) by mouth daily 90 tablet 1    metFORMIN (GLUMETZA) 1000 MG (MOD) 24 hr tablet Take 1,000 mg by mouth 2 (two) times a day with meals       methotrexate 2 5 mg tablet       metoprolol tartrate (LOPRESSOR) 50 mg tablet 50 mg every 12 (twelve) hours      Multiple Vitamin (MULTIVITAMIN) tablet Take 1 tablet by mouth daily      nitrofurantoin (MACROBID) 100 mg capsule Take 1 capsule (100 mg total) by mouth 2 (two) times a day for 5 days 10 capsule 0    nitroglycerin (NITROSTAT) 0 4 mg SL tablet Place 1 tablet (0 4 mg total) under the tongue every 5 (five) minutes as needed for chest pain 90 tablet 1    omega-3-acid ethyl esters (LOVAZA) 1 g capsule Take 1,000 mg by mouth daily      omeprazole (PriLOSEC) 20 mg delayed release capsule Take 20 mg by mouth daily      Probiotic Product (PROBIOTIC-10 PO) Take by mouth daily       Tofacitinib Citrate (XELJANZ XR PO) Take 11 mg by mouth daily      VICTOZA 18 MG/3ML SOPN inject 1 2 milligram subcutaneously daily IF TOLERATED- Takes at 1400  0     No current facility-administered medications for this visit           Allergies Allergen Reactions    Barbiturates Other (See Comments)     stomach pain    Morphine GI Intolerance     Other reaction(s): GI Intolerance    Sulfa Antibiotics Hives and Rash    Sulfasalazine Hives and Rash       Review of Systems   Constitutional: Negative for chills and fever  Gastrointestinal: Negative for nausea and vomiting  Genitourinary: Positive for frequency and urgency  Negative for flank pain, hematuria and hesitancy  Video Exam    There were no vitals filed for this visit  Physical Exam  Vitals signs reviewed  Constitutional:       General: She is not in acute distress  Appearance: She is well-developed  She is not diaphoretic  Comments: obese   HENT:      Head: Normocephalic and atraumatic  Neck:      Musculoskeletal: Normal range of motion  Pulmonary:      Effort: Pulmonary effort is normal  No respiratory distress  Neurological:      Mental Status: She is alert and oriented to person, place, and time  Psychiatric:         Behavior: Behavior normal           I have spent 10 minutes with Patient today in which greater than 50% of this time was spent in counseling/coordination of care regarding Risks and benefits of tx options, Instructions for management, Patient and family education, Importance of treatment compliance and Impressions  VIRTUAL VISIT DISCLAIMER    Caty Lopez acknowledges that she has consented to an online visit or consultation  She understands that the online visit is based solely on information provided by her, and that, in the absence of a face-to-face physical evaluation by the physician, the diagnosis she receives is both limited and provisional in terms of accuracy and completeness  This is not intended to replace a full medical face-to-face evaluation by the physician  Caty Lopez understands and accepts these terms

## 2020-08-13 LAB — BACTERIA UR CULT: NORMAL

## 2020-08-18 ENCOUNTER — APPOINTMENT (OUTPATIENT)
Dept: LAB | Facility: MEDICAL CENTER | Age: 65
End: 2020-08-18
Payer: COMMERCIAL

## 2020-08-18 ENCOUNTER — TRANSCRIBE ORDERS (OUTPATIENT)
Dept: ADMINISTRATIVE | Facility: HOSPITAL | Age: 65
End: 2020-08-18

## 2020-08-18 DIAGNOSIS — E11.9 TYPE 2 DIABETES MELLITUS WITHOUT COMPLICATION, UNSPECIFIED WHETHER LONG TERM INSULIN USE (HCC): Primary | ICD-10-CM

## 2020-08-18 DIAGNOSIS — E11.9 TYPE 2 DIABETES MELLITUS WITHOUT COMPLICATION, UNSPECIFIED WHETHER LONG TERM INSULIN USE (HCC): ICD-10-CM

## 2020-08-18 LAB
EST. AVERAGE GLUCOSE BLD GHB EST-MCNC: 166 MG/DL
HBA1C MFR BLD: 7.4 %

## 2020-08-18 PROCEDURE — 36415 COLL VENOUS BLD VENIPUNCTURE: CPT

## 2020-08-18 PROCEDURE — 3051F HG A1C>EQUAL 7.0%<8.0%: CPT | Performed by: FAMILY MEDICINE

## 2020-08-18 PROCEDURE — 83036 HEMOGLOBIN GLYCOSYLATED A1C: CPT

## 2020-09-29 ENCOUNTER — TELEMEDICINE (OUTPATIENT)
Dept: FAMILY MEDICINE CLINIC | Facility: CLINIC | Age: 65
End: 2020-09-29
Payer: COMMERCIAL

## 2020-09-29 DIAGNOSIS — R19.7 DIARRHEA, UNSPECIFIED TYPE: Primary | ICD-10-CM

## 2020-09-29 PROCEDURE — 99213 OFFICE O/P EST LOW 20 MIN: CPT | Performed by: FAMILY MEDICINE

## 2020-09-29 NOTE — PROGRESS NOTES
Virtual Regular Visit      Assessment/Plan:    1  Diarrhea, unspecified type  Patient with 2 months of intermittent diarrhea and fecal urgency  She denies any red flag symptoms such is weight loss, fevers, chills, blood in her stool  Unclear etiology though could be due to irritable bowel syndrome, lactose intolerance, use of sugar free diabetic foods causing osmotic diarrhea  Recommended avoidance of dairy products, or if consuming dairy taking lactaid pills before hand, also recommended avoidance of sugar free foods to see if this improves her symptoms  Discussed trial of Metamucil as a bulking agent  Return precautions given for abdominal pain, hematochezia, constitutional symptoms  If symptoms do not improve would initiate further workup starting with CBC, CMP, CRP, celiac serology to evaluate for inflammatory bowel disease, possible infectious etiology, or celiac disease  If workup negative and symptoms still persist would pursue stool studies with fecal fat, calprotectin, ova and parasite  Consider GI referral          Reason for visit is   Chief Complaint   Patient presents with    Virtual Regular Visit        Encounter provider Kirstin Mercedes MD    Provider located at [place] [address]  [phone]      Recent Visits  No visits were found meeting these conditions  Showing recent visits within past 7 days and meeting all other requirements     Future Appointments  No visits were found meeting these conditions  Showing future appointments within next 150 days and meeting all other requirements        The patient was identified by name and date of birth  Frandy Jefferson was informed that this is a telemedicine visit and that the visit is being conducted through [address] [place] [address] and patient was informed that this is not a secure, HIPAA-complaint platform  She agrees to proceed     My office door was closed  No one else was in the room    She acknowledged consent and understanding of privacy and security of the video platform  The patient has agreed to participate and understands they can discontinue the visit at any time  Patient is aware this is a billable service  Subjective  Huy Gibson is a 72 y o  female with history of diabetes, hypertension, coronary artery disease, GERD, ankylosing spondylitis, hyperlipidemia for virtual sick visit  HPI     She reports several months of "stomach gurgling" and "the runs " This started 2 months ago  She reports it occurs about every 2 weeks for about one to two days  She states that when it does happen she has fecal urgency, no incontinence  She has been trying to eat more bland foods  She does feel like dairy exacerbates her symptoms  She does use sugar free candies and cookies on occasion and is unsure if these worsen her symptoms  She does think her symptoms might due to "nerves " Her brother in-law is dying and she feels like "they are just waiting for the phone call "    She denies fevers, chills, abdominal pain  She denies blood or mucous in the stool  She has tried taking antidiarrheal medications without significant relief      Past Medical History:   Diagnosis Date    [facility]     Arthritis     Diabetes mellitus ([facility]     GERD (gastroesophageal reflux disease)     [facility]     Hematuria     last assessed 9/7/13    Hyperlipidemia     Hypertension     Nephrolithiasis 9/7/2013    Subclinical hypothyroidism 9/2/2015       Past Surgical History:   Procedure Laterality Date    APPENDECTOMY      BACK SURGERY      mulitple surgery, fusions "top to bottom"    BREAST SURGERY Left     biopsy    CERVICAL SPINE SURGERY      CHOLECYSTECTOMY      CORONARY ANGIOPLASTY WITH STENT PLACEMENT      CYSTOSCOPY  05/06/2015    Diagnostic, last assessed 5/6/15    HYSTERECTOMY      LITHOTRIPSY      ME CYSTO/URETERO W/LITHOTRIPSY &INDWELL STENT INSRT Left 4/18/2019    Procedure: CYSTO, URETEROSCOPY W/HOLMIUM LASER, RETROGRADE PYELOGRAM, STENT INSERTION;  Surgeon: Amaya Brannon MD;  Location: [facility];  Service: Urology       Current Outpatient Medications   Medication Sig Dispense Refill    ALPRAZolam (XANAX) 0 25 mg tablet as needed      aspirin 81 MG tablet Take 81 mg by mouth daily      atorvastatin (LIPITOR) 80 mg tablet Take 80 mg by mouth Medrol Dose Pack scheduling ONLY      B-D UF III MINI PEN NEEDLES 31G X 5 MM MISC use 2 daily as directed  0    Black Cohosh 40 MG CAPS Take 40 mg by mouth daily      calcium carbonate (OS-RILEY) 600 MG tablet Take 400 mg by mouth daily      Cholecalciferol (VITAMIN D) 2000 units tablet Take 2,000 Units by mouth daily      diclofenac sodium (VOLTAREN) 1 % Apply 2 g topically 4 (four) times a day 1 Tube 1    folic acid (FOLVITE) 412 MCG tablet Take 800 mg by mouth daily      insulin degludec (TRESIBA FLEXTOUCH) 200 units/mL CONCENTRATED U-200 injection pen Inject 50 Units under the skin daily at bedtime       Insulin Pen Needle (B-D ULTRAFINE III SHORT PEN) 31G X 8 MM MISC by Does not apply route      lisinopril (ZESTRIL) 10 mg tablet Take 1 tablet (10 mg total) by mouth daily 90 tablet 1    metFORMIN (GLUMETZA) 1000 MG (MOD) 24 hr tablet Take 1,000 mg by mouth 2 (two) times a day with meals       methotrexate 2 5 mg tablet       metoprolol tartrate (LOPRESSOR) 50 mg tablet 50 mg every 12 (twelve) hours      Multiple Vitamin (MULTIVITAMIN) tablet Take 1 tablet by mouth daily      nitroglycerin (NITROSTAT) 0 4 mg SL tablet Place 1 tablet (0 4 mg total) under the tongue every 5 (five) minutes as needed for chest pain 90 tablet 1    omega-3-acid ethyl esters (LOVAZA) 1 g capsule Take 1,000 mg by mouth daily      omeprazole (PriLOSEC) 20 mg delayed release capsule Take 20 mg by mouth daily      Probiotic Product (PROBIOTIC-10 PO) Take by mouth daily       Tofacitinib Citrate (XELJANZ XR PO) Take 11 mg by mouth daily      VICTOZA 18 MG/3ML SOPN inject 1 2 milligram subcutaneously daily IF TOLERATED- Takes at 1400  0     No current facility-administered medications for this visit  Allergies   Allergen Reactions    Barbiturates Other (See Comments)     stomach pain    Morphine GI Intolerance     Other reaction(s): GI Intolerance    Sulfa Antibiotics Hives and Rash    Sulfasalazine Hives and Rash       Review of Systems   Constitutional: Negative for activity change, appetite change, chills, fatigue and fever  Gastrointestinal: Positive for diarrhea  Negative for abdominal pain, nausea and vomiting  Video Exam    There were no vitals filed for this visit  Physical Exam  Constitutional:       General: She is not in acute distress  Appearance: Normal appearance  She is not ill-appearing or toxic-appearing  HENT:      Head: Normocephalic and atraumatic  Nose: Nose normal       Mouth/Throat:      Mouth: Mucous membranes are moist    Eyes:      Extraocular Movements: Extraocular movements intact  Conjunctiva/sclera: Conjunctivae normal    Neck:      Musculoskeletal: Normal range of motion and neck supple  Pulmonary:      Effort: Pulmonary effort is normal  No respiratory distress  Skin:     Coloration: Skin is not pale  Findings: No erythema or rash  Neurological:      Mental Status: She is alert and oriented to person, place, and time     Psychiatric:         Mood and Affect: Mood normal          Behavior: Behavior normal           Lab Results   Component Value Date    WBC 7 41 05/12/2020    HGB 11 9 05/12/2020    HCT 37 9 05/12/2020    MCV 94 05/12/2020     05/12/2020     Lab Results   Component Value Date    SODIUM 136 05/12/2020    K 4 1 05/12/2020     05/12/2020    CO2 28 05/12/2020    BUN 16 05/12/2020    CREATININE 0 71 05/12/2020    GLUC 342 (H) 03/05/2017    CALCIUM 9 2 05/12/2020     Lab Results   Component Value Date    ALT 41 05/12/2020    AST 25 05/12/2020    ALKPHOS 106 05/12/2020    BILITOT 0 41 09/01/2015     Lab Results   Component Value Date    HGBA1C 7 4 (H) 08/18/2020         I spent 11 minutes with patient today in which greater than 50% of the time was spent in counseling/coordination of care regarding Chronic diarrhea      VIRTUAL VISIT DISCLAIMER    Luis Antonio Hand acknowledges that she has consented to an online visit or consultation  She understands that the online visit is based solely on information provided by her, and that, in the absence of a face-to-face physical evaluation by the physician, the diagnosis she receives is both limited and provisional in terms of accuracy and completeness  This is not intended to replace a full medical face-to-face evaluation by the physician  Luis Antonio Hand understands and accepts these terms

## 2020-10-20 ENCOUNTER — IMMUNIZATIONS (OUTPATIENT)
Dept: FAMILY MEDICINE CLINIC | Facility: CLINIC | Age: 65
End: 2020-10-20
Payer: COMMERCIAL

## 2020-10-20 DIAGNOSIS — Z23 NEED FOR VACCINATION: Primary | ICD-10-CM

## 2020-10-20 PROCEDURE — 90662 IIV NO PRSV INCREASED AG IM: CPT

## 2020-10-20 PROCEDURE — 90471 IMMUNIZATION ADMIN: CPT

## 2020-10-26 ENCOUNTER — TELEPHONE (OUTPATIENT)
Dept: FAMILY MEDICINE CLINIC | Facility: CLINIC | Age: 65
End: 2020-10-26

## 2020-10-26 DIAGNOSIS — B37.9 YEAST INFECTION: Primary | ICD-10-CM

## 2020-10-26 RX ORDER — FLUCONAZOLE 150 MG/1
150 TABLET ORAL ONCE
Qty: 1 TABLET | Refills: 0 | Status: SHIPPED | OUTPATIENT
Start: 2020-10-26 | End: 2020-10-26

## 2020-11-20 ENCOUNTER — TRANSCRIBE ORDERS (OUTPATIENT)
Dept: ADMINISTRATIVE | Facility: HOSPITAL | Age: 65
End: 2020-11-20

## 2020-11-20 ENCOUNTER — LAB (OUTPATIENT)
Dept: LAB | Facility: MEDICAL CENTER | Age: 65
End: 2020-11-20
Payer: COMMERCIAL

## 2020-11-20 DIAGNOSIS — Z79.4 TYPE 2 DIABETES MELLITUS WITHOUT COMPLICATION, WITH LONG-TERM CURRENT USE OF INSULIN (HCC): Primary | ICD-10-CM

## 2020-11-20 DIAGNOSIS — M19.90 INFLAMMATORY ARTHRITIS: Primary | ICD-10-CM

## 2020-11-20 DIAGNOSIS — E11.9 TYPE 2 DIABETES MELLITUS WITHOUT COMPLICATION, WITH LONG-TERM CURRENT USE OF INSULIN (HCC): Primary | ICD-10-CM

## 2020-11-20 DIAGNOSIS — M05.79 RHEUMATOID ARTHRITIS OF MULTIPLE SITES WITHOUT ORGAN OR SYSTEM INVOLVEMENT WITH POSITIVE RHEUMATOID FACTOR (HCC): ICD-10-CM

## 2020-11-20 LAB
ALBUMIN SERPL BCP-MCNC: 3.7 G/DL (ref 3.5–5)
ALP SERPL-CCNC: 99 U/L (ref 46–116)
ALT SERPL W P-5'-P-CCNC: 42 U/L (ref 12–78)
ANION GAP SERPL CALCULATED.3IONS-SCNC: 4 MMOL/L (ref 4–13)
AST SERPL W P-5'-P-CCNC: 19 U/L (ref 5–45)
BACTERIA UR QL AUTO: NORMAL /HPF
BASOPHILS # BLD AUTO: 0.03 THOUSANDS/ΜL (ref 0–0.1)
BASOPHILS NFR BLD AUTO: 1 % (ref 0–1)
BILIRUB SERPL-MCNC: 0.7 MG/DL (ref 0.2–1)
BILIRUB UR QL STRIP: NEGATIVE
BUN SERPL-MCNC: 19 MG/DL (ref 5–25)
CALCIUM SERPL-MCNC: 9.9 MG/DL (ref 8.3–10.1)
CHLORIDE SERPL-SCNC: 105 MMOL/L (ref 100–108)
CLARITY UR: CLEAR
CO2 SERPL-SCNC: 29 MMOL/L (ref 21–32)
COLOR UR: YELLOW
CREAT SERPL-MCNC: 0.72 MG/DL (ref 0.6–1.3)
CRP SERPL QL: <3 MG/L
EOSINOPHIL # BLD AUTO: 0.09 THOUSAND/ΜL (ref 0–0.61)
EOSINOPHIL NFR BLD AUTO: 2 % (ref 0–6)
ERYTHROCYTE [DISTWIDTH] IN BLOOD BY AUTOMATED COUNT: 14.7 % (ref 11.6–15.1)
ERYTHROCYTE [SEDIMENTATION RATE] IN BLOOD: 40 MM/HOUR (ref 0–29)
EST. AVERAGE GLUCOSE BLD GHB EST-MCNC: 174 MG/DL
GFR SERPL CREATININE-BSD FRML MDRD: 88 ML/MIN/1.73SQ M
GLUCOSE P FAST SERPL-MCNC: 140 MG/DL (ref 65–99)
GLUCOSE UR STRIP-MCNC: NEGATIVE MG/DL
HBA1C MFR BLD: 7.7 %
HCT VFR BLD AUTO: 38.5 % (ref 34.8–46.1)
HGB BLD-MCNC: 12.2 G/DL (ref 11.5–15.4)
HGB UR QL STRIP.AUTO: NEGATIVE
HYALINE CASTS #/AREA URNS LPF: NORMAL /LPF
IMM GRANULOCYTES # BLD AUTO: 0.02 THOUSAND/UL (ref 0–0.2)
IMM GRANULOCYTES NFR BLD AUTO: 0 % (ref 0–2)
KETONES UR STRIP-MCNC: NEGATIVE MG/DL
LEUKOCYTE ESTERASE UR QL STRIP: NEGATIVE
LYMPHOCYTES # BLD AUTO: 0.83 THOUSANDS/ΜL (ref 0.6–4.47)
LYMPHOCYTES NFR BLD AUTO: 14 % (ref 14–44)
MCH RBC QN AUTO: 30.1 PG (ref 26.8–34.3)
MCHC RBC AUTO-ENTMCNC: 31.7 G/DL (ref 31.4–37.4)
MCV RBC AUTO: 95 FL (ref 82–98)
MONOCYTES # BLD AUTO: 0.79 THOUSAND/ΜL (ref 0.17–1.22)
MONOCYTES NFR BLD AUTO: 13 % (ref 4–12)
NEUTROPHILS # BLD AUTO: 4.22 THOUSANDS/ΜL (ref 1.85–7.62)
NEUTS SEG NFR BLD AUTO: 70 % (ref 43–75)
NITRITE UR QL STRIP: NEGATIVE
NON-SQ EPI CELLS URNS QL MICRO: NORMAL /HPF
NRBC BLD AUTO-RTO: 0 /100 WBCS
PH UR STRIP.AUTO: 6.5 [PH]
PLATELET # BLD AUTO: 325 THOUSANDS/UL (ref 149–390)
PMV BLD AUTO: 9.9 FL (ref 8.9–12.7)
POTASSIUM SERPL-SCNC: 4.2 MMOL/L (ref 3.5–5.3)
PROT SERPL-MCNC: 7.2 G/DL (ref 6.4–8.2)
PROT UR STRIP-MCNC: NEGATIVE MG/DL
RBC # BLD AUTO: 4.05 MILLION/UL (ref 3.81–5.12)
RBC #/AREA URNS AUTO: NORMAL /HPF
SODIUM SERPL-SCNC: 138 MMOL/L (ref 136–145)
SP GR UR STRIP.AUTO: 1.02 (ref 1–1.03)
UROBILINOGEN UR QL STRIP.AUTO: 0.2 E.U./DL
WBC # BLD AUTO: 5.98 THOUSAND/UL (ref 4.31–10.16)
WBC #/AREA URNS AUTO: NORMAL /HPF

## 2020-11-20 PROCEDURE — 81001 URINALYSIS AUTO W/SCOPE: CPT | Performed by: INTERNAL MEDICINE

## 2020-11-20 PROCEDURE — 86140 C-REACTIVE PROTEIN: CPT | Performed by: INTERNAL MEDICINE

## 2020-11-20 PROCEDURE — 85652 RBC SED RATE AUTOMATED: CPT | Performed by: INTERNAL MEDICINE

## 2020-11-20 PROCEDURE — 83036 HEMOGLOBIN GLYCOSYLATED A1C: CPT | Performed by: INTERNAL MEDICINE

## 2020-11-20 PROCEDURE — 36415 COLL VENOUS BLD VENIPUNCTURE: CPT | Performed by: INTERNAL MEDICINE

## 2020-11-20 PROCEDURE — 80053 COMPREHEN METABOLIC PANEL: CPT | Performed by: INTERNAL MEDICINE

## 2020-11-20 PROCEDURE — 85025 COMPLETE CBC W/AUTO DIFF WBC: CPT | Performed by: INTERNAL MEDICINE

## 2021-01-19 DIAGNOSIS — I10 ESSENTIAL HYPERTENSION: ICD-10-CM

## 2021-01-19 RX ORDER — LISINOPRIL 10 MG/1
10 TABLET ORAL DAILY
Qty: 90 TABLET | Refills: 1 | Status: SHIPPED | OUTPATIENT
Start: 2021-01-19 | End: 2021-01-20

## 2021-01-20 RX ORDER — LISINOPRIL 10 MG/1
10 TABLET ORAL DAILY
Qty: 90 TABLET | Refills: 1 | Status: SHIPPED | OUTPATIENT
Start: 2021-01-20 | End: 2021-07-26 | Stop reason: SDUPTHER

## 2021-01-29 PROBLEM — E11.51 TYPE 2 DIABETES MELLITUS WITH DIABETIC PERIPHERAL ANGIOPATHY WITHOUT GANGRENE (HCC): Status: ACTIVE | Noted: 2021-01-29

## 2021-01-29 PROBLEM — E27.9 DISORDER OF ADRENAL GLAND, UNSPECIFIED (HCC): Status: ACTIVE | Noted: 2021-01-29

## 2021-02-11 PROBLEM — R80.9 MICROALBUMINURIA DUE TO TYPE 2 DIABETES MELLITUS (HCC): Status: ACTIVE | Noted: 2021-02-11

## 2021-02-11 PROBLEM — E11.29 MICROALBUMINURIA DUE TO TYPE 2 DIABETES MELLITUS (HCC): Status: ACTIVE | Noted: 2021-02-11

## 2021-02-12 ENCOUNTER — OFFICE VISIT (OUTPATIENT)
Dept: FAMILY MEDICINE CLINIC | Facility: CLINIC | Age: 66
End: 2021-02-12
Payer: COMMERCIAL

## 2021-02-12 VITALS
BODY MASS INDEX: 43.41 KG/M2 | DIASTOLIC BLOOD PRESSURE: 76 MMHG | RESPIRATION RATE: 16 BRPM | WEIGHT: 245 LBS | SYSTOLIC BLOOD PRESSURE: 132 MMHG | TEMPERATURE: 97.1 F | HEIGHT: 63 IN | HEART RATE: 74 BPM

## 2021-02-12 DIAGNOSIS — I10 ESSENTIAL HYPERTENSION: ICD-10-CM

## 2021-02-12 DIAGNOSIS — K21.9 GASTROESOPHAGEAL REFLUX DISEASE WITHOUT ESOPHAGITIS: ICD-10-CM

## 2021-02-12 DIAGNOSIS — E66.01 CLASS 3 SEVERE OBESITY DUE TO EXCESS CALORIES WITH SERIOUS COMORBIDITY AND BODY MASS INDEX (BMI) OF 40.0 TO 44.9 IN ADULT (HCC): ICD-10-CM

## 2021-02-12 DIAGNOSIS — D35.01 ADENOMA OF RIGHT ADRENAL GLAND: ICD-10-CM

## 2021-02-12 DIAGNOSIS — Z79.4 TYPE 2 DIABETES MELLITUS WITH HYPERGLYCEMIA, WITH LONG-TERM CURRENT USE OF INSULIN (HCC): ICD-10-CM

## 2021-02-12 DIAGNOSIS — R80.9 MICROALBUMINURIA DUE TO TYPE 2 DIABETES MELLITUS (HCC): ICD-10-CM

## 2021-02-12 DIAGNOSIS — Z00.00 ANNUAL PHYSICAL EXAM: Primary | ICD-10-CM

## 2021-02-12 DIAGNOSIS — E27.9 DISORDER OF ADRENAL GLAND, UNSPECIFIED (HCC): ICD-10-CM

## 2021-02-12 DIAGNOSIS — E11.65 TYPE 2 DIABETES MELLITUS WITH HYPERGLYCEMIA, WITH LONG-TERM CURRENT USE OF INSULIN (HCC): ICD-10-CM

## 2021-02-12 DIAGNOSIS — I25.10 CORONARY ARTERY DISEASE INVOLVING NATIVE CORONARY ARTERY OF NATIVE HEART WITHOUT ANGINA PECTORIS: ICD-10-CM

## 2021-02-12 DIAGNOSIS — E11.29 MICROALBUMINURIA DUE TO TYPE 2 DIABETES MELLITUS (HCC): ICD-10-CM

## 2021-02-12 DIAGNOSIS — M06.9 RHEUMATOID ARTHRITIS INVOLVING MULTIPLE SITES, UNSPECIFIED WHETHER RHEUMATOID FACTOR PRESENT (HCC): ICD-10-CM

## 2021-02-12 DIAGNOSIS — R05.9 COUGH: ICD-10-CM

## 2021-02-12 PROCEDURE — 99213 OFFICE O/P EST LOW 20 MIN: CPT | Performed by: FAMILY MEDICINE

## 2021-02-12 PROCEDURE — 3075F SYST BP GE 130 - 139MM HG: CPT | Performed by: FAMILY MEDICINE

## 2021-02-12 PROCEDURE — 1036F TOBACCO NON-USER: CPT | Performed by: FAMILY MEDICINE

## 2021-02-12 PROCEDURE — 3078F DIAST BP <80 MM HG: CPT | Performed by: FAMILY MEDICINE

## 2021-02-12 PROCEDURE — 99397 PER PM REEVAL EST PAT 65+ YR: CPT | Performed by: FAMILY MEDICINE

## 2021-02-12 PROCEDURE — 3008F BODY MASS INDEX DOCD: CPT | Performed by: FAMILY MEDICINE

## 2021-02-12 PROCEDURE — 3066F NEPHROPATHY DOC TX: CPT | Performed by: FAMILY MEDICINE

## 2021-02-12 PROCEDURE — 1160F RVW MEDS BY RX/DR IN RCRD: CPT | Performed by: FAMILY MEDICINE

## 2021-02-12 RX ORDER — HYDROXYCHLOROQUINE SULFATE 200 MG/1
200 TABLET, FILM COATED ORAL 2 TIMES DAILY WITH MEALS
COMMUNITY
End: 2022-02-15 | Stop reason: ALTCHOICE

## 2021-02-12 RX ORDER — FLUTICASONE PROPIONATE 50 MCG
1 SPRAY, SUSPENSION (ML) NASAL DAILY PRN
COMMUNITY

## 2021-02-12 RX ORDER — TRAMADOL HYDROCHLORIDE 50 MG/1
50 TABLET ORAL AS NEEDED
COMMUNITY

## 2021-02-12 NOTE — ASSESSMENT & PLAN NOTE
CT scan of abdomen from April 2019 showing right adrenal mass  It measures 3 5 x 2 3 cm  It measures 16 Hounsfield units internally on the precontrast images  It measures 62 Hounsfield units on portal venous phase images  It measures 57 Hounsfield units on delayed phase images  This lesion has been present on prior studies  It was previously described as an adenoma  Today's enhancement characteristics and density do not completely support that diagnosis although it could be an atypical adenoma  It is larger than on prior studies dating back to 2016  This lesion might require further characterization with adrenal MRI or possibly biopsy  Patient states that she has had this for years, she states she has been told it has been stable for some time however I discussed with her that this recent CT scan showed that had increased in size and not her typical features of a benign adrenal adenoma  Discussed that any abnormal findings on the adrenal glands are concerning for cancer  MRIs of her abdomen have been ordered previously by her urologist however her urologist is no longer practicing  She states that she is unable to have MRI done because of her hardware and her back, as well as cardiac stent  She is not interested in pursuing further workup at this time  Consider repeating CT scan in the future

## 2021-02-12 NOTE — PROGRESS NOTES
FAMILY MEDICINE PROGRESS NOTE    Date of Service: 21  Primary Care Provider:   Li Miles MD       Name: Cheryle Learn       : 1955       Age:66 y o  Sex: female      MRN: [id number]      Chief Complaint:Annual Exam       ASSESSMENT and PLAN:  Cheryle Learn is a 77 y o  female with:     Problem List Items Addressed This Visit        Digestive    Gastroesophageal reflux disease without esophagitis     Currently on omeprazole, she reports that she has a dry cough over a year, believe cough to be caused by reflux  She does identify acidic foods as triggers  Recommended she explore "Dropping Acid" book for weight and modify her diet to improve reflux and cough  Discussed possibility of taking Pepcid nightly to help with symptoms as well  Endocrine    Type 2 diabetes mellitus with hyperglycemia, with long-term current use of insulin ([facility])     Lab Results   Component Value Date    HGBA1C 7 7 (H) 2020    HGBA1C 7 4 (H) 2020    HGBA1C 8 3 (H) 2020     Lab Results   Component Value Date    GLUF 140 (H) 2020    LDLCALC 44 2020    CREATININE 0 72 2020       F/w Endocrinology (Dr Pankaj Houston)  Current meds: Metformin 1000 mg twice daily, Victoxa 1 2mg SQ daily, Tresiba 42u QHS         Adenoma of right adrenal gland     CT scan of abdomen from 2019 showing right adrenal mass  It measures 3 5 x 2 3 cm  It measures 16 Hounsfield units internally on the precontrast images  It measures 62 Hounsfield units on portal venous phase images  It measures 57 Hounsfield units on delayed phase images  This lesion has been present on prior studies  It was previously described as an adenoma  Today's enhancement characteristics and density do not completely support that diagnosis although it could be an atypical adenoma  It is larger than on prior studies dating back to 2016    This lesion might require further characterization with adrenal MRI or possibly biopsy  Patient states that she has had this for years, she states she has been told it has been stable for some time however I discussed with her that this recent CT scan showed that had increased in size and not her typical features of a benign adrenal adenoma  Discussed that any abnormal findings on the adrenal glands are concerning for cancer  MRIs of her abdomen have been ordered previously by her urologist however her urologist is no longer practicing  She states that she is unable to have MRI done because of her hardware and her back, as well as cardiac stent  She is not interested in pursuing further workup at this time  Consider repeating CT scan in the future  Disorder of adrenal gland, unspecified ([facility] )    Microalbuminuria due to type 2 diabetes mellitus ([facility] )       Cardiovascular and Mediastinum    Essential hypertension     BP Readings from Last 3 Encounters:   07/28/20 122/64   05/08/20 120/78   04/17/20 100/78     Lab Results   Component Value Date    CREATININE 0 72 11/20/2020    EGFR 88 11/20/2020     Controlled, continue Metoprolol tartrate 50mg q12h and Lisinopril 10mg daily           Coronary artery disease involving native coronary artery of native heart without angina pectoris     CAD s/p LAD stent in March 2018, f/w Dr Sylvia Sifuentes ([address] [facility])  Continue ASA 81mg, statin, beta blocker              Musculoskeletal and Integument    Rheumatoid arthritis involving multiple sites [facility])     Follows with rheumatology            Other    Class 3 severe obesity due to excess calories with serious comorbidity and body mass index (BMI) of 40 0 to 44 9 in adult [facility])      Other Visit Diagnoses     Annual physical exam    -  Primary    Cough              SUBJECTIVE:  Nixon Mazariegos is a 77 y o  female who presents today with a chief complaint of Annual Exam  HPI     Cough  She reports dry cough for over a year  It comes and goes   It is worse at night, worse after dinner and in the evening hours  She does identify pickles, balsamic vinegars, mustard as triggers for cough  She denies symptoms of reflux, though she is on omeprazole  She does reports post-nasal drip, worse at night  She is on lisinopril, though she does not think that this the cause of her cough  Hypertension  She is on metoprolol 50 mg twice daily, lisinopril 10 mg daily  RA  She is followed by rheumatology  She is on weekly methotrexate and was on Leopold Tita, however this was discontinued due to cost  She was started on hydroxychloroquine 200 mg daily  Her back and leg have been bothering her recently  She will be starting PT soon  Coronary Artery Disease  She follows with cardiology, last saw Dr Pooja Chandra of [address] in August  She had NSTEMI in March 2018 with stent placement  Diabetes  She follows with Dr Maggi Estrada of endocrinology  Review of Systems   HENT: Positive for postnasal drip  Respiratory: Positive for cough  Negative for shortness of breath  Cardiovascular: Negative for chest pain  Gastrointestinal: Negative for blood in stool, constipation and diarrhea  Musculoskeletal: Positive for arthralgias and back pain  Neurological: Negative for dizziness, light-headedness, numbness and headaches  I have reviewed the patient's Past Medical History      Current Outpatient Medications:     ALPRAZolam (XANAX) 0 25 mg tablet, as needed, Disp: , Rfl:     aspirin 81 MG tablet, Take 81 mg by mouth daily, Disp: , Rfl:     atorvastatin (LIPITOR) 80 mg tablet, Take 80 mg by mouth Medrol Dose Pack scheduling ONLY, Disp: , Rfl:     B-D UF III MINI PEN NEEDLES 31G X 5 MM MISC, use 2 daily as directed, Disp: , Rfl: 0    calcium carbonate (OS-RILEY) 600 MG tablet, Take 400 mg by mouth daily, Disp: , Rfl:     Cholecalciferol (VITAMIN D) 2000 units tablet, Take 2,000 Units by mouth daily, Disp: , Rfl:     diclofenac sodium (VOLTAREN) 1 %, Apply 2 g topically 4 (four) times a day, Disp: 1 Tube, Rfl: 1   fluticasone (FLONASE) 50 mcg/act nasal spray, 1 spray into each nostril daily as needed, Disp: , Rfl:     folic acid (FOLVITE) 867 MCG tablet, Take 800 mg by mouth daily, Disp: , Rfl:     hydroxychloroquine (PLAQUENIL) 200 mg tablet, Take 200 mg by mouth 2 (two) times a day with meals, Disp: , Rfl:     insulin degludec (TRESIBA FLEXTOUCH) 200 units/mL CONCENTRATED U-200 injection pen, Inject 50 Units under the skin daily at bedtime , Disp: , Rfl:     Insulin Pen Needle (B-D ULTRAFINE III SHORT PEN) 31G X 8 MM MISC, by Does not apply route, Disp: , Rfl:     lisinopril (ZESTRIL) 10 mg tablet, Take 1 tablet (10 mg total) by mouth daily, Disp: 90 tablet, Rfl: 1    metFORMIN (GLUMETZA) 1000 MG (MOD) 24 hr tablet, Take 1,000 mg by mouth 2 (two) times a day with meals , Disp: , Rfl:     methotrexate 2 5 mg tablet, , Disp: , Rfl:     metoprolol tartrate (LOPRESSOR) 50 mg tablet, 50 mg every 12 (twelve) hours, Disp: , Rfl:     Multiple Vitamin (MULTIVITAMIN) tablet, Take 1 tablet by mouth daily, Disp: , Rfl:     nitroglycerin (NITROSTAT) 0 4 mg SL tablet, Place 1 tablet (0 4 mg total) under the tongue every 5 (five) minutes as needed for chest pain, Disp: 90 tablet, Rfl: 1    omega-3-acid ethyl esters (LOVAZA) 1 g capsule, Take 1,000 mg by mouth daily, Disp: , Rfl:     omeprazole (PriLOSEC) 20 mg delayed release capsule, Take 20 mg by mouth daily, Disp: , Rfl:     Probiotic Product (PROBIOTIC-10 PO), Take by mouth daily , Disp: , Rfl:     traMADol (ULTRAM) 50 mg tablet, Take 50 mg by mouth as needed for moderate pain, Disp: , Rfl:     VICTOZA 18 MG/3ML SOPN, inject 1 2 milligram subcutaneously daily IF TOLERATED- Takes at 1400, Disp: , Rfl: 0    Tofacitinib Citrate (XELJANZ XR PO), Take 11 mg by mouth daily, Disp: , Rfl:     OBJECTIVE:  /76   Pulse 74   Temp (!) 97 1 °F (36 2 °C)   Resp 16   Ht 5' 2 5" (1 588 m)   Wt 111 kg (245 lb)   BMI 44 10 kg/m²    BP Readings from Last 3 Encounters: 02/12/21 132/76   07/28/20 122/64   05/08/20 120/78      Wt Readings from Last 3 Encounters:   02/12/21 111 kg (245 lb)   07/28/20 107 kg (236 lb)   05/08/20 107 kg (235 lb)      Physical Exam  Constitutional:       General: She is not in acute distress  Appearance: Normal appearance  She is obese  She is not ill-appearing or toxic-appearing  HENT:      Head: Normocephalic and atraumatic  Right Ear: External ear normal       Left Ear: External ear normal    Eyes:      Extraocular Movements: Extraocular movements intact  Conjunctiva/sclera: Conjunctivae normal    Neck:      Musculoskeletal: Normal range of motion and neck supple  Cardiovascular:      Rate and Rhythm: Normal rate and regular rhythm  Pulses: Normal pulses  Heart sounds: Normal heart sounds  Pulmonary:      Effort: Pulmonary effort is normal  No respiratory distress  Abdominal:      General: There is no distension  Palpations: Abdomen is soft  Tenderness: There is no abdominal tenderness  Musculoskeletal: Normal range of motion  Right lower leg: No edema  Left lower leg: No edema  Skin:     General: Skin is warm and dry  Findings: No erythema or rash  Neurological:      General: No focal deficit present  Mental Status: She is alert and oriented to person, place, and time  Gait: Gait abnormal (Uses cane for ambulation)  Psychiatric:         Mood and Affect: Mood normal          Behavior: Behavior normal          CT scan from April 2019-  ADRENAL GLANDS:  The left adrenal is normal   There is a right adrenal mass  It measures 3 5 x 2 3 cm  It measures 16 Hounsfield units internally on the precontrast images  It measures 62 Hounsfield units on portal venous phase images  It measures 57   Hounsfield units on delayed phase images  This lesion has been present on prior studies  It was previously described as an adenoma    Today's enhancement characteristics and density do not completely support that diagnosis although it could be an atypical adenoma  It is larger than on prior studies dating back to 2016  This lesion might require further characterization with adrenal MRI or possibly biopsy  Return in about 1 year (around 2/12/2022)  King Solorzano MD    Note: Portions of the record have been created with voice recognition software  Occasional wrong word or "sound a like" substitutions may have occurred due to the inherent limitations of voice recognition software  Read the chart carefully and recognize, using context, where substitutions have occurred

## 2021-02-12 NOTE — ASSESSMENT & PLAN NOTE
Lab Results   Component Value Date    HGBA1C 7 7 (H) 11/20/2020    HGBA1C 7 4 (H) 08/18/2020    HGBA1C 8 3 (H) 07/18/2020     Lab Results   Component Value Date    GLUF 140 (H) 11/20/2020    LDLCALC 44 01/14/2020    CREATININE 0 72 11/20/2020     She follows with Endocrinology (Dr Kalia Rojas)  She is currently on Metformin 1000 mg twice daily, Victoza 1 2mg daily, Tresiba 42u QHS

## 2021-02-12 NOTE — ASSESSMENT & PLAN NOTE
Currently on omeprazole, she reports that she has a dry cough over a year, believe cough to be caused by reflux  She does identify acidic foods as triggers  Recommended she explore "Dropping Acid" book for weight and modify her diet to improve reflux and cough  Discussed possibility of taking Pepcid nightly to help with symptoms as well

## 2021-02-12 NOTE — PROGRESS NOTES
[facility]      Date of Service: 21  Primary Care Provider:   Valeriy Ledesma MD    Name: Tessie Ag       : 1955       Age:66 y o  Sex: female      MRN: [id number]      Chief Complaint:Annual Exam     Assessment and Plan:  77 y o  female exam      1  Health Maintenance  - Colon Cancer Screening: Cologuard in , repeat in   - Cervical Cancer Screening:s/p hysterectomy, follows GYN, normal PAP with negative HPV in 2017  - Breast Cancer Screening: mammogram done in 2020  - Labs:  Previous labs ordered by other providers who are managing her chronic conditions  - Immunizations: Reviewed  Recommend yearly flu vaccine  2  Other diagnoses addressed today:   Problem List Items Addressed This Visit        Digestive    Gastroesophageal reflux disease without esophagitis     Currently on omeprazole, she reports that she has a dry cough over a year, believe cough to be caused by reflux  She does identify acidic foods as triggers  Recommended she explore "Dropping Acid" book for weight and modify her diet to improve reflux and cough  Discussed possibility of taking Pepcid nightly to help with symptoms as well  Endocrine    Type 2 diabetes mellitus with hyperglycemia, with long-term current use of insulin (HCC)     Lab Results   Component Value Date    HGBA1C 7 7 (H) 2020    HGBA1C 7 4 (H) 2020    HGBA1C 8 3 (H) 2020     Lab Results   Component Value Date    GLUF 140 (H) 2020    LDLCALC 44 2020    CREATININE 0 72 2020       F/w Endocrinology (Dr Yeimi Chávez)  Current meds: Metformin 1000 mg twice daily, Victoxa 1 2mg SQ daily, Tresiba 42u QHS         Adenoma of right adrenal gland     2019: CT abd- Stable 1 8 cm right adrenal gland adenoma    Normal left adrenal gland             Disorder of adrenal gland, unspecified ([address] )    Microalbuminuria due to type 2 diabetes mellitus ([facility] [address] )       Cardiovascular and Mediastinum    Essential hypertension     BP Readings from Last 3 Encounters:   07/28/20 122/64   05/08/20 120/78   04/17/20 100/78     Lab Results   Component Value Date    CREATININE 0 72 11/20/2020    EGFR 88 11/20/2020     Controlled, continue Metoprolol tartrate 50mg q12h and Lisinopril 10mg daily           Coronary artery disease involving native coronary artery of native heart without angina pectoris     CAD s/p LAD stent in March 2018, f/w Dr Fartun Hinds ([address] [facility])  Continue ASA 81mg, statin, beta blocker              Musculoskeletal and Integument    Rheumatoid arthritis involving multiple sites [facility])     Follows with rheumatology            Other    Class 3 severe obesity due to excess calories with serious comorbidity and body mass index (BMI) of 40 0 to 44 9 in adult [facility])      Other Visit Diagnoses     Annual physical exam    -  Primary    Cough               Immunizations and preventive care screenings were discussed with patient today  Appropriate education was printed on patient's after visit summary  Counseling:  · Alcohol/drug use: discussed moderation in alcohol intake, the recommendations for healthy alcohol use, and avoidance of illicit drug use  · Dental Health: discussed importance of regular tooth brushing, flossing, and dental visits  · Injury prevention: discussed safety/seat belts, safety helmets, smoke detectors, carbon dioxide detectors, and smoking near bedding or upholstery  · Exercise: the importance of regular exercise/physical activity was discussed  Recommend exercise 3-5 times per week for at least 30 minutes  Follow up next physical in 1 year  Subjective:    Yovany Ley is a 77 y o  female diabetes, hypertension, coronary artery disease, DDD, rheumatoid arthritis, hyperlipidemia and is here for a comprehensive physical exam      She has 4 children, all live locally  She has 4 grandchildren       She had a number of chronic medical conditions and followed by subspeciality providers including neurosurgery, orthopedic surgery, rheumatology  She has had 13 neck and back surgeries  Hypertension  She is on metoprolol 50 mg twice daily, lisinopril 10 mg daily  RA  She is followed by rheumatology  She is on weekly methotrexate and was on Enrigue Grant, however this was discontinued due to cost  She was started on hydroxychloroquine 200 mg daily  Her back and leg have been bothering her recently  She will be starting PT soon  Coronary Artery Disease  She follows with cardiology, last saw Dr Richard Valdivia of [facility] in August  She had NSTEMI in March 2018 with stent placement  Diabetes  She follows with Dr Jonas Robert of endocrinology  Diet and Physical Activity  · Diet/Nutrition: does follow a well balanced diet  · Exercise: no formal exercise  General Health  · Vision: no vision problems and goes for regular eye exams  · Dental: regular dental visits        Gyn Health:  S/p hysterectomy       Histories Updated and Reviewed 2/12/2021:  Patient's Medications   New Prescriptions    No medications on file   Previous Medications    ALPRAZOLAM (XANAX) 0 25 MG TABLET    as needed    ASPIRIN 81 MG TABLET    Take 81 mg by mouth daily    ATORVASTATIN (LIPITOR) 80 MG TABLET    Take 80 mg by mouth Medrol Dose Pack scheduling ONLY    B-D UF III MINI PEN NEEDLES 31G X 5 MM MISC    use 2 daily as directed    CALCIUM CARBONATE (OS-RILEY) 600 MG TABLET    Take 400 mg by mouth daily    CHOLECALCIFEROL (VITAMIN D) 2000 UNITS TABLET    Take 2,000 Units by mouth daily    DICLOFENAC SODIUM (VOLTAREN) 1 %    Apply 2 g topically 4 (four) times a day    FLUTICASONE (FLONASE) 50 MCG/ACT NASAL SPRAY    1 spray into each nostril daily as needed    FOLIC ACID (FOLVITE) 554 MCG TABLET    Take 800 mg by mouth daily    HYDROXYCHLOROQUINE (PLAQUENIL) 200 MG TABLET    Take 200 mg by mouth 2 (two) times a day with meals    INSULIN DEGLUDEC (TRESIBA FLEXTOUCH) 200 UNITS/ML CONCENTRATED U-200 INJECTION PEN    Inject 50 Units under the skin daily at bedtime     INSULIN PEN NEEDLE (B-D ULTRAFINE III SHORT PEN) 31G X 8 MM MISC    by Does not apply route    LISINOPRIL (ZESTRIL) 10 MG TABLET    Take 1 tablet (10 mg total) by mouth daily    METFORMIN (GLUMETZA) 1000 MG (MOD) 24 HR TABLET    Take 1,000 mg by mouth 2 (two) times a day with meals     METHOTREXATE 2 5 MG TABLET        METOPROLOL TARTRATE (LOPRESSOR) 50 MG TABLET    50 mg every 12 (twelve) hours    MULTIPLE VITAMIN (MULTIVITAMIN) TABLET    Take 1 tablet by mouth daily    NITROGLYCERIN (NITROSTAT) 0 4 MG SL TABLET    Place 1 tablet (0 4 mg total) under the tongue every 5 (five) minutes as needed for chest pain    OMEGA-3-ACID ETHYL ESTERS (LOVAZA) 1 G CAPSULE    Take 1,000 mg by mouth daily    OMEPRAZOLE (PRILOSEC) 20 MG DELAYED RELEASE CAPSULE    Take 20 mg by mouth daily    PROBIOTIC PRODUCT (PROBIOTIC-10 PO)    Take by mouth daily     TOFACITINIB CITRATE (XELJANZ XR PO)    Take 11 mg by mouth daily    TRAMADOL (ULTRAM) 50 MG TABLET    Take 50 mg by mouth as needed for moderate pain    VICTOZA 18 MG/3ML SOPN    inject 1 2 milligram subcutaneously daily IF TOLERATED- Takes at 1400   Modified Medications    No medications on file   Discontinued Medications    BLACK COHOSH 40 MG CAPS    Take 40 mg by mouth daily     Allergies   Allergen Reactions    Acetazolamide Hives    Barbiturates Other (See Comments)     stomach pain    Morphine GI Intolerance     Other reaction(s): GI Intolerance    Sulfa Antibiotics Hives and Rash    Sulfasalazine Hives and Rash     Past Medical History:   Diagnosis Date    Ankylosing spondylitis (HCC)     Arthritis     Diabetes mellitus (HCC)     GERD (gastroesophageal reflux disease)     Heart attack ([facility] [address] )     Hematuria     last assessed 9/7/13    Hyperlipidemia     Hypertension     Nephrolithiasis 9/7/2013    Subclinical hypothyroidism 9/2/2015     Social History     Socioeconomic History    Marital status: /Civil Union     Spouse name: Not on file    Number of children: Not on file    Years of education: Not on file    Highest education level: Not on file   Occupational History    Not on file   Social Needs    Financial resource strain: Not on file    Food insecurity     Worry: Not on file     Inability: Not on file    Transportation needs     Medical: Not on file     Non-medical: Not on file   Tobacco Use    Smoking status: Never Smoker    Smokeless tobacco: Never Used   Substance and Sexual Activity    Alcohol use: Yes     Frequency: Never     Binge frequency: Never     Comment: very rarely    Drug use: No    Sexual activity: Not on file   Lifestyle    Physical activity     Days per week: Not on file     Minutes per session: Not on file    Stress: Not on file   Relationships    Social connections     Talks on phone: Not on file     Gets together: Not on file     Attends [employer] service: Not on file     Active member of club or organization: Not on file     Attends meetings of clubs or organizations: Not on file     Relationship status: Not on file    Intimate partner violence     Fear of current or ex partner: Not on file     Emotionally abused: Not on file     Physically abused: Not on file     Forced sexual activity: Not on file   Other Topics Concern    Not on file   Social History Narrative    Not on file     Immunization History   Administered Date(s) Administered    Influenza Quadrivalent, 6-35 Months IM 10/15/2014, 10/15/2015, 09/22/2016, 10/11/2017    Influenza, high dose seasonal 0 7 mL 10/20/2020    Influenza, injectable, quadrivalent, preservative free 0 5 mL 10/15/2018    Influenza, recombinant, quadrivalent,injectable, preservative free 10/07/2019    Influenza, seasonal, injectable 10/12/2012, 10/18/2013    Pneumococcal Conjugate 13-Valent 07/28/2020    Pneumococcal Polysaccharide PPV23 07/16/2019    Tdap 07/16/2019       Objective:  /76   Pulse 74 Temp (!) 97 1 °F (36 2 °C)   Resp 16   Ht 5' 2 5" (1 588 m)   Wt 111 kg (245 lb)   BMI 44 10 kg/m²   BP Readings from Last 3 Encounters:   02/12/21 132/76   07/28/20 122/64   05/08/20 120/78      Wt Readings from Last 3 Encounters:   02/12/21 111 kg (245 lb)   07/28/20 107 kg (236 lb)   05/08/20 107 kg (235 lb)      Physical Exam  Constitutional:       General: She is not in acute distress  Appearance: Normal appearance  She is obese  She is not ill-appearing or toxic-appearing  HENT:      Head: Normocephalic and atraumatic  Right Ear: External ear normal       Left Ear: External ear normal    Eyes:      Extraocular Movements: Extraocular movements intact  Conjunctiva/sclera: Conjunctivae normal    Neck:      Musculoskeletal: Normal range of motion and neck supple  Cardiovascular:      Rate and Rhythm: Normal rate and regular rhythm  Pulses: Normal pulses  Heart sounds: Normal heart sounds  Pulmonary:      Effort: Pulmonary effort is normal  No respiratory distress  Abdominal:      General: There is no distension  Palpations: Abdomen is soft  Tenderness: There is no abdominal tenderness  Musculoskeletal: Normal range of motion  Right lower leg: No edema  Left lower leg: No edema  Skin:     General: Skin is warm and dry  Findings: No erythema or rash  Neurological:      General: No focal deficit present  Mental Status: She is alert and oriented to person, place, and time  Gait: Gait abnormal (Uses cane for ambulation)     Psychiatric:         Mood and Affect: Mood normal          Behavior: Behavior normal          Lab Results   Component Value Date    WBC 5 98 11/20/2020    HGB 12 2 11/20/2020    HCT 38 5 11/20/2020    MCV 95 11/20/2020     11/20/2020     Lab Results   Component Value Date     09/01/2015    SODIUM 138 11/20/2020    K 4 2 11/20/2020     11/20/2020    CO2 29 11/20/2020    ANIONGAP 11 09/01/2015    AGAP 4 11/20/2020    BUN 19 11/20/2020    CREATININE 0 72 11/20/2020    GLUC 342 (H) 03/05/2017    GLUF 140 (H) 11/20/2020    CALCIUM 9 9 11/20/2020    AST 19 11/20/2020    ALT 42 11/20/2020    ALKPHOS 99 11/20/2020    PROT 7 7 09/01/2015    TP 7 2 11/20/2020    BILITOT 0 41 09/01/2015    TBILI 0 70 11/20/2020    EGFR 88 11/20/2020     Lab Results   Component Value Date    HGBA1C 7 7 (H) 11/20/2020         Patient Care Team:  Annie Quesada MD as PCP - General (Family Medicine)  Mitch Spicer MD as PCP - PCP-[facility]  MD Coretta Thomas MD Melody Carson, MD    Note: Portions of the record may have been created with voice recognition software  Occasional wrong word or "sound a like" substitutions may have occurred due to the inherent limitations of voice recognition software  Read the chart carefully and recognize, using context, where substitutions have occurred

## 2021-02-12 NOTE — ASSESSMENT & PLAN NOTE
BP Readings from Last 3 Encounters:   07/28/20 122/64   05/08/20 120/78   04/17/20 100/78     Lab Results   Component Value Date    CREATININE 0 72 11/20/2020    EGFR 88 11/20/2020     Controlled, continue Metoprolol tartrate 50mg q12h and Lisinopril 10mg daily

## 2021-02-12 NOTE — ASSESSMENT & PLAN NOTE
CAD s/p LAD stent in March 2018, f/w Dr Ron Carey [facility])  Continue ASA 81mg, statin, beta blocker

## 2021-03-02 ENCOUNTER — TRANSCRIBE ORDERS (OUTPATIENT)
Dept: ADMINISTRATIVE | Facility: HOSPITAL | Age: 66
End: 2021-03-02

## 2021-03-02 ENCOUNTER — LAB (OUTPATIENT)
Dept: LAB | Facility: MEDICAL CENTER | Age: 66
End: 2021-03-02
Payer: COMMERCIAL

## 2021-03-02 DIAGNOSIS — R79.89 ABNORMAL TSH: ICD-10-CM

## 2021-03-02 DIAGNOSIS — Z79.4 ENCOUNTER FOR LONG-TERM (CURRENT) USE OF INSULIN (HCC): ICD-10-CM

## 2021-03-02 DIAGNOSIS — E11.65 UNCONTROLLED TYPE 2 DIABETES MELLITUS WITH HYPERGLYCEMIA (HCC): ICD-10-CM

## 2021-03-02 DIAGNOSIS — E11.649 UNCONTROLLED TYPE 2 DIABETES MELLITUS WITH HYPOGLYCEMIA, UNSPECIFIED HYPOGLYCEMIA COMA STATUS (HCC): Primary | ICD-10-CM

## 2021-03-02 LAB
EST. AVERAGE GLUCOSE BLD GHB EST-MCNC: 166 MG/DL
HBA1C MFR BLD: 7.4 %
TSH SERPL DL<=0.05 MIU/L-ACNC: 2.51 UIU/ML (ref 0.36–3.74)

## 2021-03-02 PROCEDURE — 3051F HG A1C>EQUAL 7.0%<8.0%: CPT | Performed by: FAMILY MEDICINE

## 2021-03-02 PROCEDURE — 83036 HEMOGLOBIN GLYCOSYLATED A1C: CPT

## 2021-03-02 PROCEDURE — 36415 COLL VENOUS BLD VENIPUNCTURE: CPT

## 2021-03-02 PROCEDURE — 84443 ASSAY THYROID STIM HORMONE: CPT

## 2021-03-02 PROCEDURE — 82043 UR ALBUMIN QUANTITATIVE: CPT | Performed by: INTERNAL MEDICINE

## 2021-03-02 PROCEDURE — 82570 ASSAY OF URINE CREATININE: CPT | Performed by: INTERNAL MEDICINE

## 2021-03-03 LAB
CREAT UR-MCNC: 85.8 MG/DL
MICROALBUMIN UR-MCNC: 13.1 MG/L (ref 0–20)
MICROALBUMIN/CREAT 24H UR: 15 MG/G CREATININE (ref 0–30)

## 2021-03-03 PROCEDURE — 3061F NEG MICROALBUMINURIA REV: CPT | Performed by: FAMILY MEDICINE

## 2021-03-10 DIAGNOSIS — Z23 ENCOUNTER FOR IMMUNIZATION: ICD-10-CM

## 2021-05-04 ENCOUNTER — TRANSCRIBE ORDERS (OUTPATIENT)
Dept: ADMINISTRATIVE | Facility: HOSPITAL | Age: 66
End: 2021-05-04

## 2021-05-04 ENCOUNTER — APPOINTMENT (OUTPATIENT)
Dept: LAB | Facility: MEDICAL CENTER | Age: 66
End: 2021-05-04
Payer: COMMERCIAL

## 2021-05-04 DIAGNOSIS — M19.90 INFLAMMATORY ARTHRITIS: Primary | ICD-10-CM

## 2021-05-04 LAB
ALBUMIN SERPL BCP-MCNC: 3.3 G/DL (ref 3.5–5)
ALP SERPL-CCNC: 94 U/L (ref 46–116)
ALT SERPL W P-5'-P-CCNC: 30 U/L (ref 12–78)
ANION GAP SERPL CALCULATED.3IONS-SCNC: 5 MMOL/L (ref 4–13)
AST SERPL W P-5'-P-CCNC: 15 U/L (ref 5–45)
BACTERIA UR QL AUTO: NORMAL /HPF
BASOPHILS # BLD AUTO: 0.03 THOUSANDS/ΜL (ref 0–0.1)
BASOPHILS NFR BLD AUTO: 1 % (ref 0–1)
BILIRUB SERPL-MCNC: 0.57 MG/DL (ref 0.2–1)
BILIRUB UR QL STRIP: NEGATIVE
BUN SERPL-MCNC: 16 MG/DL (ref 5–25)
CALCIUM ALBUM COR SERPL-MCNC: 9.9 MG/DL (ref 8.3–10.1)
CALCIUM SERPL-MCNC: 9.3 MG/DL (ref 8.3–10.1)
CHLORIDE SERPL-SCNC: 106 MMOL/L (ref 100–108)
CLARITY UR: CLEAR
CO2 SERPL-SCNC: 27 MMOL/L (ref 21–32)
COLOR UR: YELLOW
CREAT SERPL-MCNC: 0.58 MG/DL (ref 0.6–1.3)
CRP SERPL QL: <3 MG/L
EOSINOPHIL # BLD AUTO: 0.36 THOUSAND/ΜL (ref 0–0.61)
EOSINOPHIL NFR BLD AUTO: 6 % (ref 0–6)
ERYTHROCYTE [DISTWIDTH] IN BLOOD BY AUTOMATED COUNT: 14.1 % (ref 11.6–15.1)
ERYTHROCYTE [SEDIMENTATION RATE] IN BLOOD: 75 MM/HOUR (ref 0–29)
GFR SERPL CREATININE-BSD FRML MDRD: 96 ML/MIN/1.73SQ M
GLUCOSE P FAST SERPL-MCNC: 133 MG/DL (ref 65–99)
GLUCOSE UR STRIP-MCNC: NEGATIVE MG/DL
HCT VFR BLD AUTO: 35.4 % (ref 34.8–46.1)
HGB BLD-MCNC: 11.2 G/DL (ref 11.5–15.4)
HGB UR QL STRIP.AUTO: NEGATIVE
HYALINE CASTS #/AREA URNS LPF: NORMAL /LPF
IMM GRANULOCYTES # BLD AUTO: 0.03 THOUSAND/UL (ref 0–0.2)
IMM GRANULOCYTES NFR BLD AUTO: 1 % (ref 0–2)
KETONES UR STRIP-MCNC: NEGATIVE MG/DL
LEUKOCYTE ESTERASE UR QL STRIP: NEGATIVE
LYMPHOCYTES # BLD AUTO: 0.81 THOUSANDS/ΜL (ref 0.6–4.47)
LYMPHOCYTES NFR BLD AUTO: 14 % (ref 14–44)
MCH RBC QN AUTO: 29.1 PG (ref 26.8–34.3)
MCHC RBC AUTO-ENTMCNC: 31.6 G/DL (ref 31.4–37.4)
MCV RBC AUTO: 92 FL (ref 82–98)
MONOCYTES # BLD AUTO: 0.56 THOUSAND/ΜL (ref 0.17–1.22)
MONOCYTES NFR BLD AUTO: 10 % (ref 4–12)
NEUTROPHILS # BLD AUTO: 3.88 THOUSANDS/ΜL (ref 1.85–7.62)
NEUTS SEG NFR BLD AUTO: 68 % (ref 43–75)
NITRITE UR QL STRIP: NEGATIVE
NON-SQ EPI CELLS URNS QL MICRO: NORMAL /HPF
NRBC BLD AUTO-RTO: 0 /100 WBCS
PH UR STRIP.AUTO: 7 [PH]
PLATELET # BLD AUTO: 269 THOUSANDS/UL (ref 149–390)
PMV BLD AUTO: 10 FL (ref 8.9–12.7)
POTASSIUM SERPL-SCNC: 4.1 MMOL/L (ref 3.5–5.3)
PROT SERPL-MCNC: 7.2 G/DL (ref 6.4–8.2)
PROT UR STRIP-MCNC: NEGATIVE MG/DL
RBC # BLD AUTO: 3.85 MILLION/UL (ref 3.81–5.12)
RBC #/AREA URNS AUTO: NORMAL /HPF
SODIUM SERPL-SCNC: 138 MMOL/L (ref 136–145)
SP GR UR STRIP.AUTO: 1.01 (ref 1–1.03)
UROBILINOGEN UR QL STRIP.AUTO: 0.2 E.U./DL
WBC # BLD AUTO: 5.67 THOUSAND/UL (ref 4.31–10.16)
WBC #/AREA URNS AUTO: NORMAL /HPF

## 2021-05-04 PROCEDURE — 85025 COMPLETE CBC W/AUTO DIFF WBC: CPT | Performed by: INTERNAL MEDICINE

## 2021-05-04 PROCEDURE — 80053 COMPREHEN METABOLIC PANEL: CPT | Performed by: INTERNAL MEDICINE

## 2021-05-04 PROCEDURE — 86140 C-REACTIVE PROTEIN: CPT | Performed by: INTERNAL MEDICINE

## 2021-05-04 PROCEDURE — 36415 COLL VENOUS BLD VENIPUNCTURE: CPT | Performed by: INTERNAL MEDICINE

## 2021-05-04 PROCEDURE — 85652 RBC SED RATE AUTOMATED: CPT | Performed by: INTERNAL MEDICINE

## 2021-05-04 PROCEDURE — 81001 URINALYSIS AUTO W/SCOPE: CPT | Performed by: INTERNAL MEDICINE

## 2021-06-11 ENCOUNTER — TRANSCRIBE ORDERS (OUTPATIENT)
Dept: ADMINISTRATIVE | Facility: HOSPITAL | Age: 66
End: 2021-06-11

## 2021-06-11 ENCOUNTER — APPOINTMENT (OUTPATIENT)
Dept: LAB | Facility: MEDICAL CENTER | Age: 66
End: 2021-06-11
Payer: COMMERCIAL

## 2021-06-11 DIAGNOSIS — M05.79 RHEUMATOID ARTHRITIS INVOLVING MULTIPLE SITES WITH POSITIVE RHEUMATOID FACTOR (HCC): ICD-10-CM

## 2021-06-11 DIAGNOSIS — Z79.4 UNCONTROLLED DIABETES MELLITUS WITH HYPERGLYCEMIA, WITH LONG-TERM CURRENT USE OF INSULIN (HCC): Primary | ICD-10-CM

## 2021-06-11 DIAGNOSIS — E11.65 UNCONTROLLED DIABETES MELLITUS WITH HYPERGLYCEMIA, WITH LONG-TERM CURRENT USE OF INSULIN (HCC): Primary | ICD-10-CM

## 2021-06-11 DIAGNOSIS — M05.79 RHEUMATOID ARTHRITIS INVOLVING MULTIPLE SITES WITH POSITIVE RHEUMATOID FACTOR (HCC): Primary | ICD-10-CM

## 2021-06-11 LAB
EST. AVERAGE GLUCOSE BLD GHB EST-MCNC: 166 MG/DL
HBA1C MFR BLD: 7.4 %
HBV SURFACE AG SER QL: NORMAL
HCV AB SER QL: NORMAL

## 2021-06-11 PROCEDURE — 83036 HEMOGLOBIN GLYCOSYLATED A1C: CPT | Performed by: INTERNAL MEDICINE

## 2021-06-11 PROCEDURE — 86803 HEPATITIS C AB TEST: CPT | Performed by: INTERNAL MEDICINE

## 2021-06-11 PROCEDURE — 36415 COLL VENOUS BLD VENIPUNCTURE: CPT | Performed by: INTERNAL MEDICINE

## 2021-06-11 PROCEDURE — 86480 TB TEST CELL IMMUN MEASURE: CPT

## 2021-06-11 PROCEDURE — 87340 HEPATITIS B SURFACE AG IA: CPT

## 2021-06-16 LAB
GAMMA INTERFERON BACKGROUND BLD IA-ACNC: 0.1 IU/ML
M TB IFN-G BLD-IMP: NEGATIVE
M TB IFN-G CD4+ BCKGRND COR BLD-ACNC: 0.01 IU/ML
M TB IFN-G CD4+ BCKGRND COR BLD-ACNC: 0.01 IU/ML
MITOGEN IGNF BCKGRD COR BLD-ACNC: 4.6 IU/ML

## 2021-07-26 DIAGNOSIS — I10 ESSENTIAL HYPERTENSION: ICD-10-CM

## 2021-07-26 RX ORDER — LISINOPRIL 10 MG/1
10 TABLET ORAL DAILY
Qty: 90 TABLET | Refills: 1 | Status: SHIPPED | OUTPATIENT
Start: 2021-07-26 | End: 2022-01-10 | Stop reason: SDUPTHER

## 2021-08-03 ENCOUNTER — APPOINTMENT (OUTPATIENT)
Dept: LAB | Facility: MEDICAL CENTER | Age: 66
End: 2021-08-03
Payer: COMMERCIAL

## 2021-09-14 ENCOUNTER — APPOINTMENT (OUTPATIENT)
Dept: LAB | Facility: MEDICAL CENTER | Age: 66
End: 2021-09-14
Payer: COMMERCIAL

## 2021-09-28 ENCOUNTER — TELEMEDICINE (OUTPATIENT)
Dept: FAMILY MEDICINE CLINIC | Facility: CLINIC | Age: 66
End: 2021-09-28
Payer: COMMERCIAL

## 2021-09-28 DIAGNOSIS — J01.10 ACUTE NON-RECURRENT FRONTAL SINUSITIS: Primary | ICD-10-CM

## 2021-09-28 PROCEDURE — 3725F SCREEN DEPRESSION PERFORMED: CPT | Performed by: PHYSICIAN ASSISTANT

## 2021-09-28 PROCEDURE — 99213 OFFICE O/P EST LOW 20 MIN: CPT | Performed by: PHYSICIAN ASSISTANT

## 2021-09-28 NOTE — PROGRESS NOTES
Virtual Regular Visit    Verification of patient location:    Patient is located in the following state in which I hold an active license [place]      Assessment/Plan:    Problem List Items Addressed This Visit     None      Visit Diagnoses     Acute non-recurrent frontal sinusitis    -  Primary      - Recommended saline nasal spray and intranasal steroid spray  - Pt will continue with OTC supportive medications  - Amoxicillin ONLY is symptoms persist for 10 days or longer from onset  - Directed pt to return if fever over 101, visual changes, worsening headache  Discussed may consider COVID testing if symptoms persist despite antibiotics    BMI Counseling: There is no height or weight on file to calculate BMI  The BMI is above normal  Nutrition recommendations include decreasing portion sizes, encouraging healthy choices of fruits and vegetables, limiting drinks that contain sugar and reducing intake of cholesterol  Exercise recommendations include moderate physical activity 150 minutes/week  No pharmacotherapy was ordered  Rationale for BMI follow-up plan is due to patient being overweight or obese  Reason for visit is   Chief Complaint   Patient presents with    Virtual Regular Visit        Encounter provider Thomas Pratt PA-C    Provider located at [place] [address]  [phone]      Recent Visits  No visits were found meeting these conditions  Showing recent visits within past 7 days and meeting all other requirements  Today's Visits  Date Type Provider Dept   09/28/21 Telemedicine [facility]   Showing today's visits and meeting all other requirements  Future Appointments  No visits were found meeting these conditions  Showing future appointments within next 150 days and meeting all other requirements       The patient was identified by name and date of birth   Dariel Smith was informed that this is a telemedicine visit and that the visit is being conducted through [address] [place] [address] Now and patient was informed that this is a secure, HIPAA-compliant platform  She agrees to proceed     My office door was closed  No one else was in the room  She acknowledged consent and understanding of privacy and security of the video platform  The patient has agreed to participate and understands they can discontinue the visit at any time  Patient is aware this is a billable service  Subjective  Rica Milton is a 77 y o  female presenting today for Left sided sinus pressure starting 7 days ago  Partial improvement with saline spray and OTC allergy medication  Sinusitis  This is a new problem  There has been no fever  Associated symptoms include ear pain and sinus pressure  Pertinent negatives include no coughing, shortness of breath or sore throat  Past treatments include acetaminophen  The treatment provided no relief          Past Medical History:   Diagnosis Date    Ankylosing spondylitis ([facility] [address] )     Arthritis     Diabetes mellitus ([facility] [address] )     GERD (gastroesophageal reflux disease)     Heart attack ([facility] [address] )     Hematuria     last assessed 9/7/13    Hyperlipidemia     Hypertension     Nephrolithiasis 9/7/2013    Subclinical hypothyroidism 9/2/2015       Past Surgical History:   Procedure Laterality Date    APPENDECTOMY      BACK SURGERY      mulitple surgery, fusions "top to bottom"    BREAST SURGERY Left     biopsy    CERVICAL SPINE SURGERY      CHOLECYSTECTOMY      CORONARY ANGIOPLASTY WITH STENT PLACEMENT      CYSTOSCOPY  05/06/2015    Diagnostic, last assessed 5/6/15    HYSTERECTOMY      LITHOTRIPSY      MT CYSTO/URETERO W/LITHOTRIPSY &INDWELL STENT INSRT Left 4/18/2019    Procedure: CYSTO, URETEROSCOPY W/HOLMIUM LASER, RETROGRADE PYELOGRAM, STENT INSERTION;  Surgeon: Christine Nye MD;  Location: [facility];  Service: Urology       Current Outpatient Medications   Medication Sig Dispense Refill    ALPRAZolam (XANAX) 0 25 mg tablet as needed      aspirin 81 MG tablet Take 81 mg by mouth daily      atorvastatin (LIPITOR) 80 mg tablet Take 80 mg by mouth Medrol Dose Pack scheduling ONLY      B-D UF III MINI PEN NEEDLES 31G X 5 MM MISC use 2 daily as directed  0    calcium carbonate (OS-RILEY) 600 MG tablet Take 400 mg by mouth daily      Cholecalciferol (VITAMIN D) 2000 units tablet Take 2,000 Units by mouth daily      diclofenac sodium (VOLTAREN) 1 % Apply 2 g topically 4 (four) times a day 1 Tube 1    fluticasone (FLONASE) 50 mcg/act nasal spray 1 spray into each nostril daily as needed      folic acid (FOLVITE) 636 MCG tablet Take 800 mg by mouth daily      hydroxychloroquine (PLAQUENIL) 200 mg tablet Take 200 mg by mouth 2 (two) times a day with meals      insulin degludec (TRESIBA FLEXTOUCH) 200 units/mL CONCENTRATED U-200 injection pen Inject 50 Units under the skin daily at bedtime       Insulin Pen Needle (B-D ULTRAFINE III SHORT PEN) 31G X 8 MM MISC by Does not apply route      lisinopril (ZESTRIL) 10 mg tablet Take 1 tablet (10 mg total) by mouth daily 90 tablet 1    metFORMIN (GLUMETZA) 1000 MG (MOD) 24 hr tablet Take 1,000 mg by mouth 2 (two) times a day with meals       methotrexate 2 5 mg tablet       metoprolol tartrate (LOPRESSOR) 50 mg tablet 50 mg every 12 (twelve) hours      Multiple Vitamin (MULTIVITAMIN) tablet Take 1 tablet by mouth daily      nitroglycerin (NITROSTAT) 0 4 mg SL tablet Place 1 tablet (0 4 mg total) under the tongue every 5 (five) minutes as needed for chest pain 90 tablet 1    omega-3-acid ethyl esters (LOVAZA) 1 g capsule Take 1,000 mg by mouth daily      omeprazole (PriLOSEC) 20 mg delayed release capsule Take 20 mg by mouth daily      Probiotic Product (PROBIOTIC-10 PO) Take by mouth daily       Tofacitinib Citrate (XELJANZ XR PO) Take 11 mg by mouth daily      traMADol (ULTRAM) 50 mg tablet Take 50 mg by mouth as needed for moderate pain      VICTOZA 18 MG/3ML SOPN inject 1 2 milligram subcutaneously daily IF TOLERATED- Takes at 1400  0     No current facility-administered medications for this visit  Allergies   Allergen Reactions    Acetazolamide Hives    Barbiturates Other (See Comments)     stomach pain    Morphine GI Intolerance     Other reaction(s): GI Intolerance    Sulfa Antibiotics Hives and Rash    Sulfasalazine Hives and Rash       Review of Systems   Constitutional: Negative for fatigue, fever and unexpected weight change  HENT: Positive for ear pain, facial swelling, sinus pressure and sinus pain  Negative for rhinorrhea and sore throat  Respiratory: Negative for cough, shortness of breath and wheezing  Cardiovascular: Negative for chest pain, palpitations and leg swelling  Gastrointestinal: Negative for constipation, diarrhea and nausea  Musculoskeletal: Negative for arthralgias and myalgias  Video Exam    There were no vitals filed for this visit  Physical Exam  Constitutional:       General: She is not in acute distress  Appearance: She is well-developed  She is not diaphoretic  HENT:      Head: Normocephalic and atraumatic  Pulmonary:      Effort: Pulmonary effort is normal  No respiratory distress  Breath sounds: No wheezing  Neurological:      Mental Status: She is alert and oriented to person, place, and time  Psychiatric:         Behavior: Behavior normal          Thought Content: Thought content normal             I spent 7 minutes directly with the patient during this visit    [id number] [facility] verbally agrees to participate in [employer]  Pt is aware that [employer] could be limited without vital signs or the ability to perform a full hands-on physical exam  Delia Samano understands she or the provider may request at any time to terminate the video visit and request the patient to seek care or treatment in person

## 2021-09-29 ENCOUNTER — TELEPHONE (OUTPATIENT)
Dept: FAMILY MEDICINE CLINIC | Facility: CLINIC | Age: 66
End: 2021-09-29

## 2021-09-29 NOTE — TELEPHONE ENCOUNTER
Patient asked for antibiotic to be called in  Patient had OV yesterday & has had symptoms of sinus pain & congestion  for 9 days  She is going on vacation next week & wants an antibiotic to before she leaves

## 2021-10-20 ENCOUNTER — IMMUNIZATIONS (OUTPATIENT)
Dept: FAMILY MEDICINE CLINIC | Facility: CLINIC | Age: 66
End: 2021-10-20
Payer: COMMERCIAL

## 2021-10-20 DIAGNOSIS — Z23 ENCOUNTER FOR IMMUNIZATION: Primary | ICD-10-CM

## 2021-10-20 PROCEDURE — 90471 IMMUNIZATION ADMIN: CPT

## 2021-10-20 PROCEDURE — 90662 IIV NO PRSV INCREASED AG IM: CPT

## 2021-11-05 ENCOUNTER — APPOINTMENT (OUTPATIENT)
Dept: LAB | Facility: MEDICAL CENTER | Age: 66
End: 2021-11-05
Payer: COMMERCIAL

## 2021-11-05 DIAGNOSIS — M05.79 RHEUMATOID ARTHRITIS INVOLVING MULTIPLE SITES WITH POSITIVE RHEUMATOID FACTOR (HCC): ICD-10-CM

## 2021-11-05 LAB
ALBUMIN SERPL BCP-MCNC: 3.3 G/DL (ref 3.5–5)
ALP SERPL-CCNC: 93 U/L (ref 46–116)
ALT SERPL W P-5'-P-CCNC: 50 U/L (ref 12–78)
ANION GAP SERPL CALCULATED.3IONS-SCNC: 3 MMOL/L (ref 4–13)
AST SERPL W P-5'-P-CCNC: 30 U/L (ref 5–45)
BASOPHILS # BLD AUTO: 0.03 THOUSANDS/ΜL (ref 0–0.1)
BASOPHILS NFR BLD AUTO: 1 % (ref 0–1)
BILIRUB SERPL-MCNC: 0.64 MG/DL (ref 0.2–1)
BUN SERPL-MCNC: 22 MG/DL (ref 5–25)
CALCIUM ALBUM COR SERPL-MCNC: 10.2 MG/DL (ref 8.3–10.1)
CALCIUM SERPL-MCNC: 9.6 MG/DL (ref 8.3–10.1)
CHLORIDE SERPL-SCNC: 104 MMOL/L (ref 100–108)
CO2 SERPL-SCNC: 29 MMOL/L (ref 21–32)
CREAT SERPL-MCNC: 0.66 MG/DL (ref 0.6–1.3)
CRP SERPL QL: 3.9 MG/L
EOSINOPHIL # BLD AUTO: 0.24 THOUSAND/ΜL (ref 0–0.61)
EOSINOPHIL NFR BLD AUTO: 4 % (ref 0–6)
ERYTHROCYTE [DISTWIDTH] IN BLOOD BY AUTOMATED COUNT: 14.8 % (ref 11.6–15.1)
ERYTHROCYTE [SEDIMENTATION RATE] IN BLOOD: 36 MM/HOUR (ref 0–29)
GFR SERPL CREATININE-BSD FRML MDRD: 92 ML/MIN/1.73SQ M
GLUCOSE P FAST SERPL-MCNC: 165 MG/DL (ref 65–99)
HCT VFR BLD AUTO: 36.2 % (ref 34.8–46.1)
HGB BLD-MCNC: 11.2 G/DL (ref 11.5–15.4)
IMM GRANULOCYTES # BLD AUTO: 0.02 THOUSAND/UL (ref 0–0.2)
IMM GRANULOCYTES NFR BLD AUTO: 0 % (ref 0–2)
LYMPHOCYTES # BLD AUTO: 1.22 THOUSANDS/ΜL (ref 0.6–4.47)
LYMPHOCYTES NFR BLD AUTO: 22 % (ref 14–44)
MCH RBC QN AUTO: 27.8 PG (ref 26.8–34.3)
MCHC RBC AUTO-ENTMCNC: 30.9 G/DL (ref 31.4–37.4)
MCV RBC AUTO: 90 FL (ref 82–98)
MONOCYTES # BLD AUTO: 0.6 THOUSAND/ΜL (ref 0.17–1.22)
MONOCYTES NFR BLD AUTO: 11 % (ref 4–12)
NEUTROPHILS # BLD AUTO: 3.38 THOUSANDS/ΜL (ref 1.85–7.62)
NEUTS SEG NFR BLD AUTO: 62 % (ref 43–75)
NRBC BLD AUTO-RTO: 0 /100 WBCS
PLATELET # BLD AUTO: 298 THOUSANDS/UL (ref 149–390)
PMV BLD AUTO: 10.2 FL (ref 8.9–12.7)
POTASSIUM SERPL-SCNC: 4.1 MMOL/L (ref 3.5–5.3)
PROT SERPL-MCNC: 6.9 G/DL (ref 6.4–8.2)
RBC # BLD AUTO: 4.03 MILLION/UL (ref 3.81–5.12)
SODIUM SERPL-SCNC: 136 MMOL/L (ref 136–145)
WBC # BLD AUTO: 5.49 THOUSAND/UL (ref 4.31–10.16)

## 2021-11-05 PROCEDURE — 86140 C-REACTIVE PROTEIN: CPT

## 2021-11-05 PROCEDURE — 85652 RBC SED RATE AUTOMATED: CPT

## 2021-11-05 PROCEDURE — 36415 COLL VENOUS BLD VENIPUNCTURE: CPT

## 2021-11-05 PROCEDURE — 80053 COMPREHEN METABOLIC PANEL: CPT

## 2021-11-05 PROCEDURE — 85025 COMPLETE CBC W/AUTO DIFF WBC: CPT

## 2021-12-21 ENCOUNTER — APPOINTMENT (OUTPATIENT)
Dept: LAB | Facility: MEDICAL CENTER | Age: 66
End: 2021-12-21
Payer: COMMERCIAL

## 2021-12-21 DIAGNOSIS — Z79.4 ENCOUNTER FOR LONG-TERM (CURRENT) USE OF INSULIN (HCC): ICD-10-CM

## 2021-12-21 DIAGNOSIS — E11.42 DIABETIC POLYNEUROPATHY ASSOCIATED WITH TYPE 2 DIABETES MELLITUS (HCC): ICD-10-CM

## 2021-12-21 LAB
EST. AVERAGE GLUCOSE BLD GHB EST-MCNC: 183 MG/DL
HBA1C MFR BLD: 8 %

## 2021-12-21 PROCEDURE — 36415 COLL VENOUS BLD VENIPUNCTURE: CPT

## 2021-12-21 PROCEDURE — 83036 HEMOGLOBIN GLYCOSYLATED A1C: CPT

## 2022-01-10 DIAGNOSIS — E11.29 MICROALBUMINURIA DUE TO TYPE 2 DIABETES MELLITUS (HCC): ICD-10-CM

## 2022-01-10 DIAGNOSIS — R80.9 MICROALBUMINURIA DUE TO TYPE 2 DIABETES MELLITUS (HCC): ICD-10-CM

## 2022-01-10 DIAGNOSIS — I10 ESSENTIAL HYPERTENSION: ICD-10-CM

## 2022-01-10 DIAGNOSIS — I10 ESSENTIAL HYPERTENSION: Primary | ICD-10-CM

## 2022-01-10 RX ORDER — LISINOPRIL 10 MG/1
10 TABLET ORAL DAILY
Qty: 90 TABLET | Refills: 0 | Status: SHIPPED | OUTPATIENT
Start: 2022-01-10 | End: 2022-04-25 | Stop reason: SDUPTHER

## 2022-01-10 NOTE — TELEPHONE ENCOUNTER
90 days, no refills, patient due for physical/follow-up next month  Will place lab orders  Please have patient go for blood work prior to follow-up

## 2022-01-19 ENCOUNTER — APPOINTMENT (OUTPATIENT)
Dept: LAB | Facility: MEDICAL CENTER | Age: 67
End: 2022-01-19
Payer: COMMERCIAL

## 2022-01-19 DIAGNOSIS — I10 ESSENTIAL HYPERTENSION: ICD-10-CM

## 2022-01-19 DIAGNOSIS — M05.79 RHEUMATOID ARTHRITIS INVOLVING MULTIPLE SITES WITH POSITIVE RHEUMATOID FACTOR (HCC): ICD-10-CM

## 2022-01-19 LAB
ALBUMIN SERPL BCP-MCNC: 3.8 G/DL (ref 3.5–5)
ALP SERPL-CCNC: 103 U/L (ref 46–116)
ALT SERPL W P-5'-P-CCNC: 34 U/L (ref 12–78)
ANION GAP SERPL CALCULATED.3IONS-SCNC: 5 MMOL/L (ref 4–13)
AST SERPL W P-5'-P-CCNC: 20 U/L (ref 5–45)
BASOPHILS # BLD AUTO: 0.02 THOUSANDS/ΜL (ref 0–0.1)
BASOPHILS NFR BLD AUTO: 0 % (ref 0–1)
BILIRUB SERPL-MCNC: 0.66 MG/DL (ref 0.2–1)
BUN SERPL-MCNC: 17 MG/DL (ref 5–25)
CALCIUM SERPL-MCNC: 9.8 MG/DL (ref 8.3–10.1)
CHLORIDE SERPL-SCNC: 102 MMOL/L (ref 100–108)
CO2 SERPL-SCNC: 29 MMOL/L (ref 21–32)
CREAT SERPL-MCNC: 0.71 MG/DL (ref 0.6–1.3)
CREAT UR-MCNC: 31.1 MG/DL
CRP SERPL QL: <3 MG/L
EOSINOPHIL # BLD AUTO: 0.21 THOUSAND/ΜL (ref 0–0.61)
EOSINOPHIL NFR BLD AUTO: 3 % (ref 0–6)
ERYTHROCYTE [DISTWIDTH] IN BLOOD BY AUTOMATED COUNT: 14.9 % (ref 11.6–15.1)
ERYTHROCYTE [SEDIMENTATION RATE] IN BLOOD: 39 MM/HOUR (ref 0–29)
GFR SERPL CREATININE-BSD FRML MDRD: 89 ML/MIN/1.73SQ M
GLUCOSE P FAST SERPL-MCNC: 138 MG/DL (ref 65–99)
HCT VFR BLD AUTO: 37.7 % (ref 34.8–46.1)
HGB BLD-MCNC: 11.6 G/DL (ref 11.5–15.4)
IMM GRANULOCYTES # BLD AUTO: 0.03 THOUSAND/UL (ref 0–0.2)
IMM GRANULOCYTES NFR BLD AUTO: 0 % (ref 0–2)
LYMPHOCYTES # BLD AUTO: 1.07 THOUSANDS/ΜL (ref 0.6–4.47)
LYMPHOCYTES NFR BLD AUTO: 14 % (ref 14–44)
MCH RBC QN AUTO: 27.8 PG (ref 26.8–34.3)
MCHC RBC AUTO-ENTMCNC: 30.8 G/DL (ref 31.4–37.4)
MCV RBC AUTO: 90 FL (ref 82–98)
MICROALBUMIN UR-MCNC: 16 MG/L (ref 0–20)
MICROALBUMIN/CREAT 24H UR: 51 MG/G CREATININE (ref 0–30)
MONOCYTES # BLD AUTO: 0.75 THOUSAND/ΜL (ref 0.17–1.22)
MONOCYTES NFR BLD AUTO: 10 % (ref 4–12)
NEUTROPHILS # BLD AUTO: 5.57 THOUSANDS/ΜL (ref 1.85–7.62)
NEUTS SEG NFR BLD AUTO: 73 % (ref 43–75)
NRBC BLD AUTO-RTO: 0 /100 WBCS
PLATELET # BLD AUTO: 364 THOUSANDS/UL (ref 149–390)
PMV BLD AUTO: 10.1 FL (ref 8.9–12.7)
POTASSIUM SERPL-SCNC: 4 MMOL/L (ref 3.5–5.3)
PROT SERPL-MCNC: 7.7 G/DL (ref 6.4–8.2)
RBC # BLD AUTO: 4.17 MILLION/UL (ref 3.81–5.12)
SODIUM SERPL-SCNC: 136 MMOL/L (ref 136–145)
WBC # BLD AUTO: 7.65 THOUSAND/UL (ref 4.31–10.16)

## 2022-01-19 PROCEDURE — 36415 COLL VENOUS BLD VENIPUNCTURE: CPT

## 2022-01-19 PROCEDURE — 82043 UR ALBUMIN QUANTITATIVE: CPT | Performed by: FAMILY MEDICINE

## 2022-01-19 PROCEDURE — 85652 RBC SED RATE AUTOMATED: CPT

## 2022-01-19 PROCEDURE — 82570 ASSAY OF URINE CREATININE: CPT | Performed by: FAMILY MEDICINE

## 2022-01-19 PROCEDURE — 86140 C-REACTIVE PROTEIN: CPT

## 2022-01-19 PROCEDURE — 80053 COMPREHEN METABOLIC PANEL: CPT

## 2022-01-19 PROCEDURE — 85025 COMPLETE CBC W/AUTO DIFF WBC: CPT

## 2022-02-14 NOTE — ASSESSMENT & PLAN NOTE
BP Readings from Last 3 Encounters:   02/15/22 138/80   02/12/21 132/76   07/28/20 122/64     Lab Results   Component Value Date    CREATININE 0 71 01/19/2022    EGFR 89 01/19/2022     Controlled, continue Metoprolol tartrate 50mg twice daily and Lisinopril 10mg daily

## 2022-02-14 NOTE — ASSESSMENT & PLAN NOTE
NSTEMI status post LAD stent in March 2018, she follows with Dr Mendel Martinez ([address] [facility])  Continue baby aspirin, statin, beta blocker

## 2022-02-14 NOTE — ASSESSMENT & PLAN NOTE
Lab Results   Component Value Date    HGBA1C 8 0 (H) 12/21/2021    HGBA1C 7 5 (H) 09/14/2021    HGBA1C 7 4 (H) 06/11/2021     Lab Results   Component Value Date    GLUF 138 (H) 01/19/2022    LDLCALC 56 09/14/2021    CREATININE 0 71 01/19/2022     She follows with Endocrinology (Dr Holli Rivera), glycemic control has worsened recently due to steroid use for back pain  With her new insurance, her formulary changed so she cannot afford Victoza so she was put back on Prandin     She is currently on Metformin 1000 mg twice daily with prandin at meal time, Tresiba 50u nightly

## 2022-02-14 NOTE — ASSESSMENT & PLAN NOTE
Follows with rheumatology  She is currently on methotrexate, she is unable to afford DMARD due to cost, though she had sone really well on [place]

## 2022-02-14 NOTE — ASSESSMENT & PLAN NOTE
Patient has had worsening microalbumin with worsening glycemic control, most recent Microalbumin:Cr of 51  Continue lisinopril

## 2022-02-14 NOTE — ASSESSMENT & PLAN NOTE
CT scan of abdomen from April 2019 showing right adrenal mass  It measures 3 5 x 2 3 cm  It measures 16 Hounsfield units internally on the precontrast images  It measures 62 Hounsfield units on portal venous phase images  It measures 57 Hounsfield units on delayed phase images  This lesion has been present on prior studies  It was previously described as an adenoma  Today's enhancement characteristics and density do not completely support that diagnosis although it could be an atypical adenoma  It is larger than on prior studies dating back to 2016  This lesion might require further characterization with adrenal MRI or possibly biopsy  I again discussed that this recent CT scan showed that had increased in size and not her typical features of a benign adrenal adenoma  Discussed that any abnormal findings on the adrenal glands are concerning for possible cancer  MRIs of her abdomen have been ordered previously though she is unable to have MRI done because she cannot lay for long periods of time  Will repeat CT scan at this time   Recommended she also discuss finding with her endocrinologist

## 2022-02-14 NOTE — PROGRESS NOTES
FAMILY MEDICINE PROGRESS NOTE    Date of Service: 02/15/22  Primary Care Provider:   Irma Leyva MD       Name: Samina Quintero       : 1955       Age:67 y o  Sex: female      MRN: [id number]      Chief Complaint:Medicare Wellness Visit (Welcome to medicare)       ASSESSMENT and PLAN:  Samina Quintero is a 79 y o  female with:     Problem List Items Addressed This Visit        Digestive    Gastroesophageal reflux disease without esophagitis - Primary     Controlled, continue omeprazole 20 mg daily            Endocrine    Type 2 diabetes mellitus with hyperglycemia, with long-term current use of insulin ([facility])     Lab Results   Component Value Date    HGBA1C 8 0 (H) 2021    HGBA1C 7 5 (H) 2021    HGBA1C 7 4 (H) 2021     Lab Results   Component Value Date    GLUF 138 (H) 2022    LDLCALC 56 2021    CREATININE 0 71 2022     She follows with Endocrinology (Dr Ulysses Grate), glycemic control has worsened recently due to steroid use for back pain  With her new insurance, her formulary changed so she cannot afford Victoza so she was put back on Prandin  She is currently on Metformin 1000 mg twice daily with prandin at meal time, Tresiba 50u nightly         Relevant Medications    repaglinide (PRANDIN) 1 mg tablet    Adenoma of right adrenal gland     CT scan of abdomen from 2019 showing right adrenal mass  It measures 3 5 x 2 3 cm  It measures 16 Hounsfield units internally on the precontrast images  It measures 62 Hounsfield units on portal venous phase images  It measures 57 Hounsfield units on delayed phase images  This lesion has been present on prior studies  It was previously described as an adenoma  Today's enhancement characteristics and density do not completely support that diagnosis although it could be an atypical adenoma  It is larger than on prior studies dating back to 2016    This lesion might require further characterization with adrenal MRI or possibly biopsy  I again discussed that this recent CT scan showed that had increased in size and not her typical features of a benign adrenal adenoma  Discussed that any abnormal findings on the adrenal glands are concerning for possible cancer  MRIs of her abdomen have been ordered previously though she is unable to have MRI done because she cannot lay for long periods of time  Will repeat CT scan at this time   Recommended she also discuss finding with her endocrinologist         Relevant Orders    CT abdomen pelvis w contrast    Disorder of adrenal gland, unspecified ([facility] [address] )    Microalbuminuria due to type 2 diabetes mellitus ([facility] [address] )     Patient has had worsening microalbumin with worsening glycemic control, most recent Microalbumin:Cr of 51  Continue lisinopril          Relevant Medications    repaglinide (PRANDIN) 1 mg tablet       Cardiovascular and Mediastinum    Essential hypertension     BP Readings from Last 3 Encounters:   02/15/22 138/80   02/12/21 132/76   07/28/20 122/64     Lab Results   Component Value Date    CREATININE 0 71 01/19/2022    EGFR 89 01/19/2022     Controlled, continue Metoprolol tartrate 50mg twice daily and Lisinopril 10mg daily           Coronary artery disease involving native coronary artery of native heart without angina pectoris     NSTEMI status post LAD stent in March 2018, she follows with Dr Jose Murray ([address] [facility])  Continue baby aspirin, statin, beta blocker              Musculoskeletal and Integument    Lumbar foraminal stenosis     She follows with pain management, she was started on gabapentin which has been helpful for her         Rheumatoid arthritis involving multiple sites [facility])     Follows with rheumatology  She is currently on methotrexate, she is unable to afford DMARD due to cost, though she had sone really well on Petra Pillai             Other    Hyperlipidemia    Class 3 severe obesity due to excess calories with serious comorbidity and body mass index (BMI) of 40 0 to 44 9 in adult [facility])      Other Visit Diagnoses     Encounter for screening mammogram for malignant neoplasm of breast        Relevant Orders    Mammo screening bilateral w 3d & cad    Medicare welcome visit        Type II diabetes mellitus with peripheral circulatory disorder ([facility] [address] )        Relevant Medications    repaglinide (PRANDIN) 1 mg tablet          SUBJECTIVE:  Zena Blanchard is a 79 y o  female who presents today with a chief complaint of Medicare Wellness Visit (Welcome to medicare)  HPI     Hypertension  She is on metoprolol 50 mg twice daily, lisinopril 10 mg daily  RA/Chronic Pain/Lumbar DDD  She is followed by rheumatology, Dr Alonso Wilson at [address]  She is on weekly methotrexate and Enbrel  She follows with pain management at [facility]  She is on tramadol, gabapentin, Robaxin for pain  She follows with ortho fro chronic knee pain  She had Visco injections last spring  She had steroid injection in November  Coronary Artery Disease  She follows with cardiology,  Dr Federico Marcial of [facility], last saw in August  She had NSTEMI in March 2018 with stent placement  She is on asprin, atorvastatin 80 mg, and beta blocker with metoprolol  Diabetes  She follows with Dr Mona Evans of endocrinology  She is on Victoza, Tresiba 50U daily, and metformin  She follows with podiatry, Dr Irina Jacques  Review of Systems   Constitutional: Negative for activity change, appetite change and fatigue  Eyes: Positive for visual disturbance  Respiratory: Negative for chest tightness and shortness of breath  Cardiovascular: Negative for chest pain, palpitations and leg swelling  Gastrointestinal: Negative for abdominal pain, blood in stool, constipation and diarrhea  Genitourinary: Negative for dysuria, frequency and urgency  Musculoskeletal: Positive for arthralgias and back pain  Neurological: Positive for numbness (neuropathy)  Negative for dizziness, light-headedness and headaches     Psychiatric/Behavioral: Negative for decreased concentration and sleep disturbance  The patient is not nervous/anxious  I have reviewed the patient's Past Medical History      Current Outpatient Medications:     ALPRAZolam (XANAX) 0 25 mg tablet, as needed, Disp: , Rfl:     aspirin 81 MG tablet, Take 81 mg by mouth daily, Disp: , Rfl:     atorvastatin (LIPITOR) 80 mg tablet, Take 80 mg by mouth Medrol Dose Pack scheduling ONLY, Disp: , Rfl:     B-D UF III MINI PEN NEEDLES 31G X 5 MM MISC, use 2 daily as directed, Disp: , Rfl: 0    calcium carbonate (OS-RILEY) 600 MG tablet, Take 400 mg by mouth daily, Disp: , Rfl:     Cholecalciferol (VITAMIN D) 2000 units tablet, Take 2,000 Units by mouth daily, Disp: , Rfl:     diclofenac sodium (VOLTAREN) 1 %, Apply 2 g topically 4 (four) times a day, Disp: 1 Tube, Rfl: 1    fluticasone (FLONASE) 50 mcg/act nasal spray, 1 spray into each nostril daily as needed, Disp: , Rfl:     folic acid (FOLVITE) 903 MCG tablet, Take 800 mg by mouth daily, Disp: , Rfl:     gabapentin (NEURONTIN) 300 mg capsule, Take 300 mg by mouth 3 (three) times a day, Disp: , Rfl:     insulin degludec (TRESIBA FLEXTOUCH) 200 units/mL CONCENTRATED U-200 injection pen, Inject 50 Units under the skin daily at bedtime , Disp: , Rfl:     Insulin Pen Needle (B-D ULTRAFINE III SHORT PEN) 31G X 8 MM MISC, by Does not apply route, Disp: , Rfl:     lisinopril (ZESTRIL) 10 mg tablet, Take 1 tablet (10 mg total) by mouth daily, Disp: 90 tablet, Rfl: 0    metFORMIN (GLUMETZA) 1000 MG (MOD) 24 hr tablet, Take 1,000 mg by mouth 2 (two) times a day with meals , Disp: , Rfl:     methocarbamol (ROBAXIN) 750 mg tablet, , Disp: , Rfl:     methotrexate 2 5 mg tablet, , Disp: , Rfl:     metoprolol tartrate (LOPRESSOR) 50 mg tablet, 50 mg every 12 (twelve) hours, Disp: , Rfl:     Multiple Vitamin (MULTIVITAMIN) tablet, Take 1 tablet by mouth daily, Disp: , Rfl:     nitroglycerin (NITROSTAT) 0 4 mg SL tablet, Place 1 tablet (0 4 mg total) under the tongue every 5 (five) minutes as needed for chest pain, Disp: 90 tablet, Rfl: 1    omega-3-acid ethyl esters (LOVAZA) 1 g capsule, Take 1,000 mg by mouth daily, Disp: , Rfl:     omeprazole (PriLOSEC) 20 mg delayed release capsule, Take 20 mg by mouth daily, Disp: , Rfl:     Probiotic Product (PROBIOTIC-10 PO), Take by mouth daily , Disp: , Rfl:     repaglinide (PRANDIN) 1 mg tablet, take 1 tablet by mouth three times a day before meals HOLD IF NOT EATING, Disp: , Rfl:     traMADol (ULTRAM) 50 mg tablet, Take 50 mg by mouth as needed for moderate pain, Disp: , Rfl:     OBJECTIVE:  /80 (BP Location: Left arm, Patient Position: Sitting, Cuff Size: Large)   Pulse 100   Temp (!) 96 4 °F (35 8 °C)   Resp 18   Ht 5' 1" (1 549 m)   Wt 110 kg (242 lb 8 oz)   Breastfeeding No   BMI 45 82 kg/m²    BP Readings from Last 3 Encounters:   02/15/22 138/80   02/12/21 132/76   07/28/20 122/64      Wt Readings from Last 3 Encounters:   02/15/22 110 kg (242 lb 8 oz)   02/12/21 111 kg (245 lb)   07/28/20 107 kg (236 lb)      Physical Exam  Constitutional:       General: She is not in acute distress  Appearance: Normal appearance  She is obese  She is not ill-appearing or toxic-appearing  HENT:      Head: Normocephalic and atraumatic  Right Ear: Tympanic membrane and external ear normal       Left Ear: Tympanic membrane and external ear normal       Nose: Nose normal       Mouth/Throat:      Mouth: Mucous membranes are moist    Eyes:      Extraocular Movements: Extraocular movements intact  Conjunctiva/sclera: Conjunctivae normal    Cardiovascular:      Rate and Rhythm: Normal rate and regular rhythm  Pulses: Normal pulses  Heart sounds: Normal heart sounds  No murmur heard  No friction rub  No gallop  Pulmonary:      Effort: Pulmonary effort is normal  No respiratory distress  Breath sounds: Normal breath sounds  No stridor  No wheezing, rhonchi or rales     Abdominal: General: There is no distension  Palpations: Abdomen is soft  Musculoskeletal:         General: Normal range of motion  Cervical back: Normal range of motion and neck supple  No rigidity  Right lower leg: No edema  Left lower leg: No edema  Skin:     General: Skin is warm and dry  Findings: No erythema or rash  Neurological:      General: No focal deficit present  Mental Status: She is alert and oriented to person, place, and time  Psychiatric:         Mood and Affect: Mood normal          Behavior: Behavior normal          Lab Results   Component Value Date    WBC 7 65 01/19/2022    HGB 11 6 01/19/2022    HCT 37 7 01/19/2022    MCV 90 01/19/2022     01/19/2022     Lab Results   Component Value Date    SODIUM 136 01/19/2022    K 4 0 01/19/2022     01/19/2022    CO2 29 01/19/2022    BUN 17 01/19/2022    CREATININE 0 71 01/19/2022    GLUC 342 (H) 03/05/2017    CALCIUM 9 8 01/19/2022     Lab Results   Component Value Date    ALT 34 01/19/2022    AST 20 01/19/2022    ALKPHOS 103 01/19/2022    BILITOT 0 41 09/01/2015     Lab Results   Component Value Date    CRP <3 0 01/19/2022     Lab Results   Component Value Date    [id number] 2 510 03/02/2021       Lab Results   Component Value Date    HGBA1C 8 0 (H) 12/21/2021     Microalbumin:Cr 51    Lab Results   Component Value Date    CHOLESTEROL 138 09/14/2021    CHOLESTEROL 133 01/14/2020    CHOLESTEROL 129 05/29/2019     Lab Results   Component Value Date    HDL 65 09/14/2021    HDL 70 01/14/2020    HDL 62 (H) 05/29/2019     Lab Results   Component Value Date    TRIG 86 09/14/2021    TRIG 96 01/14/2020    TRIG 100 05/29/2019     Lab Results   Component Value Date    [place] 73 09/14/2021    [place] 63 01/14/2020    [place] 67 05/29/2019     Lab Results   Component Value Date    LDLCALC 56 09/14/2021              Return in about 1 year (around 2/15/2023) for AWV and follow-up       Kirstin Mercedes MD    Note: Portions of the record have been created with voice recognition software  Occasional wrong word or "sound a like" substitutions may have occurred due to the inherent limitations of voice recognition software  Read the chart carefully and recognize, using context, where substitutions have occurred

## 2022-02-15 ENCOUNTER — OFFICE VISIT (OUTPATIENT)
Dept: FAMILY MEDICINE CLINIC | Facility: CLINIC | Age: 67
End: 2022-02-15
Payer: COMMERCIAL

## 2022-02-15 VITALS
BODY MASS INDEX: 45.79 KG/M2 | TEMPERATURE: 96.4 F | HEART RATE: 100 BPM | HEIGHT: 61 IN | DIASTOLIC BLOOD PRESSURE: 80 MMHG | SYSTOLIC BLOOD PRESSURE: 138 MMHG | WEIGHT: 242.5 LBS | RESPIRATION RATE: 18 BRPM

## 2022-02-15 DIAGNOSIS — E11.51 TYPE II DIABETES MELLITUS WITH PERIPHERAL CIRCULATORY DISORDER (HCC): ICD-10-CM

## 2022-02-15 DIAGNOSIS — E11.29 MICROALBUMINURIA DUE TO TYPE 2 DIABETES MELLITUS (HCC): ICD-10-CM

## 2022-02-15 DIAGNOSIS — I10 ESSENTIAL HYPERTENSION: ICD-10-CM

## 2022-02-15 DIAGNOSIS — E11.65 TYPE 2 DIABETES MELLITUS WITH HYPERGLYCEMIA, WITH LONG-TERM CURRENT USE OF INSULIN (HCC): ICD-10-CM

## 2022-02-15 DIAGNOSIS — M48.061 LUMBAR FORAMINAL STENOSIS: ICD-10-CM

## 2022-02-15 DIAGNOSIS — E27.9 DISORDER OF ADRENAL GLAND, UNSPECIFIED (HCC): ICD-10-CM

## 2022-02-15 DIAGNOSIS — Z12.31 ENCOUNTER FOR SCREENING MAMMOGRAM FOR MALIGNANT NEOPLASM OF BREAST: ICD-10-CM

## 2022-02-15 DIAGNOSIS — R80.9 MICROALBUMINURIA DUE TO TYPE 2 DIABETES MELLITUS (HCC): ICD-10-CM

## 2022-02-15 DIAGNOSIS — M05.79 RHEUMATOID ARTHRITIS INVOLVING MULTIPLE SITES WITH POSITIVE RHEUMATOID FACTOR (HCC): ICD-10-CM

## 2022-02-15 DIAGNOSIS — E66.01 CLASS 3 SEVERE OBESITY DUE TO EXCESS CALORIES WITH SERIOUS COMORBIDITY AND BODY MASS INDEX (BMI) OF 40.0 TO 44.9 IN ADULT (HCC): ICD-10-CM

## 2022-02-15 DIAGNOSIS — D35.01 ADENOMA OF RIGHT ADRENAL GLAND: ICD-10-CM

## 2022-02-15 DIAGNOSIS — K21.9 GASTROESOPHAGEAL REFLUX DISEASE WITHOUT ESOPHAGITIS: Primary | ICD-10-CM

## 2022-02-15 DIAGNOSIS — I25.10 CORONARY ARTERY DISEASE INVOLVING NATIVE CORONARY ARTERY OF NATIVE HEART WITHOUT ANGINA PECTORIS: ICD-10-CM

## 2022-02-15 DIAGNOSIS — Z79.4 TYPE 2 DIABETES MELLITUS WITH HYPERGLYCEMIA, WITH LONG-TERM CURRENT USE OF INSULIN (HCC): ICD-10-CM

## 2022-02-15 DIAGNOSIS — Z00.00 MEDICARE WELCOME VISIT: ICD-10-CM

## 2022-02-15 DIAGNOSIS — E78.2 MIXED HYPERLIPIDEMIA: ICD-10-CM

## 2022-02-15 PROCEDURE — G0438 PPPS, INITIAL VISIT: HCPCS | Performed by: FAMILY MEDICINE

## 2022-02-15 PROCEDURE — 99214 OFFICE O/P EST MOD 30 MIN: CPT | Performed by: FAMILY MEDICINE

## 2022-02-15 RX ORDER — METHOCARBAMOL 750 MG/1
TABLET, FILM COATED ORAL
COMMUNITY
Start: 2022-02-10

## 2022-02-15 RX ORDER — REPAGLINIDE 1 MG/1
TABLET ORAL
COMMUNITY
Start: 2022-02-08

## 2022-02-15 RX ORDER — GABAPENTIN 300 MG/1
300 CAPSULE ORAL 3 TIMES DAILY
COMMUNITY
Start: 2022-02-02

## 2022-02-15 NOTE — PROGRESS NOTES
Assessment and Plan:     Problem List Items Addressed This Visit        Digestive    Gastroesophageal reflux disease without esophagitis - Primary     Controlled, continue omeprazole 20 mg daily            Endocrine    Type 2 diabetes mellitus with hyperglycemia, with long-term current use of insulin ([facility])     Lab Results   Component Value Date    HGBA1C 8 0 (H) 12/21/2021    HGBA1C 7 5 (H) 09/14/2021    HGBA1C 7 4 (H) 06/11/2021     Lab Results   Component Value Date    GLUF 138 (H) 01/19/2022    LDLCALC 56 09/14/2021    CREATININE 0 71 01/19/2022     She follows with Endocrinology (Dr Samanta Boyd), glycemic control has worsened recently due to steroid use for back pain  With her new insurance, her formulary changed so she cannot afford Victoza so she was put back on Prandin  She is currently on Metformin 1000 mg twice daily with prandin at meal time, Tresiba 50u nightly         Relevant Medications    repaglinide (PRANDIN) 1 mg tablet    Adenoma of right adrenal gland     CT scan of abdomen from April 2019 showing right adrenal mass  It measures 3 5 x 2 3 cm  It measures 16 Hounsfield units internally on the precontrast images  It measures 62 Hounsfield units on portal venous phase images  It measures 57 Hounsfield units on delayed phase images  This lesion has been present on prior studies  It was previously described as an adenoma  Today's enhancement characteristics and density do not completely support that diagnosis although it could be an atypical adenoma  It is larger than on prior studies dating back to 2016  This lesion might require further characterization with adrenal MRI or possibly biopsy  I again discussed that this recent CT scan showed that had increased in size and not her typical features of a benign adrenal adenoma  Discussed that any abnormal findings on the adrenal glands are concerning for possible cancer    MRIs of her abdomen have been ordered previously though she is unable to have MRI done because she cannot lay for long periods of time  Will repeat CT scan at this time   Recommended she also discuss finding with her endocrinologist         Relevant Orders    CT abdomen pelvis w contrast    Disorder of adrenal gland, unspecified ([facility] [address] )    Microalbuminuria due to type 2 diabetes mellitus ([facility] )     Patient has had worsening microalbumin with worsening glycemic control, most recent Microalbumin:Cr of 51  Continue lisinopril          Relevant Medications    repaglinide (PRANDIN) 1 mg tablet       Cardiovascular and Mediastinum    Essential hypertension     BP Readings from Last 3 Encounters:   02/15/22 138/80   02/12/21 132/76   07/28/20 122/64     Lab Results   Component Value Date    CREATININE 0 71 01/19/2022    EGFR 89 01/19/2022     Controlled, continue Metoprolol tartrate 50mg twice daily and Lisinopril 10mg daily           Coronary artery disease involving native coronary artery of native heart without angina pectoris     NSTEMI status post LAD stent in March 2018, she follows with Dr Shefali Grace ([address] [facility])  Continue baby aspirin, statin, beta blocker              Musculoskeletal and Integument    Lumbar foraminal stenosis     She follows with pain management, she was started on gabapentin which has been helpful for her         Rheumatoid arthritis involving multiple sites [facility])     Follows with rheumatology  She is currently on methotrexate, she is unable to afford DMARD due to cost, though she had sone really well on [place] Love             Other    Hyperlipidemia    Class 3 severe obesity due to excess calories with serious comorbidity and body mass index (BMI) of 40 0 to 44 9 in adult [facility])      Other Visit Diagnoses     Encounter for screening mammogram for malignant neoplasm of breast        Relevant Orders    Mammo screening bilateral w 3d & cad    Medicare welcome visit        Type II diabetes mellitus with peripheral circulatory disorder ([facility] )        Relevant Medications repaglinide (PRANDIN) 1 mg tablet        BMI Counseling: Body mass index is 45 82 kg/m²  The BMI is above normal  Nutrition recommendations include encouraging healthy choices of fruits and vegetables, consuming healthier snacks, moderation in carbohydrate intake and reducing intake of saturated and trans fat  Exercise recommendations include exercising 3-5 times per week and strength training exercises  Rationale for BMI follow-up plan is due to patient being overweight or obese  Depression Screening and Follow-up Plan: Patient was screened for depression during today's encounter  They screened negative with a PHQ-2 score of 0  Preventive health issues were discussed with patient, and age appropriate screening tests were ordered as noted in patient's After Visit Summary  Personalized health advice and appropriate referrals for health education or preventive services given if needed, as noted in patient's After Visit Summary  History of Present Illness:     Patient presents for Welcome to Medicare visit  Patient Care Team:  Joanne Juarez MD as PCP - General (Family Medicine)  Evette Hubbard MD as PCP - PCP-[facility]  MD Theodora Stallworth MD     Review of Systems:     Review of Systems   Constitutional: Negative for activity change, appetite change and fatigue  Eyes: Positive for visual disturbance  Respiratory: Negative for chest tightness and shortness of breath  Cardiovascular: Negative for chest pain, palpitations and leg swelling  Gastrointestinal: Negative for abdominal pain, blood in stool, constipation and diarrhea  Genitourinary: Negative for dysuria, frequency and urgency  Musculoskeletal: Positive for arthralgias and back pain  Neurological: Positive for numbness (neuropathy)  Negative for dizziness, light-headedness and headaches  Psychiatric/Behavioral: Negative for decreased concentration and sleep disturbance   The patient is not nervous/anxious         Problem List:     Patient Active Problem List   Diagnosis    Ankylosing spondylitis ([facility] )    Cervical herniated disc    DDD (degenerative disc disease), thoracolumbar    Type 2 diabetes mellitus with hyperglycemia, with long-term current use of insulin ([facility])    Degenerative disc disease, cervical    Gastroesophageal reflux disease without esophagitis    Lumbar foraminal stenosis    Essential hypertension    Rheumatoid arthritis involving multiple sites ([facility] )    Sjogren's disease ([facility] )    Hyperlipidemia    Coronary artery disease involving native coronary artery of native heart without angina pectoris    Adenoma of right adrenal gland    Class 3 severe obesity due to excess calories with serious comorbidity and body mass index (BMI) of 40 0 to 44 9 in [facility])    Disorder of adrenal gland, unspecified ([facility] )    Type 2 diabetes mellitus with diabetic peripheral angiopathy without gangrene ([facility])    Microalbuminuria due to type 2 diabetes mellitus ([facility] [address] )      Past Medical and Surgical History:     Past Medical History:   Diagnosis Date    Ankylosing spondylitis (HCC)     Arthritis     Diabetes mellitus ([facility] )     GERD (gastroesophageal reflux disease)     Heart attack ([facility] )     Hematuria     last assessed 9/7/13    Hyperlipidemia     Hypertension     Nephrolithiasis 9/7/2013    Subclinical hypothyroidism 9/2/2015     Past Surgical History:   Procedure Laterality Date    APPENDECTOMY      BACK SURGERY      mulitple surgery, fusions "top to bottom"    BREAST SURGERY Left     biopsy    CERVICAL SPINE SURGERY      CHOLECYSTECTOMY      CORONARY ANGIOPLASTY WITH STENT PLACEMENT      CYSTOSCOPY  05/06/2015    Diagnostic, last assessed 5/6/15    HYSTERECTOMY      LITHOTRIPSY      WA CYSTO/URETERO W/LITHOTRIPSY &INDWELL STENT INSRT Left 4/18/2019    Procedure: CYSTO, URETEROSCOPY W/HOLMIUM LASER, RETROGRADE PYELOGRAM, STENT INSERTION;  Surgeon: Efrain Cheema MD; Location: [facility] OR;  Service: Urology      Family History:     Family History   Problem Relation Age of Onset    Diabetes Mother     Gout Mother     Heart disease Mother     Diabetes Father     Heart disease Father     Nephrolithiasis Brother     No Known Problems Daughter     No Known Problems Maternal Aunt     No Known Problems Maternal Aunt     No Known Problems Maternal Aunt     No Known Problems Maternal Aunt     No Known Problems Maternal Aunt     No Known Problems Maternal Aunt     No Known Problems Paternal Aunt     No Known Problems Paternal Aunt       Social History:     Social History     Socioeconomic History    Marital status: /Civil Union     Spouse name: None    Number of children: None    Years of education: None    Highest education level: None   Occupational History    None   Tobacco Use    Smoking status: Never Smoker    Smokeless tobacco: Never Used   Substance and Sexual Activity    Alcohol use: Yes     Comment: very rarely    Drug use: No    Sexual activity: Yes     Partners: Male     Birth control/protection: None   Other Topics Concern    None   Social History Narrative    None     Social Determinants of Health     Financial Resource Strain: Not on file   Food Insecurity: Not on file   Transportation Needs: Not on file   Physical Activity: Not on file   Stress: Not on file   Social Connections: Not on file   Intimate Partner Violence: Not on file   Housing Stability: Not on file      Medications and Allergies:     Current Outpatient Medications   Medication Sig Dispense Refill    ALPRAZolam (XANAX) 0 25 mg tablet as needed      aspirin 81 MG tablet Take 81 mg by mouth daily      atorvastatin (LIPITOR) 80 mg tablet Take 80 mg by mouth Medrol Dose Pack scheduling ONLY      B-D UF III MINI PEN NEEDLES 31G X 5 MM MISC use 2 daily as directed  0    calcium carbonate (OS-RILEY) 600 MG tablet Take 400 mg by mouth daily      Cholecalciferol (VITAMIN D) 2000 units tablet Take 2,000 Units by mouth daily      diclofenac sodium (VOLTAREN) 1 % Apply 2 g topically 4 (four) times a day 1 Tube 1    fluticasone (FLONASE) 50 mcg/act nasal spray 1 spray into each nostril daily as needed      folic acid (FOLVITE) 531 MCG tablet Take 800 mg by mouth daily      gabapentin (NEURONTIN) 300 mg capsule Take 300 mg by mouth 3 (three) times a day      insulin degludec (TRESIBA FLEXTOUCH) 200 units/mL CONCENTRATED U-200 injection pen Inject 50 Units under the skin daily at bedtime       Insulin Pen Needle (B-D ULTRAFINE III SHORT PEN) 31G X 8 MM MISC by Does not apply route      lisinopril (ZESTRIL) 10 mg tablet Take 1 tablet (10 mg total) by mouth daily 90 tablet 0    metFORMIN (GLUMETZA) 1000 MG (MOD) 24 hr tablet Take 1,000 mg by mouth 2 (two) times a day with meals       methocarbamol (ROBAXIN) 750 mg tablet       methotrexate 2 5 mg tablet       metoprolol tartrate (LOPRESSOR) 50 mg tablet 50 mg every 12 (twelve) hours      Multiple Vitamin (MULTIVITAMIN) tablet Take 1 tablet by mouth daily      nitroglycerin (NITROSTAT) 0 4 mg SL tablet Place 1 tablet (0 4 mg total) under the tongue every 5 (five) minutes as needed for chest pain 90 tablet 1    omega-3-acid ethyl esters (LOVAZA) 1 g capsule Take 1,000 mg by mouth daily      omeprazole (PriLOSEC) 20 mg delayed release capsule Take 20 mg by mouth daily      Probiotic Product (PROBIOTIC-10 PO) Take by mouth daily       repaglinide (PRANDIN) 1 mg tablet take 1 tablet by mouth three times a day before meals HOLD IF NOT EATING      traMADol (ULTRAM) 50 mg tablet Take 50 mg by mouth as needed for moderate pain       No current facility-administered medications for this visit       Allergies   Allergen Reactions    Acetazolamide Hives    Barbiturates Other (See Comments)     stomach pain    Morphine GI Intolerance     Other reaction(s): GI Intolerance    Sulfa Antibiotics Hives and Rash    Sulfasalazine Hives and Rash Immunizations:     Immunization History   Administered Date(s) Administered    Influenza Quadrivalent, 6-35 Months IM 10/15/2014, 10/15/2015, 09/22/2016, 10/11/2017    Influenza, high dose seasonal 0 7 mL 10/20/2020, 10/20/2021    Influenza, injectable, quadrivalent, preservative free 0 5 mL 10/15/2018    Influenza, recombinant, quadrivalent,injectable, preservative free 10/07/2019    Influenza, seasonal, injectable 10/12/2012, 10/18/2013    Pneumococcal Conjugate 13-Valent 07/28/2020    Pneumococcal Polysaccharide PPV23 07/16/2019    Tdap 07/16/2019      Health Maintenance:         Topic Date Due    Breast Cancer Screening: Mammogram  02/11/2022    Colorectal Cancer Screening  10/09/2022    Hepatitis C Screening  Completed         Topic Date Due    COVID-19 Vaccine (1) Never done      Medicare Screening Tests and Risk Assessments:     Karlo Pastor is here for her Welcome to Medicare visit  Health Risk Assessment:   Patient rates overall health as good  Patient feels that their physical health rating is same  Patient is very satisfied with their life  Eyesight was rated as slightly worse  Hearing was rated as same  Patient feels that their emotional and mental health rating is same  Patients states they are never, rarely angry  Patient states they are often unusually tired/fatigued  Pain experienced in the last 7 days has been some  Patient's pain rating has been 2/10  Patient states that she has experienced no weight loss or gain in last 6 months  Will be seeing retinal specialist for vision  Depression Screening:   PHQ-2 Score: 0      Fall Risk Screening: In the past year, patient has experienced: no history of falling in past year      Urinary Incontinence Screening:   Patient has not leaked urine accidently in the last six months  Home Safety:  Patient has trouble with stairs inside or outside of their home  Patient has working smoke alarms and has working carbon monoxide detector   Home safety hazards include: none  Nutrition:   Current diet is Diabetic, Low Carb, Limited junk food and No Added Salt  Medications:   Patient is currently taking over-the-counter supplements  OTC medications include: see medication list  Patient is able to manage medications  Activities of Daily Living (ADLs)/Instrumental Activities of Daily Living (IADLs):   Walk and transfer into and out of bed and chair?: Yes  Dress and groom yourself?: Yes    Bathe or shower yourself?: Yes    Feed yourself? Yes  Do your laundry/housekeeping?: Yes  Manage your money, pay your bills and track your expenses?: Yes  Make your own meals?: Yes    Do your own shopping?: Yes    Previous Hospitalizations:   Any hospitalizations or ED visits within the last 12 months?: No      Advance Care Planning:   Living will: Yes    Durable POA for healthcare: Yes    Advanced directive: Yes    Advanced directive counseling given: Yes      Cognitive Screening:   Provider or family/friend/caregiver concerned regarding cognition?: No    PREVENTIVE SCREENINGS      Cardiovascular Screening:    General: Screening Not Indicated and History Lipid Disorder      Diabetes Screening:     General: Screening Not Indicated and History Diabetes      Colorectal Cancer Screening:     General: Screening Current      Breast Cancer Screening:     General: Risks and Benefits Discussed    Due for: Mammogram        Cervical Cancer Screening:    General: Screening Not Indicated      Osteoporosis Screening:    General: Risks and Benefits Discussed      Lung Cancer Screening:     General: Screening Not Indicated      Hepatitis C Screening:    General: Screening Current    Screening, Brief Intervention, and Referral to Treatment (SBIRT)    Screening  Typical number of drinks in a day: 0  Typical number of drinks in a week: 0  Interpretation: Low risk drinking behavior  AUDIT-C Screenin) How often did you have a drink containing alcohol in the past year? never  2) How many drinks did you have on a typical day when you were drinking in the past year? 0  3) How often did you have 6 or more drinks on one occasion in the past year? never    AUDIT-C Score: 0  Interpretation: Score 0-2 (female): Negative screen for alcohol misuse    Single Item Drug Screening:  How often have you used an illegal drug (including marijuana) or a prescription medication for non-medical reasons in the past year? never    Single Item Drug Screen Score: 0  Interpretation: Negative screen for possible drug use disorder    Brief Intervention  Alcohol & drug use screenings were reviewed  No concerns regarding substance use disorder identified  Other Counseling Topics:   Car/seat belt/driving safety and calcium and vitamin D intake and regular weightbearing exercise  No exam data present     Physical Exam:     /80 (BP Location: Left arm, Patient Position: Sitting, Cuff Size: Large)   Pulse 100   Temp (!) 96 4 °F (35 8 °C)   Resp 18   Ht 5' 1" (1 549 m)   Wt 110 kg (242 lb 8 oz)   Breastfeeding No   BMI 45 82 kg/m²     Physical Exam  Constitutional:       General: She is not in acute distress  Appearance: Normal appearance  She is obese  She is not ill-appearing or toxic-appearing  HENT:      Head: Normocephalic and atraumatic  Right Ear: Tympanic membrane and external ear normal       Left Ear: Tympanic membrane and external ear normal       Nose: Nose normal       Mouth/Throat:      Mouth: Mucous membranes are moist    Eyes:      Extraocular Movements: Extraocular movements intact  Conjunctiva/sclera: Conjunctivae normal    Cardiovascular:      Rate and Rhythm: Normal rate and regular rhythm  Pulses: Normal pulses  Heart sounds: Normal heart sounds  No murmur heard  No friction rub  No gallop  Pulmonary:      Effort: Pulmonary effort is normal  No respiratory distress  Breath sounds: Normal breath sounds  No stridor   No wheezing, rhonchi or rales    Abdominal:      General: There is no distension  Palpations: Abdomen is soft  Musculoskeletal:         General: Normal range of motion  Cervical back: Normal range of motion and neck supple  No rigidity  Right lower leg: No edema  Left lower leg: No edema  Skin:     General: Skin is warm and dry  Findings: No erythema or rash  Neurological:      General: No focal deficit present  Mental Status: She is alert and oriented to person, place, and time     Psychiatric:         Mood and Affect: Mood normal          Behavior: Behavior normal           Smita Pollard MD

## 2022-02-15 NOTE — PATIENT INSTRUCTIONS
Medicare Preventive Visit Patient Instructions  Thank you for completing your Welcome to Medicare Visit or Medicare Annual Wellness Visit today  Your next wellness visit will be due in one year (2/16/2023)  The screening/preventive services that you may require over the next 5-10 years are detailed below  Some tests may not apply to you based off risk factors and/or age  Screening tests ordered at today's visit but not completed yet may show as past due  Also, please note that scanned in results may not display below  Preventive Screenings:  Service Recommendations Previous Testing/Comments   Colorectal Cancer Screening  * Colonoscopy    * Fecal Occult Blood Test (FOBT)/Fecal Immunochemical Test (FIT)  * Fecal DNA/Cologuard Test  * Flexible Sigmoidoscopy Age: 54-65 years old   Colonoscopy: every 10 years (may be performed more frequently if at higher risk)  OR  FOBT/FIT: every 1 year  OR  Cologuard: every 3 years  OR  Sigmoidoscopy: every 5 years  Screening may be recommended earlier than age 48 if at higher risk for colorectal cancer  Also, an individualized decision between you and your healthcare provider will decide whether screening between the ages of 74-80 would be appropriate  Colonoscopy: Not on file  FOBT/FIT: Not on file  Cologuard: 10/09/2019  Sigmoidoscopy: Not on file    Screening Current     Breast Cancer Screening Age: 36 years old  Frequency: every 1-2 years  Not required if history of left and right mastectomy Mammogram: 02/11/2020        Cervical Cancer Screening Between the ages of 21-29, pap smear recommended once every 3 years  Between the ages of 33-67, can perform pap smear with HPV co-testing every 5 years     Recommendations may differ for women with a history of total hysterectomy, cervical cancer, or abnormal pap smears in past  Pap Smear: 07/27/2017    Screening Not Indicated   Hepatitis C Screening Once for adults born between 1945 and 1965  More frequently in patients at high risk for Hepatitis C Hep C Antibody: 06/11/2021    Screening Current   Diabetes Screening 1-2 times per year if you're at risk for diabetes or have pre-diabetes Fasting glucose: 138 mg/dL   A1C: 8 0 %    Screening Not Indicated  History Diabetes   Cholesterol Screening Once every 5 years if you don't have a lipid disorder  May order more often based on risk factors  Lipid panel: 09/14/2021    Screening Not Indicated  History Lipid Disorder     Other Preventive Screenings Covered by Medicare:  1  Abdominal Aortic Aneurysm (AAA) Screening: covered once if your at risk  You're considered to be at risk if you have a family history of AAA  2  Lung Cancer Screening: covers low dose CT scan once per year if you meet all of the following conditions: (1) Age 50-69; (2) No signs or symptoms of lung cancer; (3) Current smoker or have quit smoking within the last 15 years; (4) You have a tobacco smoking history of at least 30 pack years (packs per day multiplied by number of years you smoked); (5) You get a written order from a healthcare provider  3  Glaucoma Screening: covered annually if you're considered high risk: (1) You have diabetes OR (2) Family history of glaucoma OR (3)  aged 48 and older OR (3)  American aged 72 and older  3  Osteoporosis Screening: covered every 2 years if you meet one of the following conditions: (1) You're estrogen deficient and at risk for osteoporosis based off medical history and other findings; (2) Have a vertebral abnormality; (3) On glucocorticoid therapy for more than 3 months; (4) Have primary hyperparathyroidism; (5) On osteoporosis medications and need to assess response to drug therapy  · Last bone density test (DXA Scan): Not on file  5  HIV Screening: covered annually if you're between the age of 12-76  Also covered annually if you are younger than 13 and older than 72 with risk factors for HIV infection   For pregnant patients, it is covered up to 3 times per pregnancy  Immunizations:  Immunization Recommendations   Influenza Vaccine Annual influenza vaccination during flu season is recommended for all persons aged >= 6 months who do not have contraindications   Pneumococcal Vaccine (Prevnar and Pneumovax)  * Prevnar = PCV13  * Pneumovax = PPSV23   Adults 25-60 years old: 1-3 doses may be recommended based on certain risk factors  Adults 72 years old: Prevnar (PCV13) vaccine recommended followed by Pneumovax (PPSV23) vaccine  If already received PPSV23 since turning 65, then PCV13 recommended at least one year after PPSV23 dose  Hepatitis B Vaccine 3 dose series if at intermediate or high risk (ex: diabetes, end stage renal disease, liver disease)   Tetanus (Td) Vaccine - COST NOT COVERED BY MEDICARE PART B Following completion of primary series, a booster dose should be given every 10 years to maintain immunity against tetanus  Td may also be given as tetanus wound prophylaxis  Tdap Vaccine - COST NOT COVERED BY MEDICARE PART B Recommended at least once for all adults  For pregnant patients, recommended with each pregnancy  Shingles Vaccine (Shingrix) - COST NOT COVERED BY MEDICARE PART B  2 shot series recommended in those aged 48 and above     Health Maintenance Due:      Topic Date Due    Breast Cancer Screening: Mammogram  02/11/2022    Colorectal Cancer Screening  10/09/2022    Hepatitis C Screening  Completed     Immunizations Due:      Topic Date Due    COVID-19 Vaccine (1) Never done     Advance Directives   What are advance directives? Advance directives are legal documents that state your wishes and plans for medical care  These plans are made ahead of time in case you lose your ability to make decisions for yourself  Advance directives can apply to any medical decision, such as the treatments you want, and if you want to donate organs  What are the types of advance directives?   There are many types of advance directives, and each state has rules about how to use them  You may choose a combination of any of the following:  · Living will: This is a written record of the treatment you want  You can also choose which treatments you do not want, which to limit, and which to stop at a certain time  This includes surgery, medicine, IV fluid, and tube feedings  · Durable power of  for healthcare [facility]): This is a written record that states who you want to make healthcare choices for you when you are unable to make them for yourself  This person, called a proxy, is usually a family member or a friend  You may choose more than 1 proxy  · Do not resuscitate (DNR) order:  A DNR order is used in case your heart stops beating or you stop breathing  It is a request not to have certain forms of treatment, such as CPR  A DNR order may be included in other types of advance directives  · Medical directive: This covers the care that you want if you are in a coma, near death, or unable to make decisions for yourself  You can list the treatments you want for each condition  Treatment may include pain medicine, surgery, blood transfusions, dialysis, IV or tube feedings, and a ventilator (breathing machine)  · Values history: This document has questions about your views, beliefs, and how you feel and think about life  This information can help others choose the care that you would choose  Why are advance directives important? An advance directive helps you control your care  Although spoken wishes may be used, it is better to have your wishes written down  Spoken wishes can be misunderstood, or not followed  Treatments may be given even if you do not want them  An advance directive may make it easier for your family to make difficult choices about your care     Weight Management   Why it is important to manage your weight:  Being overweight increases your risk of health conditions such as heart disease, high blood pressure, type 2 diabetes, and certain types of cancer  It can also increase your risk for osteoarthritis, sleep apnea, and other respiratory problems  Aim for a slow, steady weight loss  Even a small amount of weight loss can lower your risk of health problems  How to lose weight safely:  A safe and healthy way to lose weight is to eat fewer calories and get regular exercise  You can lose up about 1 pound a week by decreasing the number of calories you eat by 500 calories each day  Healthy meal plan for weight management:  A healthy meal plan includes a variety of foods, contains fewer calories, and helps you stay healthy  A healthy meal plan includes the following:  · Eat whole-grain foods more often  A healthy meal plan should contain fiber  Fiber is the part of grains, fruits, and vegetables that is not broken down by your body  Whole-grain foods are healthy and provide extra fiber in your diet  Some examples of whole-grain foods are whole-wheat breads and pastas, oatmeal, brown rice, and bulgur  · Eat a variety of vegetables every day  Include dark, leafy greens such as spinach, kale, zeke greens, and mustard greens  Eat yellow and orange vegetables such as carrots, sweet potatoes, and winter squash  · Eat a variety of fruits every day  Choose fresh or canned fruit (canned in its own juice or light syrup) instead of juice  Fruit juice has very little or no fiber  · Eat low-fat dairy foods  Drink fat-free (skim) milk or 1% milk  Eat fat-free yogurt and low-fat cottage cheese  Try low-fat cheeses such as mozzarella and other reduced-fat cheeses  · Choose meat and other protein foods that are low in fat  Choose beans or other legumes such as split peas or lentils  Choose fish, skinless poultry (chicken or turkey), or lean cuts of red meat (beef or pork)  Before you cook meat or poultry, cut off any visible fat  · Use less fat and oil  Try baking foods instead of frying them   Add less fat, such as margarine, sour cream, regular salad dressing and mayonnaise to foods  Eat fewer high-fat foods  Some examples of high-fat foods include french fries, doughnuts, ice cream, and cakes  · Eat fewer sweets  Limit foods and drinks that are high in sugar  This includes candy, cookies, regular soda, and sweetened drinks  Exercise:  Exercise at least 30 minutes per day on most days of the week  Some examples of exercise include walking, biking, dancing, and swimming  You can also fit in more physical activity by taking the stairs instead of the elevator or parking farther away from stores  Ask your healthcare provider about the best exercise plan for you  © Copyright [employer] 2018 Information is for End User's use only and may not be sold, redistributed or otherwise used for commercial purposes   All illustrations and images included in CareNotes® are the copyrighted property of A D A M , Inc  or [address] [place] [address] [facility]

## 2022-02-18 ENCOUNTER — OFFICE VISIT (OUTPATIENT)
Dept: FAMILY MEDICINE CLINIC | Facility: CLINIC | Age: 67
End: 2022-02-18
Payer: COMMERCIAL

## 2022-02-18 VITALS
SYSTOLIC BLOOD PRESSURE: 142 MMHG | TEMPERATURE: 96.8 F | BODY MASS INDEX: 45.69 KG/M2 | RESPIRATION RATE: 18 BRPM | WEIGHT: 242 LBS | DIASTOLIC BLOOD PRESSURE: 76 MMHG | HEART RATE: 88 BPM | HEIGHT: 61 IN

## 2022-02-18 DIAGNOSIS — B37.3 YEAST VAGINITIS: ICD-10-CM

## 2022-02-18 DIAGNOSIS — N30.01 ACUTE CYSTITIS WITH HEMATURIA: Primary | ICD-10-CM

## 2022-02-18 LAB
BACTERIA UR QL AUTO: ABNORMAL /HPF
BILIRUB UR QL STRIP: NEGATIVE
CLARITY UR: CLEAR
COLOR UR: YELLOW
GLUCOSE UR STRIP-MCNC: NEGATIVE MG/DL
HGB UR QL STRIP.AUTO: ABNORMAL
HYALINE CASTS #/AREA URNS LPF: ABNORMAL /LPF
KETONES UR STRIP-MCNC: NEGATIVE MG/DL
LEUKOCYTE ESTERASE UR QL STRIP: NEGATIVE
NITRITE UR QL STRIP: NEGATIVE
NON-SQ EPI CELLS URNS QL MICRO: ABNORMAL /HPF
PH UR STRIP.AUTO: 7.5 [PH]
PROT UR STRIP-MCNC: NEGATIVE MG/DL
RBC #/AREA URNS AUTO: ABNORMAL /HPF
SL AMB  POCT GLUCOSE, UA: NEGATIVE
SL AMB LEUKOCYTE ESTERASE,UA: NEGATIVE
SL AMB POCT BILIRUBIN,UA: NEGATIVE
SL AMB POCT BLOOD,UA: 50
SL AMB POCT CLARITY,UA: ABNORMAL
SL AMB POCT COLOR,UA: YELLOW
SL AMB POCT KETONES,UA: NEGATIVE
SL AMB POCT NITRITE,UA: NEGATIVE
SL AMB POCT PH,UA: 8
SL AMB POCT SPECIFIC GRAVITY,UA: 1.01
SL AMB POCT URINE PROTEIN: 15
SL AMB POCT UROBILINOGEN: 0.2
SP GR UR STRIP.AUTO: 1.01 (ref 1–1.03)
UROBILINOGEN UR QL STRIP.AUTO: 0.2 E.U./DL
WBC #/AREA URNS AUTO: ABNORMAL /HPF

## 2022-02-18 PROCEDURE — 81002 URINALYSIS NONAUTO W/O SCOPE: CPT | Performed by: PHYSICIAN ASSISTANT

## 2022-02-18 PROCEDURE — 81001 URINALYSIS AUTO W/SCOPE: CPT | Performed by: PHYSICIAN ASSISTANT

## 2022-02-18 PROCEDURE — 87086 URINE CULTURE/COLONY COUNT: CPT | Performed by: PHYSICIAN ASSISTANT

## 2022-02-18 PROCEDURE — 99214 OFFICE O/P EST MOD 30 MIN: CPT | Performed by: PHYSICIAN ASSISTANT

## 2022-02-18 RX ORDER — FLUCONAZOLE 150 MG/1
150 TABLET ORAL ONCE
Qty: 1 TABLET | Refills: 0 | Status: SHIPPED | OUTPATIENT
Start: 2022-02-18 | End: 2022-02-18

## 2022-02-18 RX ORDER — NITROFURANTOIN 25; 75 MG/1; MG/1
100 CAPSULE ORAL 2 TIMES DAILY
Qty: 10 CAPSULE | Refills: 0 | Status: SHIPPED | OUTPATIENT
Start: 2022-02-18 | End: 2022-02-23

## 2022-02-18 NOTE — PROGRESS NOTES
Assessment/Plan:     Diagnoses and all orders for this visit:    Acute cystitis with hematuria  POCT UA positive for blood  - ordered Urine culture and microscopy  Will call if needing to change antibiotic  - Directed to return if fever, chills, flank pain, continued hematuria despite antibiotics  - Ordered Macrobid 5 days  -     POCT urine dip  -     nitrofurantoin (MACROBID) 100 mg capsule; Take 1 capsule (100 mg total) by mouth 2 (two) times a day for 5 days  -     Urine culture  -     Urinalysis with microscopic    Yeast vaginitis  She often develops yeast infections after antibiotics  -     fluconazole (DIFLUCAN) 150 mg tablet; Take 1 tablet (150 mg total) by mouth once for 1 dose          Subjective:    Patient ID: Josh Thompson is a 79 y o  female  Pt is presenting today for Right flank pressure pain, urinary frequency, discomort with urination starting last night  This feels similar to prior urinary tract infection  The following portions of the patient's history were reviewed and updated as appropriate: allergies, current medications and problem list     Review of Systems   Constitutional: Negative for fatigue, fever and unexpected weight change  HENT: Negative for rhinorrhea and sore throat  Respiratory: Negative for cough, shortness of breath and wheezing  Cardiovascular: Negative for chest pain, palpitations and leg swelling  Gastrointestinal: Negative for constipation, diarrhea and nausea  Genitourinary: Positive for dysuria, frequency and urgency  Musculoskeletal: Negative for arthralgias and myalgias  Objective:  /76 (BP Location: Left arm, Patient Position: Sitting, Cuff Size: Large)   Pulse 88   Temp (!) 96 8 °F (36 °C)   Resp 18   Ht 5' 1" (1 549 m)   Wt 110 kg (242 lb)   Breastfeeding No   BMI 45 73 kg/m²      Physical Exam  Constitutional:       General: She is not in acute distress  Appearance: Normal appearance  She is well-developed   She is not diaphoretic  Eyes:      Pupils: Pupils are equal, round, and reactive to light  Cardiovascular:      Rate and Rhythm: Normal rate and regular rhythm  Heart sounds: Normal heart sounds  No murmur heard  No friction rub  No gallop  Pulmonary:      Effort: Pulmonary effort is normal  No respiratory distress  Breath sounds: Normal breath sounds  No wheezing or rales  Abdominal:      General: There is no distension  Tenderness: There is abdominal tenderness in the right lower quadrant  Musculoskeletal:      Cervical back: Normal range of motion and neck supple  Neurological:      Mental Status: She is alert and oriented to person, place, and time

## 2022-02-19 LAB — BACTERIA UR CULT: NORMAL

## 2022-02-28 ENCOUNTER — HOSPITAL ENCOUNTER (OUTPATIENT)
Dept: RADIOLOGY | Age: 67
Discharge: HOME | End: 2022-02-28
Payer: COMMERCIAL

## 2022-02-28 DIAGNOSIS — D35.01 ADENOMA OF RIGHT ADRENAL GLAND: ICD-10-CM

## 2022-02-28 PROCEDURE — G1004 CDSM NDSC: HCPCS

## 2022-02-28 PROCEDURE — 74178 CT ABD&PLV WO CNTR FLWD CNTR: CPT

## 2022-02-28 RX ADMIN — IOHEXOL 100 ML: 350 INJECTION, SOLUTION INTRAVENOUS at 15:00

## 2022-03-01 ENCOUNTER — TELEPHONE (OUTPATIENT)
Dept: FAMILY MEDICINE CLINIC | Facility: CLINIC | Age: 67
End: 2022-03-01

## 2022-03-01 NOTE — TELEPHONE ENCOUNTER
[employer]   Radiology called today     catscan of abdomen and pelvis is ready to read in Epic

## 2022-03-01 NOTE — TELEPHONE ENCOUNTER
Left message for endocrinologist office to review CT scan of adrenal glands given indeterminate right nodule  Asked for input on next steps

## 2022-03-04 ENCOUNTER — TELEPHONE (OUTPATIENT)
Dept: FAMILY MEDICINE CLINIC | Facility: CLINIC | Age: 67
End: 2022-03-04

## 2022-03-04 DIAGNOSIS — D35.01 ADENOMA OF RIGHT ADRENAL GLAND: Primary | ICD-10-CM

## 2022-03-04 NOTE — PROGRESS NOTES
Received message from endocrinologist regarding next steps  He recommended some lab work  These need to be fasting and with the cortisol needs to be collected between 7 to 9 am        She should avoid high dose biotin supplement if she takes this 72 hours before she goes for labs

## 2022-03-04 NOTE — TELEPHONE ENCOUNTER
Please call patient and relay the following instructions  Received message from endocrinologist regarding next steps  He recommended some lab work  These need to be fasting and with the cortisol needs to be collected between 7 to 9 am        She should avoid high dose biotin supplement if she takes this 72 hours before she goes for labs

## 2022-03-08 ENCOUNTER — APPOINTMENT (OUTPATIENT)
Dept: LAB | Facility: MEDICAL CENTER | Age: 67
End: 2022-03-08
Payer: COMMERCIAL

## 2022-03-08 DIAGNOSIS — D35.01 ADENOMA OF RIGHT ADRENAL GLAND: ICD-10-CM

## 2022-03-08 LAB — CORTIS AM PEAK SERPL-MCNC: 15.1 UG/DL (ref 4.2–22.4)

## 2022-03-08 PROCEDURE — 82533 TOTAL CORTISOL: CPT

## 2022-03-08 PROCEDURE — 36415 COLL VENOUS BLD VENIPUNCTURE: CPT

## 2022-03-08 PROCEDURE — 84244 ASSAY OF RENIN: CPT

## 2022-03-08 PROCEDURE — 82088 ASSAY OF ALDOSTERONE: CPT

## 2022-03-08 PROCEDURE — 83835 ASSAY OF METANEPHRINES: CPT

## 2022-03-11 LAB
METANEPH FREE SERPL-MCNC: 13.5 PG/ML (ref 0–88)
NORMETANEPHRINE SERPL-MCNC: 122.7 PG/ML (ref 0–285.2)

## 2022-03-29 LAB
ALDOST SERPL-MCNC: 1.5 NG/DL (ref 0–30)
ALDOST/RENIN PLAS-RTO: 0.5 {RATIO} (ref 0–30)
RENIN PLAS-CCNC: 3.05 NG/ML/HR (ref 0.17–5.38)

## 2022-04-12 ENCOUNTER — HOSPITAL ENCOUNTER (OUTPATIENT)
Dept: RADIOLOGY | Age: 67
Discharge: HOME | End: 2022-04-12
Payer: COMMERCIAL

## 2022-04-12 VITALS — HEIGHT: 61 IN | WEIGHT: 240 LBS | BODY MASS INDEX: 45.31 KG/M2

## 2022-04-12 DIAGNOSIS — Z12.31 ENCOUNTER FOR SCREENING MAMMOGRAM FOR MALIGNANT NEOPLASM OF BREAST: ICD-10-CM

## 2022-04-12 PROCEDURE — 77063 BREAST TOMOSYNTHESIS BI: CPT

## 2022-04-12 PROCEDURE — 77067 SCR MAMMO BI INCL CAD: CPT

## 2022-04-25 ENCOUNTER — APPOINTMENT (OUTPATIENT)
Dept: LAB | Facility: MEDICAL CENTER | Age: 67
End: 2022-04-25
Payer: COMMERCIAL

## 2022-04-25 DIAGNOSIS — I10 ESSENTIAL HYPERTENSION: ICD-10-CM

## 2022-04-25 DIAGNOSIS — E11.65 TYPE 2 DIABETES MELLITUS WITH HYPERGLYCEMIA, WITH LONG-TERM CURRENT USE OF INSULIN (HCC): ICD-10-CM

## 2022-04-25 DIAGNOSIS — Z79.4 TYPE 2 DIABETES MELLITUS WITH HYPERGLYCEMIA, WITH LONG-TERM CURRENT USE OF INSULIN (HCC): ICD-10-CM

## 2022-04-25 LAB
EST. AVERAGE GLUCOSE BLD GHB EST-MCNC: 206 MG/DL
HBA1C MFR BLD: 8.8 %
TSH SERPL DL<=0.05 MIU/L-ACNC: 1.89 UIU/ML (ref 0.45–4.5)

## 2022-04-25 PROCEDURE — 36415 COLL VENOUS BLD VENIPUNCTURE: CPT

## 2022-04-25 PROCEDURE — 84443 ASSAY THYROID STIM HORMONE: CPT

## 2022-04-25 PROCEDURE — 83036 HEMOGLOBIN GLYCOSYLATED A1C: CPT

## 2022-04-25 RX ORDER — LISINOPRIL 10 MG/1
10 TABLET ORAL DAILY
Qty: 90 TABLET | Refills: 3 | Status: SHIPPED | OUTPATIENT
Start: 2022-04-25

## 2022-07-03 ENCOUNTER — OFFICE VISIT (OUTPATIENT)
Dept: URGENT CARE | Facility: MEDICAL CENTER | Age: 67
End: 2022-07-03
Payer: COMMERCIAL

## 2022-07-03 VITALS
BODY MASS INDEX: 45.31 KG/M2 | DIASTOLIC BLOOD PRESSURE: 82 MMHG | HEART RATE: 78 BPM | WEIGHT: 240 LBS | RESPIRATION RATE: 18 BRPM | OXYGEN SATURATION: 96 % | SYSTOLIC BLOOD PRESSURE: 128 MMHG | HEIGHT: 61 IN | TEMPERATURE: 98.3 F

## 2022-07-03 DIAGNOSIS — M62.838 MUSCLE SPASMS OF NECK: Primary | ICD-10-CM

## 2022-07-03 PROCEDURE — 99213 OFFICE O/P EST LOW 20 MIN: CPT | Performed by: PHYSICIAN ASSISTANT

## 2022-07-03 PROCEDURE — G0463 HOSPITAL OUTPT CLINIC VISIT: HCPCS | Performed by: PHYSICIAN ASSISTANT

## 2022-07-03 RX ORDER — CYCLOBENZAPRINE HCL 10 MG
10 TABLET ORAL 3 TIMES DAILY
Qty: 15 TABLET | Refills: 0 | Status: SHIPPED | OUTPATIENT
Start: 2022-07-03 | End: 2022-07-18 | Stop reason: ALTCHOICE

## 2022-07-03 NOTE — PATIENT INSTRUCTIONS
1  Tylenol as needed for pain  2  Take Flexeril 10mg  Up to 3x daily as needed for spasm  3  Apply hear to heck/back 10-15min 3-4x daily  4  Minimal activities until pain free  5   Follow up with Orthopedics if symptoms persist

## 2022-07-03 NOTE — PROGRESS NOTES
[facility]        NAME: Liss Tapia is a 79 y o  female  : 1955    MRN: [id number]  DATE: July 3, 2022  TIME: 10:02 AM    Assessment and Plan   Muscle spasms of neck [M62 838]  1  Muscle spasms of neck  cyclobenzaprine (FLEXERIL) 10 mg tablet         Patient Instructions     1  Tylenol as needed for pain  2  Take Flexeril 10mg  Up to 3x daily as needed for spasm  3  Apply hear to heck/back 10-15min 3-4x daily  4  Minimal activities until pain free  5  Follow up with Orthopedics if symptoms persist        Chief Complaint     Chief Complaint   Patient presents with    Neck Pain     Left sided neck pain          History of Present Illness       Shaheen Reyes is a 19-year-old female presents with a 3 day history of left-sided neck pain  Patient denies any fall, trauma or inciting event, she reports she woke with left-sided stiffness and neck discomfort  Patient denies any radiation of the pain into her left shoulder, upper arm elbow wrist or hand  She denies any numbness, tingling or weakness  Patient does have a history of osteoarthritis with multiple cervical and spinal fusions  She currently reports tightness and pain 4/10  Neck Pain   Pertinent negatives include no numbness or weakness  Review of Systems   Review of Systems   Constitutional: Negative  Musculoskeletal: Positive for neck pain  Neurological: Negative for weakness and numbness           Current Medications       Current Outpatient Medications:     cyclobenzaprine (FLEXERIL) 10 mg tablet, Take 1 tablet (10 mg total) by mouth 3 (three) times a day for 5 days, Disp: 15 tablet, Rfl: 0    ALPRAZolam (XANAX) 0 25 mg tablet, as needed, Disp: , Rfl:     aspirin 81 MG tablet, Take 81 mg by mouth daily, Disp: , Rfl:     atorvastatin (LIPITOR) 80 mg tablet, Take 80 mg by mouth Medrol Dose Pack scheduling ONLY, Disp: , Rfl:     B-D UF III MINI PEN NEEDLES 31G X 5 MM MISC, use 2 daily as directed, Disp: , Rfl: 0    calcium carbonate (OS-RILEY) 600 MG tablet, Take 400 mg by mouth daily, Disp: , Rfl:     Cholecalciferol (VITAMIN D) 2000 units tablet, Take 2,000 Units by mouth daily, Disp: , Rfl:     diclofenac sodium (VOLTAREN) 1 %, Apply 2 g topically 4 (four) times a day, Disp: 1 Tube, Rfl: 1    fluticasone (FLONASE) 50 mcg/act nasal spray, 1 spray into each nostril daily as needed, Disp: , Rfl:     folic acid (FOLVITE) 490 MCG tablet, Take 800 mg by mouth daily, Disp: , Rfl:     gabapentin (NEURONTIN) 300 mg capsule, Take 300 mg by mouth 3 (three) times a day, Disp: , Rfl:     insulin degludec (TRESIBA FLEXTOUCH) 200 units/mL CONCENTRATED U-200 injection pen, Inject 50 Units under the skin daily at bedtime , Disp: , Rfl:     Insulin Pen Needle (B-D ULTRAFINE III SHORT PEN) 31G X 8 MM MISC, by Does not apply route, Disp: , Rfl:     lisinopril (ZESTRIL) 10 mg tablet, Take 1 tablet (10 mg total) by mouth daily, Disp: 90 tablet, Rfl: 3    metFORMIN (GLUMETZA) 1000 MG (MOD) 24 hr tablet, Take 1,000 mg by mouth 2 (two) times a day with meals , Disp: , Rfl:     methocarbamol (ROBAXIN) 750 mg tablet, , Disp: , Rfl:     methotrexate 2 5 mg tablet, , Disp: , Rfl:     metoprolol tartrate (LOPRESSOR) 50 mg tablet, 50 mg every 12 (twelve) hours, Disp: , Rfl:     Multiple Vitamin (MULTIVITAMIN) tablet, Take 1 tablet by mouth daily, Disp: , Rfl:     nitroglycerin (NITROSTAT) 0 4 mg SL tablet, Place 1 tablet (0 4 mg total) under the tongue every 5 (five) minutes as needed for chest pain, Disp: 90 tablet, Rfl: 1    omega-3-acid ethyl esters (LOVAZA) 1 g capsule, Take 1,000 mg by mouth daily, Disp: , Rfl:     omeprazole (PriLOSEC) 20 mg delayed release capsule, Take 20 mg by mouth daily, Disp: , Rfl:     Probiotic Product (PROBIOTIC-10 PO), Take by mouth daily , Disp: , Rfl:     repaglinide (PRANDIN) 1 mg tablet, take 1 tablet by mouth three times a day before meals HOLD IF NOT EATING, Disp: , Rfl:     traMADol (ULTRAM) 50 mg tablet, Take 50 mg by mouth as needed for moderate pain, Disp: , Rfl:     Current Allergies     Allergies as of 07/03/2022 - Reviewed 07/03/2022   Allergen Reaction Noted    Acetazolamide Hives 09/07/2020    Barbiturates Other (See Comments) 03/05/2017    Morphine GI Intolerance 03/05/2017    Sulfa antibiotics Hives and Rash 10/19/2012    Sulfasalazine Hives and Rash 10/19/2012            The following portions of the patient's history were reviewed and updated as appropriate: allergies, current medications, past family history, past medical history, past social history, past surgical history and problem list      Past Medical History:   Diagnosis Date    [facility]yl[facility]yl[facility]     Arthritis     Diabetes mellitus ([facility] [address] [facility]     GERD (gastroesophageal reflux disease)     [facility]     Hematuria     last assessed 9/7/13    Hyperlipidemia     Hypertension     Nephrolithiasis 9/7/2013    Subclinical hypothyroidism 9/2/2015       Past Surgical History:   Procedure Laterality Date    APPENDECTOMY      BACK SURGERY      mulitple surgery, fusions "top to bottom"    BREAST SURGERY Left     biopsy    CERVICAL SPINE SURGERY      CHOLECYSTECTOMY      CORONARY ANGIOPLASTY WITH STENT PLACEMENT      CYSTOSCOPY  05/06/2015    Diagnostic, last assessed 5/6/15    HYSTERECTOMY      LITHOTRIPSY      FL CYSTO/URETERO W/LITHOTRIPSY &INDWELL STENT INSRT Left 4/18/2019    Procedure: CYSTO, URETEROSCOPY W/HOLMIUM LASER, RETROGRADE PYELOGRAM, STENT INSERTION;  Surgeon: John Ireland MD;  Location: [facility];  Service: Urology       Family History   Problem Relation Age of Onset    Diabetes Mother     Gout Mother     Heart disease Mother     Diabetes Father     Heart disease Father     Nephrolithiasis Brother     No Known Problems Daughter     No Known Problems Maternal Aunt     No Known Problems Maternal Aunt     No Known Problems Maternal Aunt     No Known Problems Maternal Aunt     No Known Problems Maternal Aunt     No Known Problems Maternal Aunt     No Known Problems Paternal Aunt     No Known Problems Paternal Aunt          Medications have been verified  Objective   /82   Pulse 78   Temp 98 3 °F (36 8 °C)   Resp 18   Ht 5' 1" (1 549 m)   Wt 109 kg (240 lb)   SpO2 96%   BMI 45 35 kg/m²   No LMP recorded  Patient has had a hysterectomy  Physical Exam     Physical Exam  Constitutional:       General: She is not in acute distress  Appearance: Normal appearance  She is not ill-appearing  Neck:      Comments: No tenderness to palpation over the spinous processes of the cervical spine, there is tightness and palpable spasm noted in the left upper trap and sternocleidomastoid  Cervical range of motion was limited in right lateral bending, right rotation and forward flexion  Cardiovascular:      Rate and Rhythm: Normal rate and regular rhythm  Heart sounds: Normal heart sounds  No murmur heard  Pulmonary:      Effort: Pulmonary effort is normal       Breath sounds: Normal breath sounds  Musculoskeletal:      Cervical back: Muscular tenderness present  No spinous process tenderness  Decreased range of motion  Neurological:      Mental Status: She is alert  Sensory: Sensation is intact  Motor: Motor function is intact  No weakness  Deep Tendon Reflexes:      Reflex Scores:       Brachioradialis reflexes are 2+ on the right side and 2+ on the left side

## 2022-07-18 ENCOUNTER — OFFICE VISIT (OUTPATIENT)
Dept: FAMILY MEDICINE CLINIC | Facility: CLINIC | Age: 67
End: 2022-07-18
Payer: COMMERCIAL

## 2022-07-18 VITALS
HEART RATE: 88 BPM | BODY MASS INDEX: 45.31 KG/M2 | OXYGEN SATURATION: 98 % | RESPIRATION RATE: 16 BRPM | DIASTOLIC BLOOD PRESSURE: 76 MMHG | WEIGHT: 240 LBS | HEIGHT: 61 IN | TEMPERATURE: 98.6 F | SYSTOLIC BLOOD PRESSURE: 126 MMHG

## 2022-07-18 DIAGNOSIS — K11.20 PAROTITIS: Primary | ICD-10-CM

## 2022-07-18 PROCEDURE — 99213 OFFICE O/P EST LOW 20 MIN: CPT | Performed by: FAMILY MEDICINE

## 2022-07-18 RX ORDER — METHOTREXATE 25 MG/ML
0.8 INJECTION, SOLUTION INTRA-ARTERIAL; INTRAMUSCULAR; INTRAVENOUS WEEKLY
COMMUNITY
Start: 2022-06-20

## 2022-07-18 RX ORDER — CEPHALEXIN 500 MG/1
500 CAPSULE ORAL
Qty: 14 CAPSULE | Refills: 0 | Status: SHIPPED | OUTPATIENT
Start: 2022-07-18 | End: 2022-07-19

## 2022-07-18 RX ORDER — AZITHROMYCIN 250 MG/1
TABLET, FILM COATED ORAL
Qty: 6 TABLET | Refills: 0 | Status: SHIPPED | OUTPATIENT
Start: 2022-07-18 | End: 2022-07-18

## 2022-07-18 NOTE — PROGRESS NOTES
Assessment/Plan:     Diagnoses and all orders for this visit:    Parotitis  -     Discontinue: azithromycin (ZITHROMAX) 250 mg tablet; Take 2 tablets today then 1 tablet daily x 4 days  -     cephalexin (KEFLEX) 500 mg capsule; Take 1 capsule (500 mg total) by mouth 2 (two) times daily after meals for 7 days    Other orders  -     methotrexate 50 MG/2ML injection; Inject 0 8 mL under the skin once a week          Suspected L parotitis  Start antibiotics  Symptom treatment for pain  Mucinex DM for cough  Advised to call if any changes  Patient ID: Grisel Tyson is a 79 y o  female  3 week history of intermittent non productive cough with sore throat  No T  No nasal congestion or post nasal drainage  No seasonal allergies  Over the last several days she developed left ear pain  No ear drainage  Home COV 19 test negative  The following portions of the patient's history were reviewed and updated as appropriate: allergies, current medications, past family history, past medical history, past social history, past surgical history and problem list     Review of Systems   Constitutional: Negative for chills and fever  HENT: Positive for ear pain and sore throat  Negative for congestion, postnasal drip, rhinorrhea, sinus pain and trouble swallowing  See HPI    Respiratory: Negative for cough and shortness of breath  Gastrointestinal: Negative for diarrhea, nausea and vomiting  Skin: Negative for rash  Neurological: Negative for headaches  Hematological: Negative for adenopathy  Objective:      /76   Pulse 88   Temp 98 6 °F (37 °C)   Resp 16   Ht 5' 1" (1 549 m)   Wt 109 kg (240 lb)   SpO2 98%   BMI 45 35 kg/m²          Physical Exam  Vitals and nursing note reviewed  Constitutional:       General: She is not in acute distress  Appearance: She is well-developed     HENT:      Right Ear: Tympanic membrane and ear canal normal       Left Ear: Tympanic membrane and ear canal normal       Ears:      Comments: Mild swelling and tenderness left parotid area  Nose:      Right Sinus: No maxillary sinus tenderness or frontal sinus tenderness  Left Sinus: No maxillary sinus tenderness or frontal sinus tenderness  Mouth/Throat:      Mouth: No oral lesions  Dentition: Normal dentition  No gum lesions  Pharynx: Oropharynx is clear  Eyes:      Conjunctiva/sclera: Conjunctivae normal       Pupils: Pupils are equal, round, and reactive to light  Cardiovascular:      Rate and Rhythm: Normal rate and regular rhythm  Heart sounds: Normal heart sounds  No murmur heard  No gallop  Pulmonary:      Effort: Pulmonary effort is normal  No respiratory distress  Breath sounds: Normal breath sounds  No wheezing or rales  Lymphadenopathy:      Cervical: No cervical adenopathy  Skin:     Findings: No rash  Neurological:      Mental Status: She is alert and oriented to person, place, and time

## 2022-07-19 DIAGNOSIS — K11.20 PAROTITIS: Primary | ICD-10-CM

## 2022-07-19 RX ORDER — DOXYCYCLINE HYCLATE 100 MG/1
100 CAPSULE ORAL
Qty: 14 CAPSULE | Refills: 0 | Status: SHIPPED | OUTPATIENT
Start: 2022-07-19 | End: 2022-07-26

## 2022-07-22 ENCOUNTER — APPOINTMENT (OUTPATIENT)
Dept: LAB | Facility: MEDICAL CENTER | Age: 67
End: 2022-07-22
Payer: COMMERCIAL

## 2022-07-22 DIAGNOSIS — E27.8 ADRENAL NODULE (HCC): ICD-10-CM

## 2022-07-22 DIAGNOSIS — Z79.4 TYPE 2 DIABETES MELLITUS WITH HYPERGLYCEMIA, WITH LONG-TERM CURRENT USE OF INSULIN (HCC): ICD-10-CM

## 2022-07-22 DIAGNOSIS — M19.90 INFLAMMATORY ARTHRITIS: ICD-10-CM

## 2022-07-22 DIAGNOSIS — E11.65 TYPE 2 DIABETES MELLITUS WITH HYPERGLYCEMIA, WITH LONG-TERM CURRENT USE OF INSULIN (HCC): ICD-10-CM

## 2022-07-22 LAB
ALBUMIN SERPL BCP-MCNC: 3.5 G/DL (ref 3.5–5)
ALP SERPL-CCNC: 89 U/L (ref 46–116)
ALT SERPL W P-5'-P-CCNC: 45 U/L (ref 12–78)
ANION GAP SERPL CALCULATED.3IONS-SCNC: 10 MMOL/L (ref 4–13)
AST SERPL W P-5'-P-CCNC: 35 U/L (ref 5–45)
BASOPHILS # BLD AUTO: 0.04 THOUSANDS/ΜL (ref 0–0.1)
BASOPHILS NFR BLD AUTO: 1 % (ref 0–1)
BILIRUB SERPL-MCNC: 0.66 MG/DL (ref 0.2–1)
BUN SERPL-MCNC: 22 MG/DL (ref 5–25)
CALCIUM SERPL-MCNC: 9.6 MG/DL (ref 8.3–10.1)
CHLORIDE SERPL-SCNC: 106 MMOL/L (ref 96–108)
CO2 SERPL-SCNC: 24 MMOL/L (ref 21–32)
CREAT SERPL-MCNC: 0.79 MG/DL (ref 0.6–1.3)
CRP SERPL QL: 4.8 MG/L
EOSINOPHIL # BLD AUTO: 0.27 THOUSAND/ΜL (ref 0–0.61)
EOSINOPHIL NFR BLD AUTO: 4 % (ref 0–6)
ERYTHROCYTE [DISTWIDTH] IN BLOOD BY AUTOMATED COUNT: 16.6 % (ref 11.6–15.1)
ERYTHROCYTE [SEDIMENTATION RATE] IN BLOOD: 54 MM/HOUR (ref 0–29)
EST. AVERAGE GLUCOSE BLD GHB EST-MCNC: 197 MG/DL
GFR SERPL CREATININE-BSD FRML MDRD: 77 ML/MIN/1.73SQ M
GLUCOSE P FAST SERPL-MCNC: 165 MG/DL (ref 65–99)
HBA1C MFR BLD: 8.5 %
HCT VFR BLD AUTO: 35.3 % (ref 34.8–46.1)
HGB BLD-MCNC: 10.8 G/DL (ref 11.5–15.4)
IMM GRANULOCYTES # BLD AUTO: 0.03 THOUSAND/UL (ref 0–0.2)
IMM GRANULOCYTES NFR BLD AUTO: 0 % (ref 0–2)
LYMPHOCYTES # BLD AUTO: 0.81 THOUSANDS/ΜL (ref 0.6–4.47)
LYMPHOCYTES NFR BLD AUTO: 12 % (ref 14–44)
MCH RBC QN AUTO: 27.3 PG (ref 26.8–34.3)
MCHC RBC AUTO-ENTMCNC: 30.6 G/DL (ref 31.4–37.4)
MCV RBC AUTO: 89 FL (ref 82–98)
MONOCYTES # BLD AUTO: 0.52 THOUSAND/ΜL (ref 0.17–1.22)
MONOCYTES NFR BLD AUTO: 8 % (ref 4–12)
NEUTROPHILS # BLD AUTO: 5.03 THOUSANDS/ΜL (ref 1.85–7.62)
NEUTS SEG NFR BLD AUTO: 75 % (ref 43–75)
NRBC BLD AUTO-RTO: 0 /100 WBCS
PLATELET # BLD AUTO: 317 THOUSANDS/UL (ref 149–390)
PMV BLD AUTO: 10.1 FL (ref 8.9–12.7)
POTASSIUM SERPL-SCNC: 4.3 MMOL/L (ref 3.5–5.3)
PROT SERPL-MCNC: 7.4 G/DL (ref 6.4–8.4)
RBC # BLD AUTO: 3.95 MILLION/UL (ref 3.81–5.12)
SODIUM SERPL-SCNC: 140 MMOL/L (ref 135–147)
WBC # BLD AUTO: 6.7 THOUSAND/UL (ref 4.31–10.16)

## 2022-07-22 PROCEDURE — 85652 RBC SED RATE AUTOMATED: CPT

## 2022-07-22 PROCEDURE — 80053 COMPREHEN METABOLIC PANEL: CPT

## 2022-07-22 PROCEDURE — 82627 DEHYDROEPIANDROSTERONE: CPT

## 2022-07-22 PROCEDURE — 85025 COMPLETE CBC W/AUTO DIFF WBC: CPT

## 2022-07-22 PROCEDURE — 36415 COLL VENOUS BLD VENIPUNCTURE: CPT

## 2022-07-22 PROCEDURE — 83036 HEMOGLOBIN GLYCOSYLATED A1C: CPT

## 2022-07-22 PROCEDURE — 86140 C-REACTIVE PROTEIN: CPT

## 2022-07-23 LAB — DHEA-S SERPL-MCNC: 40.1 UG/DL (ref 20.4–186.6)

## 2022-08-29 ENCOUNTER — TELEPHONE (OUTPATIENT)
Dept: FAMILY MEDICINE CLINIC | Facility: CLINIC | Age: 67
End: 2022-08-29

## 2022-08-29 NOTE — TELEPHONE ENCOUNTER
Patient tested positive on 8/27/2022 with Lennie Cervantes Her symptoms started 8/26/2022 with congestion, cough,fever,achy, sore throat     She does not want any antibiotics    she is taking OTC meds         Her   was positive on 8/26/2022

## 2022-08-29 NOTE — TELEPHONE ENCOUNTER
If within 5 days of symptoms onset could consider Paxlovid  This is an experimental treatment under emergency use authorization by the FDA  It can lower risk of progression to more severe illness in at risk individuals  Possible side effects: nausea, diarrhea, myalgias, rebound viral symptoms after completing course  Can order if patient is interested and understands risk/benefits

## 2022-08-29 NOTE — TELEPHONE ENCOUNTER
Lisa  Patient informed  Patient stated that she prefers to be off Paxlovid because her  was on it and had three different side effects  Patient said currently she is taking Mucinex cough and tylenol and it seems to be working fine   She will contact the office if symptoms gets worst

## 2022-08-31 ENCOUNTER — TELEMEDICINE (OUTPATIENT)
Dept: FAMILY MEDICINE CLINIC | Facility: CLINIC | Age: 67
End: 2022-08-31
Payer: COMMERCIAL

## 2022-08-31 DIAGNOSIS — U07.1 COVID-19: Primary | ICD-10-CM

## 2022-08-31 DIAGNOSIS — R05.9 COUGH: ICD-10-CM

## 2022-08-31 PROCEDURE — 99213 OFFICE O/P EST LOW 20 MIN: CPT | Performed by: FAMILY MEDICINE

## 2022-08-31 RX ORDER — DEXTROMETHORPHAN HYDROBROMIDE AND PROMETHAZINE HYDROCHLORIDE 15; 6.25 MG/5ML; MG/5ML
5 SOLUTION ORAL 4 TIMES DAILY PRN
Qty: 118 ML | Refills: 0 | Status: SHIPPED | OUTPATIENT
Start: 2022-08-31 | End: 2022-09-19 | Stop reason: ALTCHOICE

## 2022-08-31 NOTE — PROGRESS NOTES
COVID-19 Outpatient Progress Note    Assessment/Plan:    Problem List Items Addressed This Visit    None     Visit Diagnoses     COVID-19    -  Primary    Relevant Medications    Promethazine-DM (PHENERGAN-DM) 6 25-15 mg/5 mL oral syrup    Cough        Relevant Medications    Promethazine-DM (PHENERGAN-DM) 6 25-15 mg/5 mL oral syrup         Disposition:     Patient has asymptomatic or mild COVID-19 infection  Based off CDC guidelines, they were recommended to isolate for 5 days  If they are asymptomatic or symptoms are improving with no fevers in the past 24 hours, isolation may be ended followed by 5 days of wearing a mask when around othes to minimize risk of infecting others  If still have a fever or other symptoms have not improved, continue to isolate until they improve  Regardless of when they end isolation, avoid being around people who are more likely to get very sick from COVID-19 until at least day 11  Discussed symptom directed medication options with patient  Discussed vitamin D, vitamin C, and/or zinc supplementation with patient  I have spent 10 minutes directly with the patient  Greater than 50% of this time was spent in counseling/coordination of care regarding: risks and benefits of treatment options, instructions for management, patient and family education and importance of treatment compliance  Encounter provider: Paco Love MD     Provider located at: [place] [address]  [phone]     Recent Visits  Date Type Provider Dept   08/29/22 Telephone [facility]   Showing recent visits within past 7 days and meeting all other requirements  Today's Visits  Date Type Provider Dept   08/31/22 Barbara Rosales MD [facility]   Showing today's visits and meeting all other requirements  Future Appointments  No visits were found meeting these conditions    Showing future appointments within next 150 days and meeting all other requirements     This virtual check-in was done via Roster[employer] and patient was informed that this is a secure, HIPAA-compliant platform  She agrees to proceed  Patient agrees to participate in a virtual check in via telephone or video visit instead of presenting to the office to address urgent/immediate medical needs  Patient is aware this is a billable service  She acknowledged consent and understanding of privacy and security of the video platform  The patient has agreed to participate and understands they can discontinue the visit at any time  After connecting through [facility], the patient was identified by name and date of birth  Susan Mccracken was informed that this was a telemedicine visit and that the exam was being conducted confidentially over secure lines  My office door was closed  No one else was in the room  Susan Mccracken acknowledged consent and understanding of privacy and security of the telemedicine visit  I informed the patient that I have reviewed her record in Epic and presented the opportunity for her to ask any questions regarding the visit today  The patient agreed to participate  Verification of patient location:  Patient is located in the following state in which I hold an active license: [place]    Subjective:   Susan Mccracken is a 79 y o  female who has been screened for COVID-19  Symptom change since last report: improving  Patient's symptoms include nasal congestion, cough and shortness of breath  Patient denies fever, chills, fatigue, malaise, rhinorrhea, sore throat, anosmia, loss of taste, chest tightness, abdominal pain, nausea, vomiting, diarrhea, myalgias and headaches  - Date of symptom onset: 8/27/2022  - Date of positive COVID-19 test: 8/27/2022  Type of test: Home antigen  Home antigen result verified by provider  Will get picture of test uploaded/scanned into patient's chart       COVID-19 vaccination status: Fully vaccinated with booster    Ann Rodriguez has been staying home and has isolated themselves in her home  She is taking care to not share personal items and She is wearing a mask when she leaves her room       She has been using OTC medications including Mucinex and Robitussin     No results found for: Kyler Lugo, [place], CORONAVIRUSR, SARSCOVAG, [address]  Past Medical History:   Diagnosis Date    [facility]     Arthritis     Diabetes mellitus ([facility]     GERD (gastroesophageal reflux disease)     Heart attack ([facility] )     Hematuria     last assessed 9/7/13    Hyperlipidemia     Hypertension     Nephrolithiasis 9/7/2013    Subclinical hypothyroidism 9/2/2015     Past Surgical History:   Procedure Laterality Date    APPENDECTOMY      BACK SURGERY      mulitple surgery, fusions "top to bottom"    BREAST SURGERY Left     biopsy    CERVICAL SPINE SURGERY      CHOLECYSTECTOMY      CORONARY ANGIOPLASTY WITH STENT PLACEMENT      CYSTOSCOPY  05/06/2015    Diagnostic, last assessed 5/6/15    HYSTERECTOMY      LITHOTRIPSY      [place] CYSTO/URETERO W/LITHOTRIPSY &INDWELL STENT INSRT Left 4/18/2019    Procedure: CYSTO, URETEROSCOPY W/HOLMIUM LASER, RETROGRADE PYELOGRAM, STENT INSERTION;  Surgeon: Bobby Mclain MD;  Location: [facility] OR;  Service: Urology     Current Outpatient Medications   Medication Sig Dispense Refill    Promethazine-DM (PHENERGAN-DM) 6 25-15 mg/5 mL oral syrup Take 5 mL by mouth 4 (four) times a day as needed for cough 118 mL 0    ALPRAZolam (XANAX) 0 25 mg tablet as needed      aspirin 81 MG tablet Take 81 mg by mouth daily      atorvastatin (LIPITOR) 80 mg tablet Take 80 mg by mouth Medrol Dose Pack scheduling ONLY      B-D UF III MINI PEN NEEDLES 31G X 5 MM MISC use 2 daily as directed  0    calcium carbonate (OS-RILEY) 600 MG tablet Take 400 mg by mouth daily      Cholecalciferol (VITAMIN D) 2000 units tablet Take 2,000 Units by mouth daily      fluticasone (FLONASE) 50 mcg/act nasal spray 1 spray into each nostril daily as needed      folic acid (FOLVITE) 863 MCG tablet Take 800 mg by mouth daily      gabapentin (NEURONTIN) 300 mg capsule Take 300 mg by mouth 3 (three) times a day      insulin degludec (TRESIBA) 200 units/mL CONCENTRATED U-200 injection pen Inject 54 Units under the skin daily at bedtime      Insulin Pen Needle 31G X 8 MM MISC by Does not apply route      lisinopril (ZESTRIL) 10 mg tablet Take 1 tablet (10 mg total) by mouth daily 90 tablet 3    metFORMIN (GLUMETZA) 1000 MG (MOD) 24 hr tablet Take 1,000 mg by mouth 2 (two) times a day with meals       methocarbamol (ROBAXIN) 750 mg tablet       methotrexate 50 MG/2ML injection Inject 0 8 mL under the skin once a week      metoprolol tartrate (LOPRESSOR) 50 mg tablet 50 mg every 12 (twelve) hours      Multiple Vitamin (MULTIVITAMIN) tablet Take 1 tablet by mouth daily      nitroglycerin (NITROSTAT) 0 4 mg SL tablet Place 1 tablet (0 4 mg total) under the tongue every 5 (five) minutes as needed for chest pain 90 tablet 1    omeprazole (PriLOSEC) 20 mg delayed release capsule Take 20 mg by mouth daily      Probiotic Product (PROBIOTIC-10 PO) Take by mouth daily       repaglinide (PRANDIN) 1 mg tablet take 1 tablet by mouth three times a day before meals HOLD IF NOT EATING      traMADol (ULTRAM) 50 mg tablet Take 50 mg by mouth as needed for moderate pain       No current facility-administered medications for this visit  Allergies   Allergen Reactions    Acetazolamide Hives    Barbiturates Other (See Comments)     stomach pain    Morphine GI Intolerance     Other reaction(s): GI Intolerance    Sulfa Antibiotics Hives and Rash    Sulfasalazine Hives and Rash       Review of Systems   Constitutional: Negative for chills, fatigue and fever  HENT: Positive for congestion  Negative for rhinorrhea and sore throat  Respiratory: Positive for cough and shortness of breath  Negative for chest tightness  Gastrointestinal: Negative for abdominal pain, diarrhea, nausea and vomiting  Musculoskeletal: Negative for myalgias  Neurological: Negative for headaches  Objective: There were no vitals filed for this visit  Physical Exam  Constitutional:       General: She is not in acute distress  Appearance: Normal appearance  She is not ill-appearing or toxic-appearing  HENT:      Head: Normocephalic and atraumatic  Nose: Congestion present  Mouth/Throat:      Mouth: Mucous membranes are moist    Pulmonary:      Effort: Pulmonary effort is normal  No respiratory distress  Comments: Able to speak in complete sentences without difficulty   Musculoskeletal:      Cervical back: Normal range of motion  Skin:     Coloration: Skin is not pale  Findings: No erythema  Neurological:      Mental Status: She is alert

## 2022-09-06 ENCOUNTER — TELEPHONE (OUTPATIENT)
Dept: FAMILY MEDICINE CLINIC | Facility: CLINIC | Age: 67
End: 2022-09-06

## 2022-09-06 ENCOUNTER — TELEMEDICINE (OUTPATIENT)
Dept: FAMILY MEDICINE CLINIC | Facility: CLINIC | Age: 67
End: 2022-09-06
Payer: COMMERCIAL

## 2022-09-06 DIAGNOSIS — J01.10 ACUTE NON-RECURRENT FRONTAL SINUSITIS: ICD-10-CM

## 2022-09-06 DIAGNOSIS — U07.1 COVID-19: Primary | ICD-10-CM

## 2022-09-06 PROCEDURE — 99213 OFFICE O/P EST LOW 20 MIN: CPT | Performed by: FAMILY MEDICINE

## 2022-09-06 RX ORDER — AZITHROMYCIN 250 MG/1
TABLET, FILM COATED ORAL
Qty: 6 TABLET | Refills: 0 | Status: SHIPPED | OUTPATIENT
Start: 2022-09-06 | End: 2022-09-11

## 2022-09-06 NOTE — PROGRESS NOTES
COVID-19 Outpatient Progress Note    Assessment/Plan:    Problem List Items Addressed This Visit    None     Visit Diagnoses     COVID-19    -  Primary    Relevant Medications    Mometasone Furoate (Asmanex HFA) 100 MCG/ACT AERO    Acute non-recurrent frontal sinusitis        Relevant Medications    azithromycin (Zithromax) 250 mg tablet         Disposition:     Patient has asymptomatic or mild COVID-19 infection  Based off CDC guidelines, they were recommended to isolate for 5 days  If they are asymptomatic or symptoms are improving with no fevers in the past 24 hours, isolation may be ended followed by 5 days of wearing a mask when around othes to minimize risk of infecting others  If still have a fever or other symptoms have not improved, continue to isolate until they improve  Regardless of when they end isolation, avoid being around people who are more likely to get very sick from COVID-19 until at least day 11  If antigen test remains positive, they may need to continue wearing a mask beyond day 10  Continue taking antigen tests at least 48 hours apart until you have two sequential negative results  Discussed symptom directed medication options with patient  Continue with supportive care, will trial ICS given ongoing cough and older age  Pocket prescription for [employer] provided for patient  I have spent 15 minutes directly with the patient  Greater than 50% of this time was spent in counseling/coordination of care regarding: risks and benefits of treatment options, instructions for management, patient and family education and risk factor reductions         Encounter provider: Asfi Dickerson MD     Provider located at: [address]  [phone]     Recent Visits  Date Type Provider Dept   08/31/22 [place], MD [facility]   Showing recent visits within past 7 days and meeting all other requirements  Today's Visits  Date Type Provider Dept   09/06/22 MD Emeka Jones   09/06/22 Telephone [facility]   Showing today's visits and meeting all other requirements  Future Appointments  No visits were found meeting these conditions  Showing future appointments within next 150 days and meeting all other requirements     This virtual check-in was done via [employer] and patient was informed that this is a secure, HIPAA-compliant platform  She agrees to proceed  Patient agrees to participate in a virtual check in via telephone or video visit instead of presenting to the office to address urgent/immediate medical needs  Patient is aware this is a billable service  She acknowledged consent and understanding of privacy and security of the video platform  The patient has agreed to participate and understands they can discontinue the visit at any time  After connecting through [facility], the patient was identified by name and date of birth  Zena Blanchard was informed that this was a telemedicine visit and that the exam was being conducted confidentially over secure lines  Zena Blanchard acknowledged consent and understanding of privacy and security of the telemedicine visit  I informed the patient that I have reviewed her record in Epic and presented the opportunity for her to ask any questions regarding the visit today  The patient agreed to participate  Verification of patient location:  Patient is located in the following state in which I hold an active license: [place]    Subjective:   Zena Blanchard is a 79 y o  female who has been screened for COVID-19  Symptom change since last report: unchanged  Patient's symptoms include fatigue, malaise, nasal congestion, rhinorrhea, cough, shortness of breath and chest tightness  Patient denies fever, chills, sore throat, anosmia, loss of taste, abdominal pain, nausea, vomiting, diarrhea, myalgias and headaches       - Date of symptom onset: 8/27/2022  - Date of positive COVID-19 test: 8/27/2022  Type of test: Home antigen  Patient with typical symptoms of COVID-19 and they attest that they were positive on home rapid antigen testing  Image of positive result is not able to be uploaded into their chart  COVID-19 vaccination status: Fully vaccinated with booster    Naif Bradley has been staying home and has isolated themselves in her home  She is taking care to not share personal items and is cleaning all surfaces that are touched often, like counters, tabletops, and doorknobs using household cleaning sprays or wipes  She is wearing a mask when she leaves her room  She reports that she does not feel any better  She has been taking Mucinex, Tylenol       No results found for: Luis Antonio Kam, SARSCORONAVI, CORONAVIRUSR, SARSCOVAG, [facility]  Past Medical History:   Diagnosis Date    [facility]yl[facility] [address] )     Arthritis     Diabetes mellitus ([facility] [address] )     GERD (gastroesophageal reflux disease)     [facility] attack [facility]     Hematuria     last assessed 9/7/13    Hyperlipidemia     Hypertension     Nephrolithiasis 9/7/2013    Subclinical hypothyroidism 9/2/2015     Past Surgical History:   Procedure Laterality Date    APPENDECTOMY      BACK SURGERY      mulitple surgery, fusions "top to bottom"    BREAST SURGERY Left     biopsy    CERVICAL SPINE SURGERY      CHOLECYSTECTOMY      CORONARY ANGIOPLASTY WITH STENT PLACEMENT      CYSTOSCOPY  05/06/2015    Diagnostic, last assessed 5/6/15    HYSTERECTOMY      LITHOTRIPSY      [place] CYSTO/URETERO W/LITHOTRIPSY &INDWELL STENT INSRT Left 4/18/2019    Procedure: CYSTO, URETEROSCOPY W/HOLMIUM LASER, RETROGRADE PYELOGRAM, STENT INSERTION;  Surgeon: Chandrakant Mike MD;  Location: [facility];  Service: Urology     Current Outpatient Medications   Medication Sig Dispense Refill    azithromycin (Zithromax) 250 mg tablet Take 2 tablets (500 mg total) by mouth daily for 1 day, THEN 1 tablet (250 mg total) daily for 4 days  6 tablet 0    Mometasone Furoate (Asmanex HFA) 100 MCG/ACT AERO Inhale 4 puffs (400 mcg total) 2 (two) times a day for 14 days Rinse mouth after use   13 g 0    ALPRAZolam (XANAX) 0 25 mg tablet as needed      aspirin 81 MG tablet Take 81 mg by mouth daily      atorvastatin (LIPITOR) 80 mg tablet Take 80 mg by mouth Medrol Dose Pack scheduling ONLY      B-D UF III MINI PEN NEEDLES 31G X 5 MM MISC use 2 daily as directed  0    calcium carbonate (OS-RILEY) 600 MG tablet Take 400 mg by mouth daily      Cholecalciferol (VITAMIN D) 2000 units tablet Take 2,000 Units by mouth daily      fluticasone (FLONASE) 50 mcg/act nasal spray 1 spray into each nostril daily as needed      folic acid (FOLVITE) 498 MCG tablet Take 800 mg by mouth daily      gabapentin (NEURONTIN) 300 mg capsule Take 300 mg by mouth 3 (three) times a day      insulin degludec (TRESIBA) 200 units/mL CONCENTRATED U-200 injection pen Inject 54 Units under the skin daily at bedtime      Insulin Pen Needle 31G X 8 MM MISC by Does not apply route      lisinopril (ZESTRIL) 10 mg tablet Take 1 tablet (10 mg total) by mouth daily 90 tablet 3    metFORMIN (GLUMETZA) 1000 MG (MOD) 24 hr tablet Take 1,000 mg by mouth 2 (two) times a day with meals       methocarbamol (ROBAXIN) 750 mg tablet       methotrexate 50 MG/2ML injection Inject 0 8 mL under the skin once a week      metoprolol tartrate (LOPRESSOR) 50 mg tablet 50 mg every 12 (twelve) hours      Multiple Vitamin (MULTIVITAMIN) tablet Take 1 tablet by mouth daily      nitroglycerin (NITROSTAT) 0 4 mg SL tablet Place 1 tablet (0 4 mg total) under the tongue every 5 (five) minutes as needed for chest pain 90 tablet 1    omeprazole (PriLOSEC) 20 mg delayed release capsule Take 20 mg by mouth daily      Probiotic Product (PROBIOTIC-10 PO) Take by mouth daily       Promethazine-DM (PHENERGAN-DM) 6 25-15 mg/5 mL oral syrup Take 5 mL by mouth 4 (four) times a day as needed for cough 118 mL 0    repaglinide (PRANDIN) 1 mg tablet take 1 tablet by mouth three times a day before meals HOLD IF NOT EATING      traMADol (ULTRAM) 50 mg tablet Take 50 mg by mouth as needed for moderate pain       No current facility-administered medications for this visit  Allergies   Allergen Reactions    Acetazolamide Hives    Barbiturates Other (See Comments)     stomach pain    Morphine GI Intolerance     Other reaction(s): GI Intolerance    Sulfa Antibiotics Hives and Rash    Sulfasalazine Hives and Rash       Review of Systems   Constitutional: Positive for fatigue  Negative for chills and fever  HENT: Positive for congestion and rhinorrhea  Negative for sore throat  Respiratory: Positive for cough, chest tightness and shortness of breath  Gastrointestinal: Negative for abdominal pain, diarrhea, nausea and vomiting  Musculoskeletal: Negative for myalgias  Neurological: Negative for headaches  Objective: There were no vitals filed for this visit  Physical Exam  Constitutional:       General: She is not in acute distress  Appearance: Normal appearance  She is normal weight  She is not ill-appearing or toxic-appearing  HENT:      Head: Normocephalic and atraumatic  Right Ear: External ear normal       Left Ear: External ear normal       Nose: Congestion present  Mouth/Throat:      Mouth: Mucous membranes are moist    Eyes:      Extraocular Movements: Extraocular movements intact  Conjunctiva/sclera: Conjunctivae normal    Pulmonary:      Effort: Pulmonary effort is normal  No respiratory distress  Comments: Able to speak in complete sentences without difficulty   Musculoskeletal:         General: Normal range of motion  Cervical back: Normal range of motion and neck supple  Skin:     Findings: No erythema or rash  Neurological:      General: No focal deficit present        Mental Status: She is alert and oriented to person, place, and time

## 2022-09-09 ENCOUNTER — TELEPHONE (OUTPATIENT)
Dept: FAMILY MEDICINE CLINIC | Facility: CLINIC | Age: 67
End: 2022-09-09

## 2022-09-09 DIAGNOSIS — U07.1 COVID-19: Primary | ICD-10-CM

## 2022-09-09 RX ORDER — MOMETASONE FUROATE 200 UG/1
2 AEROSOL RESPIRATORY (INHALATION) 2 TIMES DAILY
Qty: 13 G | Refills: 0 | Status: SHIPPED | OUTPATIENT
Start: 2022-09-09 | End: 2022-09-19 | Stop reason: SDUPTHER

## 2022-09-09 NOTE — TELEPHONE ENCOUNTER
Patient is coughing for over 2 weeks and is asking for inhaler       The insurance denied it because the amount of puffs is on the script    it has to be a higher strength of inhaler with less puffs        [facility] in [facility] [place]    She has covid since 8/27/2022 but feels she is having trouble breathing

## 2022-09-19 ENCOUNTER — OFFICE VISIT (OUTPATIENT)
Dept: FAMILY MEDICINE CLINIC | Facility: CLINIC | Age: 67
End: 2022-09-19
Payer: COMMERCIAL

## 2022-09-19 VITALS
RESPIRATION RATE: 17 BRPM | HEIGHT: 61 IN | DIASTOLIC BLOOD PRESSURE: 74 MMHG | TEMPERATURE: 96.6 F | SYSTOLIC BLOOD PRESSURE: 124 MMHG | HEART RATE: 75 BPM | OXYGEN SATURATION: 96 % | BODY MASS INDEX: 47.58 KG/M2 | WEIGHT: 252 LBS

## 2022-09-19 DIAGNOSIS — R09.82 POST-NASAL DRIP: ICD-10-CM

## 2022-09-19 DIAGNOSIS — R05.8 POST-VIRAL COUGH SYNDROME: Primary | ICD-10-CM

## 2022-09-19 DIAGNOSIS — Z23 ENCOUNTER FOR IMMUNIZATION: ICD-10-CM

## 2022-09-19 DIAGNOSIS — R05.9 COUGH: ICD-10-CM

## 2022-09-19 DIAGNOSIS — Z86.16 HISTORY OF COVID-19: ICD-10-CM

## 2022-09-19 PROCEDURE — 99213 OFFICE O/P EST LOW 20 MIN: CPT | Performed by: FAMILY MEDICINE

## 2022-09-19 RX ORDER — FLUTICASONE PROPIONATE 50 MCG
1 SPRAY, SUSPENSION (ML) NASAL DAILY
Qty: 11.1 ML | Refills: 0 | Status: SHIPPED | OUTPATIENT
Start: 2022-09-19

## 2022-09-19 RX ORDER — MOMETASONE FUROATE 200 UG/1
2 AEROSOL RESPIRATORY (INHALATION) 2 TIMES DAILY
Qty: 13 G | Refills: 0 | Status: SHIPPED | OUTPATIENT
Start: 2022-09-19

## 2022-09-19 RX ORDER — BENZONATATE 100 MG/1
100 CAPSULE ORAL 3 TIMES DAILY PRN
Qty: 20 CAPSULE | Refills: 0 | Status: SHIPPED | OUTPATIENT
Start: 2022-09-19

## 2022-09-19 RX ORDER — PREDNISONE 1 MG/1
5 TABLET ORAL DAILY
COMMUNITY
Start: 2022-08-24

## 2022-09-19 NOTE — PROGRESS NOTES
FAMILY MEDICINE PROGRESS NOTE    Date of Service: 22  Primary Care Provider:   Irma Leyva MD       Name: Samina Quintero       : 1955       Age:67 y o  Sex: female      MRN: [id number]      Chief Complaint:Nasal Congestion and Cough (Patient took cough syrup, mucinex)       ASSESSMENT and PLAN:  Samina Quintero is a 79 y o  female with:     Problem List Items Addressed This Visit    None     Visit Diagnoses     Post-viral cough syndrome    -  Primary    Relevant Medications    Mometasone Furoate (Asmanex HFA) 200 MCG/ACT AERO    Cough        Relevant Medications    benzonatate (TESSALON PERLES) 100 mg capsule    History of COVID-19        Post-nasal drip        Relevant Medications    fluticasone (FLONASE) 50 mcg/act nasal spray    Encounter for immunization            Patient with post viral cough following illness with COVID  Exam overall benign aside from some nonproductive cough, hoarse voice  Lungs clear to auscultation  Do not feel she needs antibiotics at this point she was started treated with a course of azithromycin  Will continue with inhaled corticosteroid, recommended other supportive care with [employer], will treat postnasal drip with Flonase, simple of nasal rinse given today  Discussed that if symptoms linger for over 6 weeks since her illness with COVID we should evaluate further but this point this is an expected course of illness    SUBJECTIVE:  Samina Quintero is a 79 y o  female who presents today with a chief complaint of Nasal Congestion and Cough (Patient took cough syrup, mucinex)  HPI     Patient tested positive for COVID , she had had ongoing symptoms of cough  She was treated with ICS and azithromycin   He reports that the inhaler was helpful for her, it help with cough and chest congestion  She does report that it is worse at night  She has been using Mucinex and cough syrup  Review of Systems   HENT: Positive for congestion  Respiratory: Positive for cough and chest tightness  I have reviewed the patient's Past Medical History      Current Outpatient Medications:     ALPRAZolam (XANAX) 0 25 mg tablet, as needed, Disp: , Rfl:     aspirin 81 MG tablet, Take 81 mg by mouth daily, Disp: , Rfl:     atorvastatin (LIPITOR) 80 mg tablet, Take 80 mg by mouth Medrol Dose Pack scheduling ONLY, Disp: , Rfl:     B-D UF III MINI PEN NEEDLES 31G X 5 MM MISC, use 2 daily as directed, Disp: , Rfl: 0    benzonatate (TESSALON PERLES) 100 mg capsule, Take 1 capsule (100 mg total) by mouth 3 (three) times a day as needed for cough, Disp: 20 capsule, Rfl: 0    calcium carbonate (OS-RILEY) 600 MG tablet, Take 400 mg by mouth daily, Disp: , Rfl:     Cholecalciferol (VITAMIN D) 2000 units tablet, Take 2,000 Units by mouth daily, Disp: , Rfl:     fluticasone (FLONASE) 50 mcg/act nasal spray, 1 spray into each nostril daily, Disp: 11 1 mL, Rfl: 0    folic acid (FOLVITE) 881 MCG tablet, Take 800 mg by mouth daily, Disp: , Rfl:     gabapentin (NEURONTIN) 300 mg capsule, Take 300 mg by mouth 3 (three) times a day, Disp: , Rfl:     insulin degludec (TRESIBA) 200 units/mL CONCENTRATED U-200 injection pen, Inject 54 Units under the skin daily at bedtime, Disp: , Rfl:     Insulin Pen Needle 31G X 8 MM MISC, by Does not apply route, Disp: , Rfl:     lisinopril (ZESTRIL) 10 mg tablet, Take 1 tablet (10 mg total) by mouth daily, Disp: 90 tablet, Rfl: 3    metFORMIN (GLUMETZA) 1000 MG (MOD) 24 hr tablet, Take 1,000 mg by mouth 2 (two) times a day with meals , Disp: , Rfl:     methocarbamol (ROBAXIN) 750 mg tablet, , Disp: , Rfl:     methotrexate 50 MG/2ML injection, Inject 0 8 mL under the skin once a week, Disp: , Rfl:     metoprolol tartrate (LOPRESSOR) 50 mg tablet, 50 mg every 12 (twelve) hours, Disp: , Rfl:     Mometasone Furoate (Asmanex HFA) 200 MCG/ACT AERO, Inhale 2 puffs (400 mcg total) 2 (two) times a day Rinse mouth after use , Disp: 13 g, Rfl: 0    Multiple Vitamin (MULTIVITAMIN) tablet, Take 1 tablet by mouth daily, Disp: , Rfl:     nitroglycerin (NITROSTAT) 0 4 mg SL tablet, Place 1 tablet (0 4 mg total) under the tongue every 5 (five) minutes as needed for chest pain, Disp: 90 tablet, Rfl: 1    omeprazole (PriLOSEC) 20 mg delayed release capsule, Take 20 mg by mouth daily, Disp: , Rfl:     Probiotic Product (PROBIOTIC-10 PO), Take by mouth daily , Disp: , Rfl:     repaglinide (PRANDIN) 1 mg tablet, take 1 tablet by mouth three times a day before meals HOLD IF NOT EATING, Disp: , Rfl:     predniSONE 5 mg tablet, Take 5 mg by mouth daily, Disp: , Rfl:     traMADol (ULTRAM) 50 mg tablet, Take 50 mg by mouth as needed for moderate pain, Disp: , Rfl:     OBJECTIVE:  /74   Pulse 75   Temp (!) 96 6 °F (35 9 °C)   Resp 17   Ht 5' 1" (1 549 m)   Wt 114 kg (252 lb)   SpO2 96%   BMI 47 61 kg/m²    BP Readings from Last 3 Encounters:   09/19/22 124/74   07/18/22 126/76   07/03/22 128/82      Wt Readings from Last 3 Encounters:   09/19/22 114 kg (252 lb)   07/18/22 109 kg (240 lb)   07/03/22 109 kg (240 lb)      Physical Exam  Constitutional:       General: She is not in acute distress  Appearance: Normal appearance  She is obese  She is not ill-appearing or toxic-appearing  HENT:      Head: Normocephalic and atraumatic  Right Ear: External ear normal       Left Ear: External ear normal       Nose: Congestion present  No rhinorrhea  Mouth/Throat:      Mouth: Mucous membranes are moist       Pharynx: No oropharyngeal exudate or posterior oropharyngeal erythema  Eyes:      Extraocular Movements: Extraocular movements intact  Conjunctiva/sclera: Conjunctivae normal    Cardiovascular:      Rate and Rhythm: Normal rate and regular rhythm  Pulmonary:      Effort: Pulmonary effort is normal  No respiratory distress  Breath sounds: Normal breath sounds  No stridor  No wheezing, rhonchi or rales     Musculoskeletal: General: Normal range of motion  Cervical back: Normal range of motion and neck supple  No rigidity  Lymphadenopathy:      Cervical: No cervical adenopathy  Skin:     Findings: No erythema or rash  Neurological:      General: No focal deficit present  Mental Status: She is alert and oriented to person, place, and time  Psychiatric:         Mood and Affect: Mood normal          Behavior: Behavior normal                 Return if symptoms worsen or fail to improve  Demetra Cervantes MD    Note: Portions of the record have been created with voice recognition software  Occasional wrong word or "sound a like" substitutions may have occurred due to the inherent limitations of voice recognition software  Read the chart carefully and recognize, using context, where substitutions have occurred

## 2022-09-19 NOTE — PATIENT INSTRUCTIONS
Continue with nasal saline, try nasal rinse and flonase  Continue to use inhaler twice daily  Continue with warm salt water gargles

## 2022-10-18 ENCOUNTER — IMMUNIZATIONS (OUTPATIENT)
Dept: FAMILY MEDICINE CLINIC | Facility: CLINIC | Age: 67
End: 2022-10-18
Payer: COMMERCIAL

## 2022-10-18 DIAGNOSIS — Z23 ENCOUNTER FOR IMMUNIZATION: Primary | ICD-10-CM

## 2022-10-18 PROCEDURE — 90662 IIV NO PRSV INCREASED AG IM: CPT

## 2022-10-18 PROCEDURE — G0008 ADMIN INFLUENZA VIRUS VAC: HCPCS

## 2022-11-05 ENCOUNTER — APPOINTMENT (OUTPATIENT)
Dept: LAB | Facility: MEDICAL CENTER | Age: 67
End: 2022-11-05

## 2022-11-05 DIAGNOSIS — Z79.4 ENCOUNTER FOR LONG-TERM (CURRENT) USE OF INSULIN (HCC): ICD-10-CM

## 2022-11-05 DIAGNOSIS — E11.65 TYPE 2 DIABETES MELLITUS WITH HYPERGLYCEMIA, WITH LONG-TERM CURRENT USE OF INSULIN (HCC): ICD-10-CM

## 2022-11-05 DIAGNOSIS — Z79.4 TYPE 2 DIABETES MELLITUS WITH HYPERGLYCEMIA, WITH LONG-TERM CURRENT USE OF INSULIN (HCC): ICD-10-CM

## 2022-11-05 LAB
ALBUMIN SERPL BCP-MCNC: 3.3 G/DL (ref 3.5–5)
ALP SERPL-CCNC: 79 U/L (ref 46–116)
ALT SERPL W P-5'-P-CCNC: 59 U/L (ref 12–78)
ANION GAP SERPL CALCULATED.3IONS-SCNC: 4 MMOL/L (ref 4–13)
AST SERPL W P-5'-P-CCNC: 35 U/L (ref 5–45)
BASOPHILS # BLD AUTO: 0.03 THOUSANDS/ÂΜL (ref 0–0.1)
BASOPHILS NFR BLD AUTO: 0 % (ref 0–1)
BILIRUB SERPL-MCNC: 0.87 MG/DL (ref 0.2–1)
BUN SERPL-MCNC: 19 MG/DL (ref 5–25)
CALCIUM ALBUM COR SERPL-MCNC: 9.8 MG/DL (ref 8.3–10.1)
CALCIUM SERPL-MCNC: 9.2 MG/DL (ref 8.3–10.1)
CHLORIDE SERPL-SCNC: 104 MMOL/L (ref 96–108)
CO2 SERPL-SCNC: 30 MMOL/L (ref 21–32)
CREAT SERPL-MCNC: 0.8 MG/DL (ref 0.6–1.3)
CRP SERPL QL: 5.2 MG/L
EOSINOPHIL # BLD AUTO: 0.18 THOUSAND/ÂΜL (ref 0–0.61)
EOSINOPHIL NFR BLD AUTO: 2 % (ref 0–6)
ERYTHROCYTE [DISTWIDTH] IN BLOOD BY AUTOMATED COUNT: 17.1 % (ref 11.6–15.1)
EST. AVERAGE GLUCOSE BLD GHB EST-MCNC: 183 MG/DL
GFR SERPL CREATININE-BSD FRML MDRD: 76 ML/MIN/1.73SQ M
GLUCOSE P FAST SERPL-MCNC: 119 MG/DL (ref 65–99)
HBA1C MFR BLD: 8 %
HCT VFR BLD AUTO: 34.6 % (ref 34.8–46.1)
HGB BLD-MCNC: 10.5 G/DL (ref 11.5–15.4)
IMM GRANULOCYTES # BLD AUTO: 0.03 THOUSAND/UL (ref 0–0.2)
IMM GRANULOCYTES NFR BLD AUTO: 0 % (ref 0–2)
LYMPHOCYTES # BLD AUTO: 1.24 THOUSANDS/ÂΜL (ref 0.6–4.47)
LYMPHOCYTES NFR BLD AUTO: 16 % (ref 14–44)
MCH RBC QN AUTO: 27.7 PG (ref 26.8–34.3)
MCHC RBC AUTO-ENTMCNC: 30.3 G/DL (ref 31.4–37.4)
MCV RBC AUTO: 91 FL (ref 82–98)
MONOCYTES # BLD AUTO: 0.68 THOUSAND/ÂΜL (ref 0.17–1.22)
MONOCYTES NFR BLD AUTO: 9 % (ref 4–12)
NEUTROPHILS # BLD AUTO: 5.73 THOUSANDS/ÂΜL (ref 1.85–7.62)
NEUTS SEG NFR BLD AUTO: 73 % (ref 43–75)
NRBC BLD AUTO-RTO: 0 /100 WBCS
PLATELET # BLD AUTO: 323 THOUSANDS/UL (ref 149–390)
PMV BLD AUTO: 10 FL (ref 8.9–12.7)
POTASSIUM SERPL-SCNC: 4 MMOL/L (ref 3.5–5.3)
PROT SERPL-MCNC: 6.9 G/DL (ref 6.4–8.4)
RBC # BLD AUTO: 3.79 MILLION/UL (ref 3.81–5.12)
SODIUM SERPL-SCNC: 138 MMOL/L (ref 135–147)
WBC # BLD AUTO: 7.89 THOUSAND/UL (ref 4.31–10.16)

## 2023-01-05 ENCOUNTER — APPOINTMENT (OUTPATIENT)
Dept: LAB | Facility: MEDICAL CENTER | Age: 68
End: 2023-01-05

## 2023-01-05 ENCOUNTER — APPOINTMENT (OUTPATIENT)
Dept: RADIOLOGY | Facility: MEDICAL CENTER | Age: 68
End: 2023-01-05

## 2023-01-05 DIAGNOSIS — M05.79 RHEUMATOID ARTHRITIS INVOLVING MULTIPLE SITES WITH POSITIVE RHEUMATOID FACTOR (HCC): ICD-10-CM

## 2023-01-13 ENCOUNTER — APPOINTMENT (OUTPATIENT)
Dept: LAB | Facility: MEDICAL CENTER | Age: 68
End: 2023-01-13

## 2023-01-13 DIAGNOSIS — Z79.4 TYPE 2 DIABETES MELLITUS WITH DIABETIC POLYNEUROPATHY, WITH LONG-TERM CURRENT USE OF INSULIN (HCC): ICD-10-CM

## 2023-01-13 DIAGNOSIS — E11.42 TYPE 2 DIABETES MELLITUS WITH DIABETIC POLYNEUROPATHY, WITH LONG-TERM CURRENT USE OF INSULIN (HCC): ICD-10-CM

## 2023-01-13 LAB — TSH SERPL DL<=0.05 MIU/L-ACNC: 2.29 UIU/ML (ref 0.45–4.5)

## 2023-01-18 ENCOUNTER — RA CDI HCC (OUTPATIENT)
Dept: OTHER | Facility: HOSPITAL | Age: 68
End: 2023-01-18

## 2023-01-18 NOTE — PROGRESS NOTES
[facility]  coding opportunities          Chart Reviewed number of suggestions sent to Provider: 4   [id number]  [id number] 4  M35 0  M45 9    This is a reminder to address ALL HCC (risk adjustment) codes as found on active problem list for 2023 as patient scores reset to zero GALLITO      Patients Insurance     Medicare Insurance: [employer]

## 2023-01-25 ENCOUNTER — OFFICE VISIT (OUTPATIENT)
Dept: FAMILY MEDICINE CLINIC | Facility: CLINIC | Age: 68
End: 2023-01-25

## 2023-01-25 VITALS
HEIGHT: 61 IN | OXYGEN SATURATION: 96 % | TEMPERATURE: 98.3 F | WEIGHT: 255 LBS | HEART RATE: 84 BPM | BODY MASS INDEX: 48.15 KG/M2 | SYSTOLIC BLOOD PRESSURE: 110 MMHG | DIASTOLIC BLOOD PRESSURE: 70 MMHG

## 2023-01-25 DIAGNOSIS — E66.01 CLASS 3 SEVERE OBESITY DUE TO EXCESS CALORIES WITH SERIOUS COMORBIDITY AND BODY MASS INDEX (BMI) OF 40.0 TO 44.9 IN ADULT (HCC): ICD-10-CM

## 2023-01-25 DIAGNOSIS — E11.29 MICROALBUMINURIA DUE TO TYPE 2 DIABETES MELLITUS (HCC): ICD-10-CM

## 2023-01-25 DIAGNOSIS — D64.9 NORMOCYTIC ANEMIA: Primary | ICD-10-CM

## 2023-01-25 DIAGNOSIS — Z12.11 SCREENING FOR COLORECTAL CANCER: ICD-10-CM

## 2023-01-25 DIAGNOSIS — M05.79 RHEUMATOID ARTHRITIS INVOLVING MULTIPLE SITES WITH POSITIVE RHEUMATOID FACTOR (HCC): ICD-10-CM

## 2023-01-25 DIAGNOSIS — R80.9 MICROALBUMINURIA DUE TO TYPE 2 DIABETES MELLITUS (HCC): ICD-10-CM

## 2023-01-25 DIAGNOSIS — E27.9 DISORDER OF ADRENAL GLAND, UNSPECIFIED (HCC): ICD-10-CM

## 2023-01-25 DIAGNOSIS — Z12.12 SCREENING FOR COLORECTAL CANCER: ICD-10-CM

## 2023-01-25 RX ORDER — INSULIN LISPRO 100 [IU]/ML
INJECTION, SOLUTION INTRAVENOUS; SUBCUTANEOUS
COMMUNITY
Start: 2022-12-01

## 2023-01-25 RX ORDER — AMOXICILLIN 500 MG/1
2000 CAPSULE ORAL AS NEEDED
COMMUNITY
Start: 2023-01-17

## 2023-01-25 RX ORDER — TOCILIZUMAB 180 MG/ML
INJECTION, SOLUTION SUBCUTANEOUS
COMMUNITY
Start: 2023-01-11

## 2023-01-25 RX ORDER — REPAGLINIDE 2 MG/1
TABLET ORAL
COMMUNITY
Start: 2022-12-20

## 2023-01-25 RX ORDER — SYRINGE WITH NEEDLE, 1 ML 28GX1/2"
SYRINGE, EMPTY DISPOSABLE MISCELLANEOUS
COMMUNITY
Start: 2022-12-18

## 2023-01-25 NOTE — PROGRESS NOTES
Name: Bernadette Chatman      : 1955      MRN: [id number]  Encounter Provider: Caleb Joya MD  Encounter Date: 2023   Encounter department: [address] [place] [address]     1  Normocytic anemia  Assessment & Plan:  Likely anemia of chronic disease given patient's medical history  Will obtain B12 and iron panel  Orders:  -     Iron Panel (Includes Ferritin, Iron Sat%, Iron, and TIBC); Future  -     Vitamin B12; Future    2  Rheumatoid arthritis involving multiple sites with positive rheumatoid factor ([facility] )  Assessment & Plan:  Follows with rheumatology  Recently started back on prednisone 5 mg daily  Rheumatology plan to start patient on Actemra  At that time they will discontinue prednisone  3  Microalbuminuria due to type 2 diabetes mellitus [facility])  Assessment & Plan:    Lab Results   Component Value Date    HGBA1C 7 7 (H) 2023   Follows with endocrinology  Patient was recently started on prednisone  Will need strict monitoring of sugars  Continue lisinopril 10 mg daily  4  Class 3 severe obesity due to excess calories with serious comorbidity and body mass index (BMI) of 40 0 to 44 9 in adult [facility])  Assessment & Plan:  Counseled patient on appropriate lifestyle modifications  5  Disorder of adrenal gland, unspecified ([facility] )  Assessment & Plan:  Follows with endocrinology  Stable continue to monitor      6  Screening for colorectal cancer  -     Cologuard      Orders above  Follow-up in 4 weeks for annual wellness visit  Subjective      Patient presents today at the request of rheumatology  Most recent CBC showed patient has normocytic anemia  Patient denies any weight loss or blood in her stool  Denies dark stools  She is due for Cologuard  Denies any fatigue  She was recently started back on prednisone for rheumatoid arthritis    Follows endocrinology for diabetes    Review of Systems   Constitutional: Negative for fatigue and fever    HENT: Negative for sore throat  Eyes: Negative for visual disturbance  Respiratory: Negative for cough, chest tightness and shortness of breath  Cardiovascular: Negative for chest pain, palpitations and leg swelling  Gastrointestinal: Negative for abdominal pain, constipation, diarrhea and nausea  Endocrine: Negative for cold intolerance and heat intolerance  Genitourinary: Negative for flank pain  Musculoskeletal: Positive for arthralgias and back pain  Negative for neck pain  Skin: Negative for rash  Neurological: Negative for headaches  Psychiatric/Behavioral: Negative for behavioral problems and confusion         Current Outpatient Medications on File Prior to Visit   Medication Sig   • ALPRAZolam (XANAX) 0 25 mg tablet as needed   • aspirin 81 MG tablet Take 81 mg by mouth daily   • atorvastatin (LIPITOR) 80 mg tablet Take 80 mg by mouth Medrol Dose Pack scheduling ONLY   • B-D ALLERGY SYRINGE 1CC/28G 28G X 1/2" 1 ML MISC use every week   • B-D UF III MINI PEN NEEDLES 31G X 5 MM MISC use 2 daily as directed   • calcium carbonate (OS-RILEY) 600 MG tablet Take 400 mg by mouth daily   • Cholecalciferol (VITAMIN D) 2000 units tablet Take 2,000 Units by mouth daily   • folic acid (FOLVITE) 499 MCG tablet Take 800 mg by mouth daily   • gabapentin (NEURONTIN) 300 mg capsule Take 300 mg by mouth 3 (three) times a day   • HumaLOG KwikPen 100 units/mL injection pen inject 6 units subcutaneously with breakfast daily HOLD IF NOT EATING   • insulin degludec (TRESIBA) 200 units/mL CONCENTRATED U-200 injection pen Inject 54 Units under the skin daily at bedtime   • Insulin Pen Needle 31G X 8 MM MISC by Does not apply route   • lisinopril (ZESTRIL) 10 mg tablet Take 1 tablet (10 mg total) by mouth daily   • methocarbamol (ROBAXIN) 750 mg tablet    • methotrexate 50 MG/2ML injection Inject 0 8 mL under the skin once a week   • metoprolol tartrate (LOPRESSOR) 50 mg tablet 50 mg every 12 (twelve) hours • Multiple Vitamin (MULTIVITAMIN) tablet Take 1 tablet by mouth daily   • nitroglycerin (NITROSTAT) 0 4 mg SL tablet Place 1 tablet (0 4 mg total) under the tongue every 5 (five) minutes as needed for chest pain   • omeprazole (PriLOSEC) 20 mg delayed release capsule Take 20 mg by mouth daily   • predniSONE 5 mg tablet Take 5 mg by mouth daily   • Probiotic Product (PROBIOTIC-10 PO) Take by mouth daily    • repaglinide (PRANDIN) 2 mg tablet TAKE 2 TABLETS BY MOUTH WITH BREAKFAST AND LUNCH  TAKE 1 TABLET WITH DINNER  HOLD IF NOT EATING  • traMADol (ULTRAM) 50 mg tablet Take 50 mg by mouth as needed for moderate pain   • Actemra 162 MG/0 9ML SOSY  (Patient not taking: Reported on 1/25/2023)   • amoxicillin (AMOXIL) 500 mg capsule Take 2,000 mg by mouth as needed 1 hour prior to dental appointment (Patient not taking: Reported on 1/25/2023)   • benzonatate (TESSALON PERLES) 100 mg capsule Take 1 capsule (100 mg total) by mouth 3 (three) times a day as needed for cough (Patient not taking: Reported on 1/25/2023)   • fluticasone (FLONASE) 50 mcg/act nasal spray 1 spray into each nostril daily (Patient not taking: Reported on 1/25/2023)   • metFORMIN (GLUCOPHAGE) 1000 MG tablet TAKE 1 TABLET BY MOUTH 2 TIMES A DAY WITH MEALS   • metFORMIN (GLUMETZA) 1000 MG (MOD) 24 hr tablet Take 1,000 mg by mouth 2 (two) times a day with meals    • Mometasone Furoate (Asmanex HFA) 200 MCG/ACT AERO Inhale 2 puffs (400 mcg total) 2 (two) times a day Rinse mouth after use  (Patient not taking: Reported on 1/25/2023)   • repaglinide (PRANDIN) 1 mg tablet take 1 tablet by mouth three times a day before meals HOLD IF NOT EATING (Patient not taking: Reported on 1/25/2023)       Objective     /70 (BP Location: Left arm, Patient Position: Sitting, Cuff Size: Large)   Pulse 84   Temp 98 3 °F (36 8 °C)   Ht 5' 0 6" (1 539 m)   Wt 116 kg (255 lb)   SpO2 96%   BMI 48 82 kg/m²     Physical Exam  Vitals and nursing note reviewed  Constitutional:       Appearance: She is well-developed  She is obese  HENT:      Head: Normocephalic and atraumatic  Cardiovascular:      Rate and Rhythm: Normal rate and regular rhythm  Pulmonary:      Effort: Pulmonary effort is normal       Breath sounds: Normal breath sounds  Neurological:      General: No focal deficit present  Mental Status: She is alert     Psychiatric:         Mood and Affect: Mood normal          Behavior: Behavior normal        Barrett Hawkins MD

## 2023-01-25 NOTE — ASSESSMENT & PLAN NOTE
Follows with rheumatology  Recently started back on prednisone 5 mg daily  Rheumatology plan to start patient on Actemra  At that time they will discontinue prednisone

## 2023-01-25 NOTE — ASSESSMENT & PLAN NOTE
Lab Results   Component Value Date    HGBA1C 7 7 (H) 01/13/2023   Follows with endocrinology  Patient was recently started on prednisone  Will need strict monitoring of sugars  Continue lisinopril 10 mg daily

## 2023-02-05 LAB — COLOGUARD RESULT REPORTABLE: NEGATIVE

## 2023-02-14 ENCOUNTER — APPOINTMENT (OUTPATIENT)
Dept: LAB | Facility: MEDICAL CENTER | Age: 68
End: 2023-02-14

## 2023-02-14 DIAGNOSIS — D64.9 NORMOCYTIC ANEMIA: ICD-10-CM

## 2023-02-14 LAB
FERRITIN SERPL-MCNC: 9 NG/ML (ref 8–388)
IRON SATN MFR SERPL: 8 % (ref 15–50)
IRON SERPL-MCNC: 33 UG/DL (ref 50–170)
TIBC SERPL-MCNC: 397 UG/DL (ref 250–450)
VIT B12 SERPL-MCNC: 408 PG/ML (ref 100–900)

## 2023-02-20 ENCOUNTER — TELEPHONE (OUTPATIENT)
Dept: FAMILY MEDICINE CLINIC | Facility: CLINIC | Age: 68
End: 2023-02-20

## 2023-02-20 DIAGNOSIS — E61.1 IRON DEFICIENCY: Primary | ICD-10-CM

## 2023-02-20 RX ORDER — FERROUS SULFATE TAB EC 324 MG (65 MG FE EQUIVALENT) 324 (65 FE) MG
324 TABLET DELAYED RESPONSE ORAL 3 TIMES WEEKLY
Qty: 12 TABLET | Refills: 2 | Status: SHIPPED | OUTPATIENT
Start: 2023-02-20 | End: 2023-05-13

## 2023-02-20 NOTE — TELEPHONE ENCOUNTER
----- Message from Ori Duenas MD sent at 2/20/2023  4:06 PM EST -----  Please call patient with results  Low iron start oral iron MWF  Will send prescription to pharmacy

## 2023-02-22 ENCOUNTER — OFFICE VISIT (OUTPATIENT)
Dept: FAMILY MEDICINE CLINIC | Facility: CLINIC | Age: 68
End: 2023-02-22

## 2023-02-22 VITALS
WEIGHT: 255 LBS | BODY MASS INDEX: 48.15 KG/M2 | OXYGEN SATURATION: 96 % | TEMPERATURE: 97.4 F | DIASTOLIC BLOOD PRESSURE: 76 MMHG | SYSTOLIC BLOOD PRESSURE: 130 MMHG | HEART RATE: 72 BPM | HEIGHT: 61 IN

## 2023-02-22 DIAGNOSIS — Z00.00 MEDICARE ANNUAL WELLNESS VISIT, SUBSEQUENT: Primary | ICD-10-CM

## 2023-02-22 DIAGNOSIS — E66.01 MORBID OBESITY WITH BMI OF 45.0-49.9, ADULT (HCC): ICD-10-CM

## 2023-02-22 DIAGNOSIS — E11.51 TYPE II DIABETES MELLITUS WITH PERIPHERAL CIRCULATORY DISORDER (HCC): ICD-10-CM

## 2023-02-22 DIAGNOSIS — Z79.4 TYPE 2 DIABETES MELLITUS WITH HYPERGLYCEMIA, WITH LONG-TERM CURRENT USE OF INSULIN (HCC): ICD-10-CM

## 2023-02-22 DIAGNOSIS — E11.65 TYPE 2 DIABETES MELLITUS WITH HYPERGLYCEMIA, WITH LONG-TERM CURRENT USE OF INSULIN (HCC): ICD-10-CM

## 2023-02-22 RX ORDER — INSULIN ASPART 100 [IU]/ML
INJECTION, SOLUTION INTRAVENOUS; SUBCUTANEOUS
COMMUNITY
Start: 2023-02-16

## 2023-02-22 NOTE — PROGRESS NOTES
Assessment and Plan:     Problem List Items Addressed This Visit        Endocrine    Type 2 diabetes mellitus with hyperglycemia, with long-term current use of insulin (HCC)    Relevant Medications    Insulin Aspart (NovoLOG) 100 units/mL injection    NovoLOG FlexPen 100 units/mL injection pen    Other Relevant Orders    Ambulatory Referral to Endocrinology   Other Visit Diagnoses     Medicare annual wellness visit, subsequent    -  Primary    Type II diabetes mellitus with peripheral circulatory disorder (HCC)        Relevant Medications    Insulin Aspart (NovoLOG) 100 units/mL injection    NovoLOG FlexPen 100 units/mL injection pen    Morbid obesity with BMI of 45 0-49 9, adult [facility])        Relevant Orders    Ambulatory referral to Nutrition Services    Ambulatory referral to Weight Management        BMI Counseling: Body mass index is 48 82 kg/m²  The BMI is above normal  Nutrition recommendations include decreasing portion sizes, consuming healthier snacks, reducing intake of saturated and trans fat and reducing intake of cholesterol  Exercise recommendations include moderate physical activity 150 minutes/week  No pharmacotherapy was ordered  Patient referred to PCP  Rationale for BMI follow-up plan is due to patient being overweight or obese  Depression Screening and Follow-up Plan: Patient was screened for depression during today's encounter  They screened negative with a PHQ-2 score of 0  Preventive health issues were discussed with patient, and age appropriate screening tests were ordered as noted in patient's After Visit Summary  Personalized health advice and appropriate referrals for health education or preventive services given if needed, as noted in patient's After Visit Summary  History of Present Illness:     Patient presents for a Medicare Wellness Visit    HPI   Patient Care Team:  Blue Snell MD as PCP - General (Family Medicine)  Uma Soto MD as PCP - PCP-[facility]  Morgan Kocher, MD Earney Kida, MD     Review of Systems:     Review of Systems     Problem List:     Patient Active Problem List   Diagnosis   • Ankylosing spondylitis [facility])   • Cervical herniated disc   • DDD (degenerative disc disease), thoracolumbar   • Type 2 diabetes mellitus with hyperglycemia, with long-term current use of insulin (HCC)   • Degenerative disc disease, cervical   • Gastroesophageal reflux disease without esophagitis   • Lumbar foraminal stenosis   • Essential hypertension   • Rheumatoid arthritis involving multiple sites ([facility] )   • Sjogren's disease ([facility] )   • Hyperlipidemia   • Coronary artery disease involving native coronary artery of native heart without angina pectoris   • Adenoma of right adrenal gland   • Class 3 severe obesity due to excess calories with serious comorbidity and body mass index (BMI) of 40 0 to 44 9 in adult [facility])   • Disorder of adrenal gland, unspecified ([facility] )   • Type 2 diabetes mellitus with diabetic peripheral angiopathy without gangrene ([facility] )   • Microalbuminuria due to type 2 diabetes mellitus ([facility] )   • Normocytic anemia      Past Medical and Surgical History:     Past Medical History:   Diagnosis Date   • Ankylosing spondylitis ([facility] )    • Arthritis    • Diabetes mellitus ([facility] )    • GERD (gastroesophageal reflux disease)    • Heart attack ([facility] )    • Hematuria     last assessed 9/7/13   • Hyperlipidemia    • Hypertension    • Nephrolithiasis 9/7/2013   • Subclinical hypothyroidism 9/2/2015     Past Surgical History:   Procedure Laterality Date   • APPENDECTOMY     • BACK SURGERY      mulitple surgery, fusions "top to bottom"   • BREAST SURGERY Left     biopsy   • CERVICAL SPINE SURGERY     • CHOLECYSTECTOMY     • CORONARY ANGIOPLASTY WITH STENT PLACEMENT     • CYSTOSCOPY  05/06/2015    Diagnostic, last assessed 5/6/15   • HYSTERECTOMY     • LITHOTRIPSY     • WI CYSTO/URETERO W/LITHOTRIPSY &INDWELL STENT INSRT Left 4/18/2019    Procedure: CYSTO, URETEROSCOPY W/HOLMIUM LASER, RETROGRADE PYELOGRAM, STENT INSERTION;  Surgeon: John Ireland MD;  Location: [facility];  Service: Urology      Family History:     Family History   Problem Relation Age of Onset   • Diabetes Mother    • Gout Mother    • Heart disease Mother    • Diabetes Father    • Heart disease Father    • Nephrolithiasis Brother    • No Known Problems Daughter    • No Known Problems Maternal Aunt    • No Known Problems Maternal Aunt    • No Known Problems Maternal Aunt    • No Known Problems Maternal Aunt    • No Known Problems Maternal Aunt    • No Known Problems Maternal Aunt    • No Known Problems Paternal Aunt    • No Known Problems Paternal Aunt       Social History:     Social History     Socioeconomic History   • Marital status: /Civil Union     Spouse name: None   • Number of children: None   • Years of education: None   • Highest education level: None   Occupational History   • None   Tobacco Use   • Smoking status: Never   • Smokeless tobacco: Never   Substance and Sexual Activity   • Alcohol use: Yes     Comment: very rarely   • Drug use: No   • Sexual activity: Yes     Partners: Male     Birth control/protection: None   Other Topics Concern   • None   Social History Narrative   • None     Social Determinants of Health     Financial Resource Strain: Low Risk    • Difficulty of Paying Living Expenses: Not very hard   Food Insecurity: Not on file   Transportation Needs: No Transportation Needs   • Lack of Transportation (Medical): No   • Lack of Transportation (Non-Medical):  No   Physical Activity: Not on file   Stress: Not on file   Social Connections: Not on file   Intimate Partner Violence: Not on file   Housing Stability: Not on file      Medications and Allergies:     Current Outpatient Medications   Medication Sig Dispense Refill   • Insulin Aspart (NovoLOG) 100 units/mL injection Take 6 units with breakfast     • Actemra 162 MG/0 9ML SOSY  (Patient not taking: Reported on 1/25/2023)     • ALPRAZolam (XANAX) 0 25 mg tablet as needed     • amoxicillin (AMOXIL) 500 mg capsule Take 2,000 mg by mouth as needed 1 hour prior to dental appointment (Patient not taking: Reported on 1/25/2023)     • aspirin 81 MG tablet Take 81 mg by mouth daily     • atorvastatin (LIPITOR) 80 mg tablet Take 80 mg by mouth Medrol Dose Pack scheduling ONLY     • B-D ALLERGY SYRINGE 1CC/28G 28G X 1/2" 1 ML MISC use every week     • B-D UF III MINI PEN NEEDLES 31G X 5 MM MISC use 2 daily as directed  0   • calcium carbonate (OS-RILEY) 600 MG tablet Take 400 mg by mouth daily     • Cholecalciferol (VITAMIN D) 2000 units tablet Take 2,000 Units by mouth daily     • ferrous sulfate 324 (65 Fe) mg Take 1 tablet (324 mg total) by mouth 3 (three) times a week for 36 doses 12 tablet 2   • folic acid (FOLVITE) 018 MCG tablet Take 800 mg by mouth daily     • gabapentin (NEURONTIN) 300 mg capsule Take 300 mg by mouth 3 (three) times a day     • HumaLOG KwikPen 100 units/mL injection pen inject 6 units subcutaneously with breakfast daily HOLD IF NOT EATING     • insulin degludec (TRESIBA) 200 units/mL CONCENTRATED U-200 injection pen Inject 54 Units under the skin daily at bedtime     • Insulin Pen Needle 31G X 8 MM MISC by Does not apply route     • lisinopril (ZESTRIL) 10 mg tablet Take 1 tablet (10 mg total) by mouth daily 90 tablet 3   • metFORMIN (GLUCOPHAGE) 1000 MG tablet TAKE 1 TABLET BY MOUTH 2 TIMES A DAY WITH MEALS     • methocarbamol (ROBAXIN) 750 mg tablet      • methotrexate 50 MG/2ML injection Inject 0 8 mL under the skin once a week     • metoprolol tartrate (LOPRESSOR) 50 mg tablet 50 mg every 12 (twelve) hours     • Multiple Vitamin (MULTIVITAMIN) tablet Take 1 tablet by mouth daily     • nitroglycerin (NITROSTAT) 0 4 mg SL tablet Place 1 tablet (0 4 mg total) under the tongue every 5 (five) minutes as needed for chest pain 90 tablet 1   • NovoLOG FlexPen 100 units/mL injection pen inject 6 units with breakfast     • omeprazole (PriLOSEC) 20 mg delayed release capsule Take 20 mg by mouth daily     • predniSONE 5 mg tablet Take 5 mg by mouth daily     • Probiotic Product (PROBIOTIC-10 PO) Take by mouth daily      • repaglinide (PRANDIN) 2 mg tablet TAKE 2 TABLETS BY MOUTH WITH BREAKFAST AND LUNCH  TAKE 1 TABLET WITH DINNER  HOLD IF NOT EATING  • traMADol (ULTRAM) 50 mg tablet Take 50 mg by mouth as needed for moderate pain       No current facility-administered medications for this visit  Allergies   Allergen Reactions   • Acetazolamide Hives   • Barbiturates Other (See Comments)     stomach pain   • Morphine GI Intolerance     Other reaction(s): GI Intolerance   • Sulfa Antibiotics Hives and Rash   • Sulfasalazine Hives and Rash      Immunizations:     Immunization History   Administered Date(s) Administered   • COVID-19 PFIZER VACCINE 0 3 ML IM 03/03/2021, 04/03/2021, 11/12/2021   • INFLUENZA 10/18/2022   • Influenza Quadrivalent, 6-35 Months IM 10/15/2014, 10/15/2015, 09/22/2016, 10/11/2017   • Influenza, high dose seasonal 0 7 mL 10/20/2020, 10/20/2021, 10/18/2022   • Influenza, injectable, quadrivalent, preservative free 0 5 mL 10/15/2018   • Influenza, recombinant, quadrivalent,injectable, preservative free 10/07/2019   • Influenza, seasonal, injectable 10/12/2012, 10/18/2013   • Pneumococcal Conjugate 13-Valent 07/28/2020   • Pneumococcal Polysaccharide PPV23 07/16/2019   • Tdap 07/16/2019      Health Maintenance:         Topic Date Due   • Breast Cancer Screening: Mammogram  04/12/2024   • Colorectal Cancer Screening  01/30/2026   • Hepatitis C Screening  Completed         Topic Date Due   • COVID-19 Vaccine (4 - Booster for [place]varez Peter series) 01/07/2022      Medicare Screening Tests and Risk Assessments:     Erma Stack is here for her Subsequent Wellness visit  Health Risk Assessment:   Patient rates overall health as good  Patient feels that their physical health rating is same   Patient is very satisfied with their life  Eyesight was rated as same  Hearing was rated as same  Patient feels that their emotional and mental health rating is same  Patients states they are never, rarely angry  Patient states they are sometimes unusually tired/fatigued  Pain experienced in the last 7 days has been some  Patient's pain rating has been 3/10  Patient states that she has experienced no weight loss or gain in last 6 months  Depression Screening:   PHQ-2 Score: 0      Fall Risk Screening: In the past year, patient has experienced: no history of falling in past year      Urinary Incontinence Screening:   Patient has not leaked urine accidently in the last six months  Home Safety:  Patient has trouble with stairs inside or outside of their home  Patient has working smoke alarms and has working carbon monoxide detector  Home safety hazards include: none  Nutrition:   Current diet is Diabetic  Medications:   Patient is currently taking over-the-counter supplements  OTC medications include: Vitamins  Patient is able to manage medications  Activities of Daily Living (ADLs)/Instrumental Activities of Daily Living (IADLs):   Walk and transfer into and out of bed and chair?: Yes  Dress and groom yourself?: Yes    Bathe or shower yourself?: Yes    Feed yourself? Yes  Do your laundry/housekeeping?: Yes  Manage your money, pay your bills and track your expenses?: Yes  Make your own meals?: Yes    Do your own shopping?: Yes    Durable Medical Equipment Suppliers  [employer]    Previous Hospitalizations:   Any hospitalizations or ED visits within the last 12 months?: No      Advance Care Planning:   Living will: Yes    Durable POA for healthcare:  Yes    Advanced directive: Yes      PREVENTIVE SCREENINGS      Cardiovascular Screening:    General: Screening Not Indicated and History Lipid Disorder      Diabetes Screening:     General: Screening Not Indicated and History Diabetes      Colorectal Cancer Screening: General: Screening Current      Breast Cancer Screening:     General: Screening Current      Cervical Cancer Screening:    General: Screening Not Indicated      Osteoporosis Screening:    General: Screening Not Indicated and Patient Declines      Abdominal Aortic Aneurysm (AAA) Screening:        General: Screening Not Indicated      Lung Cancer Screening:     General: Screening Not Indicated      Hepatitis C Screening:    General: Screening Current      Preventive Screening Comments: Unable to have dexa scan due to metal    Screening, Brief Intervention, and Referral to Treatment (SBIRT)    Screening  Typical number of drinks in a day: 0  Typical number of drinks in a week: 0  Interpretation: Low risk drinking behavior  AUDIT-C Screenin) How often did you have a drink containing alcohol in the past year? never  2) How many drinks did you have on a typical day when you were drinking in the past year? 0  3) How often did you have 6 or more drinks on one occasion in the past year? never    AUDIT-C Score: 0  Interpretation: Score 0-2 (female): Negative screen for alcohol misuse    Single Item Drug Screening:  How often have you used an illegal drug (including marijuana) or a prescription medication for non-medical reasons in the past year? never    Single Item Drug Screen Score: 0  Interpretation: Negative screen for possible drug use disorder    Other Counseling Topics:   Car/seat belt/driving safety, skin self-exam, sunscreen and calcium and vitamin D intake and regular weightbearing exercise  No results found  Physical Exam:     /76 (BP Location: Left arm, Patient Position: Sitting, Cuff Size: Large)   Pulse 72   Temp (!) 97 4 °F (36 3 °C)   Ht 5' 0 6" (1 539 m)   Wt 116 kg (255 lb)   SpO2 96%   BMI 48 82 kg/m²     Physical Exam     Judge Emma MD BMI Counseling: Body mass index is 48 82 kg/m²   The BMI is above normal  Patient referred to nutritionist due to patient being morbidly obese  Patient referred to weight management due to patient being morbidly obese  BMI Counseling: Body mass index is 48 82 kg/m²  The BMI is above normal  Nutrition recommendations include reducing portion sizes, decreasing overall calorie intake, reducing fast food intake, decreasing soda and/or juice intake, moderation in carbohydrate intake and reducing intake of cholesterol

## 2023-02-22 NOTE — PATIENT INSTRUCTIONS
Medicare Preventive Visit Patient Instructions  Thank you for completing your Welcome to Medicare Visit or Medicare Annual Wellness Visit today  Your next wellness visit will be due in one year (2/23/2024)  The screening/preventive services that you may require over the next 5-10 years are detailed below  Some tests may not apply to you based off risk factors and/or age  Screening tests ordered at today's visit but not completed yet may show as past due  Also, please note that scanned in results may not display below  Preventive Screenings:  Service Recommendations Previous Testing/Comments   Colorectal Cancer Screening  * Colonoscopy    * Fecal Occult Blood Test (FOBT)/Fecal Immunochemical Test (FIT)  * Fecal DNA/Cologuard Test  * Flexible Sigmoidoscopy Age: 39-70 years old   Colonoscopy: every 10 years (may be performed more frequently if at higher risk)  OR  FOBT/FIT: every 1 year  OR  Cologuard: every 3 years  OR  Sigmoidoscopy: every 5 years  Screening may be recommended earlier than age 39 if at higher risk for colorectal cancer  Also, an individualized decision between you and your healthcare provider will decide whether screening between the ages of 74-80 would be appropriate  Colonoscopy: 01/30/2023  FOBT/FIT: Not on file  Cologuard: 01/30/2023  Sigmoidoscopy: Not on file    Screening Current     Breast Cancer Screening Age: 36 years old  Frequency: every 1-2 years  Not required if history of left and right mastectomy Mammogram: 04/12/2022    Screening Current   Cervical Cancer Screening Between the ages of 21-29, pap smear recommended once every 3 years  Between the ages of 33-67, can perform pap smear with HPV co-testing every 5 years     Recommendations may differ for women with a history of total hysterectomy, cervical cancer, or abnormal pap smears in past  Pap Smear: 07/27/2017    Screening Not Indicated   Hepatitis C Screening Once for adults born between 1945 and 1965  More frequently in patients at high risk for Hepatitis C Hep C Antibody: 06/11/2021    Screening Current   Diabetes Screening 1-2 times per year if you're at risk for diabetes or have pre-diabetes Fasting glucose: 170 mg/dL (1/5/2023)  A1C: 7 7 % (1/13/2023)  Screening Not Indicated  History Diabetes   Cholesterol Screening Once every 5 years if you don't have a lipid disorder  May order more often based on risk factors  Lipid panel: 01/05/2023    Screening Not Indicated  History Lipid Disorder     Other Preventive Screenings Covered by Medicare:  1  Abdominal Aortic Aneurysm (AAA) Screening: covered once if your at risk  You're considered to be at risk if you have a family history of AAA  2  Lung Cancer Screening: covers low dose CT scan once per year if you meet all of the following conditions: (1) Age 50-69; (2) No signs or symptoms of lung cancer; (3) Current smoker or have quit smoking within the last 15 years; (4) You have a tobacco smoking history of at least 20 pack years (packs per day multiplied by number of years you smoked); (5) You get a written order from a healthcare provider  3  Glaucoma Screening: covered annually if you're considered high risk: (1) You have diabetes OR (2) Family history of glaucoma OR (3)  aged 48 and older OR (3)  American aged 72 and older  3  Osteoporosis Screening: covered every 2 years if you meet one of the following conditions: (1) You're estrogen deficient and at risk for osteoporosis based off medical history and other findings; (2) Have a vertebral abnormality; (3) On glucocorticoid therapy for more than 3 months; (4) Have primary hyperparathyroidism; (5) On osteoporosis medications and need to assess response to drug therapy  · Last bone density test (DXA Scan): Not on file  5  HIV Screening: covered annually if you're between the age of 12-76  Also covered annually if you are younger than 13 and older than 72 with risk factors for HIV infection   For pregnant patients, it is covered up to 3 times per pregnancy  Immunizations:  Immunization Recommendations   Influenza Vaccine Annual influenza vaccination during flu season is recommended for all persons aged >= 6 months who do not have contraindications   Pneumococcal Vaccine   * Pneumococcal conjugate vaccine = PCV13 (Prevnar 13), PCV15 (Vaxneuvance), PCV20 (Prevnar 20)  * Pneumococcal polysaccharide vaccine = PPSV23 (Pneumovax) Adults 25-60 years old: 1-3 doses may be recommended based on certain risk factors  Adults 72 years old: 1-2 doses may be recommended based off what pneumonia vaccine you previously received   Hepatitis B Vaccine 3 dose series if at intermediate or high risk (ex: diabetes, end stage renal disease, liver disease)   Tetanus (Td) Vaccine - COST NOT COVERED BY MEDICARE PART B Following completion of primary series, a booster dose should be given every 10 years to maintain immunity against tetanus  Td may also be given as tetanus wound prophylaxis  Tdap Vaccine - COST NOT COVERED BY MEDICARE PART B Recommended at least once for all adults  For pregnant patients, recommended with each pregnancy  Shingles Vaccine (Shingrix) - COST NOT COVERED BY MEDICARE PART B  2 shot series recommended in those aged 48 and above     Health Maintenance Due:      Topic Date Due   • Breast Cancer Screening: Mammogram  04/12/2024   • Colorectal Cancer Screening  01/30/2026   • Hepatitis C Screening  Completed     Immunizations Due:      Topic Date Due   • COVID-19 Vaccine (4 - Booster for Alvarez Peter series) 01/07/2022     Advance Directives   What are advance directives? Advance directives are legal documents that state your wishes and plans for medical care  These plans are made ahead of time in case you lose your ability to make decisions for yourself  Advance directives can apply to any medical decision, such as the treatments you want, and if you want to donate organs  What are the types of advance directives? There are many types of advance directives, and each state has rules about how to use them  You may choose a combination of any of the following:  · Living will: This is a written record of the treatment you want  You can also choose which treatments you do not want, which to limit, and which to stop at a certain time  This includes surgery, medicine, IV fluid, and tube feedings  · Durable power of  for healthcare [facility]): This is a written record that states who you want to make healthcare choices for you when you are unable to make them for yourself  This person, called a proxy, is usually a family member or a friend  You may choose more than 1 proxy  · Do not resuscitate (DNR) order:  A DNR order is used in case your heart stops beating or you stop breathing  It is a request not to have certain forms of treatment, such as CPR  A DNR order may be included in other types of advance directives  · Medical directive: This covers the care that you want if you are in a coma, near death, or unable to make decisions for yourself  You can list the treatments you want for each condition  Treatment may include pain medicine, surgery, blood transfusions, dialysis, IV or tube feedings, and a ventilator (breathing machine)  · Values history: This document has questions about your views, beliefs, and how you feel and think about life  This information can help others choose the care that you would choose  Why are advance directives important? An advance directive helps you control your care  Although spoken wishes may be used, it is better to have your wishes written down  Spoken wishes can be misunderstood, or not followed  Treatments may be given even if you do not want them  An advance directive may make it easier for your family to make difficult choices about your care     Weight Management   Why it is important to manage your weight:  Being overweight increases your risk of health conditions such as heart disease, high blood pressure, type 2 diabetes, and certain types of cancer  It can also increase your risk for osteoarthritis, sleep apnea, and other respiratory problems  Aim for a slow, steady weight loss  Even a small amount of weight loss can lower your risk of health problems  How to lose weight safely:  A safe and healthy way to lose weight is to eat fewer calories and get regular exercise  You can lose up about 1 pound a week by decreasing the number of calories you eat by 500 calories each day  Healthy meal plan for weight management:  A healthy meal plan includes a variety of foods, contains fewer calories, and helps you stay healthy  A healthy meal plan includes the following:  · Eat whole-grain foods more often  A healthy meal plan should contain fiber  Fiber is the part of grains, fruits, and vegetables that is not broken down by your body  Whole-grain foods are healthy and provide extra fiber in your diet  Some examples of whole-grain foods are whole-wheat breads and pastas, oatmeal, brown rice, and bulgur  · Eat a variety of vegetables every day  Include dark, leafy greens such as spinach, kale, zeke greens, and mustard greens  Eat yellow and orange vegetables such as carrots, sweet potatoes, and winter squash  · Eat a variety of fruits every day  Choose fresh or canned fruit (canned in its own juice or light syrup) instead of juice  Fruit juice has very little or no fiber  · Eat low-fat dairy foods  Drink fat-free (skim) milk or 1% milk  Eat fat-free yogurt and low-fat cottage cheese  Try low-fat cheeses such as mozzarella and other reduced-fat cheeses  · Choose meat and other protein foods that are low in fat  Choose beans or other legumes such as split peas or lentils  Choose fish, skinless poultry (chicken or turkey), or lean cuts of red meat (beef or pork)  Before you cook meat or poultry, cut off any visible fat  · Use less fat and oil  Try baking foods instead of frying them  Add less fat, such as margarine, sour cream, regular salad dressing and mayonnaise to foods  Eat fewer high-fat foods  Some examples of high-fat foods include french fries, doughnuts, ice cream, and cakes  · Eat fewer sweets  Limit foods and drinks that are high in sugar  This includes candy, cookies, regular soda, and sweetened drinks  Exercise:  Exercise at least 30 minutes per day on most days of the week  Some examples of exercise include walking, biking, dancing, and swimming  You can also fit in more physical activity by taking the stairs instead of the elevator or parking farther away from stores  Ask your healthcare provider about the best exercise plan for you  © Copyright [employer] 2018 Information is for End User's use only and may not be sold, redistributed or otherwise used for commercial purposes  All illustrations and images included in CareNotes® are the copyrighted property of [employer]  or [facility] Preventive Visit Patient Instructions  Thank you for completing your Welcome to Medicare Visit or Medicare Annual Wellness Visit today  Your next wellness visit will be due in one year (2/23/2024)  The screening/preventive services that you may require over the next 5-10 years are detailed below  Some tests may not apply to you based off risk factors and/or age  Screening tests ordered at today's visit but not completed yet may show as past due  Also, please note that scanned in results may not display below    Preventive Screenings:  Service Recommendations Previous Testing/Comments   Colorectal Cancer Screening  * Colonoscopy    * Fecal Occult Blood Test (FOBT)/Fecal Immunochemical Test (FIT)  * Fecal DNA/Cologuard Test  * Flexible Sigmoidoscopy Age: 39-70 years old   Colonoscopy: every 10 years (may be performed more frequently if at higher risk)  OR  FOBT/FIT: every 1 year  OR  Cologuard: every 3 years  OR  Sigmoidoscopy: every 5 years  Screening may be recommended earlier than age 39 if at higher risk for colorectal cancer  Also, an individualized decision between you and your healthcare provider will decide whether screening between the ages of 74-80 would be appropriate  Colonoscopy: 01/30/2023  FOBT/FIT: Not on file  Cologuard: 01/30/2023  Sigmoidoscopy: Not on file    Screening Current     Breast Cancer Screening Age: 36 years old  Frequency: every 1-2 years  Not required if history of left and right mastectomy Mammogram: 04/12/2022    Screening Current   Cervical Cancer Screening Between the ages of 21-29, pap smear recommended once every 3 years  Between the ages of 33-67, can perform pap smear with HPV co-testing every 5 years  Recommendations may differ for women with a history of total hysterectomy, cervical cancer, or abnormal pap smears in past  Pap Smear: 07/27/2017    Screening Not Indicated   Hepatitis C Screening Once for adults born between 1945 and 1965  More frequently in patients at high risk for Hepatitis C Hep C Antibody: 06/11/2021    Screening Current   Diabetes Screening 1-2 times per year if you're at risk for diabetes or have pre-diabetes Fasting glucose: 170 mg/dL (1/5/2023)  A1C: 7 7 % (1/13/2023)  Screening Not Indicated  History Diabetes   Cholesterol Screening Once every 5 years if you don't have a lipid disorder  May order more often based on risk factors  Lipid panel: 01/05/2023    Screening Not Indicated  History Lipid Disorder     Other Preventive Screenings Covered by Medicare:  6  Abdominal Aortic Aneurysm (AAA) Screening: covered once if your at risk  You're considered to be at risk if you have a family history of AAA    7  Lung Cancer Screening: covers low dose CT scan once per year if you meet all of the following conditions: (1) Age 50-69; (2) No signs or symptoms of lung cancer; (3) Current smoker or have quit smoking within the last 15 years; (4) You have a tobacco smoking history of at least 20 pack years (packs per day multiplied by number of years you smoked); (5) You get a written order from a healthcare provider  8  Glaucoma Screening: covered annually if you're considered high risk: (1) You have diabetes OR (2) Family history of glaucoma OR (3)  aged 48 and older OR (3)  American aged 72 and older  5  Osteoporosis Screening: covered every 2 years if you meet one of the following conditions: (1) You're estrogen deficient and at risk for osteoporosis based off medical history and other findings; (2) Have a vertebral abnormality; (3) On glucocorticoid therapy for more than 3 months; (4) Have primary hyperparathyroidism; (5) On osteoporosis medications and need to assess response to drug therapy  · Last bone density test (DXA Scan): Not on file  10  HIV Screening: covered annually if you're between the age of 12-76  Also covered annually if you are younger than 13 and older than 72 with risk factors for HIV infection  For pregnant patients, it is covered up to 3 times per pregnancy  Immunizations:  Immunization Recommendations   Influenza Vaccine Annual influenza vaccination during flu season is recommended for all persons aged >= 6 months who do not have contraindications   Pneumococcal Vaccine   * Pneumococcal conjugate vaccine = PCV13 (Prevnar 13), PCV15 (Vaxneuvance), PCV20 (Prevnar 20)  * Pneumococcal polysaccharide vaccine = PPSV23 (Pneumovax) Adults 25-60 years old: 1-3 doses may be recommended based on certain risk factors  Adults 72 years old: 1-2 doses may be recommended based off what pneumonia vaccine you previously received   Hepatitis B Vaccine 3 dose series if at intermediate or high risk (ex: diabetes, end stage renal disease, liver disease)   Tetanus (Td) Vaccine - COST NOT COVERED BY MEDICARE PART B Following completion of primary series, a booster dose should be given every 10 years to maintain immunity against tetanus  Td may also be given as tetanus wound prophylaxis     Tdap Vaccine - COST NOT COVERED BY MEDICARE PART B Recommended at least once for all adults  For pregnant patients, recommended with each pregnancy  Shingles Vaccine (Shingrix) - COST NOT COVERED BY MEDICARE PART B  2 shot series recommended in those aged 48 and above     Health Maintenance Due:      Topic Date Due   • Breast Cancer Screening: Mammogram  04/12/2024   • Colorectal Cancer Screening  01/30/2026   • Hepatitis C Screening  Completed     Immunizations Due:      Topic Date Due   • COVID-19 Vaccine (4 - Booster for Alvarez Peter series) 01/07/2022     Advance Directives   What are advance directives? Advance directives are legal documents that state your wishes and plans for medical care  These plans are made ahead of time in case you lose your ability to make decisions for yourself  Advance directives can apply to any medical decision, such as the treatments you want, and if you want to donate organs  What are the types of advance directives? There are many types of advance directives, and each state has rules about how to use them  You may choose a combination of any of the following:  · Living will: This is a written record of the treatment you want  You can also choose which treatments you do not want, which to limit, and which to stop at a certain time  This includes surgery, medicine, IV fluid, and tube feedings  · Durable power of  for healthcare [facility]): This is a written record that states who you want to make healthcare choices for you when you are unable to make them for yourself  This person, called a proxy, is usually a family member or a friend  You may choose more than 1 proxy  · Do not resuscitate (DNR) order:  A DNR order is used in case your heart stops beating or you stop breathing  It is a request not to have certain forms of treatment, such as CPR  A DNR order may be included in other types of advance directives  · Medical directive:   This covers the care that you want if you are in a coma, near death, or unable to make decisions for yourself  You can list the treatments you want for each condition  Treatment may include pain medicine, surgery, blood transfusions, dialysis, IV or tube feedings, and a ventilator (breathing machine)  · Values history: This document has questions about your views, beliefs, and how you feel and think about life  This information can help others choose the care that you would choose  Why are advance directives important? An advance directive helps you control your care  Although spoken wishes may be used, it is better to have your wishes written down  Spoken wishes can be misunderstood, or not followed  Treatments may be given even if you do not want them  An advance directive may make it easier for your family to make difficult choices about your care  Weight Management   Why it is important to manage your weight:  Being overweight increases your risk of health conditions such as heart disease, high blood pressure, type 2 diabetes, and certain types of cancer  It can also increase your risk for osteoarthritis, sleep apnea, and other respiratory problems  Aim for a slow, steady weight loss  Even a small amount of weight loss can lower your risk of health problems  How to lose weight safely:  A safe and healthy way to lose weight is to eat fewer calories and get regular exercise  You can lose up about 1 pound a week by decreasing the number of calories you eat by 500 calories each day  Healthy meal plan for weight management:  A healthy meal plan includes a variety of foods, contains fewer calories, and helps you stay healthy  A healthy meal plan includes the following:  · Eat whole-grain foods more often  A healthy meal plan should contain fiber  Fiber is the part of grains, fruits, and vegetables that is not broken down by your body  Whole-grain foods are healthy and provide extra fiber in your diet   Some examples of whole-grain foods are whole-wheat breads and pastas, oatmeal, brown rice, and bulgur  · Eat a variety of vegetables every day  Include dark, leafy greens such as spinach, kale, zeke greens, and mustard greens  Eat yellow and orange vegetables such as carrots, sweet potatoes, and winter squash  · Eat a variety of fruits every day  Choose fresh or canned fruit (canned in its own juice or light syrup) instead of juice  Fruit juice has very little or no fiber  · Eat low-fat dairy foods  Drink fat-free (skim) milk or 1% milk  Eat fat-free yogurt and low-fat cottage cheese  Try low-fat cheeses such as mozzarella and other reduced-fat cheeses  · Choose meat and other protein foods that are low in fat  Choose beans or other legumes such as split peas or lentils  Choose fish, skinless poultry (chicken or turkey), or lean cuts of red meat (beef or pork)  Before you cook meat or poultry, cut off any visible fat  · Use less fat and oil  Try baking foods instead of frying them  Add less fat, such as margarine, sour cream, regular salad dressing and mayonnaise to foods  Eat fewer high-fat foods  Some examples of high-fat foods include french fries, doughnuts, ice cream, and cakes  · Eat fewer sweets  Limit foods and drinks that are high in sugar  This includes candy, cookies, regular soda, and sweetened drinks  Exercise:  Exercise at least 30 minutes per day on most days of the week  Some examples of exercise include walking, biking, dancing, and swimming  You can also fit in more physical activity by taking the stairs instead of the elevator or parking farther away from stores  Ask your healthcare provider about the best exercise plan for you  © Copyright [employer] 2018 Information is for End User's use only and may not be sold, redistributed or otherwise used for commercial purposes   All illustrations and images included in CareNotes® are the copyrighted property of [employer]  or [address] [place] [address] [employer]

## 2023-02-23 ENCOUNTER — TELEPHONE (OUTPATIENT)
Dept: ENDOCRINOLOGY | Facility: CLINIC | Age: 68
End: 2023-02-23

## 2023-02-23 NOTE — TELEPHONE ENCOUNTER
Received referral for Jhony Horner      Left voicemail for patient to contact office to schedule Consult/New Patient Appointment

## 2023-03-22 ENCOUNTER — VBI (OUTPATIENT)
Dept: ADMINISTRATIVE | Facility: OTHER | Age: 68
End: 2023-03-22

## 2023-04-12 PROBLEM — E66.01 CLASS 3 SEVERE OBESITY WITH SERIOUS COMORBIDITY AND BODY MASS INDEX (BMI) OF 45.0 TO 49.9 IN ADULT (HCC): Status: ACTIVE | Noted: 2023-04-12

## 2023-04-12 PROBLEM — E66.813 CLASS 3 SEVERE OBESITY WITH SERIOUS COMORBIDITY AND BODY MASS INDEX (BMI) OF 45.0 TO 49.9 IN ADULT (HCC): Status: ACTIVE | Noted: 2023-04-12

## 2023-04-12 PROBLEM — E27.8 ADRENAL NODULE (HCC): Status: ACTIVE | Noted: 2023-04-12

## 2023-04-12 PROBLEM — E27.9 ADRENAL NODULE (HCC): Status: ACTIVE | Noted: 2023-04-12

## 2023-05-01 ENCOUNTER — TELEPHONE (OUTPATIENT)
Dept: ENDOCRINOLOGY | Facility: CLINIC | Age: 68
End: 2023-05-01

## 2023-05-01 RX ORDER — INSULIN DEGLUDEC 200 U/ML
40 INJECTION, SOLUTION SUBCUTANEOUS
COMMUNITY

## 2023-05-01 NOTE — TELEPHONE ENCOUNTER
Take novolog 10 Units about 15 mins before each meals  Reduce Tresiba to 45 Units at bedtime  Continue with metformin 1000 mg with breakfast and dinner    Blood sugars have been improving in the last 2 days, inform patient to reduce Tresiba to 40 units at bedtime, continue NovoLog 10 units before each meal plus scale    Continue metformin    Bryan Poisson

## 2023-05-08 DIAGNOSIS — I10 ESSENTIAL HYPERTENSION: ICD-10-CM

## 2023-05-08 RX ORDER — LISINOPRIL 10 MG/1
10 TABLET ORAL DAILY
Qty: 90 TABLET | Refills: 3 | Status: SHIPPED | OUTPATIENT
Start: 2023-05-08

## 2023-05-18 ENCOUNTER — VBI (OUTPATIENT)
Dept: ADMINISTRATIVE | Facility: OTHER | Age: 68
End: 2023-05-18

## 2023-05-22 DIAGNOSIS — E61.1 IRON DEFICIENCY: ICD-10-CM

## 2023-05-22 RX ORDER — FERROUS SULFATE TAB EC 324 MG (65 MG FE EQUIVALENT) 324 (65 FE) MG
324 TABLET DELAYED RESPONSE ORAL 3 TIMES WEEKLY
Qty: 12 TABLET | Refills: 2 | Status: SHIPPED | OUTPATIENT
Start: 2023-05-22 | End: 2023-08-12

## 2023-05-30 DIAGNOSIS — E11.65 TYPE 2 DIABETES MELLITUS WITH HYPERGLYCEMIA, WITH LONG-TERM CURRENT USE OF INSULIN (HCC): Primary | ICD-10-CM

## 2023-05-30 DIAGNOSIS — Z79.4 TYPE 2 DIABETES MELLITUS WITH HYPERGLYCEMIA, WITH LONG-TERM CURRENT USE OF INSULIN (HCC): Primary | ICD-10-CM

## 2023-05-30 RX ORDER — INSULIN DEGLUDEC 200 U/ML
40 INJECTION, SOLUTION SUBCUTANEOUS
Qty: 18 ML | Refills: 1 | Status: SHIPPED | OUTPATIENT
Start: 2023-05-30

## 2023-05-30 NOTE — TELEPHONE ENCOUNTER
----- Message from Yohana Warren sent at 5/30/2023  8:58 AM EDT -----  Regarding: Sergio Love  Contact: [phone]  Could you please send in a new prescription to [facility] for my Tresiba pen  I am on my last pen I take 40 units at night    their phone number is [phone]  thank you

## 2023-06-09 ENCOUNTER — APPOINTMENT (OUTPATIENT)
Dept: LAB | Facility: MEDICAL CENTER | Age: 68
End: 2023-06-09
Payer: COMMERCIAL

## 2023-06-09 DIAGNOSIS — E61.1 IRON DEFICIENCY: ICD-10-CM

## 2023-06-09 DIAGNOSIS — M05.79 RHEUMATOID ARTHRITIS INVOLVING MULTIPLE SITES WITH POSITIVE RHEUMATOID FACTOR (HCC): ICD-10-CM

## 2023-06-09 LAB
ALBUMIN SERPL BCP-MCNC: 3.9 G/DL (ref 3.5–5)
ALP SERPL-CCNC: 68 U/L (ref 46–116)
ALT SERPL W P-5'-P-CCNC: 43 U/L (ref 12–78)
ANION GAP SERPL CALCULATED.3IONS-SCNC: 7 MMOL/L (ref 4–13)
AST SERPL W P-5'-P-CCNC: 30 U/L (ref 5–45)
BASOPHILS # BLD AUTO: 0.03 THOUSANDS/ÂΜL (ref 0–0.1)
BASOPHILS NFR BLD AUTO: 1 % (ref 0–1)
BILIRUB SERPL-MCNC: 0.77 MG/DL (ref 0.2–1)
BUN SERPL-MCNC: 30 MG/DL (ref 5–25)
CALCIUM SERPL-MCNC: 9.2 MG/DL (ref 8.3–10.1)
CHLORIDE SERPL-SCNC: 107 MMOL/L (ref 96–108)
CO2 SERPL-SCNC: 25 MMOL/L (ref 21–32)
CREAT SERPL-MCNC: 0.84 MG/DL (ref 0.6–1.3)
CRP SERPL QL: <3 MG/L
EOSINOPHIL # BLD AUTO: 0.2 THOUSAND/ÂΜL (ref 0–0.61)
EOSINOPHIL NFR BLD AUTO: 5 % (ref 0–6)
ERYTHROCYTE [DISTWIDTH] IN BLOOD BY AUTOMATED COUNT: 17.7 % (ref 11.6–15.1)
ERYTHROCYTE [SEDIMENTATION RATE] IN BLOOD: 9 MM/HOUR (ref 0–29)
FERRITIN SERPL-MCNC: 11 NG/ML (ref 11–307)
GFR SERPL CREATININE-BSD FRML MDRD: 71 ML/MIN/1.73SQ M
GLUCOSE P FAST SERPL-MCNC: 177 MG/DL (ref 65–99)
HCT VFR BLD AUTO: 36.2 % (ref 34.8–46.1)
HGB BLD-MCNC: 10.9 G/DL (ref 11.5–15.4)
IMM GRANULOCYTES # BLD AUTO: 0.01 THOUSAND/UL (ref 0–0.2)
IMM GRANULOCYTES NFR BLD AUTO: 0 % (ref 0–2)
IRON SATN MFR SERPL: 14 % (ref 15–50)
IRON SERPL-MCNC: 62 UG/DL (ref 50–170)
LYMPHOCYTES # BLD AUTO: 0.77 THOUSANDS/ÂΜL (ref 0.6–4.47)
LYMPHOCYTES NFR BLD AUTO: 20 % (ref 14–44)
MCH RBC QN AUTO: 27.5 PG (ref 26.8–34.3)
MCHC RBC AUTO-ENTMCNC: 30.1 G/DL (ref 31.4–37.4)
MCV RBC AUTO: 91 FL (ref 82–98)
MONOCYTES # BLD AUTO: 0.66 THOUSAND/ÂΜL (ref 0.17–1.22)
MONOCYTES NFR BLD AUTO: 17 % (ref 4–12)
NEUTROPHILS # BLD AUTO: 2.13 THOUSANDS/ÂΜL (ref 1.85–7.62)
NEUTS SEG NFR BLD AUTO: 57 % (ref 43–75)
NRBC BLD AUTO-RTO: 0 /100 WBCS
PLATELET # BLD AUTO: 297 THOUSANDS/UL (ref 149–390)
PMV BLD AUTO: 10.2 FL (ref 8.9–12.7)
POTASSIUM SERPL-SCNC: 4.6 MMOL/L (ref 3.5–5.3)
PROT SERPL-MCNC: 6.9 G/DL (ref 6.4–8.4)
RBC # BLD AUTO: 3.96 MILLION/UL (ref 3.81–5.12)
SODIUM SERPL-SCNC: 139 MMOL/L (ref 135–147)
TIBC SERPL-MCNC: 458 UG/DL (ref 250–450)
WBC # BLD AUTO: 3.8 THOUSAND/UL (ref 4.31–10.16)

## 2023-06-09 PROCEDURE — 83540 ASSAY OF IRON: CPT

## 2023-06-09 PROCEDURE — 36415 COLL VENOUS BLD VENIPUNCTURE: CPT

## 2023-06-09 PROCEDURE — 83550 IRON BINDING TEST: CPT

## 2023-06-09 PROCEDURE — 86140 C-REACTIVE PROTEIN: CPT

## 2023-06-09 PROCEDURE — 80053 COMPREHEN METABOLIC PANEL: CPT

## 2023-06-09 PROCEDURE — 82728 ASSAY OF FERRITIN: CPT

## 2023-06-09 PROCEDURE — 85652 RBC SED RATE AUTOMATED: CPT

## 2023-06-09 PROCEDURE — 85025 COMPLETE CBC W/AUTO DIFF WBC: CPT

## 2023-06-21 ENCOUNTER — TELEPHONE (OUTPATIENT)
Dept: ENDOCRINOLOGY | Facility: CLINIC | Age: 68
End: 2023-06-21

## 2023-06-22 RX ORDER — INSULIN DEGLUDEC 200 U/ML
48 INJECTION, SOLUTION SUBCUTANEOUS
COMMUNITY

## 2023-06-22 NOTE — TELEPHONE ENCOUNTER
Reviewed blood sugar log, fasting blood sugars are in 200 to 240 mg per DL range  Please inform patient to increase Tresiba to 48 units at bedtime, if her fasting blood sugars are still elevated above 140 mg per DL she should send the log  Continue rest same    [facility]

## 2023-06-23 DIAGNOSIS — Z79.4 TYPE 2 DIABETES MELLITUS WITH HYPERGLYCEMIA, WITH LONG-TERM CURRENT USE OF INSULIN (HCC): ICD-10-CM

## 2023-06-23 DIAGNOSIS — E11.65 TYPE 2 DIABETES MELLITUS WITH HYPERGLYCEMIA, WITH LONG-TERM CURRENT USE OF INSULIN (HCC): ICD-10-CM

## 2023-06-23 RX ORDER — INSULIN ASPART 100 [IU]/ML
10 INJECTION, SOLUTION INTRAVENOUS; SUBCUTANEOUS
Qty: 60 ML | Refills: 0 | Status: SHIPPED | OUTPATIENT
Start: 2023-06-23

## 2023-06-23 NOTE — TELEPHONE ENCOUNTER
----- Message from Robina Flower sent at 6/23/2023  9:18 AM EDT -----  Regarding: Refill Novolog  Contact: [phone]  Could you please send in a new prescription for my NovoLog Pen to [facility]  Phone number is [phone]

## 2023-07-14 ENCOUNTER — APPOINTMENT (OUTPATIENT)
Dept: LAB | Facility: MEDICAL CENTER | Age: 68
End: 2023-07-14
Payer: COMMERCIAL

## 2023-07-14 DIAGNOSIS — E27.8 ADRENAL NODULE (HCC): ICD-10-CM

## 2023-07-14 DIAGNOSIS — Z79.4 TYPE 2 DIABETES MELLITUS WITH HYPERGLYCEMIA, WITH LONG-TERM CURRENT USE OF INSULIN (HCC): ICD-10-CM

## 2023-07-14 DIAGNOSIS — E11.65 TYPE 2 DIABETES MELLITUS WITH HYPERGLYCEMIA, WITH LONG-TERM CURRENT USE OF INSULIN (HCC): ICD-10-CM

## 2023-07-14 LAB
25(OH)D3 SERPL-MCNC: 44.9 NG/ML (ref 30–100)
ALBUMIN SERPL BCP-MCNC: 3.8 G/DL (ref 3.5–5)
ALP SERPL-CCNC: 74 U/L (ref 46–116)
ALT SERPL W P-5'-P-CCNC: 39 U/L (ref 12–78)
ANION GAP SERPL CALCULATED.3IONS-SCNC: 4 MMOL/L
AST SERPL W P-5'-P-CCNC: 25 U/L (ref 5–45)
BASOPHILS # BLD AUTO: 0.02 THOUSANDS/ÂΜL (ref 0–0.1)
BASOPHILS NFR BLD AUTO: 0 % (ref 0–1)
BILIRUB SERPL-MCNC: 0.92 MG/DL (ref 0.2–1)
BUN SERPL-MCNC: 27 MG/DL (ref 5–25)
CALCIUM SERPL-MCNC: 9.5 MG/DL (ref 8.3–10.1)
CHLORIDE SERPL-SCNC: 109 MMOL/L (ref 96–108)
CO2 SERPL-SCNC: 27 MMOL/L (ref 21–32)
CREAT SERPL-MCNC: 0.87 MG/DL (ref 0.6–1.3)
CREAT UR-MCNC: 205 MG/DL
EOSINOPHIL # BLD AUTO: 0.21 THOUSAND/ÂΜL (ref 0–0.61)
EOSINOPHIL NFR BLD AUTO: 4 % (ref 0–6)
ERYTHROCYTE [DISTWIDTH] IN BLOOD BY AUTOMATED COUNT: 17.1 % (ref 11.6–15.1)
EST. AVERAGE GLUCOSE BLD GHB EST-MCNC: 151 MG/DL
GFR SERPL CREATININE-BSD FRML MDRD: 68 ML/MIN/1.73SQ M
GLUCOSE SERPL-MCNC: 121 MG/DL (ref 65–140)
HBA1C MFR BLD: 6.9 %
HCT VFR BLD AUTO: 35.8 % (ref 34.8–46.1)
HGB BLD-MCNC: 11 G/DL (ref 11.5–15.4)
IMM GRANULOCYTES # BLD AUTO: 0.02 THOUSAND/UL (ref 0–0.2)
IMM GRANULOCYTES NFR BLD AUTO: 0 % (ref 0–2)
LYMPHOCYTES # BLD AUTO: 0.78 THOUSANDS/ÂΜL (ref 0.6–4.47)
LYMPHOCYTES NFR BLD AUTO: 14 % (ref 14–44)
MCH RBC QN AUTO: 28.1 PG (ref 26.8–34.3)
MCHC RBC AUTO-ENTMCNC: 30.7 G/DL (ref 31.4–37.4)
MCV RBC AUTO: 91 FL (ref 82–98)
MICROALBUMIN UR-MCNC: 155 MG/L (ref 0–20)
MICROALBUMIN/CREAT 24H UR: 76 MG/G CREATININE (ref 0–30)
MONOCYTES # BLD AUTO: 0.53 THOUSAND/ÂΜL (ref 0.17–1.22)
MONOCYTES NFR BLD AUTO: 10 % (ref 4–12)
NEUTROPHILS # BLD AUTO: 3.9 THOUSANDS/ÂΜL (ref 1.85–7.62)
NEUTS SEG NFR BLD AUTO: 72 % (ref 43–75)
NRBC BLD AUTO-RTO: 0 /100 WBCS
PLATELET # BLD AUTO: 266 THOUSANDS/UL (ref 149–390)
PMV BLD AUTO: 10.3 FL (ref 8.9–12.7)
POTASSIUM SERPL-SCNC: 4.5 MMOL/L (ref 3.5–5.3)
PROT SERPL-MCNC: 6.5 G/DL (ref 6.4–8.4)
RBC # BLD AUTO: 3.92 MILLION/UL (ref 3.81–5.12)
SODIUM SERPL-SCNC: 140 MMOL/L (ref 135–147)
WBC # BLD AUTO: 5.46 THOUSAND/UL (ref 4.31–10.16)

## 2023-07-14 PROCEDURE — 80053 COMPREHEN METABOLIC PANEL: CPT

## 2023-07-14 PROCEDURE — 85025 COMPLETE CBC W/AUTO DIFF WBC: CPT

## 2023-07-14 PROCEDURE — 82570 ASSAY OF URINE CREATININE: CPT

## 2023-07-14 PROCEDURE — 82043 UR ALBUMIN QUANTITATIVE: CPT

## 2023-07-14 PROCEDURE — 36415 COLL VENOUS BLD VENIPUNCTURE: CPT

## 2023-07-14 PROCEDURE — 82306 VITAMIN D 25 HYDROXY: CPT

## 2023-07-14 PROCEDURE — 82384 ASSAY THREE CATECHOLAMINES: CPT

## 2023-07-14 PROCEDURE — 83835 ASSAY OF METANEPHRINES: CPT

## 2023-07-14 PROCEDURE — 83036 HEMOGLOBIN GLYCOSYLATED A1C: CPT

## 2023-07-18 LAB
DOPAMINE 24H UR-MRATE: <30 PG/ML (ref 0–48)
EPINEPH PLAS-MCNC: <15 PG/ML (ref 0–62)
NOREPINEPH PLAS-MCNC: 454 PG/ML (ref 0–874)

## 2023-07-20 LAB
METANEPH FREE SERPL-MCNC: 23.1 PG/ML (ref 0–88)
NORMETANEPHRINE SERPL-MCNC: 71.2 PG/ML (ref 0–285.2)

## 2023-07-21 DIAGNOSIS — R39.9 UTI SYMPTOMS: Primary | ICD-10-CM

## 2023-07-21 RX ORDER — NITROFURANTOIN 25; 75 MG/1; MG/1
100 CAPSULE ORAL 2 TIMES DAILY
Qty: 10 CAPSULE | Refills: 0 | Status: SHIPPED | OUTPATIENT
Start: 2023-07-21 | End: 2023-07-26

## 2023-07-22 ENCOUNTER — APPOINTMENT (OUTPATIENT)
Dept: LAB | Facility: MEDICAL CENTER | Age: 68
End: 2023-07-22
Payer: COMMERCIAL

## 2023-07-22 DIAGNOSIS — R39.9 UTI SYMPTOMS: ICD-10-CM

## 2023-07-22 LAB
BACTERIA UR QL AUTO: ABNORMAL /HPF
BILIRUB UR QL STRIP: NEGATIVE
CLARITY UR: CLEAR
COLOR UR: ABNORMAL
GLUCOSE UR STRIP-MCNC: NEGATIVE MG/DL
HGB UR QL STRIP.AUTO: ABNORMAL
KETONES UR STRIP-MCNC: NEGATIVE MG/DL
LEUKOCYTE ESTERASE UR QL STRIP: NEGATIVE
NITRITE UR QL STRIP: NEGATIVE
NON-SQ EPI CELLS URNS QL MICRO: ABNORMAL /HPF
PH UR STRIP.AUTO: 6.5 [PH]
PROT UR STRIP-MCNC: ABNORMAL MG/DL
RBC #/AREA URNS AUTO: ABNORMAL /HPF
SP GR UR STRIP.AUTO: 1.02 (ref 1–1.03)
UROBILINOGEN UR STRIP-ACNC: <2 MG/DL
WBC #/AREA URNS AUTO: ABNORMAL /HPF

## 2023-07-22 PROCEDURE — 81001 URINALYSIS AUTO W/SCOPE: CPT

## 2023-08-02 ENCOUNTER — OFFICE VISIT (OUTPATIENT)
Dept: ENDOCRINOLOGY | Facility: CLINIC | Age: 68
End: 2023-08-02
Payer: COMMERCIAL

## 2023-08-02 VITALS
BODY MASS INDEX: 48.45 KG/M2 | DIASTOLIC BLOOD PRESSURE: 64 MMHG | HEIGHT: 60 IN | HEART RATE: 68 BPM | SYSTOLIC BLOOD PRESSURE: 138 MMHG | OXYGEN SATURATION: 92 % | WEIGHT: 246.8 LBS

## 2023-08-02 DIAGNOSIS — E27.8 ADRENAL NODULE (HCC): ICD-10-CM

## 2023-08-02 DIAGNOSIS — E11.65 TYPE 2 DIABETES MELLITUS WITH HYPERGLYCEMIA, WITH LONG-TERM CURRENT USE OF INSULIN (HCC): Primary | ICD-10-CM

## 2023-08-02 DIAGNOSIS — I10 PRIMARY HYPERTENSION: ICD-10-CM

## 2023-08-02 DIAGNOSIS — Z79.4 TYPE 2 DIABETES MELLITUS WITH HYPERGLYCEMIA, WITH LONG-TERM CURRENT USE OF INSULIN (HCC): Primary | ICD-10-CM

## 2023-08-02 DIAGNOSIS — E78.2 MIXED HYPERLIPIDEMIA: ICD-10-CM

## 2023-08-02 PROCEDURE — 99214 OFFICE O/P EST MOD 30 MIN: CPT | Performed by: PHYSICIAN ASSISTANT

## 2023-08-02 NOTE — PROGRESS NOTES
Patient Progress Note      CC: DM      Referring Provider  Socorro Pool [facility]  [address] [facility],  [address]     History of Present Illness:   Carmenza Calderon is a 76 y.o. female with a history of type 2 diabetes with long term use of insulin. Diabetes course has been stable. Complications of DM: CAD, neuropathy. Denies recent illness or hospitalizations. Denies recent severe hypoglycemic or severe hyperglycemic episodes. Denies any issues with her current regimen. Home glucose monitoring: are performed regularly    Home blood glucose readings:   Before breakfast: 95-180s mg/dl, more on the higher end  Before lunch: N/A  Before dinner: 97-150s mg/dl, occasionally higher  Bedtime: N/A    Current regimen: Tresiba 48 units nightly, NovoLog 10 units 3 times daily with meals, metformin 1000 mg twice daily  compliant most of the time, denies any side effects from medications. Injects in: abdomen, arm. Rotates sites: Yes  Hypoglycemic episodes: No, never  H/o of hypoglycemia causing hospitalization or Intervention such as glucagon injection  or ambulance call :  No  Hypoglycemia symptoms: never less than 70 mg/dl; feels lightheaded under 100 mg/dl  Treatment of hypoglycemia: discussed treatment     Medic alert tag: recommended: Yes    Diabetes education: Yes  Diet: 2-3 meals per day, 2+ snacks per day. Timing of meals is predictable. Diabetic diet compliance:  compliant most of the time  Activity: Daily activity is predictable: Yes. She occasionally uses a stationary pedal. . Ophthamology: Feb 2022  Podiatry: Feb 2022    Has hypertension: on ACE inhibitor/ARB, compliant most of the time  Has hyperlipidemia: on statin - tolerating well, no myalgias. compliant most of the time, denies any side effects from medications.   Thyroid disorders: No  History of pancreatitis: No     Adrenal nodule: CT scan done in Feb 2022 showed an indeterminate 3.4 cm heterogeneous right adrenal nodule with scattered dystrophic calcifications is unchanged from 4/4/2019. Both benign and malignant are included in the differential diagnosis. Prior work-up for Cushing's, primary aldosteronism, pheochromocytoma was negative.       Patient Active Problem List   Diagnosis   • Ankylosing spondylitis ([address])   • Cervical herniated disc   • DDD (degenerative disc disease), thoracolumbar   • Type 2 diabetes mellitus with hyperglycemia, with long-term current use of insulin (HCC)   • Degenerative disc disease, cervical   • Gastroesophageal reflux disease without esophagitis   • Lumbar foraminal stenosis   • Essential hypertension   • Rheumatoid arthritis involving multiple sites ([address])   • Sjogren's disease ([address])   • Hyperlipidemia   • Coronary artery disease involving native coronary artery of native heart without angina pectoris   • Adenoma of right adrenal gland   • Class 3 severe obesity due to excess calories with serious comorbidity and body mass index (BMI) of 40.0 to 44.9 in [facility])   • Disorder of adrenal gland, unspecified ([address])   • Type 2 diabetes mellitus with diabetic peripheral angiopathy without gangrene ([address])   • Microalbuminuria due to type 2 diabetes mellitus (HCC)   • Normocytic anemia   • Adrenal nodule (HCC)   • Class 3 severe obesity with serious comorbidity and body mass index (BMI) of 45.0 to 49.9 in [facility])      Past Medical History:   Diagnosis Date   • Ankylosing spondylitis ([address])    • Arthritis    • Diabetes mellitus ([address])    • GERD (gastroesophageal reflux disease)    • Heart attack ([address])    • Hematuria     last assessed 9/7/13   • Hyperlipidemia    • Hypertension    • Nephrolithiasis 9/7/2013   • Subclinical hypothyroidism 9/2/2015      Past Surgical History:   Procedure Laterality Date   • APPENDECTOMY     • BACK SURGERY      mulitple surgery, fusions "top to bottom"   • BREAST SURGERY Left     biopsy   • CERVICAL SPINE SURGERY     • CHOLECYSTECTOMY     • CORONARY ANGIOPLASTY WITH STENT PLACEMENT     • CYSTOSCOPY  05/06/2015    Diagnostic, last assessed 5/6/15   • HYSTERECTOMY     • LITHOTRIPSY     • GA CYSTO/URETERO W/LITHOTRIPSY &INDWELL STENT INSRT Left 4/18/2019    Procedure: CYSTO, URETEROSCOPY W/HOLMIUM LASER, RETROGRADE PYELOGRAM, STENT INSERTION;  Surgeon: Maddi Avila MD;  Location: [facility];  Service: Urology      Family History   Problem Relation Age of Onset   • Diabetes Mother    • Gout Mother    • Heart disease Mother    • Diabetes type II Mother    • Diabetes Father    • Heart disease Father    • No Known Problems Daughter    • No Known Problems Maternal Grandmother    • No Known Problems Maternal Grandfather    • No Known Problems Paternal Grandmother    • No Known Problems Paternal Grandfather    • Nephrolithiasis Brother    • No Known Problems Maternal Aunt    • No Known Problems Maternal Aunt    • No Known Problems Maternal Aunt    • No Known Problems Maternal Aunt    • No Known Problems Maternal Aunt    • No Known Problems Maternal Aunt    • No Known Problems Paternal Aunt    • No Known Problems Paternal Aunt      Social History     Tobacco Use   • Smoking status: Never     Passive exposure: Past   • Smokeless tobacco: Never   Substance Use Topics   • Alcohol use: Yes     Comment: very rarely     Allergies   Allergen Reactions   • Acetazolamide Hives   • Barbiturates Other (See Comments)     stomach pain   • Morphine GI Intolerance     Other reaction(s): GI Intolerance   • Sulfa Antibiotics Hives and Rash   • Sulfasalazine Hives and Rash         Current Outpatient Medications:   •  Actemra 162 MG/0.9ML SOSY, once a week, Disp: , Rfl:   •  ALPRAZolam (XANAX) 0.25 mg tablet, as needed, Disp: , Rfl:   •  amoxicillin (AMOXIL) 500 mg capsule, Take 2,000 mg by mouth as needed 1 hour prior to dental appointment, Disp: , Rfl:   •  aspirin 81 MG tablet, Take 81 mg by mouth daily, Disp: , Rfl:   •  atorvastatin (LIPITOR) 80 mg tablet, Take 80 mg by mouth Medrol Dose Pack scheduling ONLY, Disp: , Rfl:   •  B-D ALLERGY SYRINGE 1CC/28G 28G X 1/2" 1 ML MISC, use every week, Disp: , Rfl:   •  B-D UF III MINI PEN NEEDLES 31G X 5 MM MISC, use 2 daily as directed, Disp: , Rfl: 0  •  calcium carbonate (OS-RILEY) 600 MG tablet, Take 400 mg by mouth daily, Disp: , Rfl:   •  Cholecalciferol (VITAMIN D) 2000 units tablet, Take 2,000 Units by mouth daily, Disp: , Rfl:   •  ferrous sulfate 324 (65 Fe) mg, Take 1 tablet (324 mg total) by mouth 3 (three) times a week for 36 doses, Disp: 12 tablet, Rfl: 2  •  folic acid (FOLVITE) 462 MCG tablet, Take 800 mg by mouth daily, Disp: , Rfl:   •  gabapentin (NEURONTIN) 300 mg capsule, Take 300 mg by mouth 3 (three) times a day, Disp: , Rfl:   •  Insulin Pen Needle 31G X 8 MM MISC, by Does not apply route, Disp: , Rfl:   •  lisinopril (ZESTRIL) 10 mg tablet, Take 1 tablet (10 mg total) by mouth daily, Disp: 90 tablet, Rfl: 3  •  metFORMIN (GLUCOPHAGE) 1000 MG tablet, TAKE 1 TABLET BY MOUTH 2 TIMES A DAY WITH MEALS, Disp: , Rfl:   •  methocarbamol (ROBAXIN) 750 mg tablet, PRN, Disp: , Rfl:   •  methotrexate 50 MG/2ML injection, Inject 0.8 mL under the skin once a week, Disp: , Rfl:   •  metoprolol tartrate (LOPRESSOR) 50 mg tablet, 50 mg every 12 (twelve) hours, Disp: , Rfl:   •  Multiple Vitamin (MULTIVITAMIN) tablet, Take 1 tablet by mouth daily, Disp: , Rfl:   •  nitroglycerin (NITROSTAT) 0.4 mg SL tablet, Place 1 tablet (0.4 mg total) under the tongue every 5 (five) minutes as needed for chest pain, Disp: 90 tablet, Rfl: 1  •  NovoLOG FlexPen 100 units/mL injection pen, Inject 10 Units under the skin 3 (three) times a day with meals +sliding scale **MAX 60 units daily**, Disp: 60 mL, Rfl: 0  •  omeprazole (PriLOSEC) 20 mg delayed release capsule, Take 20 mg by mouth daily, Disp: , Rfl:   •  Probiotic Product (PROBIOTIC-10 PO), Take by mouth daily , Disp: , Rfl:   •  traMADol (ULTRAM) 50 mg tablet, Take 50 mg by mouth as needed for moderate pain, Disp: , Rfl:   •  [place] FlexTouch 200 units/mL CONCENTRATED U-200 injection pen, Inject 48 Units under the skin daily at bedtime, Disp: , Rfl:   •  predniSONE 5 mg tablet, Take 5 mg by mouth daily (Patient not taking: Reported on 8/2/2023), Disp: , Rfl:   Review of Systems   Constitutional: Negative for activity change, appetite change, fatigue and unexpected weight change. HENT: Positive for trouble swallowing (chronic, due to surgeries). Eyes: Positive for visual disturbance. Respiratory: Negative for shortness of breath. Cardiovascular: Negative for chest pain and palpitations. Gastrointestinal: Negative for constipation and diarrhea. Endocrine: Negative for polydipsia and polyuria. Musculoskeletal: Positive for arthralgias and myalgias. Has RA   Skin: Negative for wound. Neurological: Positive for numbness. Psychiatric/Behavioral: Negative. Physical Exam:  Body mass index is 48.1 kg/m². /64 (BP Location: Right arm, Patient Position: Sitting, Cuff Size: Large)   Pulse 68   Ht 5' 0.06" (1.526 m)   Wt 112 kg (246 lb 12.8 oz)   SpO2 92%   BMI 48.10 kg/m²    Wt Readings from Last 3 Encounters:   08/02/23 112 kg (246 lb 12.8 oz)   04/17/23 115 kg (254 lb)   04/12/23 115 kg (254 lb 3.2 oz)       Physical Exam  Vitals and nursing note reviewed. Constitutional:       Appearance: She is well-developed. HENT:      Head: Normocephalic. Eyes:      General: No scleral icterus. Pupils: Pupils are equal, round, and reactive to light. Neck:      Thyroid: No thyromegaly. Cardiovascular:      Rate and Rhythm: Normal rate and regular rhythm. Pulses:           Radial pulses are 2+ on the right side and 2+ on the left side. Heart sounds: No murmur heard. Pulmonary:      Effort: Pulmonary effort is normal. No respiratory distress. Breath sounds: Normal breath sounds. No wheezing. Musculoskeletal:      Cervical back: Neck supple. Skin:     General: Skin is warm and dry.    Neurological: Mental Status: She is alert. Patient's shoes and socks were not removed. Labs:   Component      Latest Ref Rng 1/5/2023   Sodium      135 - 147 mmol/L 137    Potassium      3.5 - 5.3 mmol/L 4.3    Chloride      96 - 108 mmol/L 105    CO2      21 - 32 mmol/L 26    Anion Gap      mmol/L 6    BUN      5 - 25 mg/dL 17    Creatinine      0.60 - 1.30 mg/dL 0.73    GLUCOSE FASTING      65 - 99 mg/dL 170 (H)    Calcium      8.3 - 10.1 mg/dL 9.4    CORRECTED CALCIUM      8.3 - 10.1 mg/dL 9.9    AST      5 - 45 U/L 29    ALT      12 - 78 U/L 41    Alkaline Phosphatase      46 - 116 U/L 87    Total Protein      6.4 - 8.4 g/dL 7.1    Albumin      3.5 - 5.0 g/dL 3.4 (L)    TOTAL BILIRUBIN      0.20 - 1.00 mg/dL 0.50    eGFR      ml/min/1.73sq m 85    Glucose, Random      65 - 140 mg/dL    Cholesterol      See Comment mg/dL 123    Triglycerides      See Comment mg/dL 96    HDL      >=50 mg/dL 59    LDL Calculated      0 - 100 mg/dL 45    Non-HDL Cholesterol      mg/dl 64    NOREPINEPHRINE PLASMA      0 - 874 pg/mL    EPINEPHRINE PLASMA      0 - 62 pg/mL    DOPAMINE PLASMA      0 - 48 pg/mL    EXT Creatinine Urine      mg/dL    MICROALBUM.,U,RANDOM      0.0 - 20.0 mg/L    MICROALBUMIN/CREATININE RATIO      0 - 30 mg/g creatinine    Hemoglobin A1C      Normal 3.8-5.6%; PreDiabetic 5.7-6.4%;  Diabetic >=6.5%; Glycemic control for adults with diabetes <7.0% %    eAG, EST AVG Glucose      mg/dl    NORMETANEPHRINE FREE PLASMA      0.0 - 285.2 pg/mL    METANEPHRINE FREE PLASMA      0.0 - 88.0 pg/mL    TSH 3RD GENERATON      0.450 - 4.500 uIU/mL    Vit D, 25-Hydroxy      30.0 - 100.0 ng/mL      Component      Latest Ref Rng 1/13/2023   Sodium      135 - 147 mmol/L    Potassium      3.5 - 5.3 mmol/L    Chloride      96 - 108 mmol/L    CO2      21 - 32 mmol/L    Anion Gap      mmol/L    BUN      5 - 25 mg/dL    Creatinine      0.60 - 1.30 mg/dL    GLUCOSE FASTING      65 - 99 mg/dL    Calcium      8.3 - 10.1 mg/dL CORRECTED CALCIUM      8.3 - 10.1 mg/dL    AST      5 - 45 U/L    ALT      12 - 78 U/L    Alkaline Phosphatase      46 - 116 U/L    Total Protein      6.4 - 8.4 g/dL    Albumin      3.5 - 5.0 g/dL    TOTAL BILIRUBIN      0.20 - 1.00 mg/dL    eGFR      ml/min/1.73sq m    Glucose, Random      65 - 140 mg/dL    Cholesterol      See Comment mg/dL    Triglycerides      See Comment mg/dL    HDL      >=50 mg/dL    LDL Calculated      0 - 100 mg/dL    Non-HDL Cholesterol      mg/dl    NOREPINEPHRINE PLASMA      0 - 874 pg/mL    EPINEPHRINE PLASMA      0 - 62 pg/mL    DOPAMINE PLASMA      0 - 48 pg/mL    EXT Creatinine Urine      mg/dL    MICROALBUM.,U,RANDOM      0.0 - 20.0 mg/L    MICROALBUMIN/CREATININE RATIO      0 - 30 mg/g creatinine    Hemoglobin A1C      Normal 3.8-5.6%; PreDiabetic 5.7-6.4%;  Diabetic >=6.5%; Glycemic control for adults with diabetes <7.0% % 7.7 (H)    eAG, EST AVG Glucose      mg/dl 174    NORMETANEPHRINE FREE PLASMA      0.0 - 285.2 pg/mL    METANEPHRINE FREE PLASMA      0.0 - 88.0 pg/mL    TSH 3RD GENERATON      0.450 - 4.500 uIU/mL 2.290    Vit D, 25-Hydroxy      30.0 - 100.0 ng/mL      Component      Latest Ref Rng 7/14/2023   Sodium      135 - 147 mmol/L 140    Potassium      3.5 - 5.3 mmol/L 4.5    Chloride      96 - 108 mmol/L 109 (H)    CO2      21 - 32 mmol/L 27    Anion Gap      mmol/L 4    BUN      5 - 25 mg/dL 27 (H)    Creatinine      0.60 - 1.30 mg/dL 0.87    GLUCOSE FASTING      65 - 99 mg/dL    Calcium      8.3 - 10.1 mg/dL 9.5    CORRECTED CALCIUM      8.3 - 10.1 mg/dL    AST      5 - 45 U/L 25    ALT      12 - 78 U/L 39    Alkaline Phosphatase      46 - 116 U/L 74    Total Protein      6.4 - 8.4 g/dL 6.5    Albumin      3.5 - 5.0 g/dL 3.8    TOTAL BILIRUBIN      0.20 - 1.00 mg/dL 0.92    eGFR      ml/min/1.73sq m 68    Glucose, Random      65 - 140 mg/dL 121    Cholesterol      See Comment mg/dL    Triglycerides      See Comment mg/dL    HDL      >=50 mg/dL    LDL Calculated 0 - 100 mg/dL    Non-HDL Cholesterol      mg/dl    NOREPINEPHRINE PLASMA      0 - 874 pg/mL 454    EPINEPHRINE PLASMA      0 - 62 pg/mL <15    DOPAMINE PLASMA      0 - 48 pg/mL <30    EXT Creatinine Urine      mg/dL 205.0    MICROALBUM.,U,RANDOM      0.0 - 20.0 mg/L 155.0 (H)    MICROALBUMIN/CREATININE RATIO      0 - 30 mg/g creatinine 76 (H)    Hemoglobin A1C      Normal 3.8-5.6%; PreDiabetic 5.7-6.4%; Diabetic >=6.5%; Glycemic control for adults with diabetes <7.0% % 6.9 (H)    eAG, EST AVG Glucose      mg/dl 151    NORMETANEPHRINE FREE PLASMA      0.0 - 285.2 pg/mL 71.2    METANEPHRINE FREE PLASMA      0.0 - 88.0 pg/mL 23.1    TSH 3RD GENERATON      0.450 - 4.500 uIU/mL    Vit D, 25-Hydroxy      30.0 - 100.0 ng/mL 44.9       Legend:  (H) High  (L) Low      Plan:    Diagnoses and all orders for this visit:    Type 2 diabetes mellitus with hyperglycemia, with long-term current use of insulin (HCC)  HGA1C 6.9%. Improved. Treatment regimen: Blood glucose levels vary between normal and high in the mornings. Advised patient to start checking blood sugar at bedtime. Based on log that she will send in 1 week, I will be able to decide if she needs an adjustment in her Tresiba dose versus dinnertime NovoLog dose. For now continue current treatment  Discussed intensive insulin regimen does increase risk for hypoglycemia. Episodes of hypoglycemia can lead to permanent disability and death. Discussed risks/complications associated with uncontrolled diabetes. Advised to adhere to diabetic diet, and recommended staying active/exercising routinely as tolerated. Keep carbohydrates consistent to limit blood glucose fluctuations. Advised to call if blood sugars less than 70 mg/dl or over 300 mg/dl. Check blood glucose 3+ times a day  Discussed symptoms and treatment of hypoglycemia. Recommended routine follow-up with podiatry and ophthalmology. Send log in 1-2 weeks.     Ordered blood work to complete prior to next visit.  -     Hemoglobin A1C; Future  -     Basic metabolic panel; Future    Adrenal nodule (HCC)  CT scan done in February 2022 showed a 3.4 cm stable nodule  Consider repeat CT scan in February 2024  Work-up for pheochromocytoma, primary hyperaldosteronism, Cushing's previously normal    Primary hypertension  Blood pressure adequately controlled, continue current treatment  -     Basic metabolic panel; Future    Mixed hyperlipidemia  LDL previously 45  Continue statin therapy  Managed by PCP      Discussed with the patient diagnosis and treatment and all questions fully answered. She will call me if any problems arise. Counseled patient on diagnostic results, prognosis, risk and benefit of treatment options, instruction for management, importance of treatment compliance, risk factor reduction and impressions.       Day Hobbs PA-C

## 2023-08-02 NOTE — PATIENT INSTRUCTIONS
Hypoglycemia instructions   Regis Bernal  8/2/2023  [id number]    Low Blood Sugar    Steps to treat low blood sugar. 1. Test blood sugar if you have symptoms of low blood sugar:   Low Blood Sugar Symptoms:  o Sweaty  o Dizzy  o Rapid heartbeat  o Shaky  o Bad mood  o Hungry      2. Treat blood sugar less than 70 with 15 grams of fast-acting carbohydrate:   Examples of 15 grams Fast-Acting Carbohydrate:  o 4 oz juice  o 4 oz regular soda  o 3-4 glucose tablets (chew)  o 3-4 hard candies (chew)          3. Wait 15 minutes and test your blood sugar again     4.  If blood sugar is less than 100, repeat steps 2-3.    5. When your blood sugar is 100 or more, eat a snack if it will be longer than one hour until your next meal. The snack should be 15 grams of carbohydrate and a protein:   Examples of snacks:  o ½ sandwich  o 6 crackers with cheese  o Piece of fruit with cheese or peanut butter  o 6 crackers with peanut butter

## 2023-08-03 ENCOUNTER — TELEPHONE (OUTPATIENT)
Dept: ADMINISTRATIVE | Facility: OTHER | Age: 68
End: 2023-08-03

## 2023-08-03 NOTE — TELEPHONE ENCOUNTER
----- Message from Cecily Servin LPN sent at 3/9/1644  3:34 PM EDT -----  Regarding: dm foot  08/02/23 3:34 PM    Hello, our patient Philly Fuchs has had Diabetic Foot Exam completed/performed. Please assist in updating the patient chart by pulling a previous Electronic Medical Record (EMR) document. The previous EMR is Dr Sherren Barrio . The date of service is May 8, 2023.     Thank you,  Cecily Servin LPN  [facility]

## 2023-08-03 NOTE — LETTER
Diabetic Foot Exam Form    Date Requested: 23  Patient: Elisabeth Kahn  Patient : 1955   Referring Provider: Vera Braun MD    Diabetic Foot Exam Performed with shoes and socks removed        Yes         No     Date of Diabetic Foot Exam ______________________________  Risk Score ____________________________________________    Left Foot       Visual Inspection         Monofilament Testing Sensory Exam        Pedal Pulses         Additional Comments         Right Foot      Visual Inspection         Monofilament Testing Sensory Exam       Pedal Pulses         Additional Comments         Comments __________________________________________________________    Practice Providing Exam ______________________________________________    Exam Performed By (print name) _______________________________________      Provider Signature ___________________________________________________      These reports are needed for  compliance. Please fax this completed form and a copy of the Diabetic Foot Exam report to our office located at [address] [place] [address] as soon as possible via Fax [phone] nabeel Miller: Phone [phone]    We thank you for your assistance in treating our mutual patient.

## 2023-08-03 NOTE — TELEPHONE ENCOUNTER
Upon review of the In Basket request and the patient's chart, initial outreach has been made via fax to facility. Please see Contacts section for details.      Thank you  Karen Hoyt

## 2023-08-03 NOTE — LETTER
Diabetic Foot Exam Form    Date Requested: 23  Patient: Elvia Ralph  Patient : 1955   Referring Provider: Nathen Hand MD    Diabetic Foot Exam Performed with shoes and socks removed        Yes         No     Date of Diabetic Foot Exam ______________________________  Risk Score ____________________________________________    Left Foot       Visual Inspection         Monofilament Testing Sensory Exam        Pedal Pulses         Additional Comments         Right Foot      Visual Inspection         Monofilament Testing Sensory Exam       Pedal Pulses         Additional Comments         Comments __________________________________________________________    Practice Providing Exam ______________________________________________    Exam Performed By (print name) _______________________________________      Provider Signature ___________________________________________________      These reports are needed for  compliance. Please fax this completed form and a copy of the Diabetic Foot Exam report to our office located at [address] [place] [address] as soon as possible via Fax [phone] nabeel Chambers: Phone [phone]    We thank you for your assistance in treating our mutual patient.

## 2023-08-07 NOTE — TELEPHONE ENCOUNTER
As a follow-up, a second attempt has been made for outreach via fax to facility. Please see Contacts section for details.     Thank you  Justin Clay

## 2023-08-08 ENCOUNTER — OFFICE VISIT (OUTPATIENT)
Dept: FAMILY MEDICINE CLINIC | Facility: CLINIC | Age: 68
End: 2023-08-08
Payer: COMMERCIAL

## 2023-08-08 VITALS
DIASTOLIC BLOOD PRESSURE: 60 MMHG | SYSTOLIC BLOOD PRESSURE: 132 MMHG | HEART RATE: 70 BPM | OXYGEN SATURATION: 97 % | HEIGHT: 60 IN | BODY MASS INDEX: 48.1 KG/M2 | TEMPERATURE: 96.8 F

## 2023-08-08 DIAGNOSIS — D64.9 NORMOCYTIC ANEMIA: ICD-10-CM

## 2023-08-08 DIAGNOSIS — S20.421A: Primary | ICD-10-CM

## 2023-08-08 DIAGNOSIS — I10 ESSENTIAL HYPERTENSION: ICD-10-CM

## 2023-08-08 PROCEDURE — 99214 OFFICE O/P EST MOD 30 MIN: CPT | Performed by: FAMILY MEDICINE

## 2023-08-08 NOTE — PROGRESS NOTES
Name: Carmenza Calderon      : 1955      MRN: [id number]  Encounter Provider: Vevelyn Oppenheim, MD  Encounter Date: 2023   Encounter department: [address] [place] [address]     1. Blister of back, right, initial encounter    2. Normocytic anemia    3. Essential hypertension      Blistered infected after using Aspercreme and heating pack. Continue to apply topical antibiotic. Provided patient with topical antibiotic in office today. Reviewed iron levels today with patient. Continue iron every other day. Blood pressure within normal limits. Continue current medications. Follow-up for annual wellness visit       Subjective      Patient presents with:  Blister: Blister on back   Popped two weeks ago     Recently had blood work completed. Overall doing well on current medications. Established with endocrinology    Review of Systems   Constitutional: Negative for fatigue and fever. HENT: Negative for sore throat. Eyes: Negative for visual disturbance. Respiratory: Negative for cough, chest tightness and shortness of breath. Cardiovascular: Negative for chest pain, palpitations and leg swelling. Gastrointestinal: Negative for abdominal pain, constipation, diarrhea and nausea. Endocrine: Negative for cold intolerance and heat intolerance. Genitourinary: Negative for flank pain. Musculoskeletal: Negative for back pain and neck pain. Skin: Positive for wound. Negative for rash. Neurological: Negative for headaches. Psychiatric/Behavioral: Negative for behavioral problems and confusion.        Current Outpatient Medications on File Prior to Visit   Medication Sig   • Actemra 162 MG/0.9ML SOSY once a week   • ALPRAZolam (XANAX) 0.25 mg tablet as needed   • aspirin 81 MG tablet Take 81 mg by mouth daily   • atorvastatin (LIPITOR) 80 mg tablet Take 80 mg by mouth Medrol Dose Pack scheduling ONLY   • B-D ALLERGY SYRINGE 1CC/28G 28G X 1/2" 1 ML MISC use every week • B-D UF III MINI PEN NEEDLES 31G X 5 MM MISC use 2 daily as directed   • calcium carbonate (OS-RILEY) 600 MG tablet Take 400 mg by mouth daily   • Cholecalciferol (VITAMIN D) 2000 units tablet Take 2,000 Units by mouth daily   • ferrous sulfate 324 (65 Fe) mg Take 1 tablet (324 mg total) by mouth 3 (three) times a week for 36 doses   • folic acid (FOLVITE) 049 MCG tablet Take 800 mg by mouth daily   • gabapentin (NEURONTIN) 300 mg capsule Take 300 mg by mouth 3 (three) times a day   • Insulin Pen Needle 31G X 8 MM MISC by Does not apply route   • lisinopril (ZESTRIL) 10 mg tablet Take 1 tablet (10 mg total) by mouth daily   • metFORMIN (GLUCOPHAGE) 1000 MG tablet TAKE 1 TABLET BY MOUTH 2 TIMES A DAY WITH MEALS   • methotrexate 50 MG/2ML injection Inject 0.8 mL under the skin once a week   • metoprolol tartrate (LOPRESSOR) 50 mg tablet 50 mg every 12 (twelve) hours   • Multiple Vitamin (MULTIVITAMIN) tablet Take 1 tablet by mouth daily   • nitroglycerin (NITROSTAT) 0.4 mg SL tablet Place 1 tablet (0.4 mg total) under the tongue every 5 (five) minutes as needed for chest pain   • NovoLOG FlexPen 100 units/mL injection pen Inject 10 Units under the skin 3 (three) times a day with meals +sliding scale **MAX 60 units daily**   • omeprazole (PriLOSEC) 20 mg delayed release capsule Take 20 mg by mouth daily   • Probiotic Product (PROBIOTIC-10 PO) Take by mouth daily    • traMADol (ULTRAM) 50 mg tablet Take 50 mg by mouth as needed for moderate pain   • Tresiba FlexTouch 200 units/mL CONCENTRATED U-200 injection pen Inject 48 Units under the skin daily at bedtime   • amoxicillin (AMOXIL) 500 mg capsule Take 2,000 mg by mouth as needed 1 hour prior to dental appointment (Patient not taking: Reported on 8/8/2023)   • methocarbamol (ROBAXIN) 750 mg tablet PRN (Patient not taking: Reported on 8/8/2023)   • predniSONE 5 mg tablet Take 5 mg by mouth daily (Patient not taking: Reported on 8/8/2023)       Objective     /60 (BP Location: Left arm, Patient Position: Sitting, Cuff Size: Large)   Pulse 70   Temp (!) 96.8 °F (36 °C)   Ht 5' 0.06" (1.526 m)   SpO2 97%   BMI 48.10 kg/m²     Physical Exam  Vitals and nursing note reviewed. Constitutional:       Appearance: She is well-developed. HENT:      Head: Normocephalic and atraumatic. Cardiovascular:      Rate and Rhythm: Normal rate and regular rhythm. Pulmonary:      Effort: Pulmonary effort is normal.      Breath sounds: Normal breath sounds. Skin:     Findings: Erythema and lesion present. Neurological:      General: No focal deficit present. Mental Status: She is alert.    Psychiatric:         Mood and Affect: Mood normal.         Behavior: Behavior normal.       Paula Wu MD

## 2023-08-11 NOTE — TELEPHONE ENCOUNTER
As a final attempt, a third outreach has been made via telephone call to facility. Please see Contacts section for details. This encounter will be closed and completed by end of day. Should we receive the requested information because of previous outreach attempts, the requested patient's chart will be updated appropriately.      Thank you  Edrie Felty

## 2023-09-12 ENCOUNTER — TELEPHONE (OUTPATIENT)
Dept: OTHER | Facility: HOSPITAL | Age: 68
End: 2023-09-12

## 2023-09-12 DIAGNOSIS — E11.65 TYPE 2 DIABETES MELLITUS WITH HYPERGLYCEMIA, WITH LONG-TERM CURRENT USE OF INSULIN (HCC): Primary | ICD-10-CM

## 2023-09-12 DIAGNOSIS — Z79.4 TYPE 2 DIABETES MELLITUS WITH HYPERGLYCEMIA, WITH LONG-TERM CURRENT USE OF INSULIN (HCC): Primary | ICD-10-CM

## 2023-09-12 NOTE — TELEPHONE ENCOUNTER
Patient wants to schedule a flu vaccine for herself and her  .   Please call to schedule     Thank you

## 2023-09-13 LAB
LEFT EYE DIABETIC RETINOPATHY: NORMAL
RIGHT EYE DIABETIC RETINOPATHY: NORMAL

## 2023-10-03 ENCOUNTER — APPOINTMENT (OUTPATIENT)
Dept: LAB | Facility: MEDICAL CENTER | Age: 68
End: 2023-10-03
Payer: COMMERCIAL

## 2023-10-03 DIAGNOSIS — M05.79 RHEUMATOID ARTHRITIS INVOLVING MULTIPLE SITES WITH POSITIVE RHEUMATOID FACTOR (HCC): ICD-10-CM

## 2023-10-03 LAB
ALBUMIN SERPL BCP-MCNC: 4 G/DL (ref 3.5–5)
ALP SERPL-CCNC: 66 U/L (ref 34–104)
ALT SERPL W P-5'-P-CCNC: 30 U/L (ref 7–52)
ANION GAP SERPL CALCULATED.3IONS-SCNC: 5 MMOL/L
AST SERPL W P-5'-P-CCNC: 22 U/L (ref 13–39)
BASOPHILS # BLD AUTO: 0.05 THOUSANDS/ÂΜL (ref 0–0.1)
BASOPHILS NFR BLD AUTO: 1 % (ref 0–1)
BILIRUB SERPL-MCNC: 0.8 MG/DL (ref 0.2–1)
BUN SERPL-MCNC: 21 MG/DL (ref 5–25)
CALCIUM SERPL-MCNC: 9.5 MG/DL (ref 8.4–10.2)
CHLORIDE SERPL-SCNC: 104 MMOL/L (ref 96–108)
CO2 SERPL-SCNC: 30 MMOL/L (ref 21–32)
CREAT SERPL-MCNC: 0.74 MG/DL (ref 0.6–1.3)
CRP SERPL QL: <1 MG/L
EOSINOPHIL # BLD AUTO: 0.32 THOUSAND/ÂΜL (ref 0–0.61)
EOSINOPHIL NFR BLD AUTO: 8 % (ref 0–6)
ERYTHROCYTE [DISTWIDTH] IN BLOOD BY AUTOMATED COUNT: 16.3 % (ref 11.6–15.1)
ERYTHROCYTE [SEDIMENTATION RATE] IN BLOOD: 1 MM/HOUR (ref 0–29)
GFR SERPL CREATININE-BSD FRML MDRD: 83 ML/MIN/1.73SQ M
GLUCOSE P FAST SERPL-MCNC: 137 MG/DL (ref 65–99)
HCT VFR BLD AUTO: 37.4 % (ref 34.8–46.1)
HGB BLD-MCNC: 11.6 G/DL (ref 11.5–15.4)
IMM GRANULOCYTES # BLD AUTO: 0.01 THOUSAND/UL (ref 0–0.2)
IMM GRANULOCYTES NFR BLD AUTO: 0 % (ref 0–2)
LYMPHOCYTES # BLD AUTO: 0.92 THOUSANDS/ÂΜL (ref 0.6–4.47)
LYMPHOCYTES NFR BLD AUTO: 22 % (ref 14–44)
MCH RBC QN AUTO: 29.8 PG (ref 26.8–34.3)
MCHC RBC AUTO-ENTMCNC: 31 G/DL (ref 31.4–37.4)
MCV RBC AUTO: 96 FL (ref 82–98)
MONOCYTES # BLD AUTO: 0.84 THOUSAND/ÂΜL (ref 0.17–1.22)
MONOCYTES NFR BLD AUTO: 20 % (ref 4–12)
NEUTROPHILS # BLD AUTO: 2.06 THOUSANDS/ÂΜL (ref 1.85–7.62)
NEUTS SEG NFR BLD AUTO: 49 % (ref 43–75)
NRBC BLD AUTO-RTO: 0 /100 WBCS
PLATELET # BLD AUTO: 224 THOUSANDS/UL (ref 149–390)
PMV BLD AUTO: 9.9 FL (ref 8.9–12.7)
POTASSIUM SERPL-SCNC: 4.7 MMOL/L (ref 3.5–5.3)
PROT SERPL-MCNC: 6.1 G/DL (ref 6.4–8.4)
RBC # BLD AUTO: 3.89 MILLION/UL (ref 3.81–5.12)
SODIUM SERPL-SCNC: 139 MMOL/L (ref 135–147)
WBC # BLD AUTO: 4.2 THOUSAND/UL (ref 4.31–10.16)

## 2023-10-03 PROCEDURE — 86140 C-REACTIVE PROTEIN: CPT

## 2023-10-03 PROCEDURE — 85025 COMPLETE CBC W/AUTO DIFF WBC: CPT

## 2023-10-03 PROCEDURE — 36415 COLL VENOUS BLD VENIPUNCTURE: CPT

## 2023-10-03 PROCEDURE — 85652 RBC SED RATE AUTOMATED: CPT

## 2023-10-03 PROCEDURE — 80053 COMPREHEN METABOLIC PANEL: CPT

## 2023-10-17 ENCOUNTER — CLINICAL SUPPORT (OUTPATIENT)
Dept: FAMILY MEDICINE CLINIC | Facility: CLINIC | Age: 68
End: 2023-10-17
Payer: COMMERCIAL

## 2023-10-17 DIAGNOSIS — Z23 ENCOUNTER FOR IMMUNIZATION: Primary | ICD-10-CM

## 2023-10-17 PROCEDURE — 90662 IIV NO PRSV INCREASED AG IM: CPT | Performed by: FAMILY MEDICINE

## 2023-10-17 PROCEDURE — G0008 ADMIN INFLUENZA VIRUS VAC: HCPCS | Performed by: FAMILY MEDICINE

## 2023-11-06 DIAGNOSIS — Z79.4 TYPE 2 DIABETES MELLITUS WITH HYPERGLYCEMIA, WITH LONG-TERM CURRENT USE OF INSULIN (HCC): Primary | ICD-10-CM

## 2023-11-06 DIAGNOSIS — E11.65 TYPE 2 DIABETES MELLITUS WITH HYPERGLYCEMIA, WITH LONG-TERM CURRENT USE OF INSULIN (HCC): Primary | ICD-10-CM

## 2023-11-06 RX ORDER — INSULIN DEGLUDEC 200 U/ML
48 INJECTION, SOLUTION SUBCUTANEOUS
Qty: 9 ML | Refills: 1 | Status: SHIPPED | OUTPATIENT
Start: 2023-11-06

## 2023-11-15 ENCOUNTER — OFFICE VISIT (OUTPATIENT)
Dept: FAMILY MEDICINE CLINIC | Facility: CLINIC | Age: 68
End: 2023-11-15
Payer: COMMERCIAL

## 2023-11-15 VITALS
BODY MASS INDEX: 49.67 KG/M2 | RESPIRATION RATE: 17 BRPM | TEMPERATURE: 98 F | WEIGHT: 253 LBS | SYSTOLIC BLOOD PRESSURE: 132 MMHG | HEART RATE: 74 BPM | OXYGEN SATURATION: 97 % | HEIGHT: 60 IN | DIASTOLIC BLOOD PRESSURE: 74 MMHG

## 2023-11-15 DIAGNOSIS — L03.011 PARONYCHIA OF FINGER, RIGHT: Primary | ICD-10-CM

## 2023-11-15 PROCEDURE — 99213 OFFICE O/P EST LOW 20 MIN: CPT | Performed by: FAMILY MEDICINE

## 2023-11-15 RX ORDER — CEPHALEXIN 500 MG/1
500 CAPSULE ORAL EVERY 8 HOURS SCHEDULED
Qty: 15 CAPSULE | Refills: 0 | Status: SHIPPED | OUTPATIENT
Start: 2023-11-15 | End: 2023-11-20

## 2023-11-15 NOTE — PROGRESS NOTES
Name: Kyler Vallecillo      : 1955      MRN: [id number]  Encounter Provider: Cisco Goldstein MD  Encounter Date: 11/15/2023   Encounter department: [address] [place] [address]. Paronychia of finger, right  -     cephalexin (KEFLEX) 500 mg capsule; Take 1 capsule (500 mg total) by mouth every 8 (eight) hours for 5 days      Warm water soaks and antibiotics. Follow up if no improvement     Depression Screening and Follow-up Plan: Patient was screened for depression during today's encounter. They screened negative with a PHQ-2 score of 0. Subjective      Left hand pointer finger painful to touch. Red and dry. She is a diabetic. Hard to use the hand due to pain. Not healing. Hand Pain       Review of Systems   Musculoskeletal:         Right index finger redness and swelling. Previously draining    Skin:  Positive for color change.        Current Outpatient Medications on File Prior to Visit   Medication Sig   • Actemra 162 MG/0.9ML SOSY once a week   • ALPRAZolam (XANAX) 0.25 mg tablet as needed   • amoxicillin (AMOXIL) 500 mg capsule Take 2,000 mg by mouth as needed 1 hour prior to dental appointment   • aspirin 81 MG tablet Take 81 mg by mouth daily   • atorvastatin (LIPITOR) 80 mg tablet Take 80 mg by mouth Medrol Dose Pack scheduling ONLY   • B-D ALLERGY SYRINGE 1CC/28G 28G X 1/2" 1 ML MISC use every week   • B-D UF III MINI PEN NEEDLES 31G X 5 MM MISC use 2 daily as directed   • calcium carbonate (OS-RILEY) 600 MG tablet Take 400 mg by mouth daily   • Cholecalciferol (VITAMIN D) 2000 units tablet Take 2,000 Units by mouth daily   • ferrous sulfate 324 (65 Fe) mg Take 1 tablet (324 mg total) by mouth 3 (three) times a week for 36 doses   • folic acid (FOLVITE) 945 MCG tablet Take 800 mg by mouth daily   • gabapentin (NEURONTIN) 300 mg capsule Take 300 mg by mouth 3 (three) times a day   • Insulin Pen Needle 31G X 8 MM MISC by Does not apply route   • lisinopril (ZESTRIL) 10 mg tablet Take 1 tablet (10 mg total) by mouth daily   • metFORMIN (GLUCOPHAGE) 1000 MG tablet Take 1 tablet by mouth 2 times a day with meals   • methocarbamol (ROBAXIN) 750 mg tablet PRN   • methotrexate 50 MG/2ML injection Inject 0.8 mL under the skin once a week   • metoprolol tartrate (LOPRESSOR) 50 mg tablet 50 mg every 12 (twelve) hours   • Multiple Vitamin (MULTIVITAMIN) tablet Take 1 tablet by mouth daily   • nitroglycerin (NITROSTAT) 0.4 mg SL tablet Place 1 tablet (0.4 mg total) under the tongue every 5 (five) minutes as needed for chest pain   • NovoLOG FlexPen 100 units/mL injection pen Inject 10 Units under the skin 3 (three) times a day with meals +sliding scale **MAX 60 units daily**   • omeprazole (PriLOSEC) 20 mg delayed release capsule Take 20 mg by mouth daily   • predniSONE 5 mg tablet Take 5 mg by mouth daily   • Probiotic Product (PROBIOTIC-10 PO) Take by mouth daily    • traMADol (ULTRAM) 50 mg tablet Take 50 mg by mouth as needed for moderate pain   • Tresiba FlexTouch 200 units/mL CONCENTRATED U-200 injection pen Inject 48 Units under the skin daily at bedtime       Objective     /74 (BP Location: Left arm, Patient Position: Sitting, Cuff Size: Standard)   Pulse 74   Temp 98 °F (36.7 °C) (Tympanic)   Resp 17   Ht 5' (1.524 m)   Wt 115 kg (253 lb)   SpO2 97%   BMI 49.41 kg/m²     Physical Exam  Constitutional:       Appearance: Normal appearance. Musculoskeletal:        Arms:       Comments: Erythematous tender right pointer finger. No discharge present   Neurological:      Mental Status: She is alert.        Enrique Mata MD

## 2023-12-06 ENCOUNTER — TELEMEDICINE (OUTPATIENT)
Dept: FAMILY MEDICINE CLINIC | Facility: CLINIC | Age: 68
End: 2023-12-06
Payer: COMMERCIAL

## 2023-12-06 DIAGNOSIS — U07.1 COVID: Primary | ICD-10-CM

## 2023-12-06 PROCEDURE — 99213 OFFICE O/P EST LOW 20 MIN: CPT | Performed by: STUDENT IN AN ORGANIZED HEALTH CARE EDUCATION/TRAINING PROGRAM

## 2023-12-06 RX ORDER — FLUTICASONE PROPIONATE 50 MCG
1 SPRAY, SUSPENSION (ML) NASAL DAILY
Qty: 15.8 ML | Refills: 1 | Status: SHIPPED | OUTPATIENT
Start: 2023-12-06

## 2023-12-06 RX ORDER — BROMPHENIRAMINE MALEATE, PSEUDOEPHEDRINE HYDROCHLORIDE, AND DEXTROMETHORPHAN HYDROBROMIDE 2; 30; 10 MG/5ML; MG/5ML; MG/5ML
5 SYRUP ORAL 4 TIMES DAILY PRN
Qty: 120 ML | Refills: 0 | Status: SHIPPED | OUTPATIENT
Start: 2023-12-06

## 2023-12-06 RX ORDER — CETIRIZINE HYDROCHLORIDE 10 MG/1
10 TABLET ORAL DAILY
Qty: 14 TABLET | Refills: 0 | Status: SHIPPED | OUTPATIENT
Start: 2023-12-06

## 2023-12-06 NOTE — ASSESSMENT & PLAN NOTE
Positive COVID antigen test on 12/2/23. At this time I have recommended social distancing from family members who are currently asymptomatic. I have recommended using a mask while in common areas in the household. Frequent hand washing and cleaning of commonly used surfaces has also been reinforced for the patient and all family members. Strict ED precautions have been given in the event patient develops worsening SOB, fevers, and decreased PO intake. Have also advised the following:   - Good hydration: drink plenty of fluids to prevent dehydration.  - Rest: refrain from any strenuous exercise. - Fever and pain control: Tylenol 1000 mg, every 8 hours as needed OR Ibuprofen 600 mg, one tablet every 6 hours as needed. - Cough: Bromophed cough supressent  - Nasal congestion/ear pressure: Afrin/Flonase nasal spray (over the counter) for maximum 5 days.    Have also recommended starting using Vitamin C supplementation at home

## 2023-12-06 NOTE — PROGRESS NOTES
COVID-19 Outpatient Progress Note    Assessment/Plan:    Problem List Items Addressed This Visit          Other    COVID - Primary     Positive COVID antigen test on 12/2/23. At this time I have recommended social distancing from family members who are currently asymptomatic. I have recommended using a mask while in common areas in the household. Frequent hand washing and cleaning of commonly used surfaces has also been reinforced for the patient and all family members. Strict ED precautions have been given in the event patient develops worsening SOB, fevers, and decreased PO intake. Have also advised the following:   - Good hydration: drink plenty of fluids to prevent dehydration.  - Rest: refrain from any strenuous exercise. - Fever and pain control: Tylenol 1000 mg, every 8 hours as needed OR Ibuprofen 600 mg, one tablet every 6 hours as needed. - Cough: Bromophed cough supressent  - Nasal congestion/ear pressure: Afrin/Flonase nasal spray (over the counter) for maximum 5 days. Have also recommended starting using Vitamin C supplementation at home              Relevant Medications    brompheniramine-pseudoephedrine-DM 30-2-10 MG/5ML syrup    fluticasone (FLONASE) 50 mcg/act nasal spray    cetirizine (ZyrTEC) 10 mg tablet      Disposition:     I have spent a total time of 20 minutes on the day of the encounter for this patient including discussing prognosis, risks and benefits of treatment options, patient and family education and risk factor reductions. Patient declines Paxlovid use at this time - reports hx of diarrhea with use. Encounter provider: Massiel Chen MD     Provider located at: [address] [place] [address] Rd  [address]  [phone]     Recent Visits  No visits were found meeting these conditions.   Showing recent visits within past 7 days and meeting all other requirements  Today's Visits  Date Type Provider Dept   12/06/23 Telemedicine Jeana Vernon Antonio Celayaonjoselin today's visits and meeting all other requirements  Future Appointments  No visits were found meeting these conditions. Showing future appointments within next 150 days and meeting all other requirements     This virtual check-in was done via telephone and she agrees to proceed. Patient agrees to participate in a virtual check in via telephone or video visit instead of presenting to the office to address urgent/immediate medical needs. Patient is aware this is a billable service. She acknowledged consent and understanding of privacy and security of the video platform. The patient has agreed to participate and understands they can discontinue the visit at any time. After connecting through Telephone, the patient was identified by name and date of birth. Yessica Malhotra was informed that this was a telemedicine visit and that the exam was being conducted confidentially over secure lines. My office door was closed. No one else was in the room. Yessica Malhotra acknowledged consent and understanding of privacy and security of the telemedicine visit. I informed the patient that I have reviewed her record in Epic and presented the opportunity for her to ask any questions regarding the visit today. The patient agreed to participate. It was my intent to perform this visit via video technology but the patient was not able to do a video connection so the visit was completed via audio telephone only. Verification of patient location:  Patient is located in the following state in which I hold an active license: [place]    Subjective:   Yessica Malhotra is a 76 y.o. female who is concerned about COVID-19. Patient's symptoms include fatigue (improving), nasal congestion, sore throat (severe), cough (dry), myalgias (resolved) and headache (resolved). Patient denies fever, chills, rhinorrhea, shortness of breath, chest tightness, abdominal pain, vomiting and diarrhea.      - Date of symptom onset: 12/2/2023      COVID-19 vaccination status: Fully vaccinated with booster    Exposure:   Contact with a person who is under investigation (PUI) for or who is positive for COVID-19 within the last 14 days?: Yes    Hospitalized recently for fever and/or lower respiratory symptoms?: No      Currently a healthcare worker that is involved in direct patient care?: No      Works in a special setting where the risk of COVID-19 transmission may be high? (this may include long-term care, correctional and halfway facilities; homeless shelters; assisted-living facilities and group homes.): No      Resident in a special setting where the risk of COVID-19 transmission may be high? (this may include long-term care, correctional and halfway facilities; homeless shelters; assisted-living facilities and group homes.): No       tested positive on Friday, Dec 1. Patient tested positive with home antigen test on 12/2/23. No results found for: "[facility]", "[address] [facility]", "[address] [place] [address],[address]", "[facility]ON[facility]", "[facility]", "[address] [facility]"    Review of Systems   Constitutional:  Positive for fatigue (improving). Negative for chills and fever. HENT:  Positive for congestion and sore throat (severe). Negative for ear pain, rhinorrhea and sinus pain. Eyes:  Negative for visual disturbance. Respiratory:  Positive for cough (dry). Negative for chest tightness, shortness of breath and wheezing. Cardiovascular:  Negative for chest pain and palpitations. Gastrointestinal:  Negative for abdominal pain, constipation, diarrhea and vomiting. Endocrine: Negative for polyuria. Genitourinary:  Negative for dysuria. Musculoskeletal:  Positive for myalgias (resolved). Negative for arthralgias. Neurological:  Positive for headaches (resolved). Negative for dizziness, syncope and light-headedness.      Current Outpatient Medications on File Prior to Visit   Medication Sig    Actemra 162 MG/0.9ML SOSY once a week ALPRAZolam (XANAX) 0.25 mg tablet as needed    amoxicillin (AMOXIL) 500 mg capsule Take 2,000 mg by mouth as needed 1 hour prior to dental appointment    aspirin 81 MG tablet Take 81 mg by mouth daily    atorvastatin (LIPITOR) 80 mg tablet Take 80 mg by mouth Medrol Dose Pack scheduling ONLY    B-D ALLERGY SYRINGE 1CC/28G 28G X 1/2" 1 ML MISC use every week    B-D UF III MINI PEN NEEDLES 31G X 5 MM MISC use 2 daily as directed    calcium carbonate (OS-RILEY) 600 MG tablet Take 400 mg by mouth daily    Cholecalciferol (VITAMIN D) 2000 units tablet Take 2,000 Units by mouth daily    ferrous sulfate 324 (65 Fe) mg Take 1 tablet (324 mg total) by mouth 3 (three) times a week for 36 doses    folic acid (FOLVITE) 443 MCG tablet Take 800 mg by mouth daily    gabapentin (NEURONTIN) 300 mg capsule Take 300 mg by mouth 3 (three) times a day    Insulin Pen Needle 31G X 8 MM MISC by Does not apply route    lisinopril (ZESTRIL) 10 mg tablet Take 1 tablet (10 mg total) by mouth daily    metFORMIN (GLUCOPHAGE) 1000 MG tablet Take 1 tablet by mouth 2 times a day with meals    methocarbamol (ROBAXIN) 750 mg tablet PRN    methotrexate 50 MG/2ML injection Inject 0.8 mL under the skin once a week    metoprolol tartrate (LOPRESSOR) 50 mg tablet 50 mg every 12 (twelve) hours    Multiple Vitamin (MULTIVITAMIN) tablet Take 1 tablet by mouth daily    nitroglycerin (NITROSTAT) 0.4 mg SL tablet Place 1 tablet (0.4 mg total) under the tongue every 5 (five) minutes as needed for chest pain    NovoLOG FlexPen 100 units/mL injection pen Inject 10 Units under the skin 3 (three) times a day with meals +sliding scale **MAX 60 units daily**    omeprazole (PriLOSEC) 20 mg delayed release capsule Take 20 mg by mouth daily    predniSONE 5 mg tablet Take 5 mg by mouth daily    Probiotic Product (PROBIOTIC-10 PO) Take by mouth daily     traMADol (ULTRAM) 50 mg tablet Take 50 mg by mouth as needed for moderate pain    Tresiba FlexTouch 200 units/mL CONCENTRATED U-200 injection pen Inject 48 Units under the skin daily at bedtime       Objective: There were no vitals taken for this visit. Physical Exam  Pulmonary:      Effort: Pulmonary effort is normal.   Neurological:      Mental Status: She is alert and oriented to person, place, and time.    Psychiatric:         Behavior: Behavior normal.       Luiza Thompson MD

## 2023-12-27 DIAGNOSIS — J06.9 UPPER RESPIRATORY TRACT INFECTION, UNSPECIFIED TYPE: Primary | ICD-10-CM

## 2023-12-27 RX ORDER — AZITHROMYCIN 250 MG/1
TABLET, FILM COATED ORAL DAILY
Qty: 6 TABLET | Refills: 0 | Status: SHIPPED | OUTPATIENT
Start: 2023-12-27 | End: 2024-01-01

## 2024-01-10 ENCOUNTER — APPOINTMENT (OUTPATIENT)
Dept: LAB | Facility: MEDICAL CENTER | Age: 69
End: 2024-01-10
Payer: COMMERCIAL

## 2024-01-10 DIAGNOSIS — Z79.4 TYPE 2 DIABETES MELLITUS WITH HYPERGLYCEMIA, WITH LONG-TERM CURRENT USE OF INSULIN (HCC): ICD-10-CM

## 2024-01-10 DIAGNOSIS — E11.65 TYPE 2 DIABETES MELLITUS WITH HYPERGLYCEMIA, WITH LONG-TERM CURRENT USE OF INSULIN (HCC): ICD-10-CM

## 2024-01-10 DIAGNOSIS — I10 PRIMARY HYPERTENSION: ICD-10-CM

## 2024-01-10 LAB
ANION GAP SERPL CALCULATED.3IONS-SCNC: 9 MMOL/L
BUN SERPL-MCNC: 18 MG/DL (ref 5–25)
CALCIUM SERPL-MCNC: 9.7 MG/DL (ref 8.4–10.2)
CHLORIDE SERPL-SCNC: 103 MMOL/L (ref 96–108)
CO2 SERPL-SCNC: 28 MMOL/L (ref 21–32)
CREAT SERPL-MCNC: 0.75 MG/DL (ref 0.6–1.3)
EST. AVERAGE GLUCOSE BLD GHB EST-MCNC: 174 MG/DL
GFR SERPL CREATININE-BSD FRML MDRD: 82 ML/MIN/1.73SQ M
GLUCOSE P FAST SERPL-MCNC: 155 MG/DL (ref 65–99)
HBA1C MFR BLD: 7.7 %
POTASSIUM SERPL-SCNC: 4.7 MMOL/L (ref 3.5–5.3)
SODIUM SERPL-SCNC: 140 MMOL/L (ref 135–147)

## 2024-01-10 PROCEDURE — 80048 BASIC METABOLIC PNL TOTAL CA: CPT

## 2024-01-10 PROCEDURE — 36415 COLL VENOUS BLD VENIPUNCTURE: CPT

## 2024-01-10 PROCEDURE — 83036 HEMOGLOBIN GLYCOSYLATED A1C: CPT

## 2024-01-18 ENCOUNTER — OFFICE VISIT (OUTPATIENT)
Dept: ENDOCRINOLOGY | Facility: CLINIC | Age: 69
End: 2024-01-18
Payer: COMMERCIAL

## 2024-01-18 VITALS
SYSTOLIC BLOOD PRESSURE: 134 MMHG | HEART RATE: 63 BPM | OXYGEN SATURATION: 97 % | WEIGHT: 252 LBS | BODY MASS INDEX: 49.22 KG/M2 | DIASTOLIC BLOOD PRESSURE: 76 MMHG

## 2024-01-18 DIAGNOSIS — E11.65 TYPE 2 DIABETES MELLITUS WITH HYPERGLYCEMIA, WITH LONG-TERM CURRENT USE OF INSULIN (HCC): Primary | ICD-10-CM

## 2024-01-18 DIAGNOSIS — E78.2 MIXED HYPERLIPIDEMIA: ICD-10-CM

## 2024-01-18 DIAGNOSIS — E27.8 ADRENAL NODULE (HCC): ICD-10-CM

## 2024-01-18 DIAGNOSIS — Z79.4 TYPE 2 DIABETES MELLITUS WITH HYPERGLYCEMIA, WITH LONG-TERM CURRENT USE OF INSULIN (HCC): Primary | ICD-10-CM

## 2024-01-18 DIAGNOSIS — E66.01 CLASS 3 SEVERE OBESITY DUE TO EXCESS CALORIES WITH SERIOUS COMORBIDITY AND BODY MASS INDEX (BMI) OF 40.0 TO 44.9 IN ADULT (HCC): ICD-10-CM

## 2024-01-18 DIAGNOSIS — D35.01 ADENOMA OF RIGHT ADRENAL GLAND: ICD-10-CM

## 2024-01-18 DIAGNOSIS — I10 PRIMARY HYPERTENSION: ICD-10-CM

## 2024-01-18 DIAGNOSIS — E11.51 TYPE II DIABETES MELLITUS WITH PERIPHERAL CIRCULATORY DISORDER (HCC): ICD-10-CM

## 2024-01-18 PROCEDURE — 99215 OFFICE O/P EST HI 40 MIN: CPT | Performed by: INTERNAL MEDICINE

## 2024-01-18 RX ORDER — INSULIN GLARGINE 100 [IU]/ML
INJECTION, SOLUTION SUBCUTANEOUS
Qty: 60 ML | Refills: 1 | Status: SHIPPED | OUTPATIENT
Start: 2024-01-18

## 2024-01-18 RX ORDER — INSULIN ASPART 100 [IU]/ML
INJECTION, SOLUTION INTRAVENOUS; SUBCUTANEOUS
Qty: 45 ML | Refills: 1 | Status: SHIPPED | OUTPATIENT
Start: 2024-01-18

## 2024-01-18 RX ORDER — GABAPENTIN 600 MG/1
TABLET ORAL
COMMUNITY
Start: 2024-01-17

## 2024-01-18 NOTE — PROGRESS NOTES
Delia Samano 68 y.o. female MRN: [id number]    Encounter: [id number]      Assessment/Plan     Assessment:  This is a 68 y.o.-year-old female with diabetes with hyperglycemia.    Plan:  Diagnoses and all orders for this visit:    Type 2 diabetes mellitus with hyperglycemia, with long-term current use of insulin (HCC)    Lab Results   Component Value Date    HGBA1C 7.7 (H) 01/10/2024   Last A1c 7.7%, uncontrolled, fasting blood sugars are elevated.  Will increase long-acting Salen to 52 units at bedtime, will switch to Lantus Solostar as Tresiba is not covered by insurance..  Also discussed with patient to take 5 units of NovoLog for small meal and continue 10 units with regular meals plus correction scale..  Discussed about hypoglycemia symptoms and treatment  Start Ozempic 0.25 mg once a week for 4 weeks and then increase the dose to 0.5 mg once a week  Discussed the side effects and benefits of Ozempic therapy.  Discussed with patient that after starting Ozempic, she may need adjustment in insulin regimen and should inform office if she gets hypoglycemic episodes    -     semaglutide, 0.25 or 0.5 mg/dose, (Ozempic, 0.25 or 0.5 MG/DOSE,) 2 mg/3 mL injection pen; Inject 0.25 mg once a week for 4 weeks and then increase to 0.5 mg once a week  -     Insulin Glargine Solostar (Lantus SoloStar) 100 UNIT/ML SOPN; Inject 52 units at bedtime  -     NovoLOG FlexPen 100 units/mL injection pen; Inject 10 units before meals , and 5 units before snacks ( max daily dose is 40 units)  -     Comprehensive metabolic panel; Future  -     Albumin / creatinine urine ratio; Future  -     Hemoglobin A1C; Future    Mixed hyperlipidemia  Lab Results   Component Value Date    LDLCALC 45 01/05/2023   Controlled, continue current management    Primary hypertension  Pressure at goal, continue current management    Adrenal nodule (HCC)  Previous workup for pheochromocytoma, primary hyperaldosteronism and Cushing syndrome is negative.  CT  abdomen reviewed from February 2022 which showed right adrenal nodule measuring 3.4 x 2.4 x 2.8 cm unchanged from 2019    This was seen as indeterminate lesion  We will consider checking, imaging again in few years  Adenoma of right adrenal gland  No history of hypertensive urgency or emergency  Repeat plasma fractionated catecholamines, a.m. cortisol  Will also obtain 24-hour urine cortisol and creatinine to rule out Cushing syndrome  -     Catecholamines, fractionated, plasma; Future  -     Cortisol Level,7-9 AM Specimen; Future  -     Albumin / creatinine urine ratio; Future    Class 3 severe obesity due to excess calories with serious comorbidity and body mass index (BMI) of 40.0 to 44.9 in adult ([facility])  .  About lifestyle modifications and importance of weight loss.  Start Ozempic 0.25 mg once a week for 4 weeks and then increase the dose to 0.5 mg once a week  Type II diabetes mellitus with peripheral circulatory disorder ([facility])    Other orders  -     gabapentin (NEURONTIN) 600 MG tablet         I have spent a total time of 40 minutes on 01/18/24 in caring for this patient including Diagnostic results, Prognosis, Risks and benefits of tx options, Instructions for management, Patient and family education, Importance of tx compliance, Risk factor reductions, Impressions, Counseling / Coordination of care, Documenting in the medical record, Reviewing / ordering tests, medicine, procedures  , and Obtaining or reviewing history  .   CC: Diabetes    History of Present Illness     HPI:    Delia Smaano 68-year-old woman with medical history of type 2 diabetes, adrenal adenoma, hypertension, hyperlipidemia, rheumatoid arthritis is here for follow-up  Current regimen includes NovoLog 10 units 3 times daily, Tresiba 48 units at bedtime, metformin 1000 mg twice a day, meds missing doses of NovoLog if she ate small meal.  Reviewed blood sugar log from patient, she checks 2-3 times daily and her blood sugars are usually in  170 to 250 mg per DL range  And blood sugars are in 160 to 180 mg per DL range          Lab Results   Component Value Date    HGBA1C 7.7 (H) 01/10/2024     Adrenal nodule-previous workup primary hyperaldosteronism, pheochromocytoma and Cushing's syndrome was negative     Eye appt and podiatry appt -  Up to date   Component      Latest Ref Rng 3/8/2022 1/10/2024   Sodium      135 - 147 mmol/L  140    Potassium      3.5 - 5.3 mmol/L  4.7    Chloride      96 - 108 mmol/L  103    CO2      21 - 32 mmol/L  28    Anion Gap      mmol/L  9    BUN      5 - 25 mg/dL  18    Creatinine      0.60 - 1.30 mg/dL  0.75    GLUCOSE FASTING      65 - 99 mg/dL  155 (H)    Calcium      8.4 - 10.2 mg/dL  9.7    eGFR      ml/min/1.73sq m  82    Renin      0.167 - 5.380 ng/mL/hr 3.048     Aldosterone      0.0 - 30.0 ng/dL 1.5     MARCUS/PRA RATIO      0.0 - 30.0  0.5     NORMETANEPHRINE FREE PLASMA      0.0 - 285.2 pg/mL 122.7     METANEPHRINE FREE PLASMA      0.0 - 88.0 pg/mL 13.5     Cortisol - AM      4.2 - 22.4 ug/dL 15.1          Review of Systems   Constitutional:  Negative for activity change, diaphoresis, fatigue, fever and unexpected weight change.   HENT: Negative.     Eyes:  Negative for visual disturbance.   Respiratory:  Negative for cough, chest tightness and shortness of breath.    Cardiovascular:  Negative for chest pain, palpitations and leg swelling.   Gastrointestinal:  Negative for abdominal pain, blood in stool, constipation, diarrhea, nausea and vomiting.   Endocrine: Negative for cold intolerance, heat intolerance, polydipsia, polyphagia and polyuria.   Genitourinary:  Negative for dysuria, enuresis, frequency and urgency.   Musculoskeletal:  Negative for arthralgias and myalgias.   Skin:  Negative for pallor, rash and wound.   Allergic/Immunologic: Negative.    Neurological:  Negative for dizziness, tremors, weakness and numbness.   Hematological: Negative.    Psychiatric/Behavioral: Negative.         Historical  "Information   Past Medical History:   Diagnosis Date    Allergic     Ankylosing spondylitis (HCC)     Arthritis     Diabetes mellitus (HCC)     GERD (gastroesophageal reflux disease)     Heart attack (HCC)     Hematuria     last assessed 9/7/13    Hyperlipidemia     Hypertension     Myocardial infarction (HCC)     Nephrolithiasis 09/07/2013    Subclinical hypothyroidism 09/02/2015     Past Surgical History:   Procedure Laterality Date    APPENDECTOMY      BACK SURGERY      mulitple surgery, fusions \"top to bottom\"    BREAST SURGERY Left     biopsy    CERVICAL SPINE SURGERY      CHOLECYSTECTOMY      CORONARY ANGIOPLASTY WITH STENT PLACEMENT      CYSTOSCOPY  05/06/2015    Diagnostic, last assessed 5/6/15    EYE SURGERY      HYSTERECTOMY      LITHOTRIPSY      ME CYSTO/URETERO W/LITHOTRIPSY &INDWELL STENT INSRT Left 04/18/2019    Procedure: CYSTO, URETEROSCOPY W/HOLMIUM LASER, RETROGRADE PYELOGRAM, STENT INSERTION;  Surgeon: Ang Bucio MD;  Location: [facility] OR;  Service: Urology     Social History   Social History     Substance and Sexual Activity   Alcohol Use Yes    Comment: very rarely     Social History     Substance and Sexual Activity   Drug Use No     Social History     Tobacco Use   Smoking Status Never    Passive exposure: Past   Smokeless Tobacco Never     Family History:   Family History   Problem Relation Age of Onset    Diabetes Mother     Gout Mother     Heart disease Mother     Diabetes type II Mother     Diabetes Father     Heart disease Father     No Known Problems Daughter     No Known Problems Maternal Grandmother     No Known Problems Maternal Grandfather     No Known Problems Paternal Grandmother     No Known Problems Paternal Grandfather     Nephrolithiasis Brother     No Known Problems Maternal Aunt     No Known Problems Maternal Aunt     No Known Problems Maternal Aunt     No Known Problems Maternal Aunt     No Known Problems Maternal Aunt     No Known Problems Maternal Aunt     No Known " "Problems Paternal Aunt     No Known Problems Paternal Aunt        Meds/Allergies   Current Outpatient Medications   Medication Sig Dispense Refill    Actemra 162 MG/0.9ML SOSY once a week      ALPRAZolam (XANAX) 0.25 mg tablet as needed      amoxicillin (AMOXIL) 500 mg capsule Take 2,000 mg by mouth as needed 1 hour prior to dental appointment      aspirin 81 MG tablet Take 81 mg by mouth daily      atorvastatin (LIPITOR) 80 mg tablet Take 80 mg by mouth Medrol Dose Pack scheduling ONLY      B-D ALLERGY SYRINGE 1CC/28G 28G X 1/2\" 1 ML MISC use every week      B-D UF III MINI PEN NEEDLES 31G X 5 MM MISC use 2 daily as directed  0    brompheniramine-pseudoephedrine-DM 30-2-10 MG/5ML syrup Take 5 mL by mouth 4 (four) times a day as needed for allergies 120 mL 0    calcium carbonate (OS-RILEY) 600 MG tablet Take 400 mg by mouth daily      cetirizine (ZyrTEC) 10 mg tablet Take 1 tablet (10 mg total) by mouth daily 14 tablet 0    Cholecalciferol (VITAMIN D) 2000 units tablet Take 2,000 Units by mouth daily      ferrous sulfate 324 (65 Fe) mg Take 1 tablet (324 mg total) by mouth 3 (three) times a week for 36 doses 12 tablet 2    fluticasone (FLONASE) 50 mcg/act nasal spray 1 spray into each nostril daily 15.8 mL 1    folic acid (FOLVITE) 800 MCG tablet Take 800 mg by mouth daily      gabapentin (NEURONTIN) 600 MG tablet       Insulin Glargine Solostar (Lantus SoloStar) 100 UNIT/ML SOPN Inject 52 units at bedtime 60 mL 1    Insulin Pen Needle 31G X 8 MM MISC by Does not apply route      lisinopril (ZESTRIL) 10 mg tablet Take 1 tablet (10 mg total) by mouth daily 90 tablet 3    metFORMIN (GLUCOPHAGE) 1000 MG tablet Take 1 tablet by mouth 2 times a day with meals 180 tablet 3    methocarbamol (ROBAXIN) 750 mg tablet PRN      methotrexate 50 MG/2ML injection Inject 0.8 mL under the skin once a week      metoprolol tartrate (LOPRESSOR) 50 mg tablet 50 mg every 12 (twelve) hours      Multiple Vitamin (MULTIVITAMIN) tablet Take 1 " tablet by mouth daily      nitroglycerin (NITROSTAT) 0.4 mg SL tablet Place 1 tablet (0.4 mg total) under the tongue every 5 (five) minutes as needed for chest pain 90 tablet 1    NovoLOG FlexPen 100 units/mL injection pen Inject 10 units before meals , and 5 units before snacks ( max daily dose is 40 units) 45 mL 1    omeprazole (PriLOSEC) 20 mg delayed release capsule Take 20 mg by mouth daily      predniSONE 5 mg tablet Take 5 mg by mouth daily      Probiotic Product (PROBIOTIC-10 PO) Take by mouth daily       semaglutide, 0.25 or 0.5 mg/dose, (Ozempic, 0.25 or 0.5 MG/DOSE,) 2 mg/3 mL injection pen Inject 0.25 mg once a week for 4 weeks and then increase to 0.5 mg once a week 3 mL 5    traMADol (ULTRAM) 50 mg tablet Take 50 mg by mouth as needed for moderate pain      gabapentin (NEURONTIN) 300 mg capsule Take 300 mg by mouth 3 (three) times a day (Patient not taking: Reported on 1/18/2024)       No current facility-administered medications for this visit.     Allergies   Allergen Reactions    Acetazolamide Hives    Barbiturates Other (See Comments)     stomach pain    Morphine GI Intolerance     Other reaction(s): GI Intolerance    Sulfa Antibiotics Hives and Rash    Sulfasalazine Hives and Rash       Objective   Vitals: Blood pressure 134/76, pulse 63, weight 114 kg (252 lb), SpO2 97%, not currently breastfeeding.    Physical Exam  Vitals reviewed.   Constitutional:       General: She is not in acute distress.     Appearance: Normal appearance. She is not ill-appearing.   HENT:      Head: Normocephalic and atraumatic.      Nose: Nose normal.   Eyes:      Extraocular Movements: Extraocular movements intact.      Conjunctiva/sclera: Conjunctivae normal.   Pulmonary:      Effort: No respiratory distress.   Musculoskeletal:      Cervical back: Normal range of motion.   Neurological:      General: No focal deficit present.      Mental Status: She is alert and oriented to person, place, and time.   Psychiatric:          Mood and Affect: Mood normal.         Behavior: Behavior normal.         The history was obtained from the review of the chart, patient.    Lab Results:   Lab Results   Component Value Date/Time    Hemoglobin A1C 7.7 (H) 01/10/2024 10:01 AM    Hemoglobin A1C 6.9 (H) 07/14/2023 09:35 AM    WBC 4.20 (L) 10/03/2023 09:58 AM    WBC 5.46 07/14/2023 09:35 AM    WBC 3.80 (L) 06/09/2023 09:31 AM    Hemoglobin 11.6 10/03/2023 09:58 AM    Hemoglobin 11.0 (L) 07/14/2023 09:35 AM    Hemoglobin 10.9 (L) 06/09/2023 09:31 AM    Hematocrit 37.4 10/03/2023 09:58 AM    Hematocrit 35.8 07/14/2023 09:35 AM    Hematocrit 36.2 06/09/2023 09:31 AM    MCV 96 10/03/2023 09:58 AM    MCV 91 07/14/2023 09:35 AM    MCV 91 06/09/2023 09:31 AM    Platelets 224 10/03/2023 09:58 AM    Platelets 266 07/14/2023 09:35 AM    Platelets 297 06/09/2023 09:31 AM    BUN 18 01/10/2024 10:01 AM    BUN 21 10/03/2023 09:58 AM    BUN 27 (H) 07/14/2023 09:35 AM    Potassium 4.7 01/10/2024 10:01 AM    Potassium 4.7 10/03/2023 09:58 AM    Potassium 4.5 07/14/2023 09:35 AM    Chloride 103 01/10/2024 10:01 AM    Chloride 104 10/03/2023 09:58 AM    Chloride 109 (H) 07/14/2023 09:35 AM    CO2 28 01/10/2024 10:01 AM    CO2 30 10/03/2023 09:58 AM    CO2 27 07/14/2023 09:35 AM    Creatinine 0.75 01/10/2024 10:01 AM    Creatinine 0.74 10/03/2023 09:58 AM    Creatinine 0.87 07/14/2023 09:35 AM    AST 22 10/03/2023 09:58 AM    AST 25 07/14/2023 09:35 AM    AST 30 06/09/2023 09:31 AM    ALT 30 10/03/2023 09:58 AM    ALT 39 07/14/2023 09:35 AM    ALT 43 06/09/2023 09:31 AM    Total Protein 6.1 (L) 10/03/2023 09:58 AM    Total Protein 6.5 07/14/2023 09:35 AM    Total Protein 6.9 06/09/2023 09:31 AM    Albumin 4.0 10/03/2023 09:58 AM    Albumin 3.8 07/14/2023 09:35 AM    Albumin 3.9 06/09/2023 09:31 AM           Imaging Studies: I have personally reviewed pertinent reports.      Portions of the record may have been created with voice recognition software. Occasional wrong word  "or \"sound a like\" substitutions may have occurred due to the inherent limitations of voice recognition software. Read the chart carefully and recognize, using context, where substitutions have occurred.    "

## 2024-01-19 ENCOUNTER — TELEPHONE (OUTPATIENT)
Dept: ENDOCRINOLOGY | Facility: CLINIC | Age: 69
End: 2024-01-19

## 2024-02-15 ENCOUNTER — APPOINTMENT (OUTPATIENT)
Dept: LAB | Facility: MEDICAL CENTER | Age: 69
End: 2024-02-15
Payer: COMMERCIAL

## 2024-02-15 DIAGNOSIS — E27.8 ADRENAL NODULE (HCC): ICD-10-CM

## 2024-02-15 LAB
CREAT 24H UR-MRATE: 0.9 G/24HR (ref 0.6–1.8)
SPECIMEN VOL UR: 1300 ML

## 2024-02-15 PROCEDURE — 82530 CORTISOL FREE: CPT

## 2024-02-15 PROCEDURE — 82570 ASSAY OF URINE CREATININE: CPT

## 2024-02-16 ENCOUNTER — TELEPHONE (OUTPATIENT)
Dept: ENDOCRINOLOGY | Facility: CLINIC | Age: 69
End: 2024-02-16

## 2024-02-16 ENCOUNTER — RA CDI HCC (OUTPATIENT)
Dept: OTHER | Facility: HOSPITAL | Age: 69
End: 2024-02-16

## 2024-02-16 PROBLEM — Z86.16 HISTORY OF COVID-19: Status: ACTIVE | Noted: 2023-12-06

## 2024-02-16 PROBLEM — E66.01 CLASS 3 SEVERE OBESITY WITH SERIOUS COMORBIDITY AND BODY MASS INDEX (BMI) OF 45.0 TO 49.9 IN ADULT (HCC): Status: ACTIVE | Noted: 2019-07-16

## 2024-02-16 PROBLEM — E66.813 CLASS 3 SEVERE OBESITY WITH SERIOUS COMORBIDITY AND BODY MASS INDEX (BMI) OF 45.0 TO 49.9 IN ADULT (HCC): Status: ACTIVE | Noted: 2019-07-16

## 2024-02-16 NOTE — PROGRESS NOTES
HCC coding opportunities          Chart Reviewed number of suggestions sent to Provider: 4   E11.29  M45.9  M06.9  Z79.4    This is a reminder to address (resolve/update/assess) ALL HCC (risk adjustment) codes as found on active problem list for 2024 as patient scores reset to zero GALLITO.  Also, just a reminder to please review and assess all other chronic conditions for 2024  Patients Insurance     Medicare Insurance: [employer]

## 2024-02-16 NOTE — TELEPHONE ENCOUNTER
Regarding: Arsalan’s blood sugar   Contact: [phone]  ----- Message from Sherrie Prado MD sent at 2/16/2024  8:58 AM EST -----       ----- Message from Delia Samano to Sherrie Prado MD sent at 2/15/2024 12:27 PM -----   Blood sugars as requested   Delia Samano. 1955

## 2024-02-20 LAB
CORTIS F 24H UR-MRATE: 29 UG/24 HR (ref 6–42)
CORTIS F UR-MCNC: 22 UG/L

## 2024-02-23 ENCOUNTER — OFFICE VISIT (OUTPATIENT)
Dept: FAMILY MEDICINE CLINIC | Facility: CLINIC | Age: 69
End: 2024-02-23
Payer: COMMERCIAL

## 2024-02-23 VITALS
DIASTOLIC BLOOD PRESSURE: 72 MMHG | SYSTOLIC BLOOD PRESSURE: 118 MMHG | TEMPERATURE: 97.5 F | HEIGHT: 60 IN | OXYGEN SATURATION: 97 % | WEIGHT: 252.5 LBS | HEART RATE: 76 BPM | BODY MASS INDEX: 49.57 KG/M2 | RESPIRATION RATE: 17 BRPM

## 2024-02-23 DIAGNOSIS — E11.51 TYPE 2 DIABETES MELLITUS WITH DIABETIC PERIPHERAL ANGIOPATHY WITHOUT GANGRENE, WITH LONG-TERM CURRENT USE OF INSULIN (HCC): ICD-10-CM

## 2024-02-23 DIAGNOSIS — M35.00 SJOGREN'S SYNDROME, WITH UNSPECIFIED ORGAN INVOLVEMENT (HCC): ICD-10-CM

## 2024-02-23 DIAGNOSIS — E11.65 TYPE 2 DIABETES MELLITUS WITH HYPERGLYCEMIA, WITH LONG-TERM CURRENT USE OF INSULIN (HCC): ICD-10-CM

## 2024-02-23 DIAGNOSIS — E27.9 DISORDER OF ADRENAL GLAND, UNSPECIFIED (HCC): ICD-10-CM

## 2024-02-23 DIAGNOSIS — Z00.00 MEDICARE ANNUAL WELLNESS VISIT, SUBSEQUENT: ICD-10-CM

## 2024-02-23 DIAGNOSIS — E11.29 MICROALBUMINURIA DUE TO TYPE 2 DIABETES MELLITUS: ICD-10-CM

## 2024-02-23 DIAGNOSIS — E78.2 MIXED HYPERLIPIDEMIA: ICD-10-CM

## 2024-02-23 DIAGNOSIS — M54.31 SCIATICA OF RIGHT SIDE: ICD-10-CM

## 2024-02-23 DIAGNOSIS — Z79.4 TYPE 2 DIABETES MELLITUS WITH DIABETIC PERIPHERAL ANGIOPATHY WITHOUT GANGRENE, WITH LONG-TERM CURRENT USE OF INSULIN (HCC): ICD-10-CM

## 2024-02-23 DIAGNOSIS — M05.79 RHEUMATOID ARTHRITIS INVOLVING MULTIPLE SITES WITH POSITIVE RHEUMATOID FACTOR (HCC): Primary | ICD-10-CM

## 2024-02-23 DIAGNOSIS — Z79.4 TYPE 2 DIABETES MELLITUS WITH HYPERGLYCEMIA, WITH LONG-TERM CURRENT USE OF INSULIN (HCC): ICD-10-CM

## 2024-02-23 DIAGNOSIS — R80.9 MICROALBUMINURIA DUE TO TYPE 2 DIABETES MELLITUS: ICD-10-CM

## 2024-02-23 PROBLEM — Z86.16 HISTORY OF COVID-19: Status: RESOLVED | Noted: 2023-12-06 | Resolved: 2024-02-23

## 2024-02-23 PROCEDURE — 99214 OFFICE O/P EST MOD 30 MIN: CPT | Performed by: FAMILY MEDICINE

## 2024-02-23 PROCEDURE — G0439 PPPS, SUBSEQ VISIT: HCPCS | Performed by: FAMILY MEDICINE

## 2024-02-23 RX ORDER — METHYLPREDNISOLONE 4 MG/1
TABLET ORAL
Qty: 21 EACH | Refills: 0 | Status: SHIPPED | OUTPATIENT
Start: 2024-02-23

## 2024-02-23 NOTE — ASSESSMENT & PLAN NOTE
Lab Results   Component Value Date    HGBA1C 7.7 (H) 01/10/2024   Started on on ozempic   Metformin, NovoLog,  Lantus

## 2024-02-23 NOTE — PATIENT INSTRUCTIONS
Medicare Preventive Visit Patient Instructions  Thank you for completing your Welcome to Medicare Visit or Medicare Annual Wellness Visit today. Your next wellness visit will be due in one year (2/23/2025).  The screening/preventive services that you may require over the next 5-10 years are detailed below. Some tests may not apply to you based off risk factors and/or age. Screening tests ordered at today's visit but not completed yet may show as past due. Also, please note that scanned in results may not display below.  Preventive Screenings:  Service Recommendations Previous Testing/Comments   Colorectal Cancer Screening  * Colonoscopy    * Fecal Occult Blood Test (FOBT)/Fecal Immunochemical Test (FIT)  * Fecal DNA/Cologuard Test  * Flexible Sigmoidoscopy Age: 45-75 years old   Colonoscopy: every 10 years (may be performed more frequently if at higher risk)  OR  FOBT/FIT: every 1 year  OR  Cologuard: every 3 years  OR  Sigmoidoscopy: every 5 years  Screening may be recommended earlier than age 45 if at higher risk for colorectal cancer. Also, an individualized decision between you and your healthcare provider will decide whether screening between the ages of 76-85 would be appropriate. Colonoscopy: 01/30/2023  FOBT/FIT: 01/30/2023  Cologuard: 01/30/2023  Sigmoidoscopy: 01/30/2023    Screening Current     Breast Cancer Screening Age: 40+ years old  Frequency: every 1-2 years  Not required if history of left and right mastectomy Mammogram: 04/17/2023    Screening Current   Cervical Cancer Screening Between the ages of 21-29, pap smear recommended once every 3 years.   Between the ages of 30-65, can perform pap smear with HPV co-testing every 5 years.   Recommendations may differ for women with a history of total hysterectomy, cervical cancer, or abnormal pap smears in past. Pap Smear: 07/27/2017    Screening Not Indicated   Hepatitis C Screening Once for adults born between 1945 and 1965  More frequently in patients  at high risk for Hepatitis C Hep C Antibody: 06/11/2021    Screening Current   Diabetes Screening 1-2 times per year if you're at risk for diabetes or have pre-diabetes Fasting glucose: 149 mg/dL (2/15/2024)  A1C: 7.7 % (1/10/2024)  Screening Not Indicated  History Diabetes   Cholesterol Screening Once every 5 years if you don't have a lipid disorder. May order more often based on risk factors. Lipid panel: 01/05/2023    Screening Not Indicated  History Lipid Disorder     Other Preventive Screenings Covered by Medicare:  Abdominal Aortic Aneurysm (AAA) Screening: covered once if your at risk. You're considered to be at risk if you have a family history of AAA.  Lung Cancer Screening: covers low dose CT scan once per year if you meet all of the following conditions: (1) Age 55-77; (2) No signs or symptoms of lung cancer; (3) Current smoker or have quit smoking within the last 15 years; (4) You have a tobacco smoking history of at least 20 pack years (packs per day multiplied by number of years you smoked); (5) You get a written order from a healthcare provider.  Glaucoma Screening: covered annually if you're considered high risk: (1) You have diabetes OR (2) Family history of glaucoma OR (3)  aged 50 and older OR (4)  American aged 65 and older  Osteoporosis Screening: covered every 2 years if you meet one of the following conditions: (1) You're estrogen deficient and at risk for osteoporosis based off medical history and other findings; (2) Have a vertebral abnormality; (3) On glucocorticoid therapy for more than 3 months; (4) Have primary hyperparathyroidism; (5) On osteoporosis medications and need to assess response to drug therapy.   Last bone density test (DXA Scan): Not on file.  HIV Screening: covered annually if you're between the age of 15-65. Also covered annually if you are younger than 15 and older than 65 with risk factors for HIV infection. For pregnant patients, it is covered  up to 3 times per pregnancy.    Immunizations:  Immunization Recommendations   Influenza Vaccine Annual influenza vaccination during flu season is recommended for all persons aged >= 6 months who do not have contraindications   Pneumococcal Vaccine   * Pneumococcal conjugate vaccine = PCV13 (Prevnar 13), PCV15 (Vaxneuvance), PCV20 (Prevnar 20)  * Pneumococcal polysaccharide vaccine = PPSV23 (Pneumovax) Adults 19-65 yo with certain risk factors or if 65+ yo  If never received any pneumonia vaccine: recommend Prevnar 20 (PCV20)  Give PCV20 if previously received 1 dose of PCV13 or PPSV23   Hepatitis B Vaccine 3 dose series if at intermediate or high risk (ex: diabetes, end stage renal disease, liver disease)   Respiratory syncytial virus (RSV) Vaccine - COVERED BY MEDICARE PART D  * RSVPreF3 (Arexvy) CDC recommends that adults 60 years of age and older may receive a single dose of RSV vaccine using shared clinical decision-making (SCDM)   Tetanus (Td) Vaccine - COST NOT COVERED BY MEDICARE PART B Following completion of primary series, a booster dose should be given every 10 years to maintain immunity against tetanus. Td may also be given as tetanus wound prophylaxis.   Tdap Vaccine - COST NOT COVERED BY MEDICARE PART B Recommended at least once for all adults. For pregnant patients, recommended with each pregnancy.   Shingles Vaccine (Shingrix) - COST NOT COVERED BY MEDICARE PART B  2 shot series recommended in those 19 years and older who have or will have weakened immune systems or those 50 years and older     Health Maintenance Due:      Topic Date Due   • Breast Cancer Screening: Mammogram  04/17/2025   • Colorectal Cancer Screening  01/30/2026   • Hepatitis C Screening  Completed     Immunizations Due:      Topic Date Due   • COVID-19 Vaccine (6 - 2023-24 season) 09/01/2023     Advance Directives   What are advance directives?  Advance directives are legal documents that state your wishes and plans for medical  care. These plans are made ahead of time in case you lose your ability to make decisions for yourself. Advance directives can apply to any medical decision, such as the treatments you want, and if you want to donate organs.   What are the types of advance directives?  There are many types of advance directives, and each state has rules about how to use them. You may choose a combination of any of the following:  Living will:  This is a written record of the treatment you want. You can also choose which treatments you do not want, which to limit, and which to stop at a certain time. This includes surgery, medicine, IV fluid, and tube feedings.   Durable power of  for healthcare (DPAHC):  This is a written record that states who you want to make healthcare choices for you when you are unable to make them for yourself. This person, called a proxy, is usually a family member or a friend. You may choose more than 1 proxy.  Do not resuscitate (DNR) order:  A DNR order is used in case your heart stops beating or you stop breathing. It is a request not to have certain forms of treatment, such as CPR. A DNR order may be included in other types of advance directives.  Medical directive:  This covers the care that you want if you are in a coma, near death, or unable to make decisions for yourself. You can list the treatments you want for each condition. Treatment may include pain medicine, surgery, blood transfusions, dialysis, IV or tube feedings, and a ventilator (breathing machine).  Values history:  This document has questions about your views, beliefs, and how you feel and think about life. This information can help others choose the care that you would choose.  Why are advance directives important?  An advance directive helps you control your care. Although spoken wishes may be used, it is better to have your wishes written down. Spoken wishes can be misunderstood, or not followed. Treatments may be given even if  you do not want them. An advance directive may make it easier for your family to make difficult choices about your care.   Weight Management   Why it is important to manage your weight:  Being overweight increases your risk of health conditions such as heart disease, high blood pressure, type 2 diabetes, and certain types of cancer. It can also increase your risk for osteoarthritis, sleep apnea, and other respiratory problems. Aim for a slow, steady weight loss. Even a small amount of weight loss can lower your risk of health problems.  How to lose weight safely:  A safe and healthy way to lose weight is to eat fewer calories and get regular exercise. You can lose up about 1 pound a week by decreasing the number of calories you eat by 500 calories each day.   Healthy meal plan for weight management:  A healthy meal plan includes a variety of foods, contains fewer calories, and helps you stay healthy. A healthy meal plan includes the following:  Eat whole-grain foods more often.  A healthy meal plan should contain fiber. Fiber is the part of grains, fruits, and vegetables that is not broken down by your body. Whole-grain foods are healthy and provide extra fiber in your diet. Some examples of whole-grain foods are whole-wheat breads and pastas, oatmeal, brown rice, and bulgur.  Eat a variety of vegetables every day.  Include dark, leafy greens such as spinach, kale, zeke greens, and mustard greens. Eat yellow and orange vegetables such as carrots, sweet potatoes, and winter squash.   Eat a variety of fruits every day.  Choose fresh or canned fruit (canned in its own juice or light syrup) instead of juice. Fruit juice has very little or no fiber.  Eat low-fat dairy foods.  Drink fat-free (skim) milk or 1% milk. Eat fat-free yogurt and low-fat cottage cheese. Try low-fat cheeses such as mozzarella and other reduced-fat cheeses.  Choose meat and other protein foods that are low in fat.  Choose beans or other legumes  such as split peas or lentils. Choose fish, skinless poultry (chicken or turkey), or lean cuts of red meat (beef or pork). Before you cook meat or poultry, cut off any visible fat.   Use less fat and oil.  Try baking foods instead of frying them. Add less fat, such as margarine, sour cream, regular salad dressing and mayonnaise to foods. Eat fewer high-fat foods. Some examples of high-fat foods include french fries, doughnuts, ice cream, and cakes.  Eat fewer sweets.  Limit foods and drinks that are high in sugar. This includes candy, cookies, regular soda, and sweetened drinks.  Exercise:  Exercise at least 30 minutes per day on most days of the week. Some examples of exercise include walking, biking, dancing, and swimming. You can also fit in more physical activity by taking the stairs instead of the elevator or parking farther away from stores. Ask your healthcare provider about the best exercise plan for you.      © Copyright [employer] 2018 Information is for End User's use only and may not be sold, redistributed or otherwise used for commercial purposes. All illustrations and images included in CareNotes® are the copyrighted property of [employer] or [employer]

## 2024-02-23 NOTE — PROGRESS NOTES
Assessment and Plan:     Problem List Items Addressed This Visit        Endocrine    Type 2 diabetes mellitus with hyperglycemia, with long-term current use of insulin (HCC)       Lab Results   Component Value Date    HGBA1C 7.7 (H) 01/10/2024   Started on on ozempic   Metformin, NovoLog,  Lantus         Disorder of adrenal gland, unspecified (HCC)    Type 2 diabetes mellitus with diabetic peripheral angiopathy without gangrene (HCC)    Microalbuminuria due to type 2 diabetes mellitus        Lab Results   Component Value Date    HGBA1C 7.7 (H) 01/10/2024   Continue following with endocrinology.  Continue lisinopril 10 mg daily            Musculoskeletal and Integument    Rheumatoid arthritis involving multiple sites (HCC) - Primary     Follows with rheumatology.  Remains on Actemra weekly         Relevant Medications    methylPREDNISolone 4 MG tablet therapy pack       Other    Sjogren's disease (HCC)     Follows with [facility] rheumatology          Relevant Medications    methylPREDNISolone 4 MG tablet therapy pack    Hyperlipidemia   Other Visit Diagnoses     Medicare annual wellness visit, subsequent        Sciatica of right side        Relevant Medications    methylPREDNISolone 4 MG tablet therapy pack      AWV and follow-up completed today.  Reviewed previous labs.  Chronic conditions stable.  Continue current medications.  Monitor blood sugars closely with steroid Dosepak for right-sided hamstring pain suspect sciatica.    Unable to complete dexa scan due to metal. Entire spine is fused with rods.   Eye exam- Follows with Retina specialist. MD Drea [facility] retina behind [facility] on [address]   Preventive health issues were discussed with patient, and age appropriate screening tests were ordered as noted in patient's After Visit Summary.  Personalized health advice and appropriate referrals for health education or preventive services given if needed, as noted in patient's After Visit Summary.      History of Present Illness:     Patient presents for a Medicare Wellness Visit    Patient presents with:  Medicare Wellness Visit and follow-up.  Today she is porting pain in her right hamstring.  Feels like she pulled a muscle.  Does have extensive spine history.  Area is mildly tender to touch.  Believes this was aggravated by stationary bike.  Unable to take NSAIDs.  She continues to follow with endocrinology and rheumatology.       Patient Care Team:  Judith Zhou MD as PCP - General (Family Medicine)  Marlene August MD as PCP - PCP-[facility] Attributed-[facility]  MD Andres Ambrosio MD     Review of Systems:     Review of Systems   Constitutional:  Negative for activity change, fatigue and fever.   Eyes:  Negative for visual disturbance.   Respiratory:  Negative for shortness of breath.    Cardiovascular:  Negative for chest pain.   Gastrointestinal:  Negative for abdominal pain, constipation, diarrhea and nausea.   Endocrine: Negative for cold intolerance and heat intolerance.   Musculoskeletal:  Positive for back pain.        Right-sided low back pain and hamstring pain.   Skin:  Negative for rash.   Neurological:  Negative for headaches.   Psychiatric/Behavioral:  Negative for confusion.         Problem List:     Patient Active Problem List   Diagnosis   • Ankylosing spondylitis (HCC)   • Cervical herniated disc   • DDD (degenerative disc disease), thoracolumbar   • Type 2 diabetes mellitus with hyperglycemia, with long-term current use of insulin ([facility])   • Degenerative disc disease, cervical   • Gastroesophageal reflux disease without esophagitis   • Lumbar foraminal stenosis   • Essential hypertension   • Rheumatoid arthritis involving multiple sites ([facility])   • Sjogren's disease ([facility])   • Hyperlipidemia   • Coronary artery disease involving native coronary artery of native heart without angina pectoris   • Adenoma of right adrenal gland   • Disorder of adrenal gland, unspecified  "(HCC)   • Type 2 diabetes mellitus with diabetic peripheral angiopathy without gangrene (HCC)   • Microalbuminuria due to type 2 diabetes mellitus    • Normocytic anemia   • Adrenal nodule (HCC)   • Class 3 severe obesity with serious comorbidity and body mass index (BMI) of 45.0 to 49.9 in adult (HCC)      Past Medical and Surgical History:     Past Medical History:   Diagnosis Date   • Allergic    • Ankylosing spondylitis (HCC)    • Arthritis    • Diabetes mellitus (HCC)    • GERD (gastroesophageal reflux disease)    • Heart attack (HCC)    • Hematuria     last assessed 9/7/13   • History of COVID-19    • Hyperlipidemia    • Hypertension    • Myocardial infarction (HCC)    • Nephrolithiasis 09/07/2013   • Subclinical hypothyroidism 09/02/2015     Past Surgical History:   Procedure Laterality Date   • APPENDECTOMY     • BACK SURGERY      mulitple surgery, fusions \"top to bottom\"   • BREAST SURGERY Left     biopsy   • CERVICAL SPINE SURGERY     • CHOLECYSTECTOMY     • CORONARY ANGIOPLASTY WITH STENT PLACEMENT     • CYSTOSCOPY  05/06/2015    Diagnostic, last assessed 5/6/15   • EYE SURGERY     • HYSTERECTOMY     • LITHOTRIPSY     • CA CYSTO/URETERO W/LITHOTRIPSY &INDWELL STENT INSRT Left 04/18/2019    Procedure: CYSTO, URETEROSCOPY W/HOLMIUM LASER, RETROGRADE PYELOGRAM, STENT INSERTION;  Surgeon: Ang Bucio MD;  Location: [facility];  Service: Urology      Family History:     Family History   Problem Relation Age of Onset   • Diabetes Mother    • Gout Mother    • Heart disease Mother    • Diabetes type II Mother    • Diabetes Father    • Heart disease Father    • No Known Problems Daughter    • No Known Problems Maternal Grandmother    • No Known Problems Maternal Grandfather    • No Known Problems Paternal Grandmother    • No Known Problems Paternal Grandfather    • Nephrolithiasis Brother    • No Known Problems Maternal Aunt    • No Known Problems Maternal Aunt    • No Known Problems Maternal Aunt    • No " "Known Problems Maternal Aunt    • No Known Problems Maternal Aunt    • No Known Problems Maternal Aunt    • No Known Problems Paternal Aunt    • No Known Problems Paternal Aunt       Social History:     Social History     Socioeconomic History   • Marital status: /Civil Union     Spouse name: None   • Number of children: None   • Years of education: None   • Highest education level: None   Occupational History   • None   Tobacco Use   • Smoking status: Never     Passive exposure: Past   • Smokeless tobacco: Never   Substance and Sexual Activity   • Alcohol use: Yes     Comment: very rarely   • Drug use: No   • Sexual activity: Yes     Partners: Male     Birth control/protection: None   Other Topics Concern   • None   Social History Narrative   • None     Social Determinants of Health     Financial Resource Strain: Low Risk  (2/16/2024)    Overall Financial Resource Strain (CARDIA)    • Difficulty of Paying Living Expenses: Not very hard   Food Insecurity: Not on file   Transportation Needs: No Transportation Needs (2/16/2024)    PRAPARE - Transportation    • Lack of Transportation (Medical): No    • Lack of Transportation (Non-Medical): No   Physical Activity: Not on file   Stress: Not on file   Social Connections: Not on file   Intimate Partner Violence: Not on file   Housing Stability: Not on file      Medications and Allergies:     Current Outpatient Medications   Medication Sig Dispense Refill   • Actemra 162 MG/0.9ML SOSY once a week     • ALPRAZolam (XANAX) 0.25 mg tablet as needed     • amoxicillin (AMOXIL) 500 mg capsule Take 2,000 mg by mouth as needed 1 hour prior to dental appointment     • aspirin 81 MG tablet Take 81 mg by mouth daily     • atorvastatin (LIPITOR) 80 mg tablet Take 80 mg by mouth Medrol Dose Pack scheduling ONLY     • B-D ALLERGY SYRINGE 1CC/28G 28G X 1/2\" 1 ML MISC use every week     • B-D UF III MINI PEN NEEDLES 31G X 5 MM MISC use 2 daily as directed  0   • " brompheniramine-pseudoephedrine-DM 30-2-10 MG/5ML syrup Take 5 mL by mouth 4 (four) times a day as needed for allergies 120 mL 0   • calcium carbonate (OS-RILEY) 600 MG tablet Take 400 mg by mouth daily     • cetirizine (ZyrTEC) 10 mg tablet Take 1 tablet (10 mg total) by mouth daily 14 tablet 0   • Cholecalciferol (VITAMIN D) 2000 units tablet Take 2,000 Units by mouth daily     • ferrous sulfate 324 (65 Fe) mg Take 1 tablet (324 mg total) by mouth 3 (three) times a week for 36 doses 12 tablet 2   • fluticasone (FLONASE) 50 mcg/act nasal spray 1 spray into each nostril daily 15.8 mL 1   • folic acid (FOLVITE) 800 MCG tablet Take 800 mg by mouth daily     • gabapentin (NEURONTIN) 300 mg capsule Take 300 mg by mouth 3 (three) times a day     • gabapentin (NEURONTIN) 600 MG tablet      • Insulin Glargine Solostar (Lantus SoloStar) 100 UNIT/ML SOPN Inject 52 units at bedtime 60 mL 1   • Insulin Pen Needle 31G X 8 MM MISC by Does not apply route     • lisinopril (ZESTRIL) 10 mg tablet Take 1 tablet (10 mg total) by mouth daily 90 tablet 3   • metFORMIN (GLUCOPHAGE) 1000 MG tablet Take 1 tablet by mouth 2 times a day with meals 180 tablet 3   • methocarbamol (ROBAXIN) 750 mg tablet PRN     • methotrexate 50 MG/2ML injection Inject 0.8 mL under the skin once a week     • methylPREDNISolone 4 MG tablet therapy pack Use as directed on package 21 each 0   • metoprolol tartrate (LOPRESSOR) 50 mg tablet 50 mg every 12 (twelve) hours     • Multiple Vitamin (MULTIVITAMIN) tablet Take 1 tablet by mouth daily     • nitroglycerin (NITROSTAT) 0.4 mg SL tablet Place 1 tablet (0.4 mg total) under the tongue every 5 (five) minutes as needed for chest pain 90 tablet 1   • NovoLOG FlexPen 100 units/mL injection pen Inject 10 units before meals , and 5 units before snacks ( max daily dose is 40 units) 45 mL 1   • omeprazole (PriLOSEC) 20 mg delayed release capsule Take 20 mg by mouth daily     • Probiotic Product (PROBIOTIC-10 PO) Take by  mouth daily      • semaglutide, 0.25 or 0.5 mg/dose, (Ozempic, 0.25 or 0.5 MG/DOSE,) 2 mg/3 mL injection pen Inject 0.25 mg once a week for 4 weeks and then increase to 0.5 mg once a week 3 mL 5   • traMADol (ULTRAM) 50 mg tablet Take 50 mg by mouth as needed for moderate pain       No current facility-administered medications for this visit.     Allergies   Allergen Reactions   • Acetazolamide Hives   • Barbiturates Other (See Comments)     stomach pain   • Morphine GI Intolerance     Other reaction(s): GI Intolerance   • Sulfa Antibiotics Hives and Rash   • Sulfasalazine Hives and Rash      Immunizations:     Immunization History   Administered Date(s) Administered   • COVID-19 PFIZER VACCINE 0.3 ML IM 03/03/2021, 03/15/2021, 04/03/2021, 04/05/2021, 11/12/2021   • INFLUENZA 10/18/2022   • Influenza Quadrivalent, 6-35 Months IM 10/15/2014, 10/15/2015, 09/22/2016, 10/11/2017   • Influenza, high dose seasonal 0.7 mL 10/20/2020, 10/20/2021, 10/18/2022, 10/17/2023   • Influenza, injectable, quadrivalent, preservative free 0.5 mL 10/15/2018   • Influenza, recombinant, quadrivalent,injectable, preservative free 10/07/2019   • Influenza, seasonal, injectable 10/12/2012, 10/18/2013   • Pneumococcal Conjugate 13-Valent 07/28/2020   • Pneumococcal Polysaccharide PPV23 07/16/2019   • Tdap 07/16/2019      Health Maintenance:         Topic Date Due   • Breast Cancer Screening: Mammogram  04/17/2025   • Colorectal Cancer Screening  01/30/2026   • Hepatitis C Screening  Completed         Topic Date Due   • COVID-19 Vaccine (6 - 2023-24 season) 09/01/2023      Medicare Screening Tests and Risk Assessments:     Delia is here for her Subsequent Wellness visit. Last Medicare Wellness visit information reviewed, patient interviewed, no change since last AWV.     Health Risk Assessment:   Patient rates overall health as good. Patient feels that their physical health rating is same. Patient is satisfied with their life. Eyesight was  rated as same. Hearing was rated as same. Patient feels that their emotional and mental health rating is same. Patients states they are never, rarely angry. Patient states they are sometimes unusually tired/fatigued. Pain experienced in the last 7 days has been some. Patient's pain rating has been 3/10. Patient states that she has experienced no weight loss or gain in last 6 months.     Depression Screening:   PHQ-2 Score: 1      Fall Risk Screening:   In the past year, patient has experienced: no history of falling in past year      Urinary Incontinence Screening:   Patient has not leaked urine accidently in the last six months.     Home Safety:  Patient has trouble with stairs inside or outside of their home. Patient has working smoke alarms and has working carbon monoxide detector. Home safety hazards include: none.     Nutrition:   Current diet is Diabetic and No Added Salt.     Medications:   Patient is currently taking over-the-counter supplements. OTC medications include: see medication list. Patient is able to manage medications.     Activities of Daily Living (ADLs)/Instrumental Activities of Daily Living (IADLs):   Walk and transfer into and out of bed and chair?: Yes  Dress and groom yourself?: Yes    Bathe or shower yourself?: Yes    Feed yourself? Yes  Do your laundry/housekeeping?: Yes  Manage your money, pay your bills and track your expenses?: Yes  Make your own meals?: Yes    Do your own shopping?: Yes    Durable Medical Equipment Suppliers  Cane    Previous Hospitalizations:   Any hospitalizations or ED visits within the last 12 months?: No      Advance Care Planning:   Living will: Yes    Durable POA for healthcare: Yes    Advanced directive: Yes      PREVENTIVE SCREENINGS      Cardiovascular Screening:    General: Screening Not Indicated and History Lipid Disorder      Diabetes Screening:     General: Screening Not Indicated and History Diabetes      Colorectal Cancer Screening:     General:  "Screening Current      Breast Cancer Screening:     General: Screening Current      Cervical Cancer Screening:    General: Screening Not Indicated      Lung Cancer Screening:     General: Screening Not Indicated      Hepatitis C Screening:    General: Screening Current    Screening, Brief Intervention, and Referral to Treatment (SBIRT)    Screening  Typical number of drinks in a day: 0  Typical number of drinks in a week: 0  Interpretation: Low risk drinking behavior.    AUDIT-C Screenin) How often did you have a drink containing alcohol in the past year? never  2) How many drinks did you have on a typical day when you were drinking in the past year? 0  3) How often did you have 6 or more drinks on one occasion in the past year? never    AUDIT-C Score: 0  Interpretation: Score 0-2 (female): Negative screen for alcohol misuse    Single Item Drug Screening:  How often have you used an illegal drug (including marijuana) or a prescription medication for non-medical reasons in the past year? never    Single Item Drug Screen Score: 0  Interpretation: Negative screen for possible drug use disorder    No results found.     Physical Exam:     /72 (BP Location: Left arm, Patient Position: Sitting, Cuff Size: Standard)   Pulse 76   Temp 97.5 °F (36.4 °C) (Temporal)   Resp 17   Ht 5' 0.06\" (1.526 m)   Wt 115 kg (252 lb 8 oz)   SpO2 97%   BMI 49.21 kg/m²     Physical Exam  Vitals and nursing note reviewed.   Constitutional:       Appearance: Normal appearance. She is well-developed.   HENT:      Head: Normocephalic and atraumatic.   Eyes:      Extraocular Movements: Extraocular movements intact.      Pupils: Pupils are equal, round, and reactive to light.   Cardiovascular:      Rate and Rhythm: Normal rate and regular rhythm.   Pulmonary:      Effort: Pulmonary effort is normal.      Breath sounds: Normal breath sounds.   Abdominal:      General: Bowel sounds are normal.      Palpations: Abdomen is soft. "   Musculoskeletal:         General: Tenderness present.      Cervical back: Normal range of motion.   Skin:     General: Skin is warm and dry.   Neurological:      General: No focal deficit present.      Mental Status: She is alert and oriented to person, place, and time.   Psychiatric:         Mood and Affect: Mood normal.         Speech: Speech normal.         Behavior: Behavior normal.          Judith Zhou MD

## 2024-02-25 NOTE — ASSESSMENT & PLAN NOTE
Lab Results   Component Value Date    HGBA1C 7.7 (H) 01/10/2024   Continue following with endocrinology.  Continue lisinopril 10 mg daily

## 2024-02-26 ENCOUNTER — TELEPHONE (OUTPATIENT)
Dept: FAMILY MEDICINE CLINIC | Facility: CLINIC | Age: 69
End: 2024-02-26

## 2024-02-26 NOTE — TELEPHONE ENCOUNTER
----- Message from Judith Zhou MD sent at 2/25/2024 12:41 PM EST -----  Regarding: Diabetic eye  ollows with Retina specialist.  Dr. Shepard [facility] retina.  [address], [place] [address].  Phone #[phone].  Please call for eye exam results

## 2024-03-04 ENCOUNTER — OFFICE VISIT (OUTPATIENT)
Dept: FAMILY MEDICINE CLINIC | Facility: CLINIC | Age: 69
End: 2024-03-04
Payer: COMMERCIAL

## 2024-03-04 VITALS
HEIGHT: 60 IN | TEMPERATURE: 97.7 F | OXYGEN SATURATION: 96 % | DIASTOLIC BLOOD PRESSURE: 62 MMHG | WEIGHT: 252.5 LBS | HEART RATE: 82 BPM | RESPIRATION RATE: 16 BRPM | BODY MASS INDEX: 49.57 KG/M2 | SYSTOLIC BLOOD PRESSURE: 122 MMHG

## 2024-03-04 DIAGNOSIS — M51.35 DDD (DEGENERATIVE DISC DISEASE), THORACOLUMBAR: ICD-10-CM

## 2024-03-04 DIAGNOSIS — M54.16 LUMBAR RADICULITIS: Primary | ICD-10-CM

## 2024-03-04 DIAGNOSIS — M48.061 LUMBAR FORAMINAL STENOSIS: ICD-10-CM

## 2024-03-04 PROCEDURE — 99214 OFFICE O/P EST MOD 30 MIN: CPT | Performed by: FAMILY MEDICINE

## 2024-03-04 PROCEDURE — G2211 COMPLEX E/M VISIT ADD ON: HCPCS | Performed by: FAMILY MEDICINE

## 2024-03-04 NOTE — PROGRESS NOTES
"Name: Delia Samano      : 1955      MRN: [id number]  Encounter Provider: Judith Zhou MD  Encounter Date: 3/4/2024   Encounter department: [facility]    Assessment & Plan     1. Lumbar radiculitis  -     Ambulatory Referral to Physical Therapy; Future    2. DDD (degenerative disc disease), thoracolumbar  -     Ambulatory Referral to Physical Therapy; Future    3. Lumbar foraminal stenosis  -     Ambulatory Referral to Physical Therapy; Future      Failed steroids. Trial therapy.  Followed with neurosurgery.  Was told nothing else can be done.  Will trial patient with physical therapy for ongoing low back pain.  Continue with ice and heat.  Continue current medications.  No further imaging at this time.       I have spent a total time of 30 minutes on 24 in caring for this patient including Prognosis, Risks and benefits of tx options, Instructions for management, Impressions, Counseling / Coordination of care, Documenting in the medical record, Reviewing / ordering tests, medicine, procedures  , and Obtaining or reviewing history  .    Subjective      Patient presents with:  Follow-up: Pain on the back of leg right did not get better   Thigh sitting and standing is the worse.           Review of Systems   Musculoskeletal:  Positive for back pain.        Right-sided back pain goes across her right buttocks down the back of her leg.  It stops above the knee.  Will response to steroids.       Current Outpatient Medications on File Prior to Visit   Medication Sig   • Actemra 162 MG/0.9ML SOSY once a week   • ALPRAZolam (XANAX) 0.25 mg tablet as needed   • amoxicillin (AMOXIL) 500 mg capsule Take 2,000 mg by mouth as needed 1 hour prior to dental appointment   • aspirin 81 MG tablet Take 81 mg by mouth daily   • atorvastatin (LIPITOR) 80 mg tablet Take 80 mg by mouth Medrol Dose Pack scheduling ONLY   • B-D ALLERGY SYRINGE 1CC/28G 28G X 1/2\" 1 ML MISC use every week   • B-D UF " III MINI PEN NEEDLES 31G X 5 MM MISC use 2 daily as directed   • brompheniramine-pseudoephedrine-DM 30-2-10 MG/5ML syrup Take 5 mL by mouth 4 (four) times a day as needed for allergies   • calcium carbonate (OS-RILEY) 600 MG tablet Take 400 mg by mouth daily   • cetirizine (ZyrTEC) 10 mg tablet Take 1 tablet (10 mg total) by mouth daily   • Cholecalciferol (VITAMIN D) 2000 units tablet Take 2,000 Units by mouth daily   • ferrous sulfate 324 (65 Fe) mg Take 1 tablet (324 mg total) by mouth 3 (three) times a week for 36 doses   • fluticasone (FLONASE) 50 mcg/act nasal spray 1 spray into each nostril daily   • folic acid (FOLVITE) 800 MCG tablet Take 800 mg by mouth daily   • gabapentin (NEURONTIN) 300 mg capsule Take 300 mg by mouth 3 (three) times a day   • gabapentin (NEURONTIN) 600 MG tablet    • Insulin Glargine Solostar (Lantus SoloStar) 100 UNIT/ML SOPN Inject 52 units at bedtime   • Insulin Pen Needle 31G X 8 MM MISC by Does not apply route   • lisinopril (ZESTRIL) 10 mg tablet Take 1 tablet (10 mg total) by mouth daily   • metFORMIN (GLUCOPHAGE) 1000 MG tablet Take 1 tablet by mouth 2 times a day with meals   • methocarbamol (ROBAXIN) 750 mg tablet PRN   • methotrexate 50 MG/2ML injection Inject 0.8 mL under the skin once a week   • metoprolol tartrate (LOPRESSOR) 50 mg tablet 50 mg every 12 (twelve) hours   • Multiple Vitamin (MULTIVITAMIN) tablet Take 1 tablet by mouth daily   • nitroglycerin (NITROSTAT) 0.4 mg SL tablet Place 1 tablet (0.4 mg total) under the tongue every 5 (five) minutes as needed for chest pain   • NovoLOG FlexPen 100 units/mL injection pen Inject 10 units before meals , and 5 units before snacks ( max daily dose is 40 units)   • omeprazole (PriLOSEC) 20 mg delayed release capsule Take 20 mg by mouth daily   • Probiotic Product (PROBIOTIC-10 PO) Take by mouth daily    • semaglutide, 0.25 or 0.5 mg/dose, (Ozempic, 0.25 or 0.5 MG/DOSE,) 2 mg/3 mL injection pen Inject 0.25 mg once a week for 4  "weeks and then increase to 0.5 mg once a week   • traMADol (ULTRAM) 50 mg tablet Take 50 mg by mouth as needed for moderate pain       Objective     /62 (BP Location: Left arm, Patient Position: Sitting, Cuff Size: Large)   Pulse 82   Temp 97.7 °F (36.5 °C)   Resp 16   Ht 5' 0.06\" (1.526 m)   Wt 115 kg (252 lb 8 oz)   SpO2 96%   BMI 49.21 kg/m²     Physical Exam  Vitals and nursing note reviewed.   Constitutional:       Appearance: She is well-developed. She is obese.   HENT:      Head: Normocephalic and atraumatic.   Cardiovascular:      Rate and Rhythm: Normal rate and regular rhythm.   Pulmonary:      Effort: Pulmonary effort is normal.      Breath sounds: Normal breath sounds.   Musculoskeletal:         General: Tenderness present.      Lumbar back: Tenderness present.   Neurological:      General: No focal deficit present.      Mental Status: She is alert.      Sensory: No sensory deficit.      Motor: No weakness.   Psychiatric:         Mood and Affect: Mood normal.         Behavior: Behavior normal.       Judith Zhou MD    "

## 2024-03-07 ENCOUNTER — EVALUATION (OUTPATIENT)
Dept: PHYSICAL THERAPY | Facility: CLINIC | Age: 69
End: 2024-03-07
Payer: COMMERCIAL

## 2024-03-07 DIAGNOSIS — M48.061 LUMBAR FORAMINAL STENOSIS: ICD-10-CM

## 2024-03-07 DIAGNOSIS — M51.35 DDD (DEGENERATIVE DISC DISEASE), THORACOLUMBAR: ICD-10-CM

## 2024-03-07 DIAGNOSIS — M54.16 LUMBAR RADICULITIS: ICD-10-CM

## 2024-03-07 PROCEDURE — 97163 PT EVAL HIGH COMPLEX 45 MIN: CPT | Performed by: PHYSICAL THERAPIST

## 2024-03-07 PROCEDURE — 97112 NEUROMUSCULAR REEDUCATION: CPT | Performed by: PHYSICAL THERAPIST

## 2024-03-07 NOTE — PROGRESS NOTES
PT Evaluation     Today's date: 3/7/2024  Patient name: Delia Samano  : 1955  MRN: [id number]  Referring provider: Judith Zhou  Dx:   Encounter Diagnosis     ICD-10-CM    1. Lumbar radiculitis  M54.16 Ambulatory Referral to Physical Therapy      2. DDD (degenerative disc disease), thoracolumbar  M51.35 Ambulatory Referral to Physical Therapy      3. Lumbar foraminal stenosis  M48.061 Ambulatory Referral to Physical Therapy          Start Time: 1100  Stop Time: 1150  Total time in clinic (min): 50 minutes    Treatment was performed by JOVON Malone under the direct supervision of Chago Kc DPT, OCS, FASPT      Assessment  Assessment details: Problem List:  1) decreased core muscle activation   -Addressing with therapeutic exercise, neuromuscular re-education, therapeutic activity, and functional activity training    2) decreased lower extremity strength   -Addressing with therapeutic exercise, neuromuscular re-education, therapeutic activity, gait training, neurodynamics, and functional activity training    Delia Samano is a 69 y.o. female presenting with an acute episode of right sided low back pain occasionally referring to her right thigh, with exam findings consistent with a diagnosis of right lumbopelvic pain with possible right hip involvement. The patient presents with decreased right lower extremity strength and limited hip mobility. She has functional limitations with sitting and walking for long periods of time, ascending/descending stairs, and transitional movements such as sit to stand. The patient would benefit from PT to address these deficits. Interventions will include therapeutic exercise, functional activity and gait training, neuromuscular re-education, core stabilization and LE strengthening progressions, a home exercise program and manual therapy and modalities as needed. Positive prognostic indicators include positive attitude toward recovery, good understanding of  "diagnosis and treatment plan options, acuity of symptoms, and absence of observed red flags.  Negative prognostic indicators include hypertension, lack of centralization with movement, Sjogren's, multiple concurrent orthopedic problems, and obesity. These factors indicate that the patient has a good  prognosis for recovery with conservative care.                Goals  Short Term Goals: to be achieved by 4 weeks  1) Patient to be independent with basic HEP  2) Decrease pain to 4/10 at its worst  3) Increase LE strength by 1/2 MMT grade in all deficient planes    Long Term Goals: to be achieved by discharge  1) FOTO equal to or greater than projected goal.  2) Patient to be independent with comprehensive HEP  3) Ambulation to improve to maximal level of function  4) Stair negotiation will improve to reciprocal  5) Sit to stand transfers will improve to maximal level of function       Plan  Planned therapy interventions: ADL retraining, gait training, home exercise program, manual therapy, neuromuscular re-education, patient education, therapeutic activities and therapeutic exercise  Frequency: 2x month  Duration in weeks: 8  Treatment plan discussed with: patient      Subjective Evaluation    History of Present Illness  Mechanism of injury: Delia Samano presents with c/c of: right buttock pain that radiates to her thigh. She states she thinks its coming from her back. She was prescribed a course of steroids for a week but reports that it did not help.  MATHEW: cannot recall a specific event that produced symptoms of this episode  Surgery date: multiple spinal fusions (ranging from [id number])  Onset/duration: 2 weeks ago  Pain descriptors: dull, inconsistent pain radiating to posterior thigh occasionally, reports that she feels like \"she's sitting on marbles\"  24-hour pattern: depends throughout day, better at night  Aggravating factors: driving, transitional movement such as sit to stand  Alleviating factors: laying " down (sleeps on back), ice   Previous episodes: not this specific pain/symptoms, hx of low back pain    Functional limitations/ADLs: going up steps (4 steps into house, one floor), walking (uses cane in community, prior to current episode)  Occupation/demands: retired (formerly worked in OR, [employer], [employer], Drs office), 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  Physical activities/hobbies: seated peddle bike she uses everyday, [place]    Patient Concerns: reducing pain during sitting, posture and gait (feels like she is walking like a question radha at the end of the day)    Social Support  Steps to enter house: yes  4  Lives in: one-story house  Lives with: spouse    Employment status: not working (retired)  Treatments  Previous treatment: physical therapy (aquatic therapy)      Objective     Neurological Testing     Sensation     Lumbar   Left   Intact: light touch    Right   Intact: light touch    Reflexes   Left   Patellar (L4): trace (1+)  Achilles (S1): trace (1+)    Right   Patellar (L4): trace (1+)  Achilles (S1): trace (1+)    Active Range of Motion     Lumbar   Flexion:  Restriction level: maximal  Left lateral flexion:  Restriction level: moderate  Right lateral flexion:  Restriction level: moderate  Left rotation:  with pain Restriction level: moderate  Right rotation:  Restriction level: moderate    Strength/Myotome Testing     Left Hip   Planes of Motion   Flexion: 4-  Abduction: 4  Adduction: 4    Right Hip   Planes of Motion   Flexion: 3+  Abduction: 3+  Adduction: 4-    Left Knee   Flexion: 4+  Extension: 4+    Right Knee   Flexion: 4  Extension: 3    Left Ankle/Foot   Dorsiflexion: 5  Plantar flexion: 5    Right Ankle/Foot   Dorsiflexion: 4  Plantar flexion: 5    Tests     Lumbar     Left   Negative crossed SLR, passive SLR and slump test.     Right   Positive passive SLR and quadrant.   Negative crossed SLR and slump test.     Right Pelvic Girdle/Sacrum   Positive: active SLR  test.     Left Hip   Negative FADIR.     Right Hip   Positive CUBA.   Negative FADIR.     Additional Tests Details  Patient reports relief with long axis distraction of right hip    Ambulation   Weight-Bearing Status   Assistive device used: single point cane    Additional Weight-Bearing Status Details  Used prior to current episode for balance    General Comments:      Hip Comments   Hip mobility minimally limited bilaterally, reproduction of symptoms on the right             Precautions: Type II DM, ankylosing spondylitis, HTN, complex cardiac hx      Diagnosis Right sided lumbopelvic pain   Last Re-Evaluation    Date of Service 3/7/24            Visit Count 1 2 3 4 5 6 7 8 9 10   Manuals                                                                 Neuro Re-Ed             TA set HEP            TA set w/ BKFO/march             Glute set HEP            Glute bridge                                                    Repeated motions             Ther Ex             Active warmup             Multifidus press             Squat             Row             Deadlift                                                    Ther Activity                                                                              Gait Training                                                                              Assessment IE            Education Prognosis, HEP, POC

## 2024-03-11 ENCOUNTER — OFFICE VISIT (OUTPATIENT)
Dept: PHYSICAL THERAPY | Facility: CLINIC | Age: 69
End: 2024-03-11
Payer: COMMERCIAL

## 2024-03-11 DIAGNOSIS — M51.35 DDD (DEGENERATIVE DISC DISEASE), THORACOLUMBAR: ICD-10-CM

## 2024-03-11 DIAGNOSIS — M54.16 LUMBAR RADICULITIS: Primary | ICD-10-CM

## 2024-03-11 DIAGNOSIS — M48.061 LUMBAR FORAMINAL STENOSIS: ICD-10-CM

## 2024-03-11 PROCEDURE — 97110 THERAPEUTIC EXERCISES: CPT | Performed by: PHYSICAL THERAPIST

## 2024-03-11 PROCEDURE — 97112 NEUROMUSCULAR REEDUCATION: CPT | Performed by: PHYSICAL THERAPIST

## 2024-03-11 NOTE — PROGRESS NOTES
Daily Note     Today's date: 3/11/2024  Patient name: Delia Samano  : 1955  MRN: [id number]  Referring provider: Judith Zhou  Dx:   Encounter Diagnosis     ICD-10-CM    1. Lumbar radiculitis  M54.16       2. DDD (degenerative disc disease), thoracolumbar  M51.35       3. Lumbar foraminal stenosis  M48.061           Start Time: 1134  Stop Time: 1220  Total time in clinic (min): 46 minutes    Treatment was performed by JOVON Malone under the direct supervision of Chago Kc DPT, OCS, FASPT    Subjective: The patient presents today with a report of low back pain, unrelated to the current episode of lumbopelvic pain. The patient reports some change in symptoms since previous session. She states that she has been adherent to her HEP and most feels this pain when she stands from sitting.      Objective: See treatment diary below      Assessment: Today's session introduced core stabilization and LE strengthening to Arsalan's program. TA sets with marches were initiated to promote core activation during LE movement. The patient responded to treatment well today. The patient would benefit from further skilled PT services to maximize level of function.      Plan: Continue per plan of care.      Precautions: Type II DM, ankylosing spondylitis, HTN, complex cardiac hx      Diagnosis Right sided lumbopelvic pain   Last Re-Evaluation    Date of Service 3/7/24 3/11           Visit Count 1 2 3 4 5 6 7 8 9 10   Manuals                                                                 Neuro Re-Ed             TA set HEP 10x15 sec holds           TA set w/ BKFO/march  Marches  3x5 ea alt           Glute set HEP            Glute bridge             Hip abd/add iso  15x5 sec holds ea  HEP           Standing SLR  Ext and abd, 2x10 ea                        Repeated motions             Ther Ex             Active warmup  Nustep L4 x6 min           Multifidus press  Seated YTB 2x10           Squat             Row              Deadlift                                                    Ther Activity                                                                              Gait Training                                                                              Assessment IE            Education Prognosis, HEP, POC

## 2024-03-13 ENCOUNTER — TELEPHONE (OUTPATIENT)
Dept: ADMINISTRATIVE | Facility: OTHER | Age: 69
End: 2024-03-13

## 2024-03-13 NOTE — TELEPHONE ENCOUNTER
Upon review of the In Basket request we were able to locate, review, and update the patient chart as requested for Diabetic Eye Exam.    Any additional questions or concerns should be emailed to the Practice Liaisons via the appropriate education email address, please do not reply via In Basket.    Thank you  Cristobal Subramanian

## 2024-03-13 NOTE — TELEPHONE ENCOUNTER
----- Message from Marbella Gold sent at 3/12/2024 11:20 AM EDT -----  03/12/24 11:21 AM    Hello, our patient Delia Samano has had Diabetic Eye Exam completed/performed. Please assist in updating the patient chart by pulling the document from the Media Tab. Scanned into media on 3-12-24 .     Thank you,  Marbella JOYA

## 2024-03-20 ENCOUNTER — APPOINTMENT (OUTPATIENT)
Dept: PHYSICAL THERAPY | Facility: CLINIC | Age: 69
End: 2024-03-20
Payer: COMMERCIAL

## 2024-03-22 ENCOUNTER — APPOINTMENT (OUTPATIENT)
Dept: PHYSICAL THERAPY | Facility: CLINIC | Age: 69
End: 2024-03-22
Payer: COMMERCIAL

## 2024-03-25 ENCOUNTER — APPOINTMENT (OUTPATIENT)
Dept: PHYSICAL THERAPY | Facility: CLINIC | Age: 69
End: 2024-03-25
Payer: COMMERCIAL

## 2024-04-15 DIAGNOSIS — I10 ESSENTIAL HYPERTENSION: ICD-10-CM

## 2024-04-16 RX ORDER — LISINOPRIL 10 MG/1
10 TABLET ORAL DAILY
Qty: 90 TABLET | Refills: 1 | Status: SHIPPED | OUTPATIENT
Start: 2024-04-16

## 2024-05-14 ENCOUNTER — LAB (OUTPATIENT)
Dept: LAB | Facility: MEDICAL CENTER | Age: 69
End: 2024-05-14
Payer: COMMERCIAL

## 2024-05-14 DIAGNOSIS — Z79.4 TYPE 2 DIABETES MELLITUS WITH HYPERGLYCEMIA, WITH LONG-TERM CURRENT USE OF INSULIN (HCC): ICD-10-CM

## 2024-05-14 DIAGNOSIS — D35.01 ADENOMA OF RIGHT ADRENAL GLAND: ICD-10-CM

## 2024-05-14 DIAGNOSIS — E11.65 TYPE 2 DIABETES MELLITUS WITH HYPERGLYCEMIA, WITH LONG-TERM CURRENT USE OF INSULIN (HCC): ICD-10-CM

## 2024-05-14 LAB
ALBUMIN SERPL BCP-MCNC: 4.1 G/DL (ref 3.5–5)
ALP SERPL-CCNC: 65 U/L (ref 34–104)
ALT SERPL W P-5'-P-CCNC: 33 U/L (ref 7–52)
ANION GAP SERPL CALCULATED.3IONS-SCNC: 8 MMOL/L (ref 4–13)
AST SERPL W P-5'-P-CCNC: 25 U/L (ref 13–39)
BILIRUB SERPL-MCNC: 0.84 MG/DL (ref 0.2–1)
BUN SERPL-MCNC: 24 MG/DL (ref 5–25)
CALCIUM SERPL-MCNC: 9.2 MG/DL (ref 8.4–10.2)
CHLORIDE SERPL-SCNC: 103 MMOL/L (ref 96–108)
CO2 SERPL-SCNC: 29 MMOL/L (ref 21–32)
CORTIS AM PEAK SERPL-MCNC: 9.2 UG/DL (ref 6.7–22.6)
CREAT SERPL-MCNC: 0.64 MG/DL (ref 0.6–1.3)
CREAT UR-MCNC: 98.2 MG/DL
EST. AVERAGE GLUCOSE BLD GHB EST-MCNC: 140 MG/DL
GFR SERPL CREATININE-BSD FRML MDRD: 91 ML/MIN/1.73SQ M
GLUCOSE P FAST SERPL-MCNC: 171 MG/DL (ref 65–99)
HBA1C MFR BLD: 6.5 %
MICROALBUMIN UR-MCNC: 223.3 MG/L
MICROALBUMIN/CREAT 24H UR: 227 MG/G CREATININE (ref 0–30)
POTASSIUM SERPL-SCNC: 4.1 MMOL/L (ref 3.5–5.3)
PROT SERPL-MCNC: 6 G/DL (ref 6.4–8.4)
SODIUM SERPL-SCNC: 140 MMOL/L (ref 135–147)

## 2024-05-14 PROCEDURE — 80053 COMPREHEN METABOLIC PANEL: CPT

## 2024-05-14 PROCEDURE — 82043 UR ALBUMIN QUANTITATIVE: CPT

## 2024-05-14 PROCEDURE — 36415 COLL VENOUS BLD VENIPUNCTURE: CPT

## 2024-05-14 PROCEDURE — 82384 ASSAY THREE CATECHOLAMINES: CPT

## 2024-05-14 PROCEDURE — 82570 ASSAY OF URINE CREATININE: CPT

## 2024-05-14 PROCEDURE — 82533 TOTAL CORTISOL: CPT

## 2024-05-14 PROCEDURE — 83036 HEMOGLOBIN GLYCOSYLATED A1C: CPT

## 2024-05-20 ENCOUNTER — OFFICE VISIT (OUTPATIENT)
Dept: FAMILY MEDICINE CLINIC | Facility: CLINIC | Age: 69
End: 2024-05-20
Payer: COMMERCIAL

## 2024-05-20 VITALS
HEIGHT: 60 IN | WEIGHT: 251 LBS | BODY MASS INDEX: 49.28 KG/M2 | OXYGEN SATURATION: 95 % | HEART RATE: 92 BPM | SYSTOLIC BLOOD PRESSURE: 112 MMHG | RESPIRATION RATE: 18 BRPM | TEMPERATURE: 97.9 F | DIASTOLIC BLOOD PRESSURE: 62 MMHG

## 2024-05-20 DIAGNOSIS — M75.42 IMPINGEMENT SYNDROME OF LEFT SHOULDER: Primary | ICD-10-CM

## 2024-05-20 LAB
DOPAMINE 24H UR-MRATE: <30 PG/ML (ref 0–48)
EPINEPH PLAS-MCNC: <15 PG/ML (ref 0–62)
NOREPINEPH PLAS-MCNC: 677 PG/ML (ref 0–874)

## 2024-05-20 PROCEDURE — 99213 OFFICE O/P EST LOW 20 MIN: CPT | Performed by: FAMILY MEDICINE

## 2024-05-20 PROCEDURE — 20610 DRAIN/INJ JOINT/BURSA W/O US: CPT | Performed by: FAMILY MEDICINE

## 2024-05-20 PROCEDURE — 96372 THER/PROPH/DIAG INJ SC/IM: CPT | Performed by: FAMILY MEDICINE

## 2024-05-20 RX ORDER — TRIAMCINOLONE ACETONIDE 40 MG/ML
40 INJECTION, SUSPENSION INTRA-ARTICULAR; INTRAMUSCULAR ONCE
Status: COMPLETED | OUTPATIENT
Start: 2024-05-20 | End: 2024-05-20

## 2024-05-20 RX ADMIN — TRIAMCINOLONE ACETONIDE 40 MG: 40 INJECTION, SUSPENSION INTRA-ARTICULAR; INTRAMUSCULAR at 15:04

## 2024-05-20 NOTE — ASSESSMENT & PLAN NOTE
Positive Wing on physical examination.  Limited range of motion above her head.  Trial patient with bursa injection today.  Recommended shoulder exercises.  Follow-up in office if no improvement.

## 2024-05-20 NOTE — PROGRESS NOTES
Ambulatory Visit  Name: Delia Samano      : 1955      MRN: [id number]  Encounter Provider: Judith Zhou MD  Encounter Date: 2024   Encounter department: [facility]    Assessment & Plan   1. Impingement syndrome of left shoulder  Assessment & Plan:  Positive Wing on physical examination.  Limited range of motion above her head.  Trial patient with bursa injection today.  Recommended shoulder exercises.  Follow-up in office if no improvement.  Orders:  -     Large joint arthrocentesis: L subacromial bursa  -     triamcinolone acetonide (KENALOG-40) 40 mg/mL injection 40 mg  Large joint arthrocentesis: L subacromial bursa  Universal Protocol:  Consent: Verbal consent obtained.  Risks and benefits: risks, benefits and alternatives were discussed  Consent given by: patient  Patient understanding: patient states understanding of the procedure being performed  Patient consent: the patient's understanding of the procedure matches consent given  Site marked: the operative site was marked  Patient identity confirmed: verbally with patient  Supporting Documentation  Indications: pain   Procedure Details  Location: shoulder - L subacromial bursa  Preparation: Patient was prepped and draped in the usual sterile fashion  Needle size: 22 G  Ultrasound guidance: no  Approach: posterolateral    Aspirate: clear  Patient tolerance: patient tolerated the procedure well with no immediate complications  Dressing:  Sterile dressing applied             History of Present Illness     Patient presents today with pain in her left shoulder for the past 2 weeks.  She is having difficulty raising her arm.  First noticed this after giving herself an insulin shot.  She has been going to physical therapy for a hamstring injury.  She has had trouble doing some of the exercises due to inability to use the left shoulder.  She has pain with raising arm and moving the arm across her body.    Arm Pain  "        Review of Systems   Musculoskeletal:         Left shoulder pain       Objective     /62 (BP Location: Right arm, Patient Position: Sitting, Cuff Size: Large)   Pulse 92   Temp 97.9 °F (36.6 °C) (Temporal)   Resp 18   Ht 5' 0.06\" (1.526 m)   Wt 114 kg (251 lb)   SpO2 95%   BMI 48.92 kg/m²     Physical Exam  Constitutional:       Appearance: She is obese.   Musculoskeletal:      Comments: Limited range of motion left shoulder.  Positive Wing   Neurological:      Mental Status: She is alert.       Administrative Statements     "

## 2024-05-22 ENCOUNTER — OFFICE VISIT (OUTPATIENT)
Dept: ENDOCRINOLOGY | Facility: CLINIC | Age: 69
End: 2024-05-22
Payer: COMMERCIAL

## 2024-05-22 VITALS
DIASTOLIC BLOOD PRESSURE: 76 MMHG | HEART RATE: 78 BPM | BODY MASS INDEX: 48.34 KG/M2 | OXYGEN SATURATION: 96 % | SYSTOLIC BLOOD PRESSURE: 130 MMHG | WEIGHT: 248 LBS

## 2024-05-22 DIAGNOSIS — E11.65 TYPE 2 DIABETES MELLITUS WITH HYPERGLYCEMIA, WITH LONG-TERM CURRENT USE OF INSULIN (HCC): Primary | ICD-10-CM

## 2024-05-22 DIAGNOSIS — D35.01 ADENOMA OF RIGHT ADRENAL GLAND: ICD-10-CM

## 2024-05-22 DIAGNOSIS — I10 ESSENTIAL HYPERTENSION: ICD-10-CM

## 2024-05-22 DIAGNOSIS — I25.10 CORONARY ARTERY DISEASE INVOLVING NATIVE CORONARY ARTERY OF NATIVE HEART WITHOUT ANGINA PECTORIS: ICD-10-CM

## 2024-05-22 DIAGNOSIS — Z79.4 TYPE 2 DIABETES MELLITUS WITH HYPERGLYCEMIA, WITH LONG-TERM CURRENT USE OF INSULIN (HCC): Primary | ICD-10-CM

## 2024-05-22 DIAGNOSIS — E78.2 MIXED HYPERLIPIDEMIA: ICD-10-CM

## 2024-05-22 PROCEDURE — 99214 OFFICE O/P EST MOD 30 MIN: CPT | Performed by: INTERNAL MEDICINE

## 2024-05-22 RX ORDER — INSULIN ASPART 100 [IU]/ML
INJECTION, SOLUTION INTRAVENOUS; SUBCUTANEOUS
Start: 2024-05-22

## 2024-05-22 NOTE — PATIENT INSTRUCTIONS
Take novolog 10 Units about 15 mins before breakfast and dinner   Take Tresiba to 52  Units at bedtime  Continue with metformin 1000 mg with breakfast and dinner  Stop Repaglinide completely    Sliding/Correctional scale:    Blood Glucose 150-200: Take 2 Units of novolog  Blood Glucose  201-250: Take 4 Units of novolog  Blood Glucose  251-300: Take 6 Units of novolog  Blood Glucose 301-350: Take 8 Units of novolog  Blood Glucose 351-400: Take 10 Units of novolog      Hypoglycemia instructions   Delia Samano  5/22/2024  [id number]    Low Blood Sugar    Steps to treat low blood sugar.    1. Test blood sugar if you have symptoms of low blood sugar:   Low Blood Sugar Symptoms:  o Sweaty  o Dizzy  o Rapid heartbeat  o Shaky    o Bad mood  o Hungry      2. Treat blood sugar less than 70 with 15 grams of fast-acting carbohydrate:   Examples of 15 grams Fast-Acting Carbohydrate:  o 4 oz juice  o 4 oz regular soda  o 3-4 glucose tablets (chew)  o 3-4 hard candies (chew)              3.   Wait 15 minutes and test your blood sugar again           4. If blood sugar is less than 100, repeat steps 2-3.      5. When your blood sugar is 100 or more, eat a snack if it will be longer than one hour until your next meal. The snack should be 15 grams of carbohydrate and a protein:   Examples of snacks:  o ½ sandwich  o 6 crackers with cheese  o Piece of fruit with cheese or peanut butter  o 6 crackers with peanut butter   If blood glucose is >401- please call the office

## 2024-05-22 NOTE — PROGRESS NOTES
Delia Samano 69 y.o. female MRN: [id number]    Encounter: [id number]      Assessment & Plan     Assessment:  This is a 69 y.o.-year-old female with diabetes with hyperglycemia.    Plan:  Diagnoses and all orders for this visit:    Type 2 diabetes mellitus with hyperglycemia, with long-term current use of insulin ([facility])  Lab Results   Component Value Date    HGBA1C 6.5 (H) 05/14/2024   Hba1c improved to 6.5%   Continue OZempic and reduce Novolog to 10 units with meals +scale   Continue Tresiba at 48 units at bedtime  Educated about hypoglycemia symptoms and T/t   Discussed to check blood sugars 4 times daily and send log in 1 month to review   Call office if blood sugar < 70 or >350 mg/dl consistantly     -     semaglutide, 0.25 or 0.5 mg/dose, (Ozempic, 0.25 or 0.5 MG/DOSE,) 2 mg/3 mL injection pen; Inject 0.5 mg once a week  -     NovoLOG FlexPen 100 units/mL injection pen; Inject 10 units before breakfast and dinner +Scale ( max daily dose is 30 units)  -     Albumin / creatinine urine ratio; Future  -     Hemoglobin A1C; Future  -     Basic metabolic panel; Future    Essential hypertension  BP Readings from Last 3 Encounters:   05/22/24 130/76   05/20/24 112/62   03/04/24 122/62      Mixed hyperlipidemia    Lab Results   Component Value Date    LDLCALC 45 01/05/2023    At goal   Continue statins   Coronary artery disease involving native coronary artery of native heart without angina pectoris  Managed by Cardiology   Adenoma of right adrenal gland  Previous workup for pheochromocytoma, primary hyperaldosteronism and Cushing syndrome is negative.  CT abdomen reviewed from February 2022 which showed right adrenal nodule measuring 3.4 x 2.4 x 2.8 cm unchanged from 2019    No history of hypertensive urgency or emergency  Repeat plasma fractionated catecholamines, a.m. cortisol is normal    24-hour urine cortisol and creatinine is normal     Component      Latest Ref Rng 2/15/2024 5/14/2024   Sodium      135 - 147  mmol/L  140    Potassium      3.5 - 5.3 mmol/L  4.1    Chloride      96 - 108 mmol/L  103    Carbon Dioxide      21 - 32 mmol/L  29    ANION GAP      4 - 13 mmol/L  8    BUN      5 - 25 mg/dL  24    Creatinine      0.60 - 1.30 mg/dL  0.64    GLUCOSE, FASTING      65 - 99 mg/dL  171 (H)    Calcium      8.4 - 10.2 mg/dL  9.2    AST      13 - 39 U/L  25    ALT      7 - 52 U/L  33    ALK PHOS      34 - 104 U/L  65    Total Protein      6.4 - 8.4 g/dL  6.0 (L)    Albumin      3.5 - 5.0 g/dL  4.1    Total Bilirubin      0.20 - 1.00 mg/dL  0.84    GFR, Calculated      ml/min/1.73sq m  91    NOREPINEPHRINE PLASMA      0 - 874 pg/mL  677    EPINEPHRINE PLASMA      0 - 62 pg/mL  <15    DOPAMINE PLASMA      0 - 48 pg/mL  <30    CORTISOL,F,UG/L,U      Undefined ug/L 22     CORTISOL 24H URINARY FREE      6 - 42 ug/24 hr 29     Creatinine, Urine      0.6 - 1.8 g/24Hr 0.9     Urine Total Volume      ml 1,300     Cortisol - AM      6.7 - 22.6 ug/dL  9.2       Legend:  (H) High  (L) Low     CC: Diabetes    History of Present Illness     HPI:    Delia Samano is 69-year-old woman with medical history of type 2 diabetes managed on insulin, vitamin D deficiency, hypertension, hyperlipidemia is here for follow-up.  She was started on Ozempic, she has lost weight her A1c improved to 6.5%    Current regimen includes NovoLog 10 units 3 times daily, Tresiba 48 units at bedtime, metformin 1000 mg twice a day.  Patient denies missing doses.  She eats small meals 3 times daily.  She checks blood sugars 3 times daily and her blood sugars usually range in 120 to 180 mg per DL range.  Last A1c is   Lab Results   Component Value Date    HGBA1C 6.5 (H) 05/14/2024     Lab Results   Component Value Date    LDLCALC 45 01/05/2023        Lab Results   Component Value Date    HGBA1C 6.5 (H) 05/14/2024     Component      Latest Ref Rng 2/15/2024 5/14/2024   Sodium      135 - 147 mmol/L  140    Potassium      3.5 - 5.3 mmol/L  4.1    Chloride      96 -  108 mmol/L  103    Carbon Dioxide      21 - 32 mmol/L  29    ANION GAP      4 - 13 mmol/L  8    BUN      5 - 25 mg/dL  24    Creatinine      0.60 - 1.30 mg/dL  0.64    GLUCOSE, FASTING      65 - 99 mg/dL  171 (H)    Calcium      8.4 - 10.2 mg/dL  9.2    AST      13 - 39 U/L  25    ALT      7 - 52 U/L  33    ALK PHOS      34 - 104 U/L  65    Total Protein      6.4 - 8.4 g/dL  6.0 (L)    Albumin      3.5 - 5.0 g/dL  4.1    Total Bilirubin      0.20 - 1.00 mg/dL  0.84    GFR, Calculated      ml/min/1.73sq m  91    NOREPINEPHRINE PLASMA      0 - 874 pg/mL  677    EPINEPHRINE PLASMA      0 - 62 pg/mL  <15    DOPAMINE PLASMA      0 - 48 pg/mL  <30    EXT Creatinine Urine      Reference range not established. mg/dL  98.2    Albumin,U,Random      <20.0 mg/L  223.3 (H)    Albumin Creat Ratio      0 - 30 mg/g creatinine  227 (H)    CORTISOL,F,UG/L,U      Undefined ug/L 22     CORTISOL 24H URINARY FREE      6 - 42 ug/24 hr 29     Creatinine, Urine      0.6 - 1.8 g/24Hr 0.9     Urine Total Volume      ml 1,300     Hemoglobin A1C      Normal 4.0-5.6%; PreDiabetic 5.7-6.4%; Diabetic >=6.5%; Glycemic control for adults with diabetes <7.0% %  6.5 (H)    eAG, EST AVG Glucose      mg/dl  140    Cortisol - AM      6.7 - 22.6 ug/dL  9.2       Legend:         Review of Systems   Constitutional:  Negative for activity change, diaphoresis, fatigue, fever and unexpected weight change.   HENT: Negative.     Eyes:  Negative for visual disturbance.   Respiratory:  Negative for cough, chest tightness and shortness of breath.    Cardiovascular:  Negative for chest pain, palpitations and leg swelling.   Gastrointestinal:  Negative for abdominal pain, blood in stool, constipation, diarrhea, nausea and vomiting.   Endocrine: Negative for cold intolerance, heat intolerance, polydipsia, polyphagia and polyuria.   Genitourinary:  Negative for dysuria, enuresis, frequency and urgency.   Musculoskeletal:  Negative for arthralgias and myalgias.   Skin:   "Negative for pallor, rash and wound.   Allergic/Immunologic: Negative.    Neurological:  Negative for dizziness, tremors, weakness and numbness.   Hematological: Negative.    Psychiatric/Behavioral: Negative.         Historical Information   Past Medical History:   Diagnosis Date    Allergic     Ankylosing spondylitis (HCC)     Arthritis     Diabetes mellitus (HCC)     GERD (gastroesophageal reflux disease)     Heart attack (HCC)     Hematuria     last assessed 9/7/13    History of COVID-19     Hyperlipidemia     Hypertension     Myocardial infarction (HCC)     Nephrolithiasis 09/07/2013    Subclinical hypothyroidism 09/02/2015     Past Surgical History:   Procedure Laterality Date    APPENDECTOMY      BACK SURGERY      mulitple surgery, fusions \"top to bottom\"    BREAST SURGERY Left     biopsy    CERVICAL SPINE SURGERY      CHOLECYSTECTOMY      CORONARY ANGIOPLASTY WITH STENT PLACEMENT      CYSTOSCOPY  05/06/2015    Diagnostic, last assessed 5/6/15    EYE SURGERY      HYSTERECTOMY      LITHOTRIPSY      NM CYSTO/URETERO W/LITHOTRIPSY &INDWELL STENT INSRT Left 04/18/2019    Procedure: CYSTO, URETEROSCOPY W/HOLMIUM LASER, RETROGRADE PYELOGRAM, STENT INSERTION;  Surgeon: Ang Bucio MD;  Location: [facility] OR;  Service: Urology     Social History   Social History     Substance and Sexual Activity   Alcohol Use Yes    Comment: very rarely     Social History     Substance and Sexual Activity   Drug Use No     Social History     Tobacco Use   Smoking Status Never    Passive exposure: Past   Smokeless Tobacco Never     Family History:   Family History   Problem Relation Age of Onset    Diabetes Mother     Gout Mother     Heart disease Mother     Diabetes type II Mother     Diabetes Father     Heart disease Father     No Known Problems Daughter     No Known Problems Maternal Grandmother     No Known Problems Maternal Grandfather     No Known Problems Paternal Grandmother     No Known Problems Paternal Grandfather     " "Nephrolithiasis Brother     No Known Problems Maternal Aunt     No Known Problems Maternal Aunt     No Known Problems Maternal Aunt     No Known Problems Maternal Aunt     No Known Problems Maternal Aunt     No Known Problems Maternal Aunt     No Known Problems Paternal Aunt     No Known Problems Paternal Aunt        Meds/Allergies   Current Outpatient Medications   Medication Sig Dispense Refill    Actemra 162 MG/0.9ML SOSY once a week      ALPRAZolam (XANAX) 0.25 mg tablet as needed      amoxicillin (AMOXIL) 500 mg capsule Take 2,000 mg by mouth as needed 1 hour prior to dental appointment      aspirin 81 MG tablet Take 81 mg by mouth daily      atorvastatin (LIPITOR) 80 mg tablet Take 80 mg by mouth Medrol Dose Pack scheduling ONLY      B-D ALLERGY SYRINGE 1CC/28G 28G X 1/2\" 1 ML MISC use every week      B-D UF III MINI PEN NEEDLES 31G X 5 MM MISC use 2 daily as directed  0    brompheniramine-pseudoephedrine-DM 30-2-10 MG/5ML syrup Take 5 mL by mouth 4 (four) times a day as needed for allergies 120 mL 0    calcium carbonate (OS-RILEY) 600 MG tablet Take 400 mg by mouth daily      cetirizine (ZyrTEC) 10 mg tablet Take 1 tablet (10 mg total) by mouth daily 14 tablet 0    Cholecalciferol (VITAMIN D) 2000 units tablet Take 2,000 Units by mouth daily      ferrous sulfate 324 (65 Fe) mg Take 1 tablet (324 mg total) by mouth 3 (three) times a week for 36 doses 12 tablet 2    fluticasone (FLONASE) 50 mcg/act nasal spray 1 spray into each nostril daily 15.8 mL 1    folic acid (FOLVITE) 800 MCG tablet Take 800 mg by mouth daily      gabapentin (NEURONTIN) 300 mg capsule Take 300 mg by mouth 3 (three) times a day      gabapentin (NEURONTIN) 600 MG tablet       Insulin Glargine Solostar (Lantus SoloStar) 100 UNIT/ML SOPN Inject 52 units at bedtime 60 mL 1    Insulin Pen Needle 31G X 8 MM MISC by Does not apply route      lisinopril (ZESTRIL) 10 mg tablet Take 1 tablet (10 mg total) by mouth daily 90 tablet 1    metFORMIN " (GLUCOPHAGE) 1000 MG tablet Take 1 tablet by mouth 2 times a day with meals 180 tablet 3    methocarbamol (ROBAXIN) 750 mg tablet PRN      methotrexate 50 MG/2ML injection Inject 0.8 mL under the skin once a week      metoprolol tartrate (LOPRESSOR) 50 mg tablet 50 mg every 12 (twelve) hours      Multiple Vitamin (MULTIVITAMIN) tablet Take 1 tablet by mouth daily      nitroglycerin (NITROSTAT) 0.4 mg SL tablet Place 1 tablet (0.4 mg total) under the tongue every 5 (five) minutes as needed for chest pain 90 tablet 1    NovoLOG FlexPen 100 units/mL injection pen Inject 10 units before breakfast and dinner +Scale ( max daily dose is 30 units)      omeprazole (PriLOSEC) 20 mg delayed release capsule Take 20 mg by mouth daily      Probiotic Product (PROBIOTIC-10 PO) Take by mouth daily       semaglutide, 0.25 or 0.5 mg/dose, (Ozempic, 0.25 or 0.5 MG/DOSE,) 2 mg/3 mL injection pen Inject 0.5 mg once a week 3 mL 5    traMADol (ULTRAM) 50 mg tablet Take 50 mg by mouth as needed for moderate pain       No current facility-administered medications for this visit.     Allergies   Allergen Reactions    Acetazolamide Hives    Barbiturates Other (See Comments)     stomach pain    Morphine GI Intolerance     Other reaction(s): GI Intolerance    Sulfa Antibiotics Hives and Rash    Sulfasalazine Hives and Rash       Objective   Vitals: Blood pressure 130/76, pulse 78, weight 112 kg (248 lb), SpO2 96%, not currently breastfeeding.    Physical Exam  Constitutional:       General: She is not in acute distress.     Appearance: Normal appearance. She is not ill-appearing.   HENT:      Head: Normocephalic and atraumatic.      Nose: Nose normal.   Eyes:      Extraocular Movements: Extraocular movements intact.      Conjunctiva/sclera: Conjunctivae normal.   Pulmonary:      Effort: No respiratory distress.   Musculoskeletal:      Cervical back: Normal range of motion.   Neurological:      General: No focal deficit present.      Mental  Status: She is alert and oriented to person, place, and time.   Psychiatric:         Mood and Affect: Mood normal.         Behavior: Behavior normal.         The history was obtained from the review of the chart, patient.    Lab Results:   Lab Results   Component Value Date/Time    Hemoglobin A1C 6.5 (H) 05/14/2024 07:41 AM    Hemoglobin A1C 7.7 (H) 01/10/2024 10:01 AM    Hemoglobin A1C 6.9 (H) 07/14/2023 09:35 AM    WBC 5.67 02/15/2024 10:32 AM    WBC 4.20 (L) 10/03/2023 09:58 AM    WBC 5.46 07/14/2023 09:35 AM    Hemoglobin 13.0 02/15/2024 10:32 AM    Hemoglobin 11.6 10/03/2023 09:58 AM    Hemoglobin 11.0 (L) 07/14/2023 09:35 AM    Hematocrit 40.6 02/15/2024 10:32 AM    Hematocrit 37.4 10/03/2023 09:58 AM    Hematocrit 35.8 07/14/2023 09:35 AM    MCV 99 (H) 02/15/2024 10:32 AM    MCV 96 10/03/2023 09:58 AM    MCV 91 07/14/2023 09:35 AM    Platelets 255 02/15/2024 10:32 AM    Platelets 224 10/03/2023 09:58 AM    Platelets 266 07/14/2023 09:35 AM    BUN 24 05/14/2024 07:41 AM    BUN 16 02/15/2024 10:32 AM    BUN 18 01/10/2024 10:01 AM    Potassium 4.1 05/14/2024 07:41 AM    Potassium 4.6 02/15/2024 10:32 AM    Potassium 4.7 01/10/2024 10:01 AM    Chloride 103 05/14/2024 07:41 AM    Chloride 100 02/15/2024 10:32 AM    Chloride 103 01/10/2024 10:01 AM    CO2 29 05/14/2024 07:41 AM    CO2 28 02/15/2024 10:32 AM    CO2 28 01/10/2024 10:01 AM    Creatinine 0.64 05/14/2024 07:41 AM    Creatinine 0.66 02/15/2024 10:32 AM    Creatinine 0.75 01/10/2024 10:01 AM    AST 25 05/14/2024 07:41 AM    AST 31 02/15/2024 10:32 AM    AST 22 10/03/2023 09:58 AM    ALT 33 05/14/2024 07:41 AM    ALT 36 02/15/2024 10:32 AM    ALT 30 10/03/2023 09:58 AM    Total Protein 6.0 (L) 05/14/2024 07:41 AM    Total Protein 6.5 02/15/2024 10:32 AM    Total Protein 6.1 (L) 10/03/2023 09:58 AM    Albumin 4.1 05/14/2024 07:41 AM    Albumin 4.4 02/15/2024 10:32 AM    Albumin 4.0 10/03/2023 09:58 AM           Imaging Studies: I have personally reviewed  "pertinent reports.      Portions of the record may have been created with voice recognition software. Occasional wrong word or \"sound a like\" substitutions may have occurred due to the inherent limitations of voice recognition software. Read the chart carefully and recognize, using context, where substitutions have occurred.    "

## 2024-07-19 ENCOUNTER — HOSPITAL ENCOUNTER (OUTPATIENT)
Dept: RADIOLOGY | Age: 69
Discharge: HOME | End: 2024-07-19
Payer: COMMERCIAL

## 2024-07-19 VITALS — WEIGHT: 245 LBS | HEIGHT: 60 IN | BODY MASS INDEX: 48.1 KG/M2

## 2024-07-19 DIAGNOSIS — Z12.31 VISIT FOR SCREENING MAMMOGRAM: ICD-10-CM

## 2024-07-19 PROCEDURE — 77063 BREAST TOMOSYNTHESIS BI: CPT

## 2024-07-19 PROCEDURE — 77067 SCR MAMMO BI INCL CAD: CPT

## 2024-07-23 ENCOUNTER — VBI (OUTPATIENT)
Dept: ADMINISTRATIVE | Facility: OTHER | Age: 69
End: 2024-07-23

## 2024-07-23 NOTE — TELEPHONE ENCOUNTER
07/23/24 10:56 AM     Chart reviewed for Hemoglobin A1c was/were not submitted to the patient's insurance.     Hector Stephenson   [facility]

## 2024-08-16 ENCOUNTER — RA CDI HCC (OUTPATIENT)
Dept: OTHER | Facility: HOSPITAL | Age: 69
End: 2024-08-16

## 2024-08-16 NOTE — PROGRESS NOTES
HCC coding opportunities          Chart Reviewed number of suggestions sent to Provider: 2   E11.29  M45.9    Patients Insurance     Medicare Insurance: [employer]

## 2024-08-23 ENCOUNTER — OFFICE VISIT (OUTPATIENT)
Dept: FAMILY MEDICINE CLINIC | Facility: CLINIC | Age: 69
End: 2024-08-23
Payer: COMMERCIAL

## 2024-08-23 VITALS
DIASTOLIC BLOOD PRESSURE: 74 MMHG | TEMPERATURE: 97.5 F | HEIGHT: 60 IN | BODY MASS INDEX: 49.57 KG/M2 | OXYGEN SATURATION: 97 % | RESPIRATION RATE: 16 BRPM | HEART RATE: 86 BPM | SYSTOLIC BLOOD PRESSURE: 128 MMHG | WEIGHT: 252.5 LBS

## 2024-08-23 DIAGNOSIS — E78.2 MIXED HYPERLIPIDEMIA: ICD-10-CM

## 2024-08-23 DIAGNOSIS — R10.11 CONTINUOUS RUQ ABDOMINAL PAIN: ICD-10-CM

## 2024-08-23 DIAGNOSIS — Z79.4 TYPE 2 DIABETES MELLITUS WITH HYPERGLYCEMIA, WITH LONG-TERM CURRENT USE OF INSULIN (HCC): ICD-10-CM

## 2024-08-23 DIAGNOSIS — Z79.4 TYPE 2 DIABETES MELLITUS WITH DIABETIC PERIPHERAL ANGIOPATHY WITHOUT GANGRENE, WITH LONG-TERM CURRENT USE OF INSULIN (HCC): ICD-10-CM

## 2024-08-23 DIAGNOSIS — M05.79 RHEUMATOID ARTHRITIS INVOLVING MULTIPLE SITES WITH POSITIVE RHEUMATOID FACTOR (HCC): ICD-10-CM

## 2024-08-23 DIAGNOSIS — I10 ESSENTIAL HYPERTENSION: Primary | ICD-10-CM

## 2024-08-23 DIAGNOSIS — R80.9 MICROALBUMINURIA DUE TO TYPE 2 DIABETES MELLITUS  (HCC): ICD-10-CM

## 2024-08-23 DIAGNOSIS — E11.29 MICROALBUMINURIA DUE TO TYPE 2 DIABETES MELLITUS  (HCC): ICD-10-CM

## 2024-08-23 DIAGNOSIS — E11.51 TYPE 2 DIABETES MELLITUS WITH DIABETIC PERIPHERAL ANGIOPATHY WITHOUT GANGRENE, WITH LONG-TERM CURRENT USE OF INSULIN (HCC): ICD-10-CM

## 2024-08-23 DIAGNOSIS — E11.65 TYPE 2 DIABETES MELLITUS WITH HYPERGLYCEMIA, WITH LONG-TERM CURRENT USE OF INSULIN (HCC): ICD-10-CM

## 2024-08-23 DIAGNOSIS — D35.01 ADENOMA OF RIGHT ADRENAL GLAND: ICD-10-CM

## 2024-08-23 PROCEDURE — 99214 OFFICE O/P EST MOD 30 MIN: CPT | Performed by: FAMILY MEDICINE

## 2024-08-23 PROCEDURE — G2211 COMPLEX E/M VISIT ADD ON: HCPCS | Performed by: FAMILY MEDICINE

## 2024-08-23 NOTE — ASSESSMENT & PLAN NOTE
Lab Results   Component Value Date    HGBA1C 6.5 (H) 05/14/2024   Continue following with endocrinology.  Continue lisinopril 10 mg daily

## 2024-08-23 NOTE — ASSESSMENT & PLAN NOTE
[facility] rheumatology.  Remains on Actemra weekly and methotrexate injections weekly.

## 2024-08-23 NOTE — ASSESSMENT & PLAN NOTE
Lab Results   Component Value Date    HGBA1C 6.5 (H) 05/14/2024   No longer taking Ozempic due to cost.  Previous $35 went to pick it up and it was $250.  Patient is going to reach out to manufacture and also inquire with insurance about coverage for alternative injectable medications.  Had been doing excellent with Ozempic.  Remains on metformin NovoLog and Lantus.  Continue following with endocrinology

## 2024-08-23 NOTE — ASSESSMENT & PLAN NOTE
BP Readings from Last 3 Encounters:   08/23/24 128/74   05/22/24 130/76   05/20/24 112/62     Lab Results   Component Value Date    CREATININE 0.64 05/14/2024    EGFR 91 05/14/2024     Controlled, continue Metoprolol tartrate 50mg twice daily and Lisinopril 10mg daily

## 2024-08-23 NOTE — PROGRESS NOTES
Ambulatory Visit  Name: Delia Samano      : 1955      MRN: [id number]  Encounter Provider: Sheryl Dobbs MD  Encounter Date: 2024   Encounter department: [facility]    Assessment & Plan   1. Essential hypertension  Assessment & Plan:  BP Readings from Last 3 Encounters:   24 128/74   24 130/76   24 112/62     Lab Results   Component Value Date    CREATININE 0.64 2024    EGFR 91 2024     Controlled, continue Metoprolol tartrate 50mg twice daily and Lisinopril 10mg daily    2. Type 2 diabetes mellitus with diabetic peripheral angiopathy without gangrene, with long-term current use of insulin (HCC)  Assessment & Plan:    Lab Results   Component Value Date    HGBA1C 6.5 (H) 2024     3. Type 2 diabetes mellitus with hyperglycemia, with long-term current use of insulin (HCC)  Assessment & Plan:    Lab Results   Component Value Date    HGBA1C 6.5 (H) 2024   No longer taking Ozempic due to cost.  Previous $35 went to pick it up and it was $250.  Patient is going to reach out to manufacture and also inquire with insurance about coverage for alternative injectable medications.  Had been doing excellent with Ozempic.  Remains on metformin NovoLog and Lantus.  Continue following with endocrinology    4. Adenoma of right adrenal gland  Assessment & Plan:  >>ASSESSMENT AND PLAN FOR DISORDER OF ADRENAL GLAND, UNSPECIFIED (HCC) WRITTEN ON 2023 11:52 AM BY SHERYL DOBBS MD    Follows with endocrinology.  Stable continue to monitor  5. Continuous RUQ abdominal pain  -     CT abdomen pelvis w contrast; Future; Expected date: 2024  6. Microalbuminuria due to type 2 diabetes mellitus  (HCC)  Assessment & Plan:    Lab Results   Component Value Date    HGBA1C 6.5 (H) 2024   Continue following with endocrinology.  Continue lisinopril 10 mg daily  7. Mixed hyperlipidemia  8. Rheumatoid arthritis involving multiple sites with  positive rheumatoid factor (HCC)  Assessment & Plan:  [facility] rheumatology.  Remains on Actemra weekly and methotrexate injections weekly.     History of multiple abdominal surgeries will obtain CT abdomen pelvis due to this chronic right upper quadrant sharp stabbing pain she is experiencing.  Remainder of orders listed above.  Follow-up in 6 months repeat labs    History of Present Illness     Patient reports sharp severe right upper quadrant pain last night lasted for 4 hours.  Pain is so severe she was hunched over.  At the time she reports tenderness to touch over the right upper quadrant.  Reports diarrhea following the pain.  Also endorses dark urine following these episodes.  Following the diarrhea she gets terrible gas.  Inhibits her ability to eat.  This is a chronic issue on and off.  This morning she had a regular bowel movement and is not currently in pain. Diarrhea with green vegetables.  She does have a history of kidney stones but states this is a different kind of pain.    History of appendectomy, cholecystectomy, partial hysterectomy      Review of Systems   Constitutional:  Negative for activity change, fatigue and fever.   Eyes:  Negative for visual disturbance.   Respiratory:  Negative for shortness of breath.    Cardiovascular:  Negative for chest pain.   Gastrointestinal:  Positive for abdominal pain and diarrhea. Negative for constipation and nausea.   Endocrine: Negative for cold intolerance and heat intolerance.   Musculoskeletal:  Negative for back pain.   Skin:  Negative for rash.   Neurological:  Negative for headaches.   Psychiatric/Behavioral:  Negative for confusion.      Past Medical History:   Diagnosis Date    Allergic     Ankylosing spondylitis (HCC)     Arthritis     Diabetes mellitus (HCC)     GERD (gastroesophageal reflux disease)     Heart attack (HCC)     Hematuria     last assessed 9/7/13    History of COVID-19     Hyperlipidemia     Hypertension     Myocardial  "infarction (HCC)     Nephrolithiasis 09/07/2013    Subclinical hypothyroidism 09/02/2015     Past Surgical History:   Procedure Laterality Date    APPENDECTOMY      BACK SURGERY      mulitple surgery, fusions \"top to bottom\"    BREAST CYST EXCISION Left     in the 90's    BREAST SURGERY Left     biopsy    CERVICAL SPINE SURGERY      CHOLECYSTECTOMY      CORONARY ANGIOPLASTY WITH STENT PLACEMENT      CYSTOSCOPY  05/06/2015    Diagnostic, last assessed 5/6/15    EYE SURGERY      HYSTERECTOMY      LITHOTRIPSY      LA CYSTO/URETERO W/LITHOTRIPSY &INDWELL STENT INSRT Left 04/18/2019    Procedure: CYSTO, URETEROSCOPY W/HOLMIUM LASER, RETROGRADE PYELOGRAM, STENT INSERTION;  Surgeon: Ang Bucio MD;  Location: [facility] OR;  Service: Urology     Family History   Problem Relation Age of Onset    Diabetes Mother     Gout Mother     Heart disease Mother     Diabetes type II Mother     Diabetes Father     Heart disease Father     No Known Problems Daughter     No Known Problems Maternal Grandmother     No Known Problems Maternal Grandfather     No Known Problems Paternal Grandmother     No Known Problems Paternal Grandfather     Nephrolithiasis Brother     No Known Problems Maternal Aunt     No Known Problems Maternal Aunt     No Known Problems Maternal Aunt     No Known Problems Maternal Aunt     No Known Problems Maternal Aunt     No Known Problems Maternal Aunt     No Known Problems Paternal Aunt     No Known Problems Paternal Aunt      Social History     Tobacco Use    Smoking status: Never     Passive exposure: Past    Smokeless tobacco: Never   Vaping Use    Vaping status: Never Used   Substance and Sexual Activity    Alcohol use: Yes     Comment: very rarely    Drug use: No    Sexual activity: Yes     Partners: Male     Birth control/protection: None     Current Outpatient Medications on File Prior to Visit   Medication Sig    Actemra 162 MG/0.9ML SOSY once a week    ALPRAZolam (XANAX) 0.25 mg tablet as needed    " "amoxicillin (AMOXIL) 500 mg capsule Take 2,000 mg by mouth as needed 1 hour prior to dental appointment    aspirin 81 MG tablet Take 81 mg by mouth daily    atorvastatin (LIPITOR) 80 mg tablet Take 80 mg by mouth Medrol Dose Pack scheduling ONLY    B-D ALLERGY SYRINGE 1CC/28G 28G X 1/2\" 1 ML MISC use every week    B-D UF III MINI PEN NEEDLES 31G X 5 MM MISC use 2 daily as directed    brompheniramine-pseudoephedrine-DM 30-2-10 MG/5ML syrup Take 5 mL by mouth 4 (four) times a day as needed for allergies    calcium carbonate (OS-RILEY) 600 MG tablet Take 400 mg by mouth daily    cetirizine (ZyrTEC) 10 mg tablet Take 1 tablet (10 mg total) by mouth daily    Cholecalciferol (VITAMIN D) 2000 units tablet Take 2,000 Units by mouth daily    ferrous sulfate 324 (65 Fe) mg Take 1 tablet (324 mg total) by mouth 3 (three) times a week for 36 doses    fluticasone (FLONASE) 50 mcg/act nasal spray 1 spray into each nostril daily    folic acid (FOLVITE) 800 MCG tablet Take 800 mg by mouth daily    gabapentin (NEURONTIN) 600 MG tablet     Insulin Glargine Solostar (Lantus SoloStar) 100 UNIT/ML SOPN Inject 52 units at bedtime    Insulin Pen Needle 31G X 8 MM MISC by Does not apply route    lisinopril (ZESTRIL) 10 mg tablet Take 1 tablet (10 mg total) by mouth daily    metFORMIN (GLUCOPHAGE) 1000 MG tablet Take 1 tablet by mouth 2 times a day with meals    methocarbamol (ROBAXIN) 750 mg tablet PRN    methotrexate 50 MG/2ML injection Inject 0.8 mL under the skin once a week    metoprolol tartrate (LOPRESSOR) 50 mg tablet 50 mg every 12 (twelve) hours    Multiple Vitamin (MULTIVITAMIN) tablet Take 1 tablet by mouth daily    nitroglycerin (NITROSTAT) 0.4 mg SL tablet Place 1 tablet (0.4 mg total) under the tongue every 5 (five) minutes as needed for chest pain    NovoLOG FlexPen 100 units/mL injection pen Inject 10 units before breakfast and dinner +Scale ( max daily dose is 30 units)    omeprazole (PriLOSEC) 20 mg delayed release capsule " "Take 20 mg by mouth daily    Probiotic Product (PROBIOTIC-10 PO) Take by mouth daily     traMADol (ULTRAM) 50 mg tablet Take 50 mg by mouth as needed for moderate pain    [DISCONTINUED] gabapentin (NEURONTIN) 300 mg capsule Take 300 mg by mouth 3 (three) times a day    [DISCONTINUED] semaglutide, 0.25 or 0.5 mg/dose, (Ozempic, 0.25 or 0.5 MG/DOSE,) 2 mg/3 mL injection pen Inject 0.5 mg once a week     Allergies   Allergen Reactions    Acetazolamide Hives    Barbiturates Other (See Comments)     stomach pain    Morphine GI Intolerance     Other reaction(s): GI Intolerance    Sulfa Antibiotics Hives and Rash    Sulfasalazine Hives and Rash     Immunization History   Administered Date(s) Administered    COVID-19 PFIZER VACCINE 0.3 ML IM 03/03/2021, 03/15/2021, 04/03/2021, 04/05/2021, 11/12/2021    INFLUENZA 10/18/2022    Influenza Quadrivalent, 6-35 Months IM 10/15/2014, 10/15/2015, 09/22/2016, 10/11/2017    Influenza, high dose seasonal 0.7 mL 10/20/2020, 10/20/2021, 10/18/2022, 10/17/2023    Influenza, injectable, quadrivalent, preservative free 0.5 mL 10/15/2018    Influenza, recombinant, quadrivalent,injectable, preservative free 10/07/2019    Influenza, seasonal, injectable 10/12/2012, 10/18/2013    Pneumococcal Conjugate 13-Valent 07/28/2020    Pneumococcal Polysaccharide PPV23 07/16/2019    Tdap 07/16/2019     Objective     /74 (BP Location: Left arm, Patient Position: Sitting, Cuff Size: Standard)   Pulse 86   Temp 97.5 °F (36.4 °C) (Temporal)   Resp 16   Ht 5' 0.06\" (1.526 m)   Wt 115 kg (252 lb 8 oz)   SpO2 97%   BMI 49.21 kg/m²     Physical Exam  Vitals and nursing note reviewed.   Constitutional:       Appearance: Normal appearance. She is well-developed.   HENT:      Head: Normocephalic and atraumatic.   Cardiovascular:      Rate and Rhythm: Normal rate and regular rhythm.   Pulmonary:      Effort: Pulmonary effort is normal.      Breath sounds: Normal breath sounds.   Abdominal:      General: " Bowel sounds are normal.      Palpations: Abdomen is soft.      Comments: Multiple surgical scars.  Mild right upper quadrant tenderness   Musculoskeletal:      Cervical back: Normal range of motion.   Skin:     General: Skin is warm.   Neurological:      General: No focal deficit present.      Mental Status: She is alert.   Psychiatric:         Mood and Affect: Mood normal.         Speech: Speech normal.

## 2024-08-23 NOTE — ASSESSMENT & PLAN NOTE
>>ASSESSMENT AND PLAN FOR DISORDER OF ADRENAL GLAND, UNSPECIFIED (HCC) WRITTEN ON 1/25/2023 11:52 AM BY SHERYL DOBBS MD    Follows with endocrinology.  Stable continue to monitor

## 2024-08-24 ENCOUNTER — APPOINTMENT (OUTPATIENT)
Dept: LAB | Facility: MEDICAL CENTER | Age: 69
End: 2024-08-24
Payer: COMMERCIAL

## 2024-08-24 DIAGNOSIS — M05.79 RHEUMATOID ARTHRITIS INVOLVING MULTIPLE SITES WITH POSITIVE RHEUMATOID FACTOR (HCC): ICD-10-CM

## 2024-08-24 LAB
ALBUMIN SERPL BCG-MCNC: 4.2 G/DL (ref 3.5–5)
ALP SERPL-CCNC: 62 U/L (ref 34–104)
ALT SERPL W P-5'-P-CCNC: 43 U/L (ref 7–52)
ANION GAP SERPL CALCULATED.3IONS-SCNC: 8 MMOL/L (ref 4–13)
AST SERPL W P-5'-P-CCNC: 30 U/L (ref 13–39)
BASOPHILS # BLD AUTO: 0.04 THOUSANDS/ÂΜL (ref 0–0.1)
BASOPHILS NFR BLD AUTO: 1 % (ref 0–1)
BILIRUB SERPL-MCNC: 1.23 MG/DL (ref 0.2–1)
BUN SERPL-MCNC: 13 MG/DL (ref 5–25)
CALCIUM SERPL-MCNC: 9.4 MG/DL (ref 8.4–10.2)
CHLORIDE SERPL-SCNC: 103 MMOL/L (ref 96–108)
CO2 SERPL-SCNC: 28 MMOL/L (ref 21–32)
CREAT SERPL-MCNC: 0.63 MG/DL (ref 0.6–1.3)
CRP SERPL QL: <1 MG/L
EOSINOPHIL # BLD AUTO: 0.2 THOUSAND/ÂΜL (ref 0–0.61)
EOSINOPHIL NFR BLD AUTO: 5 % (ref 0–6)
ERYTHROCYTE [DISTWIDTH] IN BLOOD BY AUTOMATED COUNT: 13.8 % (ref 11.6–15.1)
ERYTHROCYTE [SEDIMENTATION RATE] IN BLOOD: 4 MM/HOUR (ref 0–29)
GFR SERPL CREATININE-BSD FRML MDRD: 91 ML/MIN/1.73SQ M
GLUCOSE P FAST SERPL-MCNC: 139 MG/DL (ref 65–99)
HCT VFR BLD AUTO: 39.7 % (ref 34.8–46.1)
HGB BLD-MCNC: 13 G/DL (ref 11.5–15.4)
IMM GRANULOCYTES # BLD AUTO: 0.02 THOUSAND/UL (ref 0–0.2)
IMM GRANULOCYTES NFR BLD AUTO: 1 % (ref 0–2)
LYMPHOCYTES # BLD AUTO: 0.7 THOUSANDS/ÂΜL (ref 0.6–4.47)
LYMPHOCYTES NFR BLD AUTO: 16 % (ref 14–44)
MCH RBC QN AUTO: 32.7 PG (ref 26.8–34.3)
MCHC RBC AUTO-ENTMCNC: 32.7 G/DL (ref 31.4–37.4)
MCV RBC AUTO: 100 FL (ref 82–98)
MONOCYTES # BLD AUTO: 0.53 THOUSAND/ÂΜL (ref 0.17–1.22)
MONOCYTES NFR BLD AUTO: 12 % (ref 4–12)
NEUTROPHILS # BLD AUTO: 2.87 THOUSANDS/ÂΜL (ref 1.85–7.62)
NEUTS SEG NFR BLD AUTO: 65 % (ref 43–75)
NRBC BLD AUTO-RTO: 0 /100 WBCS
PLATELET # BLD AUTO: 233 THOUSANDS/UL (ref 149–390)
PMV BLD AUTO: 10.1 FL (ref 8.9–12.7)
POTASSIUM SERPL-SCNC: 4.4 MMOL/L (ref 3.5–5.3)
PROT SERPL-MCNC: 6.2 G/DL (ref 6.4–8.4)
RBC # BLD AUTO: 3.98 MILLION/UL (ref 3.81–5.12)
SODIUM SERPL-SCNC: 139 MMOL/L (ref 135–147)
WBC # BLD AUTO: 4.36 THOUSAND/UL (ref 4.31–10.16)

## 2024-08-24 PROCEDURE — 36415 COLL VENOUS BLD VENIPUNCTURE: CPT

## 2024-08-24 PROCEDURE — 85025 COMPLETE CBC W/AUTO DIFF WBC: CPT

## 2024-08-24 PROCEDURE — 85652 RBC SED RATE AUTOMATED: CPT

## 2024-08-24 PROCEDURE — 86140 C-REACTIVE PROTEIN: CPT

## 2024-08-24 PROCEDURE — 80053 COMPREHEN METABOLIC PANEL: CPT

## 2024-09-03 ENCOUNTER — HOSPITAL ENCOUNTER (OUTPATIENT)
Dept: RADIOLOGY | Facility: MEDICAL CENTER | Age: 69
Discharge: HOME | End: 2024-09-03
Payer: COMMERCIAL

## 2024-09-03 DIAGNOSIS — R10.11 CONTINUOUS RUQ ABDOMINAL PAIN: ICD-10-CM

## 2024-09-03 PROCEDURE — 74177 CT ABD & PELVIS W/CONTRAST: CPT

## 2024-09-03 RX ADMIN — IOHEXOL 100 ML: 350 INJECTION, SOLUTION INTRAVENOUS at 13:30

## 2024-09-12 DIAGNOSIS — N20.0 BILATERAL NEPHROLITHIASIS: Primary | ICD-10-CM

## 2024-09-24 DIAGNOSIS — E11.65 TYPE 2 DIABETES MELLITUS WITH HYPERGLYCEMIA, WITH LONG-TERM CURRENT USE OF INSULIN (HCC): ICD-10-CM

## 2024-09-24 DIAGNOSIS — Z79.4 TYPE 2 DIABETES MELLITUS WITH HYPERGLYCEMIA, WITH LONG-TERM CURRENT USE OF INSULIN (HCC): ICD-10-CM

## 2024-09-26 ENCOUNTER — PATIENT MESSAGE (OUTPATIENT)
Dept: ENDOCRINOLOGY | Facility: CLINIC | Age: 69
End: 2024-09-26

## 2024-09-26 DIAGNOSIS — Z79.4 TYPE 2 DIABETES MELLITUS WITH HYPERGLYCEMIA, WITH LONG-TERM CURRENT USE OF INSULIN (HCC): ICD-10-CM

## 2024-09-26 DIAGNOSIS — E11.65 TYPE 2 DIABETES MELLITUS WITH HYPERGLYCEMIA, WITH LONG-TERM CURRENT USE OF INSULIN (HCC): ICD-10-CM

## 2024-09-27 RX ORDER — INSULIN GLARGINE 100 [IU]/ML
INJECTION, SOLUTION SUBCUTANEOUS
Qty: 60 ML | Refills: 1 | Status: SHIPPED | OUTPATIENT
Start: 2024-09-27

## 2024-10-03 ENCOUNTER — APPOINTMENT (OUTPATIENT)
Dept: LAB | Facility: MEDICAL CENTER | Age: 69
End: 2024-10-03
Payer: COMMERCIAL

## 2024-10-03 DIAGNOSIS — M05.79 SEROPOSITIVE RHEUMATOID ARTHRITIS OF MULTIPLE SITES (HCC): ICD-10-CM

## 2024-10-03 DIAGNOSIS — M25.59 PAIN IN OTHER SPECIFIED JOINT: ICD-10-CM

## 2024-10-03 PROCEDURE — 80355 GABAPENTIN NON-BLOOD: CPT

## 2024-10-03 PROCEDURE — 80321 ALCOHOLS BIOMARKERS 1OR 2: CPT

## 2024-10-03 PROCEDURE — G0480 DRUG TEST DEF 1-7 CLASSES: HCPCS

## 2024-10-03 PROCEDURE — 80307 DRUG TEST PRSMV CHEM ANLYZR: CPT

## 2024-10-03 PROCEDURE — 80366 DRUG SCREENING PREGABALIN: CPT

## 2024-10-08 LAB
6MAM UR QL SCN: NEGATIVE NG/ML
ACCEPTABLE CREAT UR QL: 90 MG/DL
AMPHET UR QL SCN: NEGATIVE NG/ML
ANTICONVULSANTS: NORMAL
BARBITURATES UR QL SCN: NEGATIVE NG/ML
BENZODIAZ UR QL SCN: NEGATIVE NG/ML
BUPRENORPHINE UR QL CFM: NEGATIVE NG/ML
CANNABINOIDS UR QL SCN: NEGATIVE NG/ML
CARISOPRODOL UR QL: NEGATIVE NG/ML
COCAINE+BZE UR QL SCN: NEGATIVE NG/ML
ETHANOL BIOMARKERS: ABNORMAL
ETHYL GLUCURONIDE UR QL CFM: 1232 NG/MG CREAT
ETHYL GLUCURONIDE UR QL SCN: NORMAL NG/ML
ETHYL SULFATE UR QL CFM: NOT DETECTED NG/MG CREAT
FENTANYL UR QL SCN: NEGATIVE NG/ML
GABAPENTIN SERPLBLD QL SCN: NORMAL UG/ML
GABAPENTIN UR QL CFM: PRESENT
METHADONE UR QL SCN: NEGATIVE NG/ML
NITRITE UR QL STRIP: NEGATIVE UG/ML
OPIATES UR QL SCN: NEGATIVE NG/ML
OXYCODONE+OXYMORPHONE UR QL SCN: NEGATIVE NG/ML
PCP UR QL SCN: NEGATIVE NG/ML
PREGABALIN UR QL CFM: NOT DETECTED
PROPOXYPH UR QL SCN: NEGATIVE NG/ML
SPECIMEN PH ACCEPTABLE UR: 5.9 (ref 4.5–8.9)
TAPENTADOL UR QL SCN: NEGATIVE NG/ML
TRAMADOL UR QL SCN: NEGATIVE NG/ML

## 2024-10-22 ENCOUNTER — IMMUNIZATIONS (OUTPATIENT)
Dept: FAMILY MEDICINE CLINIC | Facility: CLINIC | Age: 69
End: 2024-10-22
Payer: COMMERCIAL

## 2024-10-22 DIAGNOSIS — Z23 ENCOUNTER FOR IMMUNIZATION: Primary | ICD-10-CM

## 2024-10-22 PROCEDURE — 90662 IIV NO PRSV INCREASED AG IM: CPT

## 2024-10-22 PROCEDURE — G0008 ADMIN INFLUENZA VIRUS VAC: HCPCS

## 2024-10-24 ENCOUNTER — LAB (OUTPATIENT)
Dept: LAB | Facility: MEDICAL CENTER | Age: 69
End: 2024-10-24
Payer: COMMERCIAL

## 2024-10-24 DIAGNOSIS — E11.65 TYPE 2 DIABETES MELLITUS WITH HYPERGLYCEMIA, WITH LONG-TERM CURRENT USE OF INSULIN (HCC): ICD-10-CM

## 2024-10-24 DIAGNOSIS — Z11.59 NEED FOR HEPATITIS C SCREENING TEST: ICD-10-CM

## 2024-10-24 DIAGNOSIS — M05.79 RHEUMATOID ARTHRITIS INVOLVING MULTIPLE SITES WITH POSITIVE RHEUMATOID FACTOR (HCC): ICD-10-CM

## 2024-10-24 DIAGNOSIS — Z11.59 NEED FOR HEPATITIS B SCREENING TEST: ICD-10-CM

## 2024-10-24 DIAGNOSIS — Z79.4 TYPE 2 DIABETES MELLITUS WITH HYPERGLYCEMIA, WITH LONG-TERM CURRENT USE OF INSULIN (HCC): ICD-10-CM

## 2024-10-24 DIAGNOSIS — Z11.1 SCREENING-PULMONARY TB: ICD-10-CM

## 2024-10-24 LAB
CREAT UR-MCNC: 138.6 MG/DL
EST. AVERAGE GLUCOSE BLD GHB EST-MCNC: 148 MG/DL
HBA1C MFR BLD: 6.8 %
HBV SURFACE AG SER QL: NORMAL
HCV AB SER QL: NORMAL
MICROALBUMIN UR-MCNC: 817.4 MG/L
MICROALBUMIN/CREAT 24H UR: 590 MG/G CREATININE (ref 0–30)

## 2024-10-24 PROCEDURE — 86480 TB TEST CELL IMMUN MEASURE: CPT

## 2024-10-24 PROCEDURE — 83036 HEMOGLOBIN GLYCOSYLATED A1C: CPT

## 2024-10-24 PROCEDURE — 87340 HEPATITIS B SURFACE AG IA: CPT

## 2024-10-24 PROCEDURE — 82043 UR ALBUMIN QUANTITATIVE: CPT

## 2024-10-24 PROCEDURE — 82570 ASSAY OF URINE CREATININE: CPT

## 2024-10-24 PROCEDURE — 86803 HEPATITIS C AB TEST: CPT

## 2024-10-25 LAB
GAMMA INTERFERON BACKGROUND BLD IA-ACNC: 0.04 IU/ML
M TB IFN-G BLD-IMP: NEGATIVE
M TB IFN-G CD4+ BCKGRND COR BLD-ACNC: 0 IU/ML
M TB IFN-G CD4+ BCKGRND COR BLD-ACNC: 0 IU/ML
MITOGEN IGNF BCKGRD COR BLD-ACNC: 2.63 IU/ML

## 2024-11-12 ENCOUNTER — OFFICE VISIT (OUTPATIENT)
Dept: ENDOCRINOLOGY | Facility: CLINIC | Age: 69
End: 2024-11-12
Payer: COMMERCIAL

## 2024-11-12 VITALS
HEIGHT: 60 IN | DIASTOLIC BLOOD PRESSURE: 76 MMHG | OXYGEN SATURATION: 94 % | BODY MASS INDEX: 48.69 KG/M2 | WEIGHT: 248 LBS | HEART RATE: 71 BPM | SYSTOLIC BLOOD PRESSURE: 122 MMHG

## 2024-11-12 DIAGNOSIS — E55.9 VITAMIN D DEFICIENCY: ICD-10-CM

## 2024-11-12 DIAGNOSIS — I10 ESSENTIAL HYPERTENSION: ICD-10-CM

## 2024-11-12 DIAGNOSIS — D35.01 ADENOMA OF RIGHT ADRENAL GLAND: ICD-10-CM

## 2024-11-12 DIAGNOSIS — E78.2 MIXED HYPERLIPIDEMIA: ICD-10-CM

## 2024-11-12 DIAGNOSIS — E11.65 TYPE 2 DIABETES MELLITUS WITH HYPERGLYCEMIA, WITH LONG-TERM CURRENT USE OF INSULIN (HCC): Primary | ICD-10-CM

## 2024-11-12 DIAGNOSIS — Z79.4 TYPE 2 DIABETES MELLITUS WITH HYPERGLYCEMIA, WITH LONG-TERM CURRENT USE OF INSULIN (HCC): Primary | ICD-10-CM

## 2024-11-12 PROCEDURE — 99214 OFFICE O/P EST MOD 30 MIN: CPT | Performed by: INTERNAL MEDICINE

## 2024-11-12 RX ORDER — INSULIN GLARGINE 100 [IU]/ML
INJECTION, SOLUTION SUBCUTANEOUS
Start: 2024-11-12

## 2024-11-12 NOTE — PROGRESS NOTES
Delia Samano 69 y.o. female MRN: [id number]    Encounter: [id number]      Assessment & Plan     Assessment:  This is a 69 y.o.-year-old female with diabetes with hyperglycemia.    Plan:  Diagnoses and all orders for this visit:    Type 2 diabetes mellitus with hyperglycemia, with long-term current use of insulin ([facility])    Lab Results   Component Value Date    HGBA1C 6.8 (H) 10/24/2024   A1c 6.8%, at goal  Fasting blood sugars are elevated in 130 to 170 mg per DL range, will increase Lantus to 55 units.  Continue metformin 1000 mg twice a day  Continue NovoLog 10 units 3 times daily with meals plus scale, she understands to hold the dose if she is not eating..  Educated about hypoglycemia symptoms and treatment.  Also discussed about continuous glucose monitor sensor use however patient declined.  She prefers to check blood sugars with glucometer.  Educated to check blood sugar twice daily and goal for blood sugar is 80 to 160 mg per DL range    -     Insulin Glargine Solostar (Lantus SoloStar) 100 UNIT/ML SOPN; Inject 55 units at bedtime  -     Albumin / creatinine urine ratio; Future  -     Basic metabolic panel; Future  -     Hemoglobin A1C; Future    Adenoma of right adrenal gland  Previously workup for pheochromocytoma x 2 was negative primary hyperaldosteronism and Cushing syndrome workup l is negative.  Patient does not have any symptoms of palpitation, syncopal or presyncopal episodes, flushing, excessive weight gain    Adrenal adenoma is stable as per imaging  Vitamin D deficiency  Continue vitamin D3 supplementation 2000 IU daily  Order vitamin D level  -     Vitamin D 25 hydroxy; Future    Essential hypertension  Blood pressure well-controlled, continue current management  Mixed hyperlipidemia  Lab Results   Component Value Date    LDLCALC 58 10/24/2024   LDL is at goal       CC: Diabetes    History of Present Illness     HPI:    Delia Samano is 69-year-old woman with medical history of type 2  diabetes managed on insulin regimen, vitamin D deficiency, hypertension and hyperlipidemia is here for follow-up.  her A1c has improved to 6.8%    Current regimen includes Tresiba 48 units at bedtime, metformin 1000 mg twice a day, NovoLog 10 units 3 times daily   sHe has not been taking Ozempic because of the coverage  SHe checks blood sugars twice daily and her blood sugars are usually controlled in 80 to 180 mg per DL range  Recent hospitalization for hyperglycemia or hypoglycemia    For hypertension, she takes lisinopril 10 mg daily, metoprolol 50 mg twice a day  For hyperlipidemia, she takes Lipitor 80 mg daily  Lab Results   Component Value Date    HGBA1C 6.8 (H) 10/24/2024     Wt Readings from Last 3 Encounters:   11/12/24 112 kg (248 lb)   08/23/24 115 kg (252 lb 8 oz)   07/19/24 111 kg (245 lb)       Latest Reference Range & Units 03/08/22 07:36 07/14/23 09:35 05/14/24 07:41   EPINEPHRINE PLASMA 0 - 62 pg/mL  <15 <15   METANEPHRINE FREE PLASMA 0.0 - 88.0 pg/mL 13.5 23.1    NORMETANEPHRINE FREE PLASMA 0.0 - 285.2 pg/mL 122.7 71.2       Latest Reference Range & Units 03/08/22 07:36 07/14/23 09:35   METANEPHRINE FREE PLASMA 0.0 - 88.0 pg/mL 13.5 23.1      Latest Reference Range & Units 06/10/19 08:56 02/15/24 10:35   CORTISOL 24H URINARY FREE 6 - 42 ug/24 hr 17 29          Review of Systems   Constitutional:  Negative for activity change, diaphoresis, fatigue, fever and unexpected weight change.   HENT: Negative.     Eyes:  Negative for visual disturbance.   Respiratory:  Negative for cough, chest tightness and shortness of breath.    Cardiovascular:  Negative for chest pain, palpitations and leg swelling.   Gastrointestinal:  Negative for abdominal pain, blood in stool, constipation, diarrhea, nausea and vomiting.   Endocrine: Negative for cold intolerance, heat intolerance, polydipsia, polyphagia and polyuria.   Genitourinary:  Negative for dysuria, enuresis, frequency and urgency.   Musculoskeletal:   "Negative for arthralgias and myalgias.   Skin:  Negative for pallor, rash and wound.   Allergic/Immunologic: Negative.    Neurological:  Negative for dizziness, tremors, weakness and numbness.   Hematological: Negative.    Psychiatric/Behavioral: Negative.         Historical Information   Past Medical History:   Diagnosis Date    Allergic     Ankylosing spondylitis (HCC)     Arthritis     Diabetes mellitus (HCC)     GERD (gastroesophageal reflux disease)     Heart attack (HCC)     Hematuria     last assessed 9/7/13    History of COVID-19     Hyperlipidemia     Hypertension     Myocardial infarction (HCC)     Nephrolithiasis 09/07/2013    Subclinical hypothyroidism 09/02/2015     Past Surgical History:   Procedure Laterality Date    APPENDECTOMY      BACK SURGERY      mulitple surgery, fusions \"top to bottom\"    BREAST CYST EXCISION Left     in the 90's    BREAST SURGERY Left     biopsy    CERVICAL SPINE SURGERY      CHOLECYSTECTOMY      CORONARY ANGIOPLASTY WITH STENT PLACEMENT      CYSTOSCOPY  05/06/2015    Diagnostic, last assessed 5/6/15    EYE SURGERY      HYSTERECTOMY      LITHOTRIPSY      NE CYSTO/URETERO W/LITHOTRIPSY &INDWELL STENT INSRT Left 04/18/2019    Procedure: CYSTO, URETEROSCOPY W/HOLMIUM LASER, RETROGRADE PYELOGRAM, STENT INSERTION;  Surgeon: Ang Bucio MD;  Location: [facility] OR;  Service: Urology     Social History   Social History     Substance and Sexual Activity   Alcohol Use Yes    Comment: very rarely     Social History     Substance and Sexual Activity   Drug Use No     Social History     Tobacco Use   Smoking Status Never    Passive exposure: Past   Smokeless Tobacco Never     Family History:   Family History   Problem Relation Age of Onset    Diabetes Mother     Gout Mother     Heart disease Mother     Diabetes type II Mother     Diabetes Father     Heart disease Father     No Known Problems Daughter     No Known Problems Maternal Grandmother     No Known Problems Maternal Grandfather " "    No Known Problems Paternal Grandmother     No Known Problems Paternal Grandfather     Nephrolithiasis Brother     No Known Problems Maternal Aunt     No Known Problems Maternal Aunt     No Known Problems Maternal Aunt     No Known Problems Maternal Aunt     No Known Problems Maternal Aunt     No Known Problems Maternal Aunt     No Known Problems Paternal Aunt     No Known Problems Paternal Aunt        Meds/Allergies   Current Outpatient Medications   Medication Sig Dispense Refill    Actemra 162 MG/0.9ML SOSY once a week      ALPRAZolam (XANAX) 0.25 mg tablet as needed      aspirin 81 MG tablet Take 81 mg by mouth daily      atorvastatin (LIPITOR) 80 mg tablet Take 80 mg by mouth Medrol Dose Pack scheduling ONLY      B-D ALLERGY SYRINGE 1CC/28G 28G X 1/2\" 1 ML MISC use every week      B-D UF III MINI PEN NEEDLES 31G X 5 MM MISC use 2 daily as directed  0    brompheniramine-pseudoephedrine-DM 30-2-10 MG/5ML syrup Take 5 mL by mouth 4 (four) times a day as needed for allergies 120 mL 0    calcium carbonate (OS-RILEY) 600 MG tablet Take 400 mg by mouth daily      cetirizine (ZyrTEC) 10 mg tablet Take 1 tablet (10 mg total) by mouth daily 14 tablet 0    Cholecalciferol (VITAMIN D) 2000 units tablet Take 2,000 Units by mouth daily      ferrous sulfate 324 (65 Fe) mg Take 1 tablet (324 mg total) by mouth 3 (three) times a week for 36 doses 12 tablet 2    fluticasone (FLONASE) 50 mcg/act nasal spray 1 spray into each nostril daily 15.8 mL 1    folic acid (FOLVITE) 800 MCG tablet Take 800 mg by mouth daily      gabapentin (NEURONTIN) 600 MG tablet       Insulin Glargine Solostar (Lantus SoloStar) 100 UNIT/ML SOPN Inject 55 units at bedtime      Insulin Pen Needle 31G X 8 MM MISC by Does not apply route      lisinopril (ZESTRIL) 10 mg tablet Take 1 tablet (10 mg total) by mouth daily 90 tablet 1    metFORMIN (GLUCOPHAGE) 1000 MG tablet Take 1 tablet by mouth 2 times a day with meals 180 tablet 1    methocarbamol (ROBAXIN) " 750 mg tablet PRN      methotrexate 50 MG/2ML injection Inject 0.8 mL under the skin once a week      metoprolol tartrate (LOPRESSOR) 50 mg tablet 50 mg every 12 (twelve) hours      Multiple Vitamin (MULTIVITAMIN) tablet Take 1 tablet by mouth daily      nitroglycerin (NITROSTAT) 0.4 mg SL tablet Place 1 tablet (0.4 mg total) under the tongue every 5 (five) minutes as needed for chest pain 90 tablet 1    NovoLOG FlexPen 100 units/mL injection pen Inject 10 units before breakfast and dinner +Scale ( max daily dose is 30 units)      omeprazole (PriLOSEC) 20 mg delayed release capsule Take 20 mg by mouth daily      Probiotic Product (PROBIOTIC-10 PO) Take by mouth daily       traMADol (ULTRAM) 50 mg tablet Take 50 mg by mouth as needed for moderate pain      amoxicillin (AMOXIL) 500 mg capsule Take 2,000 mg by mouth as needed 1 hour prior to dental appointment (Patient not taking: Reported on 11/12/2024)       No current facility-administered medications for this visit.     Allergies   Allergen Reactions    Acetazolamide Hives    Barbiturates Other (See Comments)     stomach pain    Morphine GI Intolerance     Other reaction(s): GI Intolerance    Sulfa Antibiotics Hives and Rash    Sulfasalazine Hives and Rash       Objective   Vitals: Blood pressure 122/76, pulse 71, height 5' (1.524 m), weight 112 kg (248 lb), SpO2 94%, not currently breastfeeding.    Physical Exam  Constitutional:       General: She is not in acute distress.     Appearance: Normal appearance. She is obese. She is not ill-appearing.   HENT:      Head: Normocephalic and atraumatic.      Nose: Nose normal.   Eyes:      Extraocular Movements: Extraocular movements intact.      Conjunctiva/sclera: Conjunctivae normal.   Pulmonary:      Effort: No respiratory distress.   Musculoskeletal:      Cervical back: Normal range of motion.   Neurological:      General: No focal deficit present.      Mental Status: She is alert and oriented to person, place, and  time.   Psychiatric:         Mood and Affect: Mood normal.         Behavior: Behavior normal.         The history was obtained from the review of the chart, patient.    Lab Results:   Lab Results   Component Value Date/Time    Hemoglobin A1C 6.8 (H) 10/24/2024 09:46 AM    Hemoglobin A1C 6.5 (H) 05/14/2024 07:41 AM    Hemoglobin A1C 7.7 (H) 01/10/2024 10:01 AM    WBC 4.63 10/24/2024 09:46 AM    WBC 4.36 08/24/2024 09:06 AM    WBC 5.67 02/15/2024 10:32 AM    Hemoglobin 13.2 10/24/2024 09:46 AM    Hemoglobin 13.0 08/24/2024 09:06 AM    Hemoglobin 13.0 02/15/2024 10:32 AM    Hematocrit 40.4 10/24/2024 09:46 AM    Hematocrit 39.7 08/24/2024 09:06 AM    Hematocrit 40.6 02/15/2024 10:32 AM     (H) 10/24/2024 09:46 AM     (H) 08/24/2024 09:06 AM    MCV 99 (H) 02/15/2024 10:32 AM    Platelets 203 10/24/2024 09:46 AM    Platelets 233 08/24/2024 09:06 AM    Platelets 255 02/15/2024 10:32 AM    BUN 21 10/24/2024 09:46 AM    BUN 13 08/24/2024 09:06 AM    BUN 24 05/14/2024 07:41 AM    Potassium 4.4 10/24/2024 09:46 AM    Potassium 4.4 08/24/2024 09:06 AM    Potassium 4.1 05/14/2024 07:41 AM    Chloride 103 10/24/2024 09:46 AM    Chloride 103 08/24/2024 09:06 AM    Chloride 103 05/14/2024 07:41 AM    CO2 29 10/24/2024 09:46 AM    CO2 28 08/24/2024 09:06 AM    CO2 29 05/14/2024 07:41 AM    Creatinine 0.68 10/24/2024 09:46 AM    Creatinine 0.63 08/24/2024 09:06 AM    Creatinine 0.64 05/14/2024 07:41 AM    AST 27 10/24/2024 09:46 AM    AST 30 08/24/2024 09:06 AM    AST 25 05/14/2024 07:41 AM    ALT 35 10/24/2024 09:46 AM    ALT 43 08/24/2024 09:06 AM    ALT 33 05/14/2024 07:41 AM    Total Protein 6.1 (L) 10/24/2024 09:46 AM    Total Protein 6.2 (L) 08/24/2024 09:06 AM    Total Protein 6.0 (L) 05/14/2024 07:41 AM    Albumin 4.1 10/24/2024 09:46 AM    Albumin 4.2 08/24/2024 09:06 AM    Albumin 4.1 05/14/2024 07:41 AM    HDL, Direct 54 10/24/2024 09:46 AM    Triglycerides 114 10/24/2024 09:46 AM           Imaging Studies:  "Results Review Statement: No pertinent imaging studies reviewed.    Portions of the record may have been created with voice recognition software. Occasional wrong word or \"sound a like\" substitutions may have occurred due to the inherent limitations of voice recognition software. Read the chart carefully and recognize, using context, where substitutions have occurred.    "

## 2024-12-03 DIAGNOSIS — I10 ESSENTIAL HYPERTENSION: ICD-10-CM

## 2024-12-03 NOTE — TELEPHONE ENCOUNTER
Arsalan lackey  Primary [facility] (supporting Judith Zhou MD)         12/3/24  9:40 AM  Could you please send a new script to [facility] for a new script. 90 days with three refills lisinopril 10 mg taken one daily. Their phone number is [phone] thank you.

## 2024-12-04 RX ORDER — LISINOPRIL 10 MG/1
10 TABLET ORAL DAILY
Qty: 90 TABLET | Refills: 1 | Status: SHIPPED | OUTPATIENT
Start: 2024-12-04

## 2024-12-09 ENCOUNTER — OFFICE VISIT (OUTPATIENT)
Dept: FAMILY MEDICINE CLINIC | Facility: CLINIC | Age: 69
End: 2024-12-09
Payer: COMMERCIAL

## 2024-12-09 VITALS
TEMPERATURE: 97.4 F | RESPIRATION RATE: 18 BRPM | SYSTOLIC BLOOD PRESSURE: 132 MMHG | WEIGHT: 248.5 LBS | DIASTOLIC BLOOD PRESSURE: 66 MMHG | OXYGEN SATURATION: 95 % | HEIGHT: 60 IN | HEART RATE: 66 BPM | BODY MASS INDEX: 48.79 KG/M2

## 2024-12-09 DIAGNOSIS — J06.9 ACUTE URI: Primary | ICD-10-CM

## 2024-12-09 PROCEDURE — G2211 COMPLEX E/M VISIT ADD ON: HCPCS

## 2024-12-09 PROCEDURE — 99213 OFFICE O/P EST LOW 20 MIN: CPT

## 2024-12-09 RX ORDER — FLUTICASONE PROPIONATE 50 MCG
1 SPRAY, SUSPENSION (ML) NASAL DAILY
Qty: 15.8 ML | Refills: 1 | Status: SHIPPED | OUTPATIENT
Start: 2024-12-09

## 2024-12-09 RX ORDER — AZITHROMYCIN 250 MG/1
TABLET, FILM COATED ORAL
Qty: 6 TABLET | Refills: 0 | Status: SHIPPED | OUTPATIENT
Start: 2024-12-09 | End: 2024-12-13

## 2024-12-09 NOTE — PROGRESS NOTES
Name: Delia Samano      : 1955      MRN: [id number]  Encounter Provider: Isai Young DO  Encounter Date: 2024   Encounter department: [facility]  :  Assessment & Plan  Acute URI  Progressive cough, congestion, sinus discomfort over the last week with some improvement with OTC medication.  COVID-negative x 2 at home.  Continue supportive care with OTC cough syrup, cough drops, saline rinses, Flonase as below, etc.  Can optimize Mucinex to 1200 mg twice a day for the next 5 to 7 days  Z-Jose as below due to progressive symptoms  ED/return precautions reviewed  Orders:    fluticasone (FLONASE) 50 mcg/act nasal spray; 1 spray into each nostril daily    azithromycin (ZITHROMAX) 250 mg tablet; Take 2 tablets today then 1 tablet daily x 4 days         History of Present Illness     Cough  Associated symptoms include postnasal drip and a sore throat. Pertinent negatives include no chills, ear pain, fever, headaches or shortness of breath.   Sore Throat   This is a new problem. The current episode started in the past 7 days. The problem has been gradually worsening. Neither side of throat is experiencing more pain than the other. There has been no fever. The pain is at a severity of 3/10. The pain is mild. Associated symptoms include congestion, coughing and a hoarse voice. Pertinent negatives include no abdominal pain, diarrhea, drooling, ear discharge, ear pain, headaches, plugged ear sensation, neck pain, shortness of breath, stridor, swollen glands or vomiting.       Patient with complaints of cough, congestion, intermittent wheezing, hoarse voice for about a week.  Tested negative for COVID x 2 at home.  Has been taking Mucinex and cough syrup with some improvement.  No fevers or chills or difficulty breathing.    Review of Systems   Constitutional:  Negative for chills and fever.   HENT:  Positive for congestion, hoarse voice, postnasal drip, sinus pressure and sore throat.  "Negative for drooling, ear discharge and ear pain.    Respiratory:  Positive for cough. Negative for shortness of breath and stridor.    Gastrointestinal:  Negative for abdominal pain, diarrhea and vomiting.   Musculoskeletal:  Negative for neck pain.   Neurological:  Negative for headaches.     Current Outpatient Medications on File Prior to Visit   Medication Sig Dispense Refill    Actemra 162 MG/0.9ML SOSY once a week      ALPRAZolam (XANAX) 0.25 mg tablet as needed      aspirin 81 MG tablet Take 81 mg by mouth daily      atorvastatin (LIPITOR) 80 mg tablet Take 80 mg by mouth Medrol Dose Pack scheduling ONLY      B-D ALLERGY SYRINGE 1CC/28G 28G X 1/2\" 1 ML MISC use every week      B-D UF III MINI PEN NEEDLES 31G X 5 MM MISC use 2 daily as directed  0    brompheniramine-pseudoephedrine-DM 30-2-10 MG/5ML syrup Take 5 mL by mouth 4 (four) times a day as needed for allergies 120 mL 0    calcium carbonate (OS-RILEY) 600 MG tablet Take 400 mg by mouth daily      cetirizine (ZyrTEC) 10 mg tablet Take 1 tablet (10 mg total) by mouth daily 14 tablet 0    Cholecalciferol (VITAMIN D) 2000 units tablet Take 2,000 Units by mouth daily      folic acid (FOLVITE) 800 MCG tablet Take 800 mg by mouth daily      gabapentin (NEURONTIN) 600 MG tablet       Insulin Glargine Solostar (Lantus SoloStar) 100 UNIT/ML SOPN Inject 55 units at bedtime      Insulin Pen Needle 31G X 8 MM MISC by Does not apply route      lisinopril (ZESTRIL) 10 mg tablet Take 1 tablet (10 mg total) by mouth daily 90 tablet 1    metFORMIN (GLUCOPHAGE) 1000 MG tablet Take 1 tablet by mouth 2 times a day with meals 180 tablet 1    methocarbamol (ROBAXIN) 750 mg tablet PRN      methotrexate 50 MG/2ML injection Inject 0.8 mL under the skin once a week      metoprolol tartrate (LOPRESSOR) 50 mg tablet 50 mg every 12 (twelve) hours      Multiple Vitamin (MULTIVITAMIN) tablet Take 1 tablet by mouth daily      nitroglycerin (NITROSTAT) 0.4 mg SL tablet Place 1 tablet " (0.4 mg total) under the tongue every 5 (five) minutes as needed for chest pain 90 tablet 1    NovoLOG FlexPen 100 units/mL injection pen Inject 10 units before breakfast and dinner +Scale ( max daily dose is 30 units)      omeprazole (PriLOSEC) 20 mg delayed release capsule Take 20 mg by mouth daily      Probiotic Product (PROBIOTIC-10 PO) Take by mouth daily       traMADol (ULTRAM) 50 mg tablet Take 50 mg by mouth as needed for moderate pain      [DISCONTINUED] fluticasone (FLONASE) 50 mcg/act nasal spray 1 spray into each nostril daily 15.8 mL 1    amoxicillin (AMOXIL) 500 mg capsule Take 2,000 mg by mouth as needed 1 hour prior to dental appointment (Patient not taking: Reported on 12/9/2024)      ferrous sulfate 324 (65 Fe) mg Take 1 tablet (324 mg total) by mouth 3 (three) times a week for 36 doses 12 tablet 2     No current facility-administered medications on file prior to visit.         Objective   /66 (BP Location: Left arm, Patient Position: Sitting, Cuff Size: Large)   Pulse 66   Temp (!) 97.4 °F (36.3 °C) (Temporal)   Resp 18   Ht 5' (1.524 m)   Wt 113 kg (248 lb 8 oz)   SpO2 95%   BMI 48.53 kg/m²      Physical Exam  Vitals reviewed.   Constitutional:       Appearance: She is obese.   HENT:      Head: Normocephalic and atraumatic.      Comments: Maxillary sinus discomfort but denies pain     Right Ear: External ear normal.      Left Ear: External ear normal.      Nose: Congestion present.      Mouth/Throat:      Pharynx: Oropharynx is clear. Posterior oropharyngeal erythema present.   Eyes:      General:         Right eye: No discharge.         Left eye: No discharge.      Extraocular Movements: Extraocular movements intact.      Conjunctiva/sclera: Conjunctivae normal.   Cardiovascular:      Rate and Rhythm: Normal rate and regular rhythm.      Pulses: Normal pulses.      Heart sounds: Normal heart sounds.   Pulmonary:      Effort: Pulmonary effort is normal. No respiratory distress.       Breath sounds: Normal breath sounds. No wheezing, rhonchi or rales.   Abdominal:      Palpations: Abdomen is soft.   Musculoskeletal:      Cervical back: Neck supple.   Skin:     General: Skin is warm.   Neurological:      Mental Status: She is alert and oriented to person, place, and time.   Psychiatric:         Mood and Affect: Mood normal.         Behavior: Behavior normal.         Thought Content: Thought content normal.         Judgment: Judgment normal.       Administrative Statements   I have spent a total time of 20 minutes in caring for this patient on the day of the visit/encounter including Prognosis, Risks and benefits of tx options, Instructions for management, Patient and family education, Importance of tx compliance, Risk factor reductions, Documenting in the medical record, Reviewing / ordering tests, medicine, procedures  , and Obtaining or reviewing history  .

## 2025-01-15 ENCOUNTER — TELEPHONE (OUTPATIENT)
Dept: ENDOCRINOLOGY | Facility: CLINIC | Age: 70
End: 2025-01-15

## 2025-01-16 NOTE — TELEPHONE ENCOUNTER
I do not believe I can give her RX with out assessment, she will need appt   It was not started during last visit.   I do not know who started PA process     Sherrie Prado

## 2025-01-16 NOTE — TELEPHONE ENCOUNTER
the PA is for  Ozempic, 0.25 or 0.5 MG is not on patients active medication list, if patient is to be on Ozempic, 0.25 or 0.5 MG please prescribe rx and send message back to the prior authorization pool so a prior authorization can be submitted. Thank you

## 2025-01-27 ENCOUNTER — OFFICE VISIT (OUTPATIENT)
Dept: FAMILY MEDICINE CLINIC | Facility: CLINIC | Age: 70
End: 2025-01-27
Payer: COMMERCIAL

## 2025-01-27 VITALS
SYSTOLIC BLOOD PRESSURE: 124 MMHG | DIASTOLIC BLOOD PRESSURE: 72 MMHG | OXYGEN SATURATION: 97 % | HEART RATE: 68 BPM | BODY MASS INDEX: 48.98 KG/M2 | HEIGHT: 60 IN | WEIGHT: 249.5 LBS | TEMPERATURE: 97.6 F | RESPIRATION RATE: 18 BRPM

## 2025-01-27 DIAGNOSIS — F11.20 OPIOID DEPENDENCE, UNCOMPLICATED (HCC): ICD-10-CM

## 2025-01-27 DIAGNOSIS — E11.29 MICROALBUMINURIA DUE TO TYPE 2 DIABETES MELLITUS  (HCC): ICD-10-CM

## 2025-01-27 DIAGNOSIS — Z79.4 TYPE 2 DIABETES MELLITUS WITH DIABETIC PERIPHERAL ANGIOPATHY WITHOUT GANGRENE, WITH LONG-TERM CURRENT USE OF INSULIN (HCC): ICD-10-CM

## 2025-01-27 DIAGNOSIS — E66.813 CLASS 3 SEVERE OBESITY DUE TO EXCESS CALORIES WITH SERIOUS COMORBIDITY AND BODY MASS INDEX (BMI) OF 45.0 TO 49.9 IN ADULT (HCC): ICD-10-CM

## 2025-01-27 DIAGNOSIS — E11.51 TYPE 2 DIABETES MELLITUS WITH DIABETIC PERIPHERAL ANGIOPATHY WITHOUT GANGRENE, WITH LONG-TERM CURRENT USE OF INSULIN (HCC): ICD-10-CM

## 2025-01-27 DIAGNOSIS — M35.05 SJOGREN SYNDROME WITH INFLAMMATORY ARTHRITIS (HCC): ICD-10-CM

## 2025-01-27 DIAGNOSIS — R80.9 MICROALBUMINURIA DUE TO TYPE 2 DIABETES MELLITUS  (HCC): ICD-10-CM

## 2025-01-27 DIAGNOSIS — E27.9 ADRENAL NODULE (HCC): ICD-10-CM

## 2025-01-27 DIAGNOSIS — M05.79 RHEUMATOID ARTHRITIS INVOLVING MULTIPLE SITES WITH POSITIVE RHEUMATOID FACTOR (HCC): ICD-10-CM

## 2025-01-27 DIAGNOSIS — E66.01 CLASS 3 SEVERE OBESITY DUE TO EXCESS CALORIES WITH SERIOUS COMORBIDITY AND BODY MASS INDEX (BMI) OF 45.0 TO 49.9 IN ADULT (HCC): ICD-10-CM

## 2025-01-27 DIAGNOSIS — H69.92 DYSFUNCTION OF LEFT EUSTACHIAN TUBE: Primary | ICD-10-CM

## 2025-01-27 PROCEDURE — 99213 OFFICE O/P EST LOW 20 MIN: CPT | Performed by: FAMILY MEDICINE

## 2025-01-27 PROCEDURE — G2211 COMPLEX E/M VISIT ADD ON: HCPCS | Performed by: FAMILY MEDICINE

## 2025-01-27 RX ORDER — AZITHROMYCIN 250 MG/1
TABLET, FILM COATED ORAL
Qty: 6 TABLET | Refills: 0 | Status: SHIPPED | OUTPATIENT
Start: 2025-01-27 | End: 2025-01-31

## 2025-01-27 NOTE — ASSESSMENT & PLAN NOTE
She is followed by Endocrinology      Lab Results   Component Value Date    HGBA1C 6.8 (H) 10/24/2024

## 2025-01-27 NOTE — ASSESSMENT & PLAN NOTE
09/2024 Stable right adrenal nodule measuring 3.3 x 2.4 cm now containing more coarse calcifications compared to previous study. Normal left adrenal gland.

## 2025-01-27 NOTE — PROGRESS NOTES
Name: Delia Samano      : 1955      MRN: [id number]  Encounter Provider: Jeremiah Glass MD  Encounter Date: 2025   Encounter department: [facility]  :  Assessment & Plan  Dysfunction of left eustachian tube    Suspect element of L TMJ as well     Symptom treatment   Recheck as needed     Orders:    azithromycin (ZITHROMAX) 250 mg tablet; Take 2 tablets today then 1 tablet daily x 4 days        Type 2 diabetes mellitus with diabetic peripheral angiopathy without gangrene, with long-term current use of insulin ([facility])    She is followed by Endocrinology      Lab Results   Component Value Date    HGBA1C 6.8 (H) 10/24/2024            Microalbuminuria due to type 2 diabetes mellitus  ([facility])    Lab Results   Component Value Date    HGBA1C 6.8 (H) 10/24/2024            Rheumatoid arthritis involving multiple sites with positive rheumatoid factor ([facility])    On Methotrexate followed by rheumatology       Sjogren syndrome with inflammatory arthritis ([facility])         Opioid dependence, uncomplicated ([facility])    On Tramadol followed by Pain Management          Adrenal nodule ([facility])    2024 Stable right adrenal nodule measuring 3.3 x 2.4 cm now containing more coarse calcifications compared to previous study. Normal left adrenal gland.          Class 3 severe obesity due to excess calories with serious comorbidity and body mass index (BMI) of 45.0 to 49.9 in adult ([facility])    BMI Readings from Last 1 Encounters:   25 48.73 kg/m²                       History of Present Illness   1 week history of recurrent left-sided facial pain/ear pain.  No left ear drainage.  No fevers or chills.  No cough.  No nasal congestion or postnasal drainage.  She has been using Flonase nasal spray/Mucinex as needed      Review of Systems   Constitutional:  Negative for appetite change, chills, fever and unexpected weight change.   HENT:  Positive for congestion, ear pain and sinus pressure. Negative for ear discharge,  hearing loss and sore throat.    Respiratory:  Negative for cough.    Gastrointestinal:  Negative for diarrhea, nausea and vomiting.   Neurological:  Negative for dizziness and headaches.   Hematological:  Negative for adenopathy.       Objective   /72 (BP Location: Left arm, Patient Position: Sitting, Cuff Size: Standard)   Pulse 68   Temp 97.6 °F (36.4 °C) (Temporal)   Resp 18   Ht 5' (1.524 m)   Wt 113 kg (249 lb 8 oz)   SpO2 97%   BMI 48.73 kg/m²      Physical Exam  Constitutional:       General: She is not in acute distress.  HENT:      Right Ear: Tympanic membrane and ear canal normal.      Left Ear: Ear canal normal.  No middle ear effusion. Tympanic membrane is erythematous and retracted.      Ears:      Comments: Left TMJ tenderness.  No left mastoid tenderness or swelling     Nose:      Comments: No sinus tenderness      Mouth/Throat:      Pharynx: No posterior oropharyngeal erythema.   Eyes:      Conjunctiva/sclera: Conjunctivae normal.   Cardiovascular:      Rate and Rhythm: Normal rate and regular rhythm.      Heart sounds: No murmur heard.     No gallop.   Pulmonary:      Effort: No respiratory distress.      Breath sounds: No wheezing or rales.   Skin:     Findings: No rash.   Neurological:      Mental Status: She is alert and oriented to person, place, and time.

## 2025-02-17 DIAGNOSIS — Z79.4 TYPE 2 DIABETES MELLITUS WITH HYPERGLYCEMIA, WITH LONG-TERM CURRENT USE OF INSULIN (HCC): ICD-10-CM

## 2025-02-17 DIAGNOSIS — E11.65 TYPE 2 DIABETES MELLITUS WITH HYPERGLYCEMIA, WITH LONG-TERM CURRENT USE OF INSULIN (HCC): ICD-10-CM

## 2025-02-18 ENCOUNTER — PATIENT MESSAGE (OUTPATIENT)
Dept: ENDOCRINOLOGY | Facility: CLINIC | Age: 70
End: 2025-02-18

## 2025-02-18 DIAGNOSIS — Z79.4 TYPE 2 DIABETES MELLITUS WITH HYPERGLYCEMIA, WITH LONG-TERM CURRENT USE OF INSULIN (HCC): ICD-10-CM

## 2025-02-18 DIAGNOSIS — E11.65 TYPE 2 DIABETES MELLITUS WITH HYPERGLYCEMIA, WITH LONG-TERM CURRENT USE OF INSULIN (HCC): ICD-10-CM

## 2025-02-18 RX ORDER — INSULIN ASPART 100 [IU]/ML
INJECTION, SOLUTION INTRAVENOUS; SUBCUTANEOUS
Qty: 15 ML | OUTPATIENT
Start: 2025-02-18

## 2025-02-18 RX ORDER — INSULIN ASPART 100 [IU]/ML
INJECTION, SOLUTION INTRAVENOUS; SUBCUTANEOUS
Qty: 45 ML | Refills: 2 | Status: SHIPPED | OUTPATIENT
Start: 2025-02-18

## 2025-03-19 ENCOUNTER — RESULTS FOLLOW-UP (OUTPATIENT)
Dept: ENDOCRINOLOGY | Facility: CLINIC | Age: 70
End: 2025-03-19

## 2025-03-19 ENCOUNTER — APPOINTMENT (OUTPATIENT)
Dept: LAB | Facility: MEDICAL CENTER | Age: 70
End: 2025-03-19
Payer: COMMERCIAL

## 2025-03-19 DIAGNOSIS — E55.9 VITAMIN D DEFICIENCY: ICD-10-CM

## 2025-03-19 DIAGNOSIS — E11.65 TYPE 2 DIABETES MELLITUS WITH HYPERGLYCEMIA, WITH LONG-TERM CURRENT USE OF INSULIN (HCC): ICD-10-CM

## 2025-03-19 DIAGNOSIS — Z79.4 TYPE 2 DIABETES MELLITUS WITH HYPERGLYCEMIA, WITH LONG-TERM CURRENT USE OF INSULIN (HCC): ICD-10-CM

## 2025-03-19 LAB
25(OH)D3 SERPL-MCNC: 35 NG/ML (ref 30–100)
ANION GAP SERPL CALCULATED.3IONS-SCNC: 10 MMOL/L (ref 4–13)
BUN SERPL-MCNC: 15 MG/DL (ref 5–25)
CALCIUM SERPL-MCNC: 9.7 MG/DL (ref 8.4–10.2)
CHLORIDE SERPL-SCNC: 100 MMOL/L (ref 96–108)
CO2 SERPL-SCNC: 28 MMOL/L (ref 21–32)
CREAT SERPL-MCNC: 0.59 MG/DL (ref 0.6–1.3)
CREAT UR-MCNC: 48.7 MG/DL
EST. AVERAGE GLUCOSE BLD GHB EST-MCNC: 186 MG/DL
GFR SERPL CREATININE-BSD FRML MDRD: 93 ML/MIN/1.73SQ M
GLUCOSE P FAST SERPL-MCNC: 210 MG/DL (ref 65–99)
HBA1C MFR BLD: 8.1 %
MICROALBUMIN UR-MCNC: 149.1 MG/L
MICROALBUMIN/CREAT 24H UR: 306 MG/G CREATININE (ref 0–30)
POTASSIUM SERPL-SCNC: 4.6 MMOL/L (ref 3.5–5.3)
SODIUM SERPL-SCNC: 138 MMOL/L (ref 135–147)

## 2025-03-19 PROCEDURE — 80048 BASIC METABOLIC PNL TOTAL CA: CPT

## 2025-03-19 PROCEDURE — 83036 HEMOGLOBIN GLYCOSYLATED A1C: CPT

## 2025-03-19 PROCEDURE — 82570 ASSAY OF URINE CREATININE: CPT

## 2025-03-19 PROCEDURE — 82306 VITAMIN D 25 HYDROXY: CPT

## 2025-03-19 PROCEDURE — 36415 COLL VENOUS BLD VENIPUNCTURE: CPT

## 2025-03-19 PROCEDURE — 82043 UR ALBUMIN QUANTITATIVE: CPT

## 2025-03-26 LAB
LEFT EYE DIABETIC RETINOPATHY: NORMAL
RIGHT EYE DIABETIC RETINOPATHY: NORMAL

## 2025-04-07 ENCOUNTER — OFFICE VISIT (OUTPATIENT)
Dept: FAMILY MEDICINE CLINIC | Facility: CLINIC | Age: 70
End: 2025-04-07
Payer: COMMERCIAL

## 2025-04-07 VITALS
BODY MASS INDEX: 48.69 KG/M2 | OXYGEN SATURATION: 100 % | DIASTOLIC BLOOD PRESSURE: 62 MMHG | SYSTOLIC BLOOD PRESSURE: 126 MMHG | HEIGHT: 60 IN | WEIGHT: 248 LBS | TEMPERATURE: 96.8 F | HEART RATE: 68 BPM | RESPIRATION RATE: 16 BRPM

## 2025-04-07 DIAGNOSIS — R39.9 UTI SYMPTOMS: Primary | ICD-10-CM

## 2025-04-07 LAB
BACTERIA UR QL AUTO: ABNORMAL /HPF
BILIRUB UR QL STRIP: NEGATIVE
CAOX CRY URNS QL MICRO: ABNORMAL /HPF
CLARITY UR: ABNORMAL
COLOR UR: YELLOW
GLUCOSE UR STRIP-MCNC: ABNORMAL MG/DL
HGB UR QL STRIP.AUTO: ABNORMAL
KETONES UR STRIP-MCNC: NEGATIVE MG/DL
LEUKOCYTE ESTERASE UR QL STRIP: ABNORMAL
MUCOUS THREADS UR QL AUTO: ABNORMAL
NITRITE UR QL STRIP: NEGATIVE
NON-SQ EPI CELLS URNS QL MICRO: ABNORMAL /HPF
PH UR STRIP.AUTO: 6 [PH]
PROT UR STRIP-MCNC: ABNORMAL MG/DL
RBC #/AREA URNS AUTO: ABNORMAL /HPF
SL AMB  POCT GLUCOSE, UA: NORMAL
SL AMB LEUKOCYTE ESTERASE,UA: NORMAL
SL AMB POCT BILIRUBIN,UA: NORMAL
SL AMB POCT BLOOD,UA: NORMAL
SL AMB POCT CLARITY,UA: CLEAR
SL AMB POCT COLOR,UA: NORMAL
SL AMB POCT KETONES,UA: NORMAL
SL AMB POCT NITRITE,UA: NORMAL
SL AMB POCT PH,UA: 6
SL AMB POCT SPECIFIC GRAVITY,UA: 1.01
SL AMB POCT URINE PROTEIN: NORMAL
SL AMB POCT UROBILINOGEN: NORMAL
SP GR UR STRIP.AUTO: 1.02 (ref 1–1.03)
UROBILINOGEN UR STRIP-ACNC: <2 MG/DL
WBC #/AREA URNS AUTO: ABNORMAL /HPF

## 2025-04-07 PROCEDURE — 81002 URINALYSIS NONAUTO W/O SCOPE: CPT | Performed by: FAMILY MEDICINE

## 2025-04-07 PROCEDURE — 81001 URINALYSIS AUTO W/SCOPE: CPT | Performed by: FAMILY MEDICINE

## 2025-04-07 PROCEDURE — 87086 URINE CULTURE/COLONY COUNT: CPT | Performed by: FAMILY MEDICINE

## 2025-04-07 PROCEDURE — 99213 OFFICE O/P EST LOW 20 MIN: CPT | Performed by: FAMILY MEDICINE

## 2025-04-07 PROCEDURE — G2211 COMPLEX E/M VISIT ADD ON: HCPCS | Performed by: FAMILY MEDICINE

## 2025-04-07 RX ORDER — NITROFURANTOIN 25; 75 MG/1; MG/1
100 CAPSULE ORAL 2 TIMES DAILY
Qty: 10 CAPSULE | Refills: 0 | Status: SHIPPED | OUTPATIENT
Start: 2025-04-07 | End: 2025-04-12

## 2025-04-08 ENCOUNTER — OFFICE VISIT (OUTPATIENT)
Dept: ENDOCRINOLOGY | Facility: CLINIC | Age: 70
End: 2025-04-08
Payer: COMMERCIAL

## 2025-04-08 VITALS
BODY MASS INDEX: 48.69 KG/M2 | OXYGEN SATURATION: 95 % | WEIGHT: 248 LBS | HEIGHT: 60 IN | DIASTOLIC BLOOD PRESSURE: 66 MMHG | HEART RATE: 65 BPM | SYSTOLIC BLOOD PRESSURE: 122 MMHG

## 2025-04-08 DIAGNOSIS — I10 ESSENTIAL HYPERTENSION: ICD-10-CM

## 2025-04-08 DIAGNOSIS — E27.9 ADRENAL NODULE (HCC): ICD-10-CM

## 2025-04-08 DIAGNOSIS — Z79.4 TYPE 2 DIABETES MELLITUS WITH DIABETIC PERIPHERAL ANGIOPATHY WITHOUT GANGRENE, WITH LONG-TERM CURRENT USE OF INSULIN (HCC): Primary | ICD-10-CM

## 2025-04-08 DIAGNOSIS — E11.51 TYPE 2 DIABETES MELLITUS WITH DIABETIC PERIPHERAL ANGIOPATHY WITHOUT GANGRENE, WITH LONG-TERM CURRENT USE OF INSULIN (HCC): Primary | ICD-10-CM

## 2025-04-08 DIAGNOSIS — E78.2 MIXED HYPERLIPIDEMIA: ICD-10-CM

## 2025-04-08 LAB — BACTERIA UR CULT: NORMAL

## 2025-04-08 PROCEDURE — 99214 OFFICE O/P EST MOD 30 MIN: CPT | Performed by: PHYSICIAN ASSISTANT

## 2025-04-08 RX ORDER — PREDNISONE 5 MG/1
TABLET ORAL
COMMUNITY
Start: 2025-03-18

## 2025-04-08 RX ORDER — METFORMIN HYDROCHLORIDE 500 MG/1
500 TABLET, EXTENDED RELEASE ORAL 2 TIMES DAILY WITH MEALS
Qty: 360 TABLET | Refills: 2 | Status: SHIPPED | OUTPATIENT
Start: 2025-04-08 | End: 2025-04-11 | Stop reason: SDUPTHER

## 2025-04-08 RX ORDER — DOXYCYCLINE HYCLATE 100 MG
1 TABLET ORAL 2 TIMES DAILY
COMMUNITY
Start: 2025-02-11

## 2025-04-08 NOTE — PATIENT INSTRUCTIONS
Hypoglycemia instructions   Delia Samano  4/8/2025  [id number]    Low Blood Sugar    Steps to treat low blood sugar.    1. Test blood sugar if you have symptoms of low blood sugar:   Low Blood Sugar Symptoms:  o Sweaty  o Dizzy  o Rapid heartbeat  o Shaky  o Bad mood  o Hungry      2. Treat blood sugar less than 70 with 15 grams of fast-acting carbohydrate:   Examples of 15 grams Fast-Acting Carbohydrate:  o 4 oz juice  o 4 oz regular soda  o 3-4 glucose tablets (chew)  o 3-4 hard candies (chew)          3.  Wait 15 minutes and test your blood sugar again     4. If blood sugar is less than 100, repeat steps 2-3.    5. When your blood sugar is 100 or more, eat a snack if it will be longer than one hour until your next meal. The snack should be 15 grams of carbohydrate and a protein:   Examples of snacks:  o ½ sandwich  o 6 crackers with cheese  o Piece of fruit with cheese or peanut butter  o 6 crackers with peanut butter

## 2025-04-08 NOTE — PROGRESS NOTES
Name: Delia Samano      : 1955      MRN: [id number]  Encounter Provider: Judith Zhou MD  Encounter Date: 2025   Encounter department: [facility]  :  Assessment & Plan  UTI symptoms  UTI symptoms.  Start Macrobid 100 mg twice daily for 5 days.  Send out for microscopy and culture.  Orders:    nitrofurantoin (MACROBID) 100 mg capsule; Take 1 capsule (100 mg total) by mouth 2 (two) times a day for 5 days    Urine culture    Urinalysis with microscopic    POCT urine dip           History of Present Illness   Patient presents with:  Urinary Tract Infection: Last Friday   Pressure   Hurts when done peeing  Tylenol     Patient presents today with UTI symptoms.  Started last Friday.  She feels pressure and pain with urination.  She has tried Tylenol.  No improvement.  Denies any flank pain or fevers.    Urinary Tract Infection   The current episode started in the past 7 days. The problem occurs every urination. The problem has been unchanged. The quality of the pain is described as burning. The pain is at a severity of 3/10. The pain is moderate. There has been no fever. Associated symptoms include frequency and urgency. Pertinent negatives include no chills, discharge or flank pain.     Review of Systems   Constitutional:  Negative for chills.   Genitourinary:  Positive for frequency and urgency. Negative for flank pain.       Objective   /62 (BP Location: Left arm, Patient Position: Sitting, Cuff Size: Large)   Pulse 68   Temp (!) 96.8 °F (36 °C) (Temporal)   Resp 16   Ht 5' (1.524 m)   Wt 112 kg (248 lb)   SpO2 100%   BMI 48.43 kg/m²      Physical Exam  Vitals and nursing note reviewed.   Constitutional:       Appearance: Normal appearance. She is well-developed.   HENT:      Head: Normocephalic and atraumatic.   Cardiovascular:      Rate and Rhythm: Normal rate and regular rhythm.   Pulmonary:      Effort: Pulmonary effort is normal.      Breath sounds: Normal  breath sounds.   Abdominal:      General: Bowel sounds are normal.      Palpations: Abdomen is soft.   Musculoskeletal:      Cervical back: Normal range of motion.   Skin:     General: Skin is warm.   Neurological:      General: No focal deficit present.      Mental Status: She is alert.   Psychiatric:         Mood and Affect: Mood normal.         Speech: Speech normal.

## 2025-04-08 NOTE — ASSESSMENT & PLAN NOTE
Stable right adrenal nodule  Prior workup for Cushing syndrome, pheochromocytoma, primary hyperaldosteronism was negative  Orders:    Cortisol Level,7-9 AM Specimen; Future    Metanephrine, Fractionated Plasma Free; Future    Aldosterone/Renin Ratio; Future

## 2025-04-08 NOTE — ASSESSMENT & PLAN NOTE
HGA1C 8.1%. Worsened.  Treatment regimen: she had diarrhea on metformin IR 1000 mg BID. Switch to ER metformin 1000 mg BID. If she has recurrent symptoms, will decrease dose to 1500 mg daily. Continue current dose of insulins without missing doses. Missing lunch time dose likely is causing higher BG levels at dinner.   Discussed intensive insulin regimen does increase risk for hypoglycemia. Episodes of hypoglycemia can lead to permanent disability and death.  Discussed risks/complications associated with uncontrolled diabetes.    Advised to adhere to diabetic diet, and recommended staying active/exercising routinely as tolerated.  Keep carbohydrates consistent to limit blood glucose fluctuations.  Advised to call if blood sugars less than 70 mg/dl or over 300 mg/dl.   Check blood glucose 3+ times a day  Discussed symptoms and treatment of hypoglycemia.   Discussed use of CGM to collect additional blood glucose data to reveal trends and patterns that can be used to optimize treatment plan.   Recommended routine follow-up with podiatry and ophthalmology.   Send log in 1-2 weeks.    Ordered blood work to complete prior to next visit.    Lab Results   Component Value Date    HGBA1C 8.1 (H) 03/19/2025       Orders:    Hemoglobin A1C; Future    Albumin / creatinine urine ratio; Future    Basic metabolic panel; Future    metFORMIN (GLUCOPHAGE-XR) 500 mg 24 hr tablet; Take 1 tablet (500 mg total) by mouth 2 (two) times a day with meals

## 2025-04-08 NOTE — PROGRESS NOTES
Name: Delia Samano      : 1955      MRN: [id number]  Encounter Provider: Day Hobbs PA-C  Encounter Date: 2025   Encounter department: [facility] [place]    CC: DM  Assessment & Plan  Type 2 diabetes mellitus with diabetic peripheral angiopathy without gangrene, with long-term current use of insulin (HCC)  HGA1C 8.1%. Worsened.  Treatment regimen: she had diarrhea on metformin IR 1000 mg BID. Switch to ER metformin 1000 mg BID. If she has recurrent symptoms, will decrease dose to 1500 mg daily. Continue current dose of insulins without missing doses. Missing lunch time dose likely is causing higher BG levels at dinner.   Discussed intensive insulin regimen does increase risk for hypoglycemia. Episodes of hypoglycemia can lead to permanent disability and death.  Discussed risks/complications associated with uncontrolled diabetes.    Advised to adhere to diabetic diet, and recommended staying active/exercising routinely as tolerated.  Keep carbohydrates consistent to limit blood glucose fluctuations.  Advised to call if blood sugars less than 70 mg/dl or over 300 mg/dl.   Check blood glucose 3+ times a day  Discussed symptoms and treatment of hypoglycemia.   Discussed use of CGM to collect additional blood glucose data to reveal trends and patterns that can be used to optimize treatment plan.   Recommended routine follow-up with podiatry and ophthalmology.   Send log in 1-2 weeks.    Ordered blood work to complete prior to next visit.    Lab Results   Component Value Date    HGBA1C 8.1 (H) 2025       Orders:    Hemoglobin A1C; Future    Albumin / creatinine urine ratio; Future    Basic metabolic panel; Future    metFORMIN (GLUCOPHAGE-XR) 500 mg 24 hr tablet; Take 1 tablet (500 mg total) by mouth 2 (two) times a day with meals    Essential hypertension  Blood pressure adequately controlled, continue current treatment       Mixed hyperlipidemia  On statin  therapy  Managed by cardiology       Adrenal nodule (HCC)  Stable right adrenal nodule  Prior workup for Cushing syndrome, pheochromocytoma, primary hyperaldosteronism was negative  Orders:    Cortisol Level,7-9 AM Specimen; Future    Metanephrine, Fractionated Plasma Free; Future    Aldosterone/Renin Ratio; Future        History of Present Illness     Delia Samano is a 70 y.o. female with a history of type 2 diabetes with long term use of insulin. Diabetes course has been stable. Complications of DM: CAD, neuropathy. Denies recent illness or hospitalizations. Denies recent severe hypoglycemic or severe hyperglycemic episodes. Denies any issues with her current regimen. Home glucose monitoring: are performed regularly     Home blood glucose readings:   Before breakfast:  mg/dl, most under 154 mg/dl  Before lunch: N/A  Before dinner: 178-267 mg/dl. She is not checking frequently before dinner right now because she has not been home as often.   Bedtime: N/A     She is currently on a tapering dose of prednisone over 6 weeks due to flare-up of her RA. She has 3 weeks left of prednisone.   The past couple weeks have not been routine as well as because they are visiting their sister-in-law frequently who is dying and is on hospice.     Current regimen: Lantus 55 units nightly, NovoLog 10 units 3 times daily with meals and 5 units before snacks, metformin 1000 mg twice daily (1/2 tablet BID)  compliant some of the time, denies any side effects from medications. She admits to missing lunch dose of Novolog if she is out.   Injects in: abdomen, arm. Rotates sites: Yes  Hypoglycemic episodes: No, never  H/o of hypoglycemia causing hospitalization or Intervention such as glucagon injection  or ambulance call :  No  Hypoglycemia symptoms: never less than 70 mg/dl; feels lightheaded, jittery, sweaty  under 100 mg/dl  Treatment of hypoglycemia: discussed treatment      Medic alert tag: recommended: Yes     Diabetes  education: Yes  Diet: 2-3 meals per day, 1 snack per day. Timing of meals is predictable.   Diabetic diet compliance:  compliant some of the time. She has been eating a little later at dinner recently.   Activity: Daily activity is predictable: Yes. No routine exercise.      Has hypertension: on ACE inhibitor/ARB, compliant most of the time  Has hyperlipidemia: on statin - tolerating well, no myalgias. compliant most of the time, denies any side effects from medications.  Thyroid disorders: No  History of pancreatitis: No      Adrenal nodule: CT scan done in September 2024 showed a stable right adrenal nodule measuring 3.3 x 2.4 cm now containing more coarse calcifications compared to previous study.  The left adrenal gland appeared normal.  Prior work-up for Cushing's, primary aldosteronism, pheochromocytoma was negative.    Last Eye Exam: 09/13/2023. March 2025.  Last Foot Exam: 01/30/2025  Health Maintenance   Topic Date Due    Diabetic Eye Exam  09/13/2025    Diabetic Foot Exam  01/30/2026       Review of Systems   Constitutional:  Negative for activity change, appetite change, fatigue and unexpected weight change.   HENT:  Negative for trouble swallowing.    Eyes:  Negative for visual disturbance.   Respiratory:  Negative for shortness of breath.    Cardiovascular:  Negative for chest pain and palpitations.   Gastrointestinal:  Negative for constipation and diarrhea.   Endocrine: Negative for polydipsia and polyuria.   Musculoskeletal:  Positive for arthralgias (RA).   Skin:  Negative for wound.   Neurological:  Positive for numbness.   Psychiatric/Behavioral: Negative.      as per HPI    Current Outpatient Medications on File Prior to Visit   Medication Sig Dispense Refill    Actemra 162 MG/0.9ML SOSY once a week      ALPRAZolam (XANAX) 0.25 mg tablet as needed      amoxicillin (AMOXIL) 500 mg capsule Take 2,000 mg by mouth as needed 1 hour prior to dental appointment      aspirin 81 MG tablet Take 81 mg by  "mouth daily      atorvastatin (LIPITOR) 80 mg tablet Take 80 mg by mouth Medrol Dose Pack scheduling ONLY      B-D ALLERGY SYRINGE 1CC/28G 28G X 1/2\" 1 ML MISC use every week      B-D UF III MINI PEN NEEDLES 31G X 5 MM MISC use 2 daily as directed  0    brompheniramine-pseudoephedrine-DM 30-2-10 MG/5ML syrup Take 5 mL by mouth 4 (four) times a day as needed for allergies 120 mL 0    calcium carbonate (OS-RILEY) 600 MG tablet Take 400 mg by mouth daily      cetirizine (ZyrTEC) 10 mg tablet Take 1 tablet (10 mg total) by mouth daily 14 tablet 0    Cholecalciferol (VITAMIN D) 2000 units tablet Take 2,000 Units by mouth daily      doxycycline hyclate (VIBRA-TABS) 100 mg tablet Take 1 tablet by mouth 2 (two) times a day      fluticasone (FLONASE) 50 mcg/act nasal spray 1 spray into each nostril daily 15.8 mL 1    folic acid (FOLVITE) 800 MCG tablet Take 800 mg by mouth daily      gabapentin (NEURONTIN) 600 MG tablet       Insulin Glargine Solostar (Lantus SoloStar) 100 UNIT/ML SOPN Inject 55 units at bedtime      Insulin Pen Needle 31G X 8 MM MISC by Does not apply route      lisinopril (ZESTRIL) 10 mg tablet Take 1 tablet (10 mg total) by mouth daily 90 tablet 1    metFORMIN (GLUCOPHAGE) 1000 MG tablet Take 1 tablet by mouth 2 times a day with meals 180 tablet 1    methocarbamol (ROBAXIN) 750 mg tablet PRN      methotrexate 50 MG/2ML injection Inject 0.8 mL under the skin once a week      metoprolol tartrate (LOPRESSOR) 50 mg tablet 50 mg every 12 (twelve) hours      Multiple Vitamin (MULTIVITAMIN) tablet Take 1 tablet by mouth daily      nitrofurantoin (MACROBID) 100 mg capsule Take 1 capsule (100 mg total) by mouth 2 (two) times a day for 5 days 10 capsule 0    nitroglycerin (NITROSTAT) 0.4 mg SL tablet Place 1 tablet (0.4 mg total) under the tongue every 5 (five) minutes as needed for chest pain 90 tablet 1    NovoLOG FlexPen 100 units/mL injection pen inject 10 units subcutaneously before meals and 5 units before " SNACKS (MAX DAILY DOSE IS 40 UNITS) 45 mL 2    omeprazole (PriLOSEC) 20 mg delayed release capsule Take 20 mg by mouth daily      predniSONE 5 mg tablet Take by mouth      Probiotic Product (PROBIOTIC-10 PO) Take by mouth daily       traMADol (ULTRAM) 50 mg tablet Take 50 mg by mouth as needed for moderate pain      ferrous sulfate 324 (65 Fe) mg Take 1 tablet (324 mg total) by mouth 3 (three) times a week for 36 doses 12 tablet 2     No current facility-administered medications on file prior to visit.         Medical History Reviewed by provider this encounter:     .    Objective   /66   Pulse 65   Ht 5' (1.524 m)   Wt 112 kg (248 lb)   SpO2 95%   BMI 48.43 kg/m²      Body mass index is 48.43 kg/m².  Wt Readings from Last 3 Encounters:   04/08/25 112 kg (248 lb)   04/07/25 112 kg (248 lb)   01/27/25 113 kg (249 lb 8 oz)     Physical Exam  Vitals and nursing note reviewed.   Constitutional:       Appearance: She is well-developed.   HENT:      Head: Normocephalic.   Eyes:      General: No scleral icterus.  Neck:      Thyroid: No thyromegaly.   Cardiovascular:      Rate and Rhythm: Normal rate and regular rhythm.      Pulses:           Radial pulses are 2+ on the right side and 2+ on the left side.      Heart sounds: No murmur heard.  Pulmonary:      Effort: Pulmonary effort is normal. No respiratory distress.      Breath sounds: Normal breath sounds. No wheezing.   Musculoskeletal:      Cervical back: Neck supple.   Skin:     General: Skin is warm and dry.   Neurological:      Mental Status: She is alert.         Labs:   Component      Latest Ref Rng 5/14/2024 10/24/2024 3/19/2025   Sodium      135 - 147 mmol/L 140  139  138    Potassium      3.5 - 5.3 mmol/L 4.1  4.4  4.6    Chloride      96 - 108 mmol/L 103  103  100    Carbon Dioxide      21 - 32 mmol/L 29  29  28    ANION GAP      4 - 13 mmol/L 8  7  10    BUN      5 - 25 mg/dL 24  21  15    Creatinine      0.60 - 1.30 mg/dL 0.64  0.68  0.59 (L)     GLUCOSE, FASTING      65 - 99 mg/dL 171 (H)  158 (H)  210 (H)    Calcium      8.4 - 10.2 mg/dL 9.2  9.1  9.7    AST      13 - 39 U/L 25  27     ALT      7 - 52 U/L 33  35     ALK PHOS      34 - 104 U/L 65  63     Total Protein      6.4 - 8.4 g/dL 6.0 (L)  6.1 (L)     Albumin      3.5 - 5.0 g/dL 4.1  4.1     Total Bilirubin      0.20 - 1.00 mg/dL 0.84  1.19 (H)     GFR, Calculated      ml/min/1.73sq m 91  89  93    Cholesterol      See Comment mg/dL  135     Triglycerides      See Comment mg/dL  114     HDL      >=50 mg/dL  54     LDL Calculated      0 - 100 mg/dL  58     Non-HDL Cholesterol      mg/dl  81     NOREPINEPHRINE PLASMA      0 - 874 pg/mL 677      EPINEPHRINE PLASMA      0 - 62 pg/mL <15      DOPAMINE PLASMA      0 - 48 pg/mL <30      EXT Creatinine Urine      Reference range not established. mg/dL 98.2  138.6  48.7    Albumin,U,Random      <20.0 mg/L 223.3 (H)  817.4 (H)  149.1 (H)    Albumin Creat Ratio      0 - 30 mg/g creatinine 227 (H)  590 (H)  306 (H)    Hemoglobin A1C      Normal 4.0-5.6%; PreDiabetic 5.7-6.4%; Diabetic >=6.5%; Glycemic control for adults with diabetes <7.0% % 6.5 (H)  6.8 (H)  8.1 (H)    eAG, EST AVG Glucose      mg/dl 140  148  186    Cortisol - AM      6.7 - 22.6 ug/dL 9.2      VITAMIN D, 25-HYDROXY      30.0 - 100.0 ng/mL   35.0       Legend:  (H) High  (L) Low    Patient Instructions     Hypoglycemia instructions   Delia Samano  4/8/2025  [id number]    Low Blood Sugar    Steps to treat low blood sugar.    1. Test blood sugar if you have symptoms of low blood sugar:   Low Blood Sugar Symptoms:  o Sweaty  o Dizzy  o Rapid heartbeat  o Shaky  o Bad mood  o Hungry      2. Treat blood sugar less than 70 with 15 grams of fast-acting carbohydrate:   Examples of 15 grams Fast-Acting Carbohydrate:  o 4 oz juice  o 4 oz regular soda  o 3-4 glucose tablets (chew)  o 3-4 hard candies (chew)          3.  Wait 15 minutes and test your blood sugar again     4. If blood sugar is less  than 100, repeat steps 2-3.    5. When your blood sugar is 100 or more, eat a snack if it will be longer than one hour until your next meal. The snack should be 15 grams of carbohydrate and a protein:   Examples of snacks:  o ½ sandwich  o 6 crackers with cheese  o Piece of fruit with cheese or peanut butter  o 6 crackers with peanut butter      Discussed with the patient and all questioned fully answered. She will call me if any problems arise.    Administrative Statements   I have spent a total time of 30 minutes in caring for this patient on the day of the visit/encounter including Importance of tx compliance, Documenting in the medical record, Reviewing/placing orders in the medical record (including tests, medications, and/or procedures), and Obtaining or reviewing history  .

## 2025-04-10 ENCOUNTER — TELEPHONE (OUTPATIENT)
Age: 70
End: 2025-04-10

## 2025-04-10 DIAGNOSIS — Z79.4 TYPE 2 DIABETES MELLITUS WITH DIABETIC PERIPHERAL ANGIOPATHY WITHOUT GANGRENE, WITH LONG-TERM CURRENT USE OF INSULIN (HCC): ICD-10-CM

## 2025-04-10 DIAGNOSIS — E11.51 TYPE 2 DIABETES MELLITUS WITH DIABETIC PERIPHERAL ANGIOPATHY WITHOUT GANGRENE, WITH LONG-TERM CURRENT USE OF INSULIN (HCC): ICD-10-CM

## 2025-04-10 NOTE — TELEPHONE ENCOUNTER
Pharmacy calling asking if the dispense quantity for metformin is meant to be 180 versus 360? Please advise.

## 2025-04-11 RX ORDER — METFORMIN HYDROCHLORIDE 500 MG/1
1000 TABLET, EXTENDED RELEASE ORAL 2 TIMES DAILY WITH MEALS
Qty: 360 TABLET | Refills: 2 | Status: SHIPPED | OUTPATIENT
Start: 2025-04-11

## 2025-04-21 ENCOUNTER — OFFICE VISIT (OUTPATIENT)
Dept: FAMILY MEDICINE CLINIC | Facility: CLINIC | Age: 70
End: 2025-04-21
Payer: COMMERCIAL

## 2025-04-21 VITALS
HEART RATE: 77 BPM | BODY MASS INDEX: 48.53 KG/M2 | WEIGHT: 248.5 LBS | TEMPERATURE: 97.6 F | SYSTOLIC BLOOD PRESSURE: 126 MMHG | DIASTOLIC BLOOD PRESSURE: 60 MMHG | RESPIRATION RATE: 16 BRPM | OXYGEN SATURATION: 95 %

## 2025-04-21 DIAGNOSIS — J06.9 ACUTE URI: ICD-10-CM

## 2025-04-21 DIAGNOSIS — R05.9 COUGH, UNSPECIFIED TYPE: Primary | ICD-10-CM

## 2025-04-21 LAB
SARS-COV-2 AG UPPER RESP QL IA: NEGATIVE
SL AMB POCT RAPID FLU A: NORMAL
SL AMB POCT RAPID FLU B: NORMAL
VALID CONTROL: NORMAL

## 2025-04-21 PROCEDURE — 87804 INFLUENZA ASSAY W/OPTIC: CPT | Performed by: FAMILY MEDICINE

## 2025-04-21 PROCEDURE — 87811 SARS-COV-2 COVID19 W/OPTIC: CPT | Performed by: FAMILY MEDICINE

## 2025-04-21 PROCEDURE — G2211 COMPLEX E/M VISIT ADD ON: HCPCS | Performed by: FAMILY MEDICINE

## 2025-04-21 PROCEDURE — 99213 OFFICE O/P EST LOW 20 MIN: CPT | Performed by: FAMILY MEDICINE

## 2025-04-21 RX ORDER — BENZONATATE 200 MG/1
200 CAPSULE ORAL 3 TIMES DAILY PRN
Qty: 20 CAPSULE | Refills: 0 | Status: SHIPPED | OUTPATIENT
Start: 2025-04-21

## 2025-04-21 RX ORDER — DOXYCYCLINE HYCLATE 100 MG
100 TABLET ORAL 2 TIMES DAILY
Qty: 14 TABLET | Refills: 0 | Status: SHIPPED | OUTPATIENT
Start: 2025-04-21 | End: 2025-04-28

## 2025-04-21 NOTE — PROGRESS NOTES
Name: Delia Samano      : 1955      MRN: [id number]  Encounter Provider: Judith hZou MD  Encounter Date: 2025   Encounter department: [facility]  :  Assessment & Plan  Cough, unspecified type    Orders:    POCT Rapid Covid Ag    POCT rapid flu A and B    Acute URI  Continue with symptomatic treatment.  Start Tessalon Perles and doxycycline  Continue flonase and Robitussin  Follow up in office if no improvement could consider chest x-ray at that time    Orders:    doxycycline hyclate (VIBRA-TABS) 100 mg tablet; Take 1 tablet (100 mg total) by mouth 2 (two) times a day for 7 days    benzonatate (TESSALON) 200 MG capsule; Take 1 capsule (200 mg total) by mouth 3 (three) times a day as needed for cough           History of Present Illness   Patient presents with:  Cough: congestions sinus   Thursday   Robitussin tylenol       Patient presents today with cough and sinus congestion which began on Thursday.  She been taking Robitussin over-the-counter but this has not helped.  Cough is keeping her up at night.  Denies any fevers.  No improvement in symptoms.    Cough  Associated symptoms include headaches, rhinorrhea and a sore throat.     Review of Systems   Constitutional:  Positive for fatigue.   HENT:  Positive for congestion, rhinorrhea, sinus pressure and sore throat.    Respiratory:  Positive for cough.    Neurological:  Positive for headaches.       Objective   /60 (BP Location: Left arm, Patient Position: Sitting, Cuff Size: Large)   Pulse 77   Temp 97.6 °F (36.4 °C) (Temporal)   Resp 16   Wt 113 kg (248 lb 8 oz)   SpO2 95%   BMI 48.53 kg/m²      Physical Exam  Constitutional:       Appearance: Normal appearance. She is ill-appearing.   HENT:      Head: Normocephalic and atraumatic.      Nose: Congestion and rhinorrhea present.   Cardiovascular:      Rate and Rhythm: Normal rate and regular rhythm.   Pulmonary:      Comments: Course breath  sounds  Neurological:      General: No focal deficit present.      Mental Status: She is alert.   Psychiatric:         Mood and Affect: Mood normal.

## 2025-05-06 ENCOUNTER — TELEPHONE (OUTPATIENT)
Dept: OTHER | Facility: HOSPITAL | Age: 70
End: 2025-05-06

## 2025-05-06 DIAGNOSIS — I10 ESSENTIAL HYPERTENSION: ICD-10-CM

## 2025-05-06 RX ORDER — LISINOPRIL 10 MG/1
10 TABLET ORAL DAILY
Qty: 90 TABLET | Refills: 1 | Status: SHIPPED | OUTPATIENT
Start: 2025-05-06

## 2025-05-06 NOTE — TELEPHONE ENCOUNTER
Patient called the RX Refill Line. Message is being forwarded to the office.     Patient is requesting a medication to help her lingering cough. She states she was seen in the office a week ago and while she is feeling better, she still has a cough. Benzonatate has not helped and the cough is worse at night.     Pharmacy: [facility] - [place], [place]     Please contact patient at [phone]

## 2025-05-06 NOTE — TELEPHONE ENCOUNTER
Reason for call:   [x] Refill   [] Prior Auth  [] Other:     Office:   [x] PCP/Provider -   [] Specialty/Provider -     Medication: lisinopril (ZESTRIL) 10 mg tablet     Dose/Frequency: Take 1 tablet (10 mg total) by mouth daily     Quantity: 90    Pharmacy: [facility].       Mail Away Pharmacy   Does the patient have enough for 10 days?   [x] Yes   [] No - Send as HP to POD

## 2025-05-13 DIAGNOSIS — Z79.4 TYPE 2 DIABETES MELLITUS WITH DIABETIC PERIPHERAL ANGIOPATHY WITHOUT GANGRENE, WITH LONG-TERM CURRENT USE OF INSULIN (HCC): Primary | ICD-10-CM

## 2025-05-13 DIAGNOSIS — E11.51 TYPE 2 DIABETES MELLITUS WITH DIABETIC PERIPHERAL ANGIOPATHY WITHOUT GANGRENE, WITH LONG-TERM CURRENT USE OF INSULIN (HCC): Primary | ICD-10-CM

## 2025-05-19 DIAGNOSIS — E11.65 TYPE 2 DIABETES MELLITUS WITH HYPERGLYCEMIA, WITH LONG-TERM CURRENT USE OF INSULIN (HCC): ICD-10-CM

## 2025-05-19 DIAGNOSIS — Z79.4 TYPE 2 DIABETES MELLITUS WITH HYPERGLYCEMIA, WITH LONG-TERM CURRENT USE OF INSULIN (HCC): ICD-10-CM

## 2025-05-20 DIAGNOSIS — Z79.4 TYPE 2 DIABETES MELLITUS WITH HYPERGLYCEMIA, WITH LONG-TERM CURRENT USE OF INSULIN (HCC): ICD-10-CM

## 2025-05-20 DIAGNOSIS — E11.65 TYPE 2 DIABETES MELLITUS WITH HYPERGLYCEMIA, WITH LONG-TERM CURRENT USE OF INSULIN (HCC): ICD-10-CM

## 2025-05-20 RX ORDER — INSULIN GLARGINE 100 [IU]/ML
INJECTION, SOLUTION SUBCUTANEOUS
Qty: 45 ML | Refills: 1 | Status: SHIPPED | OUTPATIENT
Start: 2025-05-20

## 2025-05-20 RX ORDER — INSULIN GLARGINE 100 [IU]/ML
INJECTION, SOLUTION SUBCUTANEOUS
Qty: 60 ML | OUTPATIENT
Start: 2025-05-20

## 2025-07-07 DIAGNOSIS — Z79.4 TYPE 2 DIABETES MELLITUS WITH DIABETIC PERIPHERAL ANGIOPATHY WITHOUT GANGRENE, WITH LONG-TERM CURRENT USE OF INSULIN (HCC): ICD-10-CM

## 2025-07-07 DIAGNOSIS — E11.51 TYPE 2 DIABETES MELLITUS WITH DIABETIC PERIPHERAL ANGIOPATHY WITHOUT GANGRENE, WITH LONG-TERM CURRENT USE OF INSULIN (HCC): ICD-10-CM

## 2025-07-09 ENCOUNTER — APPOINTMENT (OUTPATIENT)
Dept: LAB | Facility: MEDICAL CENTER | Age: 70
End: 2025-07-09
Attending: INTERNAL MEDICINE
Payer: COMMERCIAL

## 2025-07-09 DIAGNOSIS — M19.90 INFLAMMATORY ARTHRITIS: ICD-10-CM

## 2025-07-09 DIAGNOSIS — E11.51 TYPE 2 DIABETES MELLITUS WITH DIABETIC PERIPHERAL ANGIOPATHY WITHOUT GANGRENE, WITH LONG-TERM CURRENT USE OF INSULIN (HCC): ICD-10-CM

## 2025-07-09 DIAGNOSIS — M05.79 RHEUMATOID ARTHRITIS INVOLVING MULTIPLE SITES WITH POSITIVE RHEUMATOID FACTOR (HCC): ICD-10-CM

## 2025-07-09 DIAGNOSIS — Z79.4 TYPE 2 DIABETES MELLITUS WITH DIABETIC PERIPHERAL ANGIOPATHY WITHOUT GANGRENE, WITH LONG-TERM CURRENT USE OF INSULIN (HCC): ICD-10-CM

## 2025-07-09 DIAGNOSIS — E27.9 ADRENAL NODULE (HCC): ICD-10-CM

## 2025-07-09 LAB
BACTERIA UR QL AUTO: ABNORMAL /HPF
BILIRUB UR QL STRIP: NEGATIVE
CLARITY UR: ABNORMAL
COLOR UR: ABNORMAL
CORTIS AM PEAK SERPL-MCNC: 10.9 UG/DL (ref 6.7–22.6)
CREAT UR-MCNC: 287.5 MG/DL
EST. AVERAGE GLUCOSE BLD GHB EST-MCNC: 148 MG/DL
GLUCOSE UR STRIP-MCNC: NEGATIVE MG/DL
HBA1C MFR BLD: 6.8 %
HGB UR QL STRIP.AUTO: ABNORMAL
HYALINE CASTS #/AREA URNS LPF: ABNORMAL /LPF
KETONES UR STRIP-MCNC: ABNORMAL MG/DL
LEUKOCYTE ESTERASE UR QL STRIP: ABNORMAL
MICROALBUMIN UR-MCNC: 836.4 MG/L
MICROALBUMIN/CREAT 24H UR: 291 MG/G CREATININE (ref 0–30)
MUCOUS THREADS UR QL AUTO: ABNORMAL
NITRITE UR QL STRIP: NEGATIVE
NON-SQ EPI CELLS URNS QL MICRO: ABNORMAL /HPF
PH UR STRIP.AUTO: 6 [PH]
PROT UR STRIP-MCNC: ABNORMAL MG/DL
RBC #/AREA URNS AUTO: ABNORMAL /HPF
SP GR UR STRIP.AUTO: 1.03 (ref 1–1.03)
UROBILINOGEN UR STRIP-ACNC: 2 MG/DL
WBC #/AREA URNS AUTO: ABNORMAL /HPF

## 2025-07-09 PROCEDURE — 82570 ASSAY OF URINE CREATININE: CPT

## 2025-07-09 PROCEDURE — 82533 TOTAL CORTISOL: CPT

## 2025-07-09 PROCEDURE — 83036 HEMOGLOBIN GLYCOSYLATED A1C: CPT

## 2025-07-09 PROCEDURE — 82088 ASSAY OF ALDOSTERONE: CPT

## 2025-07-09 PROCEDURE — 84244 ASSAY OF RENIN: CPT

## 2025-07-09 PROCEDURE — 81001 URINALYSIS AUTO W/SCOPE: CPT

## 2025-07-09 PROCEDURE — 82043 UR ALBUMIN QUANTITATIVE: CPT

## 2025-07-09 PROCEDURE — 83835 ASSAY OF METANEPHRINES: CPT

## 2025-07-14 LAB
ALDOST SERPL-MCNC: 3.3 NG/DL (ref 0–30)
ALDOST/RENIN PLAS-RTO: 2.4 {RATIO} (ref 0–30)
RENIN PLAS-CCNC: 1.37 NG/ML/HR (ref 0.17–5.38)

## 2025-07-19 LAB
METANEPH FREE SERPL-MCNC: <25 PG/ML (ref 0–88)
NORMETANEPHRINE SERPL-MCNC: 103.2 PG/ML (ref 0–285.2)

## 2025-08-05 ENCOUNTER — TELEPHONE (OUTPATIENT)
Dept: ADMINISTRATIVE | Facility: OTHER | Age: 70
End: 2025-08-05

## 2025-08-05 ENCOUNTER — OFFICE VISIT (OUTPATIENT)
Dept: FAMILY MEDICINE CLINIC | Facility: CLINIC | Age: 70
End: 2025-08-05
Payer: COMMERCIAL

## 2025-08-05 VITALS
OXYGEN SATURATION: 97 % | HEART RATE: 78 BPM | TEMPERATURE: 97.7 F | HEIGHT: 60 IN | DIASTOLIC BLOOD PRESSURE: 74 MMHG | RESPIRATION RATE: 18 BRPM | SYSTOLIC BLOOD PRESSURE: 128 MMHG | WEIGHT: 242.5 LBS | BODY MASS INDEX: 47.61 KG/M2

## 2025-08-05 DIAGNOSIS — Z00.00 MEDICARE ANNUAL WELLNESS VISIT, SUBSEQUENT: ICD-10-CM

## 2025-08-05 DIAGNOSIS — Z79.4 TYPE 2 DIABETES MELLITUS WITH HYPERGLYCEMIA, WITH LONG-TERM CURRENT USE OF INSULIN (HCC): Primary | ICD-10-CM

## 2025-08-05 DIAGNOSIS — R80.9 MICROALBUMINURIA DUE TO TYPE 2 DIABETES MELLITUS  (HCC): ICD-10-CM

## 2025-08-05 DIAGNOSIS — I10 ESSENTIAL HYPERTENSION: ICD-10-CM

## 2025-08-05 DIAGNOSIS — E11.65 TYPE 2 DIABETES MELLITUS WITH HYPERGLYCEMIA, WITH LONG-TERM CURRENT USE OF INSULIN (HCC): Primary | ICD-10-CM

## 2025-08-05 DIAGNOSIS — E11.29 MICROALBUMINURIA DUE TO TYPE 2 DIABETES MELLITUS  (HCC): ICD-10-CM

## 2025-08-05 DIAGNOSIS — J06.9 ACUTE URI: ICD-10-CM

## 2025-08-05 DIAGNOSIS — E27.9 ADRENAL NODULE (HCC): ICD-10-CM

## 2025-08-05 DIAGNOSIS — E78.2 MIXED HYPERLIPIDEMIA: ICD-10-CM

## 2025-08-05 PROCEDURE — G0537 PR RISK ASCVD TST ONCE PR 12 MO: HCPCS | Performed by: FAMILY MEDICINE

## 2025-08-05 PROCEDURE — 99214 OFFICE O/P EST MOD 30 MIN: CPT | Performed by: FAMILY MEDICINE

## 2025-08-05 PROCEDURE — G0439 PPPS, SUBSEQ VISIT: HCPCS | Performed by: FAMILY MEDICINE

## 2025-08-05 PROCEDURE — G0442 ANNUAL ALCOHOL SCREEN 15 MIN: HCPCS | Performed by: FAMILY MEDICINE

## 2025-08-05 PROCEDURE — G2211 COMPLEX E/M VISIT ADD ON: HCPCS | Performed by: FAMILY MEDICINE

## 2025-08-05 PROCEDURE — G0444 DEPRESSION SCREEN ANNUAL: HCPCS | Performed by: FAMILY MEDICINE

## 2025-08-05 RX ORDER — FLUTICASONE PROPIONATE 50 MCG
1 SPRAY, SUSPENSION (ML) NASAL DAILY
Qty: 15.8 ML | Refills: 1 | Status: SHIPPED | OUTPATIENT
Start: 2025-08-05

## 2025-08-08 ENCOUNTER — TELEPHONE (OUTPATIENT)
Dept: ENDOCRINOLOGY | Facility: CLINIC | Age: 70
End: 2025-08-08

## 2025-08-12 ENCOUNTER — CONSULT (OUTPATIENT)
Dept: FAMILY MEDICINE CLINIC | Facility: CLINIC | Age: 70
End: 2025-08-12
Payer: COMMERCIAL

## 2025-08-12 PROBLEM — M17.11 PRIMARY OSTEOARTHRITIS OF RIGHT KNEE: Status: ACTIVE | Noted: 2025-08-12

## 2025-08-13 ENCOUNTER — PATIENT MESSAGE (OUTPATIENT)
Dept: ENDOCRINOLOGY | Facility: CLINIC | Age: 70
End: 2025-08-13

## (undated) DEVICE — LASER FIBER HOLMIUM 272MICRON

## (undated) DEVICE — TUBING SUCTION 5MM X 12 FT

## (undated) DEVICE — BASKET SPECIMEN RETRIVAL 1.9FR 120CM

## (undated) DEVICE — GLOVE SRG BIOGEL 7.5

## (undated) DEVICE — SCD SEQUENTIAL COMPRESSION COMFORT SLEEVE MEDIUM KNEE LENGTH: Brand: KENDALL SCD

## (undated) DEVICE — PACK TUR

## (undated) DEVICE — UROCATCH BAG

## (undated) DEVICE — GUIDEWIRE STRGHT TIP 0.035 IN  SOLO PLUS

## (undated) DEVICE — STENT CONTOUR INJ 6FR 30CM: Type: IMPLANTABLE DEVICE | Site: BLADDER | Status: NON-FUNCTIONAL

## (undated) DEVICE — LUBRICANT SURGILUBE TUBE 4 OZ  FLIP TOP

## (undated) DEVICE — SPECIMEN CONTAINER STERILE PEEL PACK

## (undated) DEVICE — ENDOSCOPIC VALVE WITH ADAPTER.: Brand: SURSEAL® II

## (undated) DEVICE — Device

## (undated) DEVICE — URETERAL CATHETER POLLACK OPEN ENDED 5FR